# Patient Record
Sex: MALE | Race: WHITE | NOT HISPANIC OR LATINO | Employment: FULL TIME | URBAN - METROPOLITAN AREA
[De-identification: names, ages, dates, MRNs, and addresses within clinical notes are randomized per-mention and may not be internally consistent; named-entity substitution may affect disease eponyms.]

---

## 2017-03-10 ENCOUNTER — PATIENT MESSAGE (OUTPATIENT)
Dept: PSYCHIATRY | Facility: CLINIC | Age: 42
End: 2017-03-10

## 2017-03-13 ENCOUNTER — OFFICE VISIT (OUTPATIENT)
Dept: PSYCHIATRY | Facility: CLINIC | Age: 42
End: 2017-03-13
Payer: COMMERCIAL

## 2017-03-13 VITALS — DIASTOLIC BLOOD PRESSURE: 103 MMHG | HEART RATE: 73 BPM | SYSTOLIC BLOOD PRESSURE: 171 MMHG | HEIGHT: 69 IN

## 2017-03-13 DIAGNOSIS — F41.1 GAD (GENERALIZED ANXIETY DISORDER): ICD-10-CM

## 2017-03-13 DIAGNOSIS — F33.1 MODERATE EPISODE OF RECURRENT MAJOR DEPRESSIVE DISORDER: ICD-10-CM

## 2017-03-13 PROBLEM — F32.A DEPRESSION: Status: ACTIVE | Noted: 2017-03-13

## 2017-03-13 PROCEDURE — 90792 PSYCH DIAG EVAL W/MED SRVCS: CPT | Mod: S$GLB,,, | Performed by: PSYCHIATRY & NEUROLOGY

## 2017-03-13 PROCEDURE — 99999 PR PBB SHADOW E&M-EST. PATIENT-LVL II: CPT | Mod: PBBFAC,,, | Performed by: PSYCHIATRY & NEUROLOGY

## 2017-03-13 RX ORDER — DULOXETIN HYDROCHLORIDE 60 MG/1
60 CAPSULE, DELAYED RELEASE ORAL 2 TIMES DAILY
Qty: 60 CAPSULE | Refills: 5 | Status: SHIPPED | OUTPATIENT
Start: 2017-03-13 | End: 2017-07-04 | Stop reason: SDUPTHER
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
## 2017-03-24 ENCOUNTER — TELEPHONE (OUTPATIENT)
Dept: INTERNAL MEDICINE | Facility: CLINIC | Age: 42
End: 2017-03-24

## 2017-03-24 DIAGNOSIS — Z20.828 EXPOSURE TO INFLUENZA: Primary | ICD-10-CM

## 2017-03-24 RX ORDER — OSELTAMIVIR PHOSPHATE 75 MG/1
75 CAPSULE ORAL DAILY
Qty: 7 CAPSULE | Refills: 0 | Status: SHIPPED | OUTPATIENT
Start: 2017-03-24 | End: 2017-03-31

## 2017-03-24 NOTE — TELEPHONE ENCOUNTER
----- Message from Ava Leyva sent at 3/24/2017 12:12 PM CDT -----  Contact: [phone]/ self   Patient;s daughter has flu and patient wouild like prescription for tamiflu to be called in. Please advise.

## 2017-04-26 ENCOUNTER — OFFICE VISIT (OUTPATIENT)
Dept: PSYCHIATRY | Facility: CLINIC | Age: 42
End: 2017-04-26
Payer: COMMERCIAL

## 2017-04-26 VITALS
HEART RATE: 102 BPM | SYSTOLIC BLOOD PRESSURE: 155 MMHG | WEIGHT: 225.81 LBS | BODY MASS INDEX: 33.45 KG/M2 | DIASTOLIC BLOOD PRESSURE: 89 MMHG | HEIGHT: 69 IN

## 2017-04-26 DIAGNOSIS — F41.1 GAD (GENERALIZED ANXIETY DISORDER): Primary | ICD-10-CM

## 2017-04-26 DIAGNOSIS — F32.89 DEPRESSIVE DISORDER, NOT ELSEWHERE CLASSIFIED: ICD-10-CM

## 2017-04-26 PROCEDURE — 90836 PSYTX W PT W E/M 45 MIN: CPT | Mod: S$GLB,,, | Performed by: PSYCHIATRY & NEUROLOGY

## 2017-04-26 PROCEDURE — 99214 OFFICE O/P EST MOD 30 MIN: CPT | Mod: S$GLB,,, | Performed by: PSYCHIATRY & NEUROLOGY

## 2017-04-26 PROCEDURE — 99999 PR PBB SHADOW E&M-EST. PATIENT-LVL II: CPT | Mod: PBBFAC,,, | Performed by: PSYCHIATRY & NEUROLOGY

## 2017-04-26 PROCEDURE — 1160F RVW MEDS BY RX/DR IN RCRD: CPT | Mod: S$GLB,,, | Performed by: PSYCHIATRY & NEUROLOGY

## 2017-04-26 RX ORDER — LAMOTRIGINE 25 MG/1
TABLET ORAL
Qty: 30 TABLET | Refills: 2 | Status: SHIPPED | OUTPATIENT
Start: 2017-04-26 | End: 2017-06-15
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
## 2017-05-12 ENCOUNTER — PATIENT MESSAGE (OUTPATIENT)
Dept: PSYCHIATRY | Facility: CLINIC | Age: 42
End: 2017-05-12

## 2017-05-17 ENCOUNTER — PATIENT MESSAGE (OUTPATIENT)
Dept: PSYCHIATRY | Facility: CLINIC | Age: 42
End: 2017-05-17

## 2017-06-07 ENCOUNTER — OFFICE VISIT (OUTPATIENT)
Dept: INTERNAL MEDICINE | Facility: CLINIC | Age: 42
End: 2017-06-07
Payer: COMMERCIAL

## 2017-06-07 ENCOUNTER — PATIENT MESSAGE (OUTPATIENT)
Dept: INTERNAL MEDICINE | Facility: CLINIC | Age: 42
End: 2017-06-07

## 2017-06-07 ENCOUNTER — HOSPITAL ENCOUNTER (OUTPATIENT)
Dept: RADIOLOGY | Facility: HOSPITAL | Age: 42
Discharge: HOME | End: 2017-06-07
Attending: INTERNAL MEDICINE
Payer: COMMERCIAL

## 2017-06-07 VITALS
TEMPERATURE: 98 F | HEART RATE: 88 BPM | DIASTOLIC BLOOD PRESSURE: 99 MMHG | SYSTOLIC BLOOD PRESSURE: 136 MMHG | HEIGHT: 69 IN | RESPIRATION RATE: 16 BRPM | BODY MASS INDEX: 33.86 KG/M2 | WEIGHT: 228.63 LBS

## 2017-06-07 DIAGNOSIS — R10.11 RIGHT UPPER QUADRANT PAIN: Primary | ICD-10-CM

## 2017-06-07 DIAGNOSIS — Z00.00 ROUTINE GENERAL MEDICAL EXAMINATION AT A HEALTH CARE FACILITY: Primary | ICD-10-CM

## 2017-06-07 DIAGNOSIS — R10.11 RIGHT UPPER QUADRANT PAIN: ICD-10-CM

## 2017-06-07 PROCEDURE — 71110 X-RAY EXAM RIBS BIL 3 VIEWS: CPT | Mod: TC,PO

## 2017-06-07 PROCEDURE — 99214 OFFICE O/P EST MOD 30 MIN: CPT | Mod: S$GLB,,, | Performed by: INTERNAL MEDICINE

## 2017-06-07 PROCEDURE — 99999 PR PBB SHADOW E&M-EST. PATIENT-LVL III: CPT | Mod: PBBFAC,,, | Performed by: INTERNAL MEDICINE

## 2017-06-07 PROCEDURE — 71110 X-RAY EXAM RIBS BIL 3 VIEWS: CPT | Mod: 26,,, | Performed by: RADIOLOGY

## 2017-06-07 RX ORDER — TIZANIDINE 2 MG/1
2 TABLET ORAL NIGHTLY PRN
Qty: 14 TABLET | Refills: 0 | Status: SHIPPED | OUTPATIENT
Start: 2017-06-07 | End: 2017-06-17

## 2017-06-07 RX ORDER — METHYLPREDNISOLONE 4 MG/1
TABLET ORAL
Qty: 1 PACKAGE | Refills: 0 | Status: SHIPPED | OUTPATIENT
Start: 2017-06-07 | End: 2017-06-28

## 2017-06-07 NOTE — PROGRESS NOTES
"Subjective:       Patient ID: Jesse Hernández is a 42 y.o. male.    Chief Complaint: Flank Pain (right)    HPI     Patient is a 42 year old male here today with a 3 day history of right sided upper quadrant/rib cage associated pain. He says he had an "attack of his allergies" the other morning causing him to sneeze and cough quite a bit. He wasn't sure if he pulled a muscle or hurt his ribcage in doing so by sneezing so much but since then it is sore. Causing him sharp pain with movement. No change with eating meals. No fever/chills. No nausea.vomiting. No constipation or diarrhea. No diaphoresis, no chest heaviness, no palpitations, no cough, no sob or wheezing. No hemoptysis.    Review of Systems   Constitutional: Negative for chills, fatigue and fever.   HENT: Negative for congestion, ear pain, postnasal drip, rhinorrhea, sinus pressure and sore throat.    Eyes: Negative for itching and visual disturbance.   Respiratory: Negative for cough, shortness of breath and wheezing.    Cardiovascular: Negative for chest pain, palpitations and leg swelling.   Gastrointestinal: Positive for abdominal pain. Negative for nausea.   Genitourinary: Negative for dysuria.   Musculoskeletal: Negative for arthralgias and myalgias.   Skin: Negative for rash.   Neurological: Negative for weakness, light-headedness and headaches.       Objective:      Physical Exam   Constitutional: He is oriented to person, place, and time. He appears well-developed and well-nourished. No distress.   HENT:   Head: Normocephalic and atraumatic.   Mouth/Throat: Oropharynx is clear and moist. No oropharyngeal exudate.   Eyes: Conjunctivae and EOM are normal. Pupils are equal, round, and reactive to light.   Neck: Normal range of motion. Neck supple.   Cardiovascular: Normal rate, regular rhythm and normal heart sounds.    No murmur heard.  No rib cage tenderness to palpation   But pain was elicited with having him sit straight up and move his torso   "   Pulmonary/Chest: Effort normal and breath sounds normal. No respiratory distress. He has no wheezes.   Neurological: He is alert and oriented to person, place, and time.   Skin: Skin is warm. He is not diaphoretic.   Nursing note and vitals reviewed.      Assessment:       1. Right upper quadrant pain        Plan:       Patient describes rib cage pain worse with position, rotating torso in bed, straightening is spine. No trauma described. Sounds more like costochondritis. He has taken ibuprofen without much relief. Try of Medrol Dose Pack. Rib Xray today. Call me by Friday if sx have not slightly improved. Rx for Zanaflex 2 mg nightly prn muscle spasm.

## 2017-06-08 ENCOUNTER — TELEPHONE (OUTPATIENT)
Dept: INTERNAL MEDICINE | Facility: CLINIC | Age: 42
End: 2017-06-08

## 2017-06-08 NOTE — TELEPHONE ENCOUNTER
----- Message from Jose Luis Sharpe MD sent at 6/7/2017  3:50 PM CDT -----  Please let patient know that his rib xray was normal and lungs appeared normal as well. Thanks

## 2017-06-12 ENCOUNTER — PATIENT MESSAGE (OUTPATIENT)
Dept: INTERNAL MEDICINE | Facility: CLINIC | Age: 42
End: 2017-06-12

## 2017-06-12 DIAGNOSIS — R07.89 CHEST PAIN, ATYPICAL: Primary | ICD-10-CM

## 2017-06-14 NOTE — TELEPHONE ENCOUNTER
Spoke to patient and informed him that Dr. Sharpe suggest a CT of the chest.  Patient will wait a day or two to see if he will agree to have CT done.

## 2017-06-14 NOTE — TELEPHONE ENCOUNTER
Please let patient know that since he is still having pain, I would recommend a CT chest to be checked. Thank you.

## 2017-06-15 ENCOUNTER — OFFICE VISIT (OUTPATIENT)
Dept: PSYCHIATRY | Facility: CLINIC | Age: 42
End: 2017-06-15
Payer: COMMERCIAL

## 2017-06-15 VITALS
HEART RATE: 76 BPM | DIASTOLIC BLOOD PRESSURE: 108 MMHG | SYSTOLIC BLOOD PRESSURE: 152 MMHG | WEIGHT: 228 LBS | HEIGHT: 69 IN | BODY MASS INDEX: 33.77 KG/M2

## 2017-06-15 DIAGNOSIS — F41.1 GAD (GENERALIZED ANXIETY DISORDER): ICD-10-CM

## 2017-06-15 DIAGNOSIS — F33.40 MAJOR DEPRESSIVE DISORDER, RECURRENT, IN REMISSION: Primary | ICD-10-CM

## 2017-06-15 PROCEDURE — 99999 PR PBB SHADOW E&M-EST. PATIENT-LVL III: CPT | Mod: PBBFAC,,, | Performed by: PSYCHIATRY & NEUROLOGY

## 2017-06-15 PROCEDURE — 99214 OFFICE O/P EST MOD 30 MIN: CPT | Mod: S$GLB,,, | Performed by: PSYCHIATRY & NEUROLOGY

## 2017-06-15 RX ORDER — LAMOTRIGINE 100 MG/1
100 TABLET ORAL DAILY
Qty: 30 TABLET | Refills: 2 | Status: SHIPPED | OUTPATIENT
Start: 2017-06-15 | End: 2017-06-29 | Stop reason: SDUPTHER

## 2017-06-15 RX ORDER — QUETIAPINE FUMARATE 25 MG/1
25 TABLET, FILM COATED ORAL 2 TIMES DAILY PRN
Qty: 60 TABLET | Refills: 2 | Status: SHIPPED | OUTPATIENT
Start: 2017-06-15 | End: 2018-02-12

## 2017-06-15 NOTE — PATIENT INSTRUCTIONS
1. Continue cymbalta 60 mg daily.  2. Increase lamictal to 100 mg daily, sent a prescription for 100 mg tablet to your pharmacy.  3. Start seroquel 12.5 - 25 mg up to twice a day as needed for anxiety, irritability. (1/2 to 1 tablet)  4. Recommend: Gennaro Aguila, Nhi Zuniga, John Barone and Triny Alarcon for therapy.  5. Return on 6/29 at 8am.

## 2017-06-16 NOTE — TELEPHONE ENCOUNTER
----- Message from Vika Hyde sent at 6/16/2017 10:56 AM CDT -----  Contact: pt [phone]  Patient is returning a phone call.  Who left a message for the patient: nurse  Does patient know what this is regarding:  Ct scan  Comments:

## 2017-06-29 ENCOUNTER — OFFICE VISIT (OUTPATIENT)
Dept: PSYCHIATRY | Facility: CLINIC | Age: 42
End: 2017-06-29
Payer: COMMERCIAL

## 2017-06-29 VITALS
DIASTOLIC BLOOD PRESSURE: 102 MMHG | BODY MASS INDEX: 33.77 KG/M2 | SYSTOLIC BLOOD PRESSURE: 142 MMHG | HEIGHT: 69 IN | HEART RATE: 71 BPM | WEIGHT: 228 LBS

## 2017-06-29 DIAGNOSIS — F41.1 GAD (GENERALIZED ANXIETY DISORDER): ICD-10-CM

## 2017-06-29 DIAGNOSIS — F33.40 MAJOR DEPRESSIVE DISORDER, RECURRENT, IN REMISSION: ICD-10-CM

## 2017-06-29 PROCEDURE — 90833 PSYTX W PT W E/M 30 MIN: CPT | Mod: S$GLB,,, | Performed by: PSYCHIATRY & NEUROLOGY

## 2017-06-29 PROCEDURE — 99214 OFFICE O/P EST MOD 30 MIN: CPT | Mod: S$GLB,,, | Performed by: PSYCHIATRY & NEUROLOGY

## 2017-06-29 PROCEDURE — 99999 PR PBB SHADOW E&M-EST. PATIENT-LVL II: CPT | Mod: PBBFAC,,, | Performed by: PSYCHIATRY & NEUROLOGY

## 2017-06-29 RX ORDER — LAMOTRIGINE 200 MG/1
200 TABLET ORAL DAILY
Qty: 30 TABLET | Refills: 2 | Status: SHIPPED | OUTPATIENT
Start: 2017-06-29 | End: 2017-10-08 | Stop reason: SDUPTHER
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
## 2017-07-04 RX ORDER — DULOXETIN HYDROCHLORIDE 60 MG/1
60 CAPSULE, DELAYED RELEASE ORAL DAILY
Qty: 60 CAPSULE | Refills: 2 | Status: SHIPPED | OUTPATIENT
Start: 2017-07-04 | End: 2018-02-12 | Stop reason: SDUPTHER

## 2017-08-09 ENCOUNTER — OFFICE VISIT (OUTPATIENT)
Dept: INTERNAL MEDICINE | Facility: CLINIC | Age: 42
End: 2017-08-09
Payer: COMMERCIAL

## 2017-08-09 VITALS
HEIGHT: 70 IN | OXYGEN SATURATION: 97 % | DIASTOLIC BLOOD PRESSURE: 88 MMHG | HEART RATE: 98 BPM | SYSTOLIC BLOOD PRESSURE: 132 MMHG | BODY MASS INDEX: 32.82 KG/M2 | WEIGHT: 229.25 LBS

## 2017-08-09 DIAGNOSIS — R06.83 SNORING: ICD-10-CM

## 2017-08-09 DIAGNOSIS — R03.0 ELEVATED BLOOD PRESSURE READING IN OFFICE WITHOUT DIAGNOSIS OF HYPERTENSION: ICD-10-CM

## 2017-08-09 DIAGNOSIS — Z00.00 PREVENTATIVE HEALTH CARE: Primary | ICD-10-CM

## 2017-08-09 DIAGNOSIS — E66.9 OBESITY (BMI 30.0-34.9): ICD-10-CM

## 2017-08-09 DIAGNOSIS — Z23 NEED FOR TDAP VACCINATION: ICD-10-CM

## 2017-08-09 DIAGNOSIS — G47.10 DAYTIME HYPERSOMNIA: ICD-10-CM

## 2017-08-09 DIAGNOSIS — R91.1 PULMONARY NODULE: ICD-10-CM

## 2017-08-09 DIAGNOSIS — M79.676 PAIN OF TOE, UNSPECIFIED LATERALITY: ICD-10-CM

## 2017-08-09 PROBLEM — E66.811 OBESITY (BMI 30.0-34.9): Status: ACTIVE | Noted: 2017-08-09

## 2017-08-09 PROCEDURE — 99396 PREV VISIT EST AGE 40-64: CPT | Mod: S$GLB,,, | Performed by: INTERNAL MEDICINE

## 2017-08-09 PROCEDURE — 99999 PR PBB SHADOW E&M-EST. PATIENT-LVL IV: CPT | Mod: PBBFAC,,, | Performed by: INTERNAL MEDICINE

## 2017-08-09 NOTE — PROGRESS NOTES
Subjective:       Patient ID: Jesse Hernández is a 42 y.o. male.    Chief Complaint: Annual Exam    HPI 42-year-old male presents to clinic today for annual physical exam.  He reports isolated episode where his toe great toe hurt for around a week with some swelling pain and sensitivity no redness.  He increase his water and cherry intake.  Seems to have resolved.  He is followed by psychiatry for mood disorder.  He has been on medications since March of last year.  His blood pressure is been running elevated periodically over this time.  He denies any symptoms.  He is recent started exercising and eating better.  We discussed treatment options and he like to hold off on starting vacations for his blood pressure while he works on this.  He feels he can control it.  His medications for mood seemed to be working very well. Review of Systems  Otherwise negative   Objective:      Physical Exam  General: Well-appearing, well-nourished.  No distress  HEENT: conjunctivae are normal.  Pupils are equal and reative to light.  TM's are clear and intact bilaterally.  Hearing is grossly normal.  Nasopharynx is clear.  Oropharynx is clear.  Neck: Supple.  No thyroid megaly.  No bruits.  Lymph: No cervical or supraclavicular adenopathy.  Heart: Regular rate and rhythm, without murmur, rub or gallop.  Lungs: Clear to auscultation; respiratory effort normal.  Abdomen: Soft, nontender, nondistended.  Normoactive bowel sounds.  No hepatomegaly.  No masses.  Extremities: Good distal pulses.  No edema.  Psych: Oriented to time person place.  Judgment and insight seem unimpaired.  Mood and affect are appropriate.  Assessment:       1. Preventative health care    2. Need for Tdap vaccination    3. Pulmonary nodule    4. Pain of toe, unspecified laterality    5. Daytime hypersomnia    6. Snoring    7. Obesity (BMI 30.0-34.9)    8. Elevated blood pressure reading in office without diagnosis of hypertension        Plan:       Jesse was seen  today for annual exam.    Diagnoses and all orders for this visit:    Preventative health care  -     CBC auto differential; Standing  -     Comprehensive metabolic panel; Standing  -     Lipid panel; Standing  -     TSH; Standing  -     PSA, Screening; Standing    Need for Tdap vaccination  -   -     (In Office Administered) Tdap Vaccine    Pulmonary nodule  -     CT Chest Without Contrast; Future  Former smoker  Pain of toe, unspecified laterality  -     Uric acid; Future    Daytime hypersomnia  -     Ambulatory consult for Sleep Study    Snoring  -     Ambulatory consult for Sleep Study    Obesity (BMI 30.0-34.9)  Recommended weight loss counseling provided.  Elevated blood pressure reading in office without diagnosis of hypertension  Monitor blood pressure mandatory setting discussed goal blood pressure less than 140/90.  If blood pressure running greater than this and he is not able to get it under better control with healthy lifestyle, then he will need medication management.  Discussed low-sodium diet.

## 2017-08-10 ENCOUNTER — LAB VISIT (OUTPATIENT)
Dept: LAB | Facility: HOSPITAL | Age: 42
End: 2017-08-10
Attending: INTERNAL MEDICINE
Payer: COMMERCIAL

## 2017-08-10 DIAGNOSIS — M79.676 PAIN OF TOE, UNSPECIFIED LATERALITY: ICD-10-CM

## 2017-08-10 DIAGNOSIS — Z00.00 PREVENTATIVE HEALTH CARE: ICD-10-CM

## 2017-08-10 LAB
ALBUMIN SERPL BCP-MCNC: 3.7 G/DL
ALP SERPL-CCNC: 59 U/L
ALT SERPL W/O P-5'-P-CCNC: 50 U/L
ANION GAP SERPL CALC-SCNC: 10 MMOL/L
AST SERPL-CCNC: 29 U/L
BASOPHILS # BLD AUTO: 0.03 K/UL
BASOPHILS NFR BLD: 0.4 %
BILIRUB SERPL-MCNC: 0.6 MG/DL
BUN SERPL-MCNC: 15 MG/DL
CALCIUM SERPL-MCNC: 9.3 MG/DL
CHLORIDE SERPL-SCNC: 106 MMOL/L
CHOLEST/HDLC SERPL: 7.3 {RATIO}
CO2 SERPL-SCNC: 23 MMOL/L
COMPLEXED PSA SERPL-MCNC: 0.48 NG/ML
CREAT SERPL-MCNC: 1.1 MG/DL
DIFFERENTIAL METHOD: NORMAL
EOSINOPHIL # BLD AUTO: 0.1 K/UL
EOSINOPHIL NFR BLD: 1.3 %
ERYTHROCYTE [DISTWIDTH] IN BLOOD BY AUTOMATED COUNT: 13.9 %
EST. GFR  (AFRICAN AMERICAN): >60 ML/MIN/1.73 M^2
EST. GFR  (NON AFRICAN AMERICAN): >60 ML/MIN/1.73 M^2
GLUCOSE SERPL-MCNC: 103 MG/DL
HCT VFR BLD AUTO: 48.1 %
HDL/CHOLESTEROL RATIO: 13.8 %
HDLC SERPL-MCNC: 290 MG/DL
HDLC SERPL-MCNC: 40 MG/DL
HGB BLD-MCNC: 16.6 G/DL
LDLC SERPL CALC-MCNC: 184.6 MG/DL
LYMPHOCYTES # BLD AUTO: 1.6 K/UL
LYMPHOCYTES NFR BLD: 22.8 %
MCH RBC QN AUTO: 29.6 PG
MCHC RBC AUTO-ENTMCNC: 34.5 G/DL
MCV RBC AUTO: 86 FL
MONOCYTES # BLD AUTO: 0.6 K/UL
MONOCYTES NFR BLD: 7.8 %
NEUTROPHILS # BLD AUTO: 4.7 K/UL
NEUTROPHILS NFR BLD: 67.3 %
NONHDLC SERPL-MCNC: 250 MG/DL
PLATELET # BLD AUTO: 251 K/UL
PMV BLD AUTO: 10.6 FL
POTASSIUM SERPL-SCNC: 4.3 MMOL/L
PROT SERPL-MCNC: 6.6 G/DL
RBC # BLD AUTO: 5.6 M/UL
SODIUM SERPL-SCNC: 139 MMOL/L
TRIGL SERPL-MCNC: 327 MG/DL
TSH SERPL DL<=0.005 MIU/L-ACNC: 3.17 UIU/ML
URATE SERPL-MCNC: 7.3 MG/DL
WBC # BLD AUTO: 7.02 K/UL

## 2017-08-10 PROCEDURE — 80061 LIPID PANEL: CPT

## 2017-08-10 PROCEDURE — 84443 ASSAY THYROID STIM HORMONE: CPT

## 2017-08-10 PROCEDURE — 36415 COLL VENOUS BLD VENIPUNCTURE: CPT | Mod: PO

## 2017-08-10 PROCEDURE — 84550 ASSAY OF BLOOD/URIC ACID: CPT

## 2017-08-10 PROCEDURE — 84153 ASSAY OF PSA TOTAL: CPT

## 2017-08-10 PROCEDURE — 85025 COMPLETE CBC W/AUTO DIFF WBC: CPT

## 2017-08-10 PROCEDURE — 80053 COMPREHEN METABOLIC PANEL: CPT

## 2017-08-14 ENCOUNTER — OFFICE VISIT (OUTPATIENT)
Dept: FAMILY MEDICINE | Facility: CLINIC | Age: 42
End: 2017-08-14
Payer: COMMERCIAL

## 2017-08-14 VITALS
WEIGHT: 223.31 LBS | HEIGHT: 70 IN | OXYGEN SATURATION: 97 % | DIASTOLIC BLOOD PRESSURE: 80 MMHG | SYSTOLIC BLOOD PRESSURE: 122 MMHG | BODY MASS INDEX: 31.97 KG/M2 | HEART RATE: 82 BPM | TEMPERATURE: 98 F

## 2017-08-14 DIAGNOSIS — J20.9 ACUTE BRONCHITIS, UNSPECIFIED ORGANISM: Primary | ICD-10-CM

## 2017-08-14 PROCEDURE — 99999 PR PBB SHADOW E&M-EST. PATIENT-LVL III: CPT | Mod: PBBFAC,,, | Performed by: FAMILY MEDICINE

## 2017-08-14 PROCEDURE — 3008F BODY MASS INDEX DOCD: CPT | Mod: S$GLB,,, | Performed by: FAMILY MEDICINE

## 2017-08-14 PROCEDURE — 99214 OFFICE O/P EST MOD 30 MIN: CPT | Mod: S$GLB,,, | Performed by: FAMILY MEDICINE

## 2017-08-14 RX ORDER — AZITHROMYCIN 250 MG/1
TABLET, FILM COATED ORAL
Qty: 6 TABLET | Refills: 0 | Status: SHIPPED | OUTPATIENT
Start: 2017-08-14 | End: 2017-08-19

## 2017-08-14 RX ORDER — ALBUTEROL SULFATE 90 UG/1
2 AEROSOL, METERED RESPIRATORY (INHALATION) EVERY 6 HOURS PRN
Qty: 18 G | Refills: 0 | Status: SHIPPED | OUTPATIENT
Start: 2017-08-14 | End: 2018-02-15

## 2017-08-14 NOTE — PROGRESS NOTES
Subjective:       Patient ID: Jesse Hernández is a 42 y.o. male.    Chief Complaint: Sinusitis (x 1 week); Cough; and Fatigue    HPI 42 year old male here for one week history of sinus pressure, post nasal drip, and a dry cough. Patient states at night he can hear wheezing. He states symptoms are worsening and he had to stay home from work today.  He denies chest tightness. He has tried robitussin, sudafed, and benadryl without relief. Patient has tried zyrtec and allegra. He states allegra has helped the most in the past.  Review of Systems   Constitutional: Positive for fatigue. Negative for chills and fever.   HENT: Positive for postnasal drip. Negative for sinus pressure.    Respiratory: Positive for cough and wheezing.    Cardiovascular: Negative for chest pain and leg swelling.   Gastrointestinal: Negative for abdominal pain, diarrhea, nausea and vomiting.       Objective:      Vitals:    08/14/17 1744   BP: 122/80   Pulse: 82   Temp: 98.4 °F (36.9 °C)     Physical Exam   Constitutional: He is oriented to person, place, and time. He appears well-developed and well-nourished.   HENT:   Mouth/Throat: Oropharynx is clear and moist. No oropharyngeal exudate.   Eyes: EOM are normal. Right eye exhibits no discharge. Left eye exhibits no discharge.   Neck: Normal range of motion.   Cardiovascular: Normal rate and regular rhythm.    Pulmonary/Chest: Effort normal. He has wheezes.   Mild expiratory wheezing bilaterally   Abdominal: Soft. There is no tenderness. There is no guarding.   Lymphadenopathy:     He has no cervical adenopathy.   Neurological: He is alert and oriented to person, place, and time.   Psychiatric: He has a normal mood and affect. His behavior is normal. Judgment and thought content normal.   Vitals reviewed.      Assessment:       1. Acute bronchitis, unspecified organism        Plan:         1. Acute bronchitis: with patient's history of tobacco use and worsening symptoms, will start zpak and  albuterol prn. Continue water intake. RTC if symptoms worsen.   Acute bronchitis, unspecified organism    Other orders  -     albuterol 90 mcg/actuation inhaler; Inhale 2 puffs into the lungs every 6 (six) hours as needed for Wheezing. Rescue  Dispense: 18 g; Refill: 0  -     azithromycin (Z-ANGEL) 250 MG tablet; Take 2 tablets by mouth on day 1; Take 1 tablet by mouth on days 2-5  Dispense: 6 tablet; Refill: 0      Return if symptoms worsen or fail to improve.

## 2017-08-14 NOTE — PATIENT INSTRUCTIONS
Viral Upper Respiratory Illness with Wheezing (Adult)  You have a viral upper respiratory illness (URI), which is another term for the common cold. When the infection causes a lot of irritation, the air passages can go into spasm. This causes wheezing and shortness of breath.    This illness is contagious during the first few days. It is spread through the air by coughing and sneezing. It may also be spread by direct contact (touching the sick person and then touching your own eyes, nose, or mouth). Frequent handwashing will decrease the risk.  Most viral illnesses go away within 7 to 10 days with rest and simple home remedies. Sometimes the illness may last for several weeks. Antibiotics will not kill a virus, and they are generally not prescribed for this condition.  Home care  · If symptoms are severe, rest at home for the first 2 to 3 days. When you resume activity, don't let yourself get too tired.  · Avoid being exposed to cigarette smoke (yours or others).  · You may use acetaminophen or ibuprofen to control pain and fever, unless another medicine was prescribed. (Note: If you have chronic liver or kidney disease, have ever had a stomach ulcer or gastrointestinal bleeding, or are taking blood-thinning medicines, talk with your healthcare provider before using these medicines.) Aspirin should never be given to anyone under 18 years of age who is ill with a viral infection or fever. It may cause severe liver or brain damage.  · Your appetite may be poor, so a light diet is fine. Avoid dehydration by drinking 6 to 8 glasses of fluids per day (water, soft drinks, juices, tea, or soup). Extra fluids will help loosen secretions in the nose and lungs.  · Over-the-counter cold medicines will not shorten the length of time youre sick, but they may be helpful for the following symptoms: cough, sore throat, and nasal and sinus congestion. (Note: Do not use decongestants if you have high blood pressure.)  Follow-up  care  Follow up with your healthcare provider, or as advised.  When to seek medical advice  Call your healthcare provider right away if any of these occur:  · Cough with lots of colored sputum (mucus)  · Severe headache; face, neck, or ear pain  · Difficulty swallowing due to throat pain  · Fever of 100.4ºF (38ºC) or higher, or as directed by your healthcare provider  Call 911, or get immediate medical care  Call emergency services right away if any of these occur:  · Chest pain, shortness of breath, worsening wheezing, or difficulty breathing  · Coughing up blood  · Inability to swallow due to throat pain  Date Last Reviewed: 9/13/2015  © 6345-9639 [employer]. [address] [place] [address], [place] [address]. All rights reserved. This information is not intended as a substitute for professional medical care. Always follow your healthcare professional's instructions.

## 2017-08-15 ENCOUNTER — HOSPITAL ENCOUNTER (OUTPATIENT)
Dept: RADIOLOGY | Facility: HOSPITAL | Age: 42
Discharge: HOME | End: 2017-08-15
Attending: INTERNAL MEDICINE
Payer: COMMERCIAL

## 2017-08-15 DIAGNOSIS — R91.1 PULMONARY NODULE: ICD-10-CM

## 2017-08-15 PROCEDURE — 71250 CT THORAX DX C-: CPT | Mod: 26,,, | Performed by: RADIOLOGY

## 2017-08-15 PROCEDURE — 71250 CT THORAX DX C-: CPT | Mod: TC

## 2017-08-17 ENCOUNTER — PATIENT MESSAGE (OUTPATIENT)
Dept: INTERNAL MEDICINE | Facility: CLINIC | Age: 42
End: 2017-08-17

## 2017-08-17 DIAGNOSIS — J40 BRONCHITIS: Primary | ICD-10-CM

## 2017-08-18 RX ORDER — METHYLPREDNISOLONE 4 MG/1
TABLET ORAL
Qty: 1 PACKAGE | Refills: 0 | Status: SHIPPED | OUTPATIENT
Start: 2017-08-18 | End: 2018-02-12

## 2017-08-21 ENCOUNTER — PATIENT MESSAGE (OUTPATIENT)
Dept: INTERNAL MEDICINE | Facility: CLINIC | Age: 42
End: 2017-08-21

## 2017-08-23 ENCOUNTER — TELEPHONE (OUTPATIENT)
Dept: INTERNAL MEDICINE | Facility: CLINIC | Age: 42
End: 2017-08-23

## 2017-08-23 NOTE — TELEPHONE ENCOUNTER
----- Message from Baljit Coburn sent at 8/23/2017 12:37 PM CDT -----  Contact: self/[phone]  Patient is calling to get results from recent test.  Please call and advise.

## 2017-08-23 NOTE — TELEPHONE ENCOUNTER
Patient was requested to come in for an office visit due to multiple abnormalities needed to be addressed and to discuss treatment plan.  Patient no showed his appointment today and when contacted stated he did not want to pay co-pay.  Will send communication regarding all patient's test results including labs and CT of chest.  See letter section or patient email of chart.  Additionally he was asked to come back for follow-up blood pressure in the office.  Nurse spoke to patient and he has not willing to come in to the office.

## 2017-08-24 ENCOUNTER — PATIENT MESSAGE (OUTPATIENT)
Dept: INTERNAL MEDICINE | Facility: CLINIC | Age: 42
End: 2017-08-24

## 2017-08-28 ENCOUNTER — PATIENT MESSAGE (OUTPATIENT)
Dept: PSYCHIATRY | Facility: CLINIC | Age: 42
End: 2017-08-28

## 2017-08-30 DIAGNOSIS — M10.9 GOUT, UNSPECIFIED CAUSE, UNSPECIFIED CHRONICITY, UNSPECIFIED SITE: Primary | ICD-10-CM

## 2017-09-06 ENCOUNTER — CLINICAL SUPPORT (OUTPATIENT)
Dept: FAMILY MEDICINE | Facility: CLINIC | Age: 42
End: 2017-09-06
Payer: COMMERCIAL

## 2017-09-06 DIAGNOSIS — Z23 NEED FOR TDAP VACCINATION: Primary | ICD-10-CM

## 2017-09-06 PROCEDURE — 90715 TDAP VACCINE 7 YRS/> IM: CPT | Mod: S$GLB,,, | Performed by: INTERNAL MEDICINE

## 2017-09-06 PROCEDURE — 90471 IMMUNIZATION ADMIN: CPT | Mod: S$GLB,,, | Performed by: INTERNAL MEDICINE

## 2017-10-09 RX ORDER — LAMOTRIGINE 200 MG/1
TABLET ORAL
Qty: 30 TABLET | Refills: 0 | Status: SHIPPED | OUTPATIENT
Start: 2017-10-09 | End: 2017-11-06 | Stop reason: SDUPTHER

## 2017-10-18 ENCOUNTER — OFFICE VISIT (OUTPATIENT)
Dept: SLEEP MEDICINE | Facility: CLINIC | Age: 42
End: 2017-10-18
Payer: COMMERCIAL

## 2017-10-18 VITALS
HEIGHT: 70 IN | BODY MASS INDEX: 32.67 KG/M2 | SYSTOLIC BLOOD PRESSURE: 135 MMHG | HEART RATE: 66 BPM | DIASTOLIC BLOOD PRESSURE: 83 MMHG | WEIGHT: 228.19 LBS

## 2017-10-18 DIAGNOSIS — G47.30 SLEEP APNEA, UNSPECIFIED TYPE: Primary | ICD-10-CM

## 2017-10-18 PROCEDURE — 99999 PR PBB SHADOW E&M-EST. PATIENT-LVL III: CPT | Mod: PBBFAC,,, | Performed by: PSYCHIATRY & NEUROLOGY

## 2017-10-18 PROCEDURE — 99204 OFFICE O/P NEW MOD 45 MIN: CPT | Mod: S$GLB,,, | Performed by: PSYCHIATRY & NEUROLOGY

## 2017-10-18 NOTE — PATIENT INSTRUCTIONS
SLEEP LAB ([facility] or [facility]) will contact you to schedulethe sleep study. Their number is [phone] (ext 2). Please call them if you do not hear from them in 10 business days from now.  The [facility] is located on 7th floor of the [facility]; [place] lab is located in [facility].    SLEEP CLINIC (my assistant) will call you when the sleep study results are ready - if you have not heard from us by 2 weeks from the date of the study, please call [phone] (ext 1) or you can [facility] to contact me.    You are advised to abstain from driving should you feel sleepy or drowsy.

## 2017-10-18 NOTE — PROGRESS NOTES
Jesse Hernández  was seen at the request of  Roxanne Wolfe MD for sleep evaluation.    10/18/2017 INITIAL HISTORY OF PRESENT ILLNESS:  Jesse Hernández is a 42 y.o. male is here to be evaluated for a sleep disorder.       CHIEF COMPLAINT:      The patient's complaints include excessive daytime sleepiness, excessive daytime fatigue, snoring and interrupted sleep since  Many years ago.    Reports occasional  dry mouth and sore throat  Reports occasional nasal congestion   Reports  morning headaches  Reports occasional  interrupted sleep  Reports occasional frequent leg movements  Denies symptoms concerning for parasomnia    The ESS ([place] Sleepiness Score) taken on initial visit is 12 /24      SLEEP ROUTINE AND LIFESTYLE 10/18/2017 :    Occupation: +    Bed partner: his wife     Time to bed - wake up time on a workday : 9-10 pm to 6 Am  Time to bed - wake up time on a day off: till 6-8  Sleep onset latency: minutes  Disruptions or awakenings: 3-5  Time to fall back into sleep: fast   Perceived sleep quality: 3/5  Perceived total sleep time:  8  hours.  Daytime naps: yes    Exercise routine: yes  Caffeine:  no     PREVIOUS SLEEP STUDIES:     None      DME:       PAST MEDICAL HISTORY:    Active Ambulatory Problems     Diagnosis Date Noted    Fatty liver 01/07/2015    Pulmonary nodule 01/07/2015    Adrenal nodule 03/18/2015    Depressive disorder, not elsewhere classified 04/20/2015    Depression 03/13/2017    ALISON (generalized anxiety disorder) 03/13/2017    Obesity (BMI 30.0-34.9) 08/09/2017    Acute bronchitis 08/14/2017     Resolved Ambulatory Problems     Diagnosis Date Noted    Flu-like symptoms 12/14/2014    Cough 12/14/2014    Fever 12/14/2014    Sore throat 12/14/2014    Bronchitis with influenza 12/18/2014    Arthralgia 01/07/2015    Rib pain 01/07/2015    Obesity 01/07/2015     No Additional Past Medical History                PAST SURGICAL HISTORY:    Past Surgical History:   Procedure  "Laterality Date    BACK SURGERY      SPINE SURGERY           FAMILY HISTORY:                Family History   Problem Relation Age of Onset    Cancer Mother     Dementia Mother     Diabetes Paternal Grandmother        SOCIAL HISTORY:          Tobacco:   History   Smoking Status    Former Smoker    Quit date: 12/1/2008   Smokeless Tobacco    Former User       alcohol use:    History   Alcohol Use    Yes                   ALLERGIES:  Review of patient's allergies indicates:  No Known Allergies    CURRENT MEDICATIONS:    Current Outpatient Prescriptions   Medication Sig Dispense Refill    duloxetine (CYMBALTA) 60 MG capsule Take 1 capsule (60 mg total) by mouth once daily. 60 capsule 2    lamotrigine (LAMICTAL) 200 MG tablet TAKE 1 TABLET(200 MG) BY MOUTH EVERY DAY 30 tablet 0    methylPREDNISolone (MEDROL DOSEPACK) 4 mg tablet Take as directed 1 Package 0    quetiapine (SEROQUEL) 25 MG Tab Take 1 tablet (25 mg total) by mouth 2 (two) times daily as needed. 60 tablet 2    albuterol 90 mcg/actuation inhaler Inhale 2 puffs into the lungs every 6 (six) hours as needed for Wheezing. Rescue 18 g 0     No current facility-administered medications for this visit.                       REVIEW OF SYSTEMS:   Sleep related symptoms as per HPI    reports weight gain 20 lbs  Reports occasional dyspnea  Denies palpitations  Reports acid reflux   Reports polyuria 3  Reports  mood diturbance  Denies  anemia  Reports  muscle pain  Denies  Gait imbalance    Otherwise, a balance of 10 systems reviewed is negative.    PHYSICAL EXAM:  /83 (BP Location: Right arm, Patient Position: Sitting, BP Method: Large (Automatic))   Pulse 66   Ht 5' 10" (1.778 m)   Wt 103.5 kg (228 lb 2.8 oz)   BMI 32.74 kg/m²   GENERAL: Overweight body habitus, well groomed.  HEENT:   HEENT:  Conjunctivae are non-erythematous; Pupils equal, round, and reactive to light; Nose is symmetrical; Nasal mucosa is pink and moist; Septum is midline; " "Inferior turbinates are normal; Nasal airflow is normal; Posterior pharynx is pink; Modified Mallampati:IV; Posterior palate is low; Tonsils not visualized; Uvula is elongated;Tongue is enlarged; Dentition is fair; No TMJ tenderness; Jaw opening and protrusion without click and without discomfort.  NECK: Supple. Neck circumference is 17.5 inches. No thyromegaly. No palpable nodes.     SKIN: On face and neck: No abrasions, no rashes, no lesions.  No subcutaneous nodules are palpable.  RESPIRATORY: Chest is clear to auscultation.  Normal chest expansion and non-labored breathing at rest.  CARDIOVASCULAR: Normal S1, S2.  No murmurs, gallops or rubs. No carotid bruits bilaterally.  No edema. No clubbing. No cyanosis.    NEURO: Oriented to time, place and person. Normal attention span and concentration. Gait normal.    PSYCH: Affect is full. Mood is normal  MUSCULOSKELETAL: Moves 4 extremities. Gait normal.         Using [facility]: yes      ASSESSMENT:    1. Sleep Apnea NEC. The patient symptomatically has  excessive daytime sleepiness, snoring, excessive daytime fatigue and interrupted sleep  with exam findings of "a crowded oral airway and elevated body mass index. The patient has medical co-morbidities of depression,  which can be worsened by KAILEE. This warrants further investigation for possible obstructive sleep apnea.          PLAN:    Diagnostic: Polysomnogram HOME (BCBS). The nature of this procedure and its indication was discussed with the patient. he would  like to come discuss PSG results.    Weight loss strategies were discussed in detail           More than 25 minutes of this 45 minutes visit was spent in counseling: during our discussion today, we talked about the etiology of  KAILEE as well as the potential ramifications of untreated sleep apnea, which could include daytime sleepiness, hypertension, heart disease and/or stroke.  We discussed potential treatment options, which could include weight loss, body " positioning, continuous positive airway pressure (CPAP), or referral for surgical consideration. Meanwhile, he  is urged to avoid supine sleep, weight gain and alcoholic beverages since all of these can worsen KAILEE.     Precautions: The patient was advised to abstain from driving should he feel sleepy or drowsy.    Follow up: MD/NP  after the sleep study has been completed.     Thank you for allowing me the opportunity to participate in the care of your patient.    This visit summary will be sent to referring provider via inbasket

## 2017-10-18 NOTE — LETTER
October 18, 2017      Roxanne Wolfe MD  [address]  [place] [address]           [place]  [address] [address]  Phone: [phone]  Fax: [phone]          Patient: Jesse Hernández   MR Number: [id number]   YOB: 1975   Date of Visit: 10/18/2017       Dear Dr. Roxanne Wolfe:    Thank you for referring Jesse Hernández to me for evaluation. Attached you will find relevant portions of my assessment and plan of care.    If you have questions, please do not hesitate to call me. I look forward to following Jesse Hernández along with you.    Sincerely,    Tiffanie Martin MD    Enclosure  CC:  No Recipients    If you would like to receive this communication electronically, please contact [email or web address] or [phone] to request more information on "[employer] Link access.    For providers and/or their staff who would like to refer a patient to [facility], please contact us through our one-stop-shop provider referral line, [facility], at [phone].    If you feel you have received this communication in error or would no longer like to receive these types of communications, please e-mail [email or web address]

## 2017-11-07 RX ORDER — LAMOTRIGINE 200 MG/1
TABLET ORAL
Qty: 30 TABLET | Refills: 1 | Status: SHIPPED | OUTPATIENT
Start: 2017-11-07 | End: 2018-02-12 | Stop reason: SDUPTHER

## 2017-11-07 RX ORDER — LAMOTRIGINE 200 MG/1
TABLET ORAL
Qty: 30 TABLET | Refills: 0 | Status: SHIPPED | OUTPATIENT
Start: 2017-11-07 | End: 2017-11-07 | Stop reason: SDUPTHER

## 2017-11-08 ENCOUNTER — TELEPHONE (OUTPATIENT)
Dept: SLEEP MEDICINE | Facility: OTHER | Age: 42
End: 2017-11-08

## 2017-12-12 ENCOUNTER — HOSPITAL ENCOUNTER (OUTPATIENT)
Dept: SLEEP MEDICINE | Facility: OTHER | Age: 42
Discharge: HOME | End: 2017-12-12
Attending: PSYCHIATRY & NEUROLOGY
Payer: COMMERCIAL

## 2017-12-12 DIAGNOSIS — G47.33 OSA (OBSTRUCTIVE SLEEP APNEA): ICD-10-CM

## 2017-12-12 DIAGNOSIS — G47.30 SLEEP APNEA, UNSPECIFIED TYPE: ICD-10-CM

## 2017-12-12 PROCEDURE — 95806 SLEEP STUDY UNATT&RESP EFFT: CPT | Mod: 26,,, | Performed by: PSYCHIATRY & NEUROLOGY

## 2017-12-12 PROCEDURE — 95800 SLP STDY UNATTENDED: CPT

## 2017-12-12 NOTE — PROGRESS NOTES
Per physician orders, patient was given home sleep testing device and instructed on how to apply the device before going to bed tonight.  I sized the device and showed the patient using a mirror how the device fits and what it should look like so they can use a mirror when putting it on themselves at home.  We reviewed the instruction booklet and the number to call if they have any questions at night.  Patient understood and was instructed to return the device the next back to the sleep lab.

## 2017-12-27 ENCOUNTER — PATIENT MESSAGE (OUTPATIENT)
Dept: PSYCHIATRY | Facility: CLINIC | Age: 42
End: 2017-12-27

## 2017-12-28 ENCOUNTER — TELEPHONE (OUTPATIENT)
Dept: PSYCHIATRY | Facility: CLINIC | Age: 42
End: 2017-12-28

## 2017-12-29 ENCOUNTER — TELEPHONE (OUTPATIENT)
Dept: PSYCHIATRY | Facility: CLINIC | Age: 42
End: 2017-12-29

## 2017-12-29 NOTE — TELEPHONE ENCOUNTER
Called patient again. Answered but unable to talk because she was shopping. States she will call later and ask for me.

## 2018-01-05 ENCOUNTER — TELEPHONE (OUTPATIENT)
Dept: SLEEP MEDICINE | Facility: CLINIC | Age: 43
End: 2018-01-05

## 2018-01-05 NOTE — TELEPHONE ENCOUNTER
Left vmail asking for call back to schedule appointment with Dr. Martin or one of our nurse practitioners.

## 2018-01-09 ENCOUNTER — PATIENT MESSAGE (OUTPATIENT)
Dept: PSYCHIATRY | Facility: CLINIC | Age: 43
End: 2018-01-09

## 2018-01-18 ENCOUNTER — PATIENT MESSAGE (OUTPATIENT)
Dept: SLEEP MEDICINE | Facility: CLINIC | Age: 43
End: 2018-01-18

## 2018-01-18 ENCOUNTER — PATIENT MESSAGE (OUTPATIENT)
Dept: PSYCHIATRY | Facility: CLINIC | Age: 43
End: 2018-01-18

## 2018-01-21 ENCOUNTER — PATIENT MESSAGE (OUTPATIENT)
Dept: SLEEP MEDICINE | Facility: CLINIC | Age: 43
End: 2018-01-21

## 2018-02-11 ENCOUNTER — PATIENT MESSAGE (OUTPATIENT)
Dept: PSYCHIATRY | Facility: CLINIC | Age: 43
End: 2018-02-11

## 2018-02-12 ENCOUNTER — OFFICE VISIT (OUTPATIENT)
Dept: PSYCHIATRY | Facility: CLINIC | Age: 43
End: 2018-02-12
Payer: COMMERCIAL

## 2018-02-12 VITALS
SYSTOLIC BLOOD PRESSURE: 142 MMHG | DIASTOLIC BLOOD PRESSURE: 93 MMHG | WEIGHT: 226.44 LBS | BODY MASS INDEX: 32.49 KG/M2 | HEART RATE: 83 BPM

## 2018-02-12 DIAGNOSIS — F41.1 GAD (GENERALIZED ANXIETY DISORDER): ICD-10-CM

## 2018-02-12 DIAGNOSIS — F33.40 RECURRENT MAJOR DEPRESSIVE DISORDER, IN REMISSION: Primary | ICD-10-CM

## 2018-02-12 PROCEDURE — 99999 PR PBB SHADOW E&M-EST. PATIENT-LVL III: CPT | Mod: PBBFAC,,, | Performed by: PSYCHIATRY & NEUROLOGY

## 2018-02-12 PROCEDURE — 3008F BODY MASS INDEX DOCD: CPT | Mod: S$GLB,,, | Performed by: PSYCHIATRY & NEUROLOGY

## 2018-02-12 PROCEDURE — 99215 OFFICE O/P EST HI 40 MIN: CPT | Mod: S$GLB,,, | Performed by: PSYCHIATRY & NEUROLOGY

## 2018-02-12 RX ORDER — RISPERIDONE 0.25 MG/1
.25-.5 TABLET ORAL DAILY
Qty: 60 TABLET | Refills: 2 | Status: SHIPPED | OUTPATIENT
Start: 2018-02-12 | End: 2018-03-31

## 2018-02-12 RX ORDER — LAMOTRIGINE 200 MG/1
TABLET ORAL
Qty: 90 TABLET | Refills: 1 | Status: SHIPPED | OUTPATIENT
Start: 2018-02-12 | End: 2018-08-13 | Stop reason: SDUPTHER

## 2018-02-12 RX ORDER — DULOXETIN HYDROCHLORIDE 60 MG/1
60 CAPSULE, DELAYED RELEASE ORAL DAILY
Qty: 180 CAPSULE | Refills: 1 | Status: SHIPPED | OUTPATIENT
Start: 2018-02-12 | End: 2018-09-26 | Stop reason: SDUPTHER

## 2018-02-12 NOTE — PATIENT INSTRUCTIONS
1. Continue cymbalta 60 mg daily and lamictal 200 mg daily.  2. Start risperdal 0.25-0.5 mg as needed daily for anxiety or irritability.  3. Recommend getting CPAP.  4. Try to limit drinks to 3 drinks or less.  5. Recommend therapy, I will contact you about getting therapy appointment.  6. [facility].  7. Return at march 26th at 4pm.

## 2018-02-15 ENCOUNTER — OFFICE VISIT (OUTPATIENT)
Dept: SLEEP MEDICINE | Facility: CLINIC | Age: 43
End: 2018-02-15
Payer: COMMERCIAL

## 2018-02-15 VITALS
DIASTOLIC BLOOD PRESSURE: 86 MMHG | HEART RATE: 88 BPM | SYSTOLIC BLOOD PRESSURE: 132 MMHG | HEIGHT: 70 IN | WEIGHT: 231.31 LBS | BODY MASS INDEX: 33.11 KG/M2

## 2018-02-15 DIAGNOSIS — G47.33 OBSTRUCTIVE SLEEP APNEA: Primary | ICD-10-CM

## 2018-02-15 PROCEDURE — 3008F BODY MASS INDEX DOCD: CPT | Mod: S$GLB,,, | Performed by: NURSE PRACTITIONER

## 2018-02-15 PROCEDURE — 99213 OFFICE O/P EST LOW 20 MIN: CPT | Mod: S$GLB,,, | Performed by: NURSE PRACTITIONER

## 2018-02-15 PROCEDURE — 99999 PR PBB SHADOW E&M-EST. PATIENT-LVL III: CPT | Mod: PBBFAC,,, | Performed by: NURSE PRACTITIONER

## 2018-02-15 NOTE — PROGRESS NOTES
"   Jesse Hernández returns today for mgt of KAILEE.  Last seen by MD, this is my initial visit with him    He has since undergone a home sleep study which we reviewed together today in detail. Results were mailed to him in Dec '17. Continued light sleep.He is always tired!. Labs cbc and tsh normal 8/17. 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 Strongest desire to nap ~ 2p. Occasional am headaches. Occasional sinus congestion. Trying to lose wgt, even when weighed 190's he was still tired.       HISTORY  10/18/17  CHIEF COMPLAINT:      The patient's complaints include excessive daytime sleepiness, excessive daytime fatigue, snoring and interrupted sleep since  Many years ago.    Reports occasional  dry mouth and sore throat  Reports occasional nasal congestion   Reports  morning headaches  Reports occasional  interrupted sleep  Reports occasional frequent leg movements  Denies symptoms concerning for parasomnia    The ESS ([place] Sleepiness Score) taken on initial visit is 12 /24      SLEEP ROUTINE AND LIFESTYLE 02/15/2018 :    Occupation: +    Bed partner: his wife     Time to bed - wake up time on a workday : 9-10 pm to 6 Am  Time to bed - wake up time on a day off: till 6-8  Sleep onset latency: minutes  Disruptions or awakenings: 3-5  Time to fall back into sleep: fast   Perceived sleep quality: 3/5  Perceived total sleep time:  8  hours.  Daytime naps: yes    Exercise routine: yes  Caffeine:  no      REVIEW OF SYSTEMS:   Sleep related symptoms as per HPI, 3# gain. Mood stable for the most part. Otherwise a balance review of 10-systems is negative.         PHYSICAL EXAM:  /86   Pulse 88   Ht 5' 10" (1.778 m)   Wt 104.9 kg (231 lb 4.8 oz)   BMI 33.19 kg/m²   GENERAL: Overweight body habitus, well groomed.      HST AHI 5(RDI 21)/low sat 87.6%  Using [facility]: yes      ASSESSMENT:    1. KAILEE. Ready to begin PAP  He has medical co-morbidities of depression,  which can be worsened by KAILEE.       PLAN:  We " discussed potential treatment options, which could include weight loss (10-15%), body positioning, continuous positive airway pressure (CPAP-defintive), mandibular advancement splint by dentist, or referral for surgical consideration. Discussed etiology of KAILEE and potential ramifications of untreated KAILEE, including heart disease, hypertension, cognitive difficulties, stroke, and diabetes.        CPAP 6-16cm setup today, [facility] D[facility] pending authorization.   Do not drive sleepy  Encouraged continued weight loss efforts for potential improvement of KAILEE and overall health benefits      RTC 4 -5 wks after setup adherence

## 2018-02-15 NOTE — PATIENT INSTRUCTIONS
Obstructive Sleep Apnea  Obstructive sleep apnea is a condition that causes your air passages to become narrowed or blocked during sleep. As a result, breathing stops for short periods. Your body wakes up enough for breathing to begin again, though you don't remember it. The cycle of stopped breathing and brief awakenings can repeat dozens of times a night. This prevents the body from getting to the deeper stages of sleep that are needed for good rest and may cause your body's oxygen level to fall.  Signs of sleep apnea include loud snoring, noisy breathing, and gasping sounds during sleep. Daytime symptoms include waking up tired after a full night's sleep, waking up with headaches, feeling very sleepy or falling asleep during the day, and having problems with memory or concentration.  Risk factors for sleep apnea include:  · Being overweight  · Being a man, or a woman in menopause  · Smoking  · Using alcohol or sedating medicines  · Having enlarged structures in the nose or throat  Home care  Lifestyle changes that can help treat snoring and sleep apnea include the following:  · If you are overweight, lose weight. Talk to your healthcare provider about a weight-loss plan for you.  · Avoid alcohol for 3 to 4 hours before bedtime. Avoid sedating medications. Ask your healthcare provider about the medicines you take.  · If you smoke, talk to your healthcare provider about ways to quit.  · Sleep on your side. This can help prevent gravity from pulling relaxed throat tissues into your breathing passages.  · If you have allergies or sinus problems that block your nose, ask your healthcare provider for help.  Follow-up care  Follow up with your healthcare provider, or as advised. A diagnosis of sleep apnea is made with a sleep study. Your healthcare provider can tell you more about this test.  When to seek medical advice  Sleep apnea can make you more likely to have certain health problems. These include high blood  pressure, heart attack, stroke, and sexual dysfunction. If you have sleep apnea, talk to your healthcare provider about the best treatments for you.  Date Last Reviewed: 4/1/2017  © 5306-7367 [employer]. [address] [place] [address], [place] [address]. All rights reserved. This information is not intended as a substitute for professional medical care. Always follow your healthcare professional's instructions.

## 2018-03-16 ENCOUNTER — OFFICE VISIT (OUTPATIENT)
Dept: SLEEP MEDICINE | Facility: CLINIC | Age: 43
End: 2018-03-16
Payer: COMMERCIAL

## 2018-03-16 VITALS
HEIGHT: 70 IN | BODY MASS INDEX: 33.07 KG/M2 | WEIGHT: 231 LBS | DIASTOLIC BLOOD PRESSURE: 97 MMHG | HEART RATE: 88 BPM | SYSTOLIC BLOOD PRESSURE: 152 MMHG

## 2018-03-16 DIAGNOSIS — G47.33 OBSTRUCTIVE SLEEP APNEA: Primary | ICD-10-CM

## 2018-03-16 PROCEDURE — 99214 OFFICE O/P EST MOD 30 MIN: CPT | Mod: S$GLB,,, | Performed by: NURSE PRACTITIONER

## 2018-03-16 PROCEDURE — 99999 PR PBB SHADOW E&M-EST. PATIENT-LVL III: CPT | Mod: PBBFAC,,, | Performed by: NURSE PRACTITIONER

## 2018-03-16 NOTE — PROGRESS NOTES
Jesse Hernández returns today for mgt of KAILEE.  Last seen 2/15/18  Since setup he has been using his mask nightly, except when nose soreness from mask too bothersome. Wants to switch mask. It pushes up on nasal cartlidge. ESS=11. He feels better since using therapy. Also helps open his sinus'. No chin strap. Denies oral drying. Denies nasal drying. Denies pressure intolerance. BP up today from arguing/wife booster stuff.     Date Range: 2/14/2018 - 3/15/2018     Hide     Compliance Summary  Apnea Indices    Days with Device Usage:  25 days  Average AHI:  1.6    Percentage of Days >=4 Hours:  73.3%     Average Usage (Days Used):  6 hrs. 36 mins. 19 secs.     Average Usage (All Days):  5 hrs. 30 mins. 16 secs.     90% tile 8.5-9.1cm  0% leak      HISTORY  10/18/17  CHIEF COMPLAINT:      The patient's complaints include excessive daytime sleepiness, excessive daytime fatigue, snoring and interrupted sleep since  Many years ago.    Reports occasional  dry mouth and sore throat  Reports occasional nasal congestion   Reports  morning headaches  Reports occasional  interrupted sleep  Reports occasional frequent leg movements  Denies symptoms concerning for parasomnia    The ESS ([place] Sleepiness Score) taken on initial visit is 12 /24      SLEEP ROUTINE AND LIFESTYLE 03/16/2018 :    Occupation: +    Bed partner: his wife     Time to bed - wake up time on a workday : 9-10 pm to 6 Am  Time to bed - wake up time on a day off: till 6-8  Sleep onset latency: minutes  Disruptions or awakenings: 3-5  Time to fall back into sleep: fast   Perceived sleep quality: 3/5  Perceived total sleep time:  8  hours.  Daytime naps: yes    Exercise routine: yes  Caffeine:  no    2/15/18:   He has since undergone a home sleep study which we reviewed together today in detail. Results were mailed to him in Dec '17. Continued light sleep.He is always tired!. Labs cbc and tsh normal 8/17. 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 Occasional am headaches. Occasional sinus congestion. Trying to lose wgt, even when weighed 190's he was still tired.       REVIEW OF SYSTEMS:   Sleep related symptoms as per HPI, stable wgt. Mood stable. Otherwise a balance review of 10-systems is negative.         PHYSICAL EXAM:  BP (!) 152/97   Pulse 88   Ht 5' 10" (1.778 m)   Wt 104.8 kg (231 lb)   BMI 33.15 kg/m²   GENERAL: Overweight body habitus, well groomed.      HST AHI 5(RDI 21)/low sat 87.6%  Using [facility]: yes      ASSESSMENT:    1. KAILEE. Adherent with autoPAP, AHI<6, doing well, symptoms improved. Nose irritation from mask  He has medical co-morbidities of depression,  which can be worsened by KAILEE.       PLAN:  Discussed effectiveness of therapy of KAILEE and potential ramifications of untreated KAILEE, including heart disease, hypertension, cognitive difficulties, stroke, and diabetes.        APAP continue but adjust pressure 6-11cm.  [facility] DME prn supplies  Do not drive sleepy  Encouraged continued weight loss efforts for potential improvement of KAILEE and overall health benefits      RTC 12-mos adherence, sooner if needed  "

## 2018-03-26 ENCOUNTER — OFFICE VISIT (OUTPATIENT)
Dept: PSYCHIATRY | Facility: CLINIC | Age: 43
End: 2018-03-26
Payer: COMMERCIAL

## 2018-03-26 DIAGNOSIS — F41.1 GAD (GENERALIZED ANXIETY DISORDER): Primary | ICD-10-CM

## 2018-03-26 DIAGNOSIS — F33.40 RECURRENT MAJOR DEPRESSIVE DISORDER, IN REMISSION: ICD-10-CM

## 2018-03-26 PROCEDURE — 99215 OFFICE O/P EST HI 40 MIN: CPT | Mod: S$GLB,,, | Performed by: PSYCHIATRY & NEUROLOGY
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
## 2018-06-29 ENCOUNTER — PATIENT MESSAGE (OUTPATIENT)
Dept: PSYCHIATRY | Facility: CLINIC | Age: 43
End: 2018-06-29

## 2018-08-13 ENCOUNTER — OFFICE VISIT (OUTPATIENT)
Dept: INTERNAL MEDICINE | Facility: CLINIC | Age: 43
End: 2018-08-13
Payer: COMMERCIAL

## 2018-08-13 ENCOUNTER — HOSPITAL ENCOUNTER (OUTPATIENT)
Dept: RADIOLOGY | Facility: HOSPITAL | Age: 43
Discharge: HOME | End: 2018-08-13
Attending: INTERNAL MEDICINE
Payer: COMMERCIAL

## 2018-08-13 VITALS
HEART RATE: 88 BPM | SYSTOLIC BLOOD PRESSURE: 150 MMHG | WEIGHT: 234.38 LBS | DIASTOLIC BLOOD PRESSURE: 98 MMHG | HEIGHT: 70 IN | BODY MASS INDEX: 33.55 KG/M2

## 2018-08-13 DIAGNOSIS — G89.29 CHRONIC RIGHT SHOULDER PAIN: ICD-10-CM

## 2018-08-13 DIAGNOSIS — M25.511 CHRONIC RIGHT SHOULDER PAIN: ICD-10-CM

## 2018-08-13 DIAGNOSIS — F41.1 GAD (GENERALIZED ANXIETY DISORDER): ICD-10-CM

## 2018-08-13 DIAGNOSIS — I10 HYPERTENSION, UNSPECIFIED TYPE: Primary | ICD-10-CM

## 2018-08-13 DIAGNOSIS — Z00.00 PREVENTATIVE HEALTH CARE: ICD-10-CM

## 2018-08-13 DIAGNOSIS — M75.81 ROTATOR CUFF TENDONITIS, RIGHT: ICD-10-CM

## 2018-08-13 DIAGNOSIS — F33.40 RECURRENT MAJOR DEPRESSIVE DISORDER, IN REMISSION: ICD-10-CM

## 2018-08-13 PROBLEM — J20.9 ACUTE BRONCHITIS: Status: RESOLVED | Noted: 2017-08-14 | Resolved: 2018-08-13

## 2018-08-13 PROCEDURE — 73030 X-RAY EXAM OF SHOULDER: CPT | Mod: TC,FY,PO,RT

## 2018-08-13 PROCEDURE — 99999 PR PBB SHADOW E&M-EST. PATIENT-LVL IV: CPT | Mod: PBBFAC,,, | Performed by: INTERNAL MEDICINE

## 2018-08-13 PROCEDURE — 73030 X-RAY EXAM OF SHOULDER: CPT | Mod: 26,RT,, | Performed by: RADIOLOGY

## 2018-08-13 PROCEDURE — 3008F BODY MASS INDEX DOCD: CPT | Mod: CPTII,S$GLB,, | Performed by: INTERNAL MEDICINE

## 2018-08-13 PROCEDURE — 99214 OFFICE O/P EST MOD 30 MIN: CPT | Mod: S$GLB,,, | Performed by: INTERNAL MEDICINE

## 2018-08-13 RX ORDER — METOPROLOL SUCCINATE 50 MG/1
50 TABLET, EXTENDED RELEASE ORAL DAILY
Qty: 30 TABLET | Refills: 5 | Status: SHIPPED | OUTPATIENT
Start: 2018-08-13 | End: 2018-10-01

## 2018-08-13 NOTE — PROGRESS NOTES
Subjective:       Patient ID: Jesse Hernández is a 43 y.o. male.    Chief Complaint: Shoulder Pain (right)    HPI this 43-year-old male presents to clinic today complaining of right shoulder pain for the last 2 years he states it has been getting worse over the last year and around 1 month ago became he states real bad.  As he went to an after hours clinic at that time he states that they gave him some steroids and an injection.  He did really get any relief he has failed NSAIDs and steroids and Biofreeze.  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  Pain initially presented in his anterior portion of the shoulder now is become more involved the entire shoulder denies any significant neck pain. Does feel like the pain can come down into the arm.  Noted that patient's blood pressure is elevated and has been more often elevated the last couple years than normal.  Review of Systems  otherwise negative  Objective:      Physical Exam  General: Well-appearing, well-nourished.  No distress  HEENT: conjunctivae are normal.  Pupils are equal and reative to light.  TM's are clear and intact bilaterally.  Hearing is grossly normal.  Nasopharynx is clear.  Oropharynx is clear.  Neck: Supple.  No thyroid megaly.  No bruits.  Lymph: No cervical or supraclavicular adenopathy.  Heart: Regular rate and rhythm, without murmur, rub or gallop.  Lungs: Clear to auscultation; respiratory effort normal.  Abdomen: Soft, nontender, nondistended.  Normoactive bowel sounds.  No hepatomegaly.  No masses.  Extremities: Good distal pulses.  No edema.  Psych: Oriented to time person place.  Judgment and insight seem unimpaired.  Mood and affect are appropriate.  Muscle skeletal pain in his right anterior shoulder he has decreased range of motion with abduction and external rotation behind the back.  Assessment:       1.  Hypertension, unspecified type    2. Chronic right shoulder pain    3. Preventative health care    4. Rotator cuff tendonitis, right        Plan:       Jesse was seen today for shoulder pain.    Diagnoses and all orders for this visit:    Hypertension, unspecified type  -     metoprolol succinate (TOPROL-XL) 50 MG 24 hr tablet; Take 1 tablet (50 mg total) by mouth once daily.  .  Discuss use benefit as well as potential adverse side effects recommend follow-up in 4-6 weeks.  Chronic right shoulder pain  -     X-ray Shoulder 2 or More Views Right; Future  -     Ambulatory consult to Orthopedics    Preventative health care  -     CBC auto differential; Standing  -     Comprehensive metabolic panel; Standing  -     Lipid panel; Standing  -     PSA, Screening; Standing  -     TSH; Standing    Rotator cuff tendonitis, right  Right shoulder pain chronic over the last 2 years with increasing discomfort over the last month.  Clinical exam is most consistent with a rotator cuff tendinitis or tendinopathy cannot rule out tear.  Will refer to Orthopedics for further evaluation and treatment

## 2018-08-14 ENCOUNTER — TELEPHONE (OUTPATIENT)
Dept: FAMILY MEDICINE | Facility: CLINIC | Age: 43
End: 2018-08-14

## 2018-08-14 DIAGNOSIS — M25.519 SHOULDER PAIN, UNSPECIFIED CHRONICITY, UNSPECIFIED LATERALITY: Primary | ICD-10-CM

## 2018-08-14 RX ORDER — LAMOTRIGINE 200 MG/1
TABLET ORAL
Qty: 90 TABLET | Refills: 0 | Status: SHIPPED | OUTPATIENT
Start: 2018-08-14 | End: 2018-09-26 | Stop reason: SDUPTHER

## 2018-08-14 NOTE — TELEPHONE ENCOUNTER
Spoke with patient and informed of xray results and informed to keep f/u appt with orthopedics. Patient reports he is taking ibuprofen OTC and it is not working. Trying compresses hot and cold with no relief. Had to leave work today due to the pain. Patient wants to know if there is anything else he can do.

## 2018-08-14 NOTE — TELEPHONE ENCOUNTER
----- Message from Sandy George sent at 8/14/2018 11:50 AM CDT -----  Contact: [phone]/ self   Pt its requesting xray test results done yesterday. Pt also wants to know what to do about the pain .Please advise

## 2018-08-15 ENCOUNTER — PATIENT MESSAGE (OUTPATIENT)
Dept: INTERNAL MEDICINE | Facility: CLINIC | Age: 43
End: 2018-08-15

## 2018-08-15 RX ORDER — KETOROLAC TROMETHAMINE 10 MG/1
10 TABLET, FILM COATED ORAL EVERY 6 HOURS
Qty: 16 TABLET | Refills: 0 | Status: SHIPPED | OUTPATIENT
Start: 2018-08-15 | End: 2018-08-16

## 2018-08-15 NOTE — TELEPHONE ENCOUNTER
Please inform patient that I sent in a prescription for Toradol that he can take every 6 hr until he has not per 2 need to get in to see Orthopedics.  This medication can only be used short period of time as prolonged use is not good for the kidneys.  He must stop all over-the-counter anti-inflammatories such as ibuprofen Motrin and Aleve while using this.

## 2018-08-16 ENCOUNTER — OFFICE VISIT (OUTPATIENT)
Dept: ORTHOPEDICS | Facility: CLINIC | Age: 43
End: 2018-08-16
Payer: COMMERCIAL

## 2018-08-16 VITALS — HEIGHT: 70 IN | WEIGHT: 223.5 LBS | BODY MASS INDEX: 32 KG/M2

## 2018-08-16 DIAGNOSIS — M75.81 TENDINITIS OF RIGHT ROTATOR CUFF: Primary | ICD-10-CM

## 2018-08-16 PROCEDURE — 20610 DRAIN/INJ JOINT/BURSA W/O US: CPT | Mod: RT,S$GLB,, | Performed by: PHYSICIAN ASSISTANT

## 2018-08-16 PROCEDURE — 3008F BODY MASS INDEX DOCD: CPT | Mod: CPTII,S$GLB,, | Performed by: PHYSICIAN ASSISTANT

## 2018-08-16 PROCEDURE — 99999 PR PBB SHADOW E&M-EST. PATIENT-LVL III: CPT | Mod: PBBFAC,,, | Performed by: PHYSICIAN ASSISTANT

## 2018-08-16 PROCEDURE — 99203 OFFICE O/P NEW LOW 30 MIN: CPT | Mod: 25,S$GLB,, | Performed by: PHYSICIAN ASSISTANT

## 2018-08-16 RX ORDER — MELOXICAM 15 MG/1
15 TABLET ORAL DAILY
Qty: 30 TABLET | Refills: 0 | Status: SHIPPED | OUTPATIENT
Start: 2018-08-16 | End: 2018-09-15

## 2018-08-16 RX ORDER — METHOCARBAMOL 750 MG/1
750 TABLET, FILM COATED ORAL 4 TIMES DAILY
Qty: 10 TABLET | Refills: 0 | Status: SHIPPED | OUTPATIENT
Start: 2018-08-16 | End: 2018-08-26

## 2018-08-16 RX ORDER — METHYLPREDNISOLONE ACETATE 80 MG/ML
80 INJECTION, SUSPENSION INTRA-ARTICULAR; INTRALESIONAL; INTRAMUSCULAR; SOFT TISSUE
Status: COMPLETED | OUTPATIENT
Start: 2018-08-16 | End: 2018-08-16

## 2018-08-16 RX ADMIN — METHYLPREDNISOLONE ACETATE 80 MG: 80 INJECTION, SUSPENSION INTRA-ARTICULAR; INTRALESIONAL; INTRAMUSCULAR; SOFT TISSUE at 07:08

## 2018-08-16 NOTE — LETTER
August 16, 2018      Roxanne Wolfe MD  [address] [facility] [address]           [facility]  [address] [address]  Phone: [phone]  Fax: [phone]          Patient: eJsse Hernández   MR Number: [id number]   YOB: 1975   Date of Visit: 8/16/2018       Dear Dr. Roxanne Wolfe:    Thank you for referring Jesse Hernández to me for evaluation. Attached you will find relevant portions of my assessment and plan of care.    If you have questions, please do not hesitate to call me. I look forward to following Jesse Hernández along with you.    Sincerely,    Sujata Schultz PA-C    Enclosure  CC:  No Recipients    If you would like to receive this communication electronically, please contact [email or web address] or [phone] to request more information on "[employer] Link access.    For providers and/or their staff who would like to refer a patient to [facility], please contact us through our one-stop-shop provider referral line, [facility] , at [phone].    If you feel you have received this communication in error or would no longer like to receive these types of communications, please e-mail [email or web address]

## 2018-08-17 NOTE — PROGRESS NOTES
Subjective:      Patient ID: Jesse Hernández is a 43 y.o. male.    Chief Complaint: Pain of the Right Shoulder    HPI  43 year old male presents with chief complaint of intermittent right shoulder pain x 2 years. He is RHD. Pain began after he threw a baseball. It increased about 6 months ago, no trauma. Pain is worse with moving it and pulling a door open. He took naproxen and ibuprofen with no relief.   Review of Systems   Constitution: Negative for chills, fever and night sweats.   Cardiovascular: Negative for chest pain.   Respiratory: Negative for cough and shortness of breath.    Hematologic/Lymphatic: Does not bruise/bleed easily.   Skin: Negative for color change.   Gastrointestinal: Negative for heartburn.   Genitourinary: Negative for dysuria.   Neurological: Negative for numbness and paresthesias.   Psychiatric/Behavioral: Negative for altered mental status.   Allergic/Immunologic: Negative for persistent infections.         Objective:            General    Vitals reviewed.  Constitutional: He is oriented to person, place, and time. He appears well-developed and well-nourished.   Cardiovascular: Normal rate.    Neurological: He is alert and oriented to person, place, and time.         Right Shoulder Exam     Range of Motion   Active abduction: 150   Forward Flexion: 160   External Rotation 0 degrees: abnormal   Internal rotation 0 degrees: abnormal     Tests & Signs   Drop arm: negative  Rabago test: negative  Impingement: negative  Rotator Cuff Painful Arc/Range: mild  Speed's Test: negative    Other   Sensation: normal    Comments:  Positive empty can test.     Muscle Strength   Right Upper Extremity   Shoulder Abduction: 5/5   Supraspinatus: 5/5/5   Biceps: 5/5/5     Vascular Exam     Right Pulses      Radial:                    2+          X-ray: reviewed by myself. No fx or dislocation.         Assessment:       Encounter Diagnosis   Name Primary?    Tendinitis of right rotator cuff Yes           Plan:       Discussed treatment options with patient. He would like an injection. He will take mobic x 2 weeks. He will take robaxin prn. RTC if symptoms worsen or do not improve.     PROCEDURE:  I have explained the risks, benefits, and alternatives of the procedure in detail.  The patient voices understanding and all questions have been answered.  The patient agrees to proceed as planned. So after I performed a sterile prep of the skin in the normal fashion the right shoulder is injected from the anterior approach using a 22 gauge needle with a combination of 4cc 1% plain lidocaine and 80 mg of depo medrol. The patient is cautioned and immediate relief of pain is secondary to the local anesthetic and will be temporary.  After the anesthetic wears off there may be a increase in pain that may last for a few hours or a few days and they should use ice to help alleviate this flair up of pain.

## 2018-09-23 ENCOUNTER — OFFICE VISIT (OUTPATIENT)
Dept: URGENT CARE | Facility: CLINIC | Age: 43
End: 2018-09-23
Payer: COMMERCIAL

## 2018-09-23 VITALS
HEART RATE: 95 BPM | TEMPERATURE: 99 F | SYSTOLIC BLOOD PRESSURE: 145 MMHG | DIASTOLIC BLOOD PRESSURE: 97 MMHG | OXYGEN SATURATION: 99 % | RESPIRATION RATE: 19 BRPM
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
## 2018-09-23 DIAGNOSIS — K13.0 LIP PAIN: ICD-10-CM

## 2018-09-23 PROCEDURE — 96372 THER/PROPH/DIAG INJ SC/IM: CPT | Mod: S$GLB,,, | Performed by: FAMILY MEDICINE

## 2018-09-23 PROCEDURE — 99214 OFFICE O/P EST MOD 30 MIN: CPT | Mod: 25,S$GLB,, | Performed by: FAMILY MEDICINE

## 2018-09-23 RX ORDER — DIPHENHYDRAMINE HYDROCHLORIDE 50 MG/ML
50 INJECTION INTRAMUSCULAR; INTRAVENOUS
Status: COMPLETED | OUTPATIENT
Start: 2018-09-23 | End: 2018-09-23

## 2018-09-23 RX ORDER — BETAMETHASONE SODIUM PHOSPHATE AND BETAMETHASONE ACETATE 3; 3 MG/ML; MG/ML
6 INJECTION, SUSPENSION INTRA-ARTICULAR; INTRALESIONAL; INTRAMUSCULAR; SOFT TISSUE
Status: COMPLETED | OUTPATIENT
Start: 2018-09-23 | End: 2018-09-23

## 2018-09-23 RX ORDER — KETOROLAC TROMETHAMINE 30 MG/ML
60 INJECTION, SOLUTION INTRAMUSCULAR; INTRAVENOUS
Status: COMPLETED | OUTPATIENT
Start: 2018-09-23 | End: 2018-09-23

## 2018-09-23 RX ORDER — PREDNISONE 10 MG/1
TABLET ORAL
Qty: 30 TABLET | Refills: 0 | Status: SHIPPED | OUTPATIENT
Start: 2018-09-23 | End: 2018-10-01

## 2018-09-23 RX ADMIN — BETAMETHASONE SODIUM PHOSPHATE AND BETAMETHASONE ACETATE 6 MG: 3; 3 INJECTION, SUSPENSION INTRA-ARTICULAR; INTRALESIONAL; INTRAMUSCULAR; SOFT TISSUE at 04:09

## 2018-09-23 RX ADMIN — DIPHENHYDRAMINE HYDROCHLORIDE 50 MG: 50 INJECTION INTRAMUSCULAR; INTRAVENOUS at 04:09

## 2018-09-23 RX ADMIN — KETOROLAC TROMETHAMINE 60 MG: 30 INJECTION, SOLUTION INTRAMUSCULAR; INTRAVENOUS at 04:09

## 2018-09-23 NOTE — PATIENT INSTRUCTIONS
Local Allergic Reaction, Other  You are having an allergic reaction. Almost anything can cause one. Different people are allergic to different things. It is usually something that you ate or swallowed, came into contact with by getting or putting it on your skin or clothes, or something you breathed in the air. This can be very annoying and sometimes scary.  Symptoms of an allergic reaction can include:  · Rash, hives, redness, welts, blisters  · Itching, burning, stinging, pain  · Dry, flaky, cracking, scaly skin  · Swelling of the face, lips or other parts of the body  Sometimes the cause of an allergic reaction may be obvious. To help identify your allergen, remember:  · When it started  · What you were doing at the time or just before that  · Any activities you were involved in  · Any new products or contacts  Here are some common causes, but remember almost anything can cause a reaction, and you may not even be aware that you came into contact with one of these things.  · Dust, mold, pollen  · Plants such as poison ivy and poison oak are common ones, but there are many others  · Animals  · Foods such as shrimp, shellfish, peanuts, milk products, gluten, eggs; also colorings, flavorings, additives  · Insect bites or stings such as bees, mosquitos, flees, ticks  · Medicines such as penicillin, sulfa drugs, amoxicillin, aspirin, ibuprofen; any medicine can cause a reaction  · Jewelry such as nickel, gold  (new, or something youve worn for a while including zippers, and  buttons)  · Latex such as in gloves, clothes, toys, balloons, or some tapes (some people allergic to latex may also have problems with foods like bananas, avocados, kiwi, papaya, or chestnuts)  · Lotions, perfumes, cosmetics, soaps, shampoos, skincare products, nail products  · Chemicals or dyes in clothing, linen, , hair dyes, soaps, iodine  Home care    The goal of our treatment is to help relieve the symptoms, and get you feeling  better. The rash will usually fade over several days, but can sometimes last a couple of weeks. Over the next couple of days, there may be times when it is gets a little worse, and then better again. Here are some things to do:  · If you know what you are allergic to, avoid it because future reactions could be worse than this one.  · Avoid tight clothing and anything that heats up your skin (hot showers/baths, direct sunlight) since heat will make itching worse.  · An ice pack will relieve local areas of intense itching and redness. Dont put the ice directly on the skin, because it can damage the skin. You can also ice put it in a plastic bag. Wrap it in something like a towel, oskar shirt, or cloth.  · Oral Benadryl (diphenhydramine) is an antihistamine available at drug and grocery stores. Unless a prescription antihistamine was given, Benadryl may be used to reduce itching if large areas of the skin are involved. It may make you sleepy, so be careful using it in the daytime or when going to school, working, or driving. [NOTE: Do not use Benadryl if you have glaucoma or if you are a man with trouble urinating due to an enlarged prostate.] There are antihistamines that causes less drowsiness and is a good alternatives for daytime use. Ask your pharmacist for suggestions.  · Do not use Benadryl cream on your skin, because in some people it can cause a further reaction, and make you allergic to Benadryl.  · Try not to scratch. This can tear the skin and cause an infection.  · Using heat-steam to clean your home, using high-efficiency particulate (HEPA) vacuums and filters, avoiding food and pet triggers, exterminating cockroaches, and frequent house cleaning are a few of the strategies used to decrease allergic reactions.  Follow-up care  Follow up with your healthcare provider, or as advised if your symptoms do not continue to improve or get worse.  Call 911  Call 911 if any of these occur:  · Trouble breathing or  swallowing, wheezing  · New or worsening swelling in the mouth, throat, or tongue  · Hoarse voice or trouble speaking  · Confused   · Very drowsy or trouble awakening  · Fainting or loss of consciousness  · Rapid heart rate  · Low blood pressure  · Feeling of doom  · Nausea, vomiting, abdominal pain, diarrhea  · Vomiting blood, or large amounts of blood in stool  · Seizure  When to seek medical advice  Call your healthcare provider right away if any of the following occur:  · Spreading areas of itching, redness or swelling  · New or worse swelling in the face, eyelids, or  lips  · Dizziness, weakness  · Signs of infection:  ¨ Spreading redness  ¨ Increased pain or swelling  ¨ Fever of 100.4ºF (38ºC) or higher, or as directed by your healthcare provider  ¨ Colored fluid draining from the inflamed areas  Date Last Reviewed: 7/30/2015  © 4017-7375 [employer] [address] [place] [address]. All rights reserved. This information is not intended as a substitute for professional medical care. Always follow your healthcare professional's instructions.

## 2018-09-23 NOTE — PROGRESS NOTES
Subjective:       Patient ID: Jesse Hernández is a 43 y.o. male.    Vitals:  tympanic temperature is 99.1 °F (37.3 °C). His blood pressure is 207/145 (abnormal) and his pulse is 95. His respiration is 19 and oxygen saturation is 99%.     Chief Complaint: Insect Bite    44 y/o male with c/o upper lip pain and burning sensation, states cutting grass and got bit by something 10 minutes PTA, very painful and burning feeling no SOB, feels something in lips.denies SOB  Has hx  Of HTN, on bp medication and BP this am at home  Was 120/80 chad      Insect Bite   This is a new problem. The current episode started today. The problem occurs constantly. The problem has been gradually worsening. Pertinent negatives include no abdominal pain, anorexia, arthralgias, change in bowel habit, chest pain, chills, congestion, coughing, diaphoresis, fatigue, fever, headaches, joint swelling, myalgias, nausea, neck pain, numbness, rash, sore throat, swollen glands, urinary symptoms, vertigo, visual change, vomiting or weakness. Associated symptoms comments: Lip swelling, painful redness around nose and lips. He has tried nothing for the symptoms. The treatment provided no relief.     Review of Systems   Constitution: Negative for chills, diaphoresis, fatigue, fever and weakness.   HENT: Negative for congestion and sore throat.    Eyes: Negative for blurred vision.   Cardiovascular: Negative for chest pain.   Respiratory: Negative for cough and shortness of breath.    Skin: Negative for rash.   Musculoskeletal: Negative for arthralgias, back pain, joint pain, joint swelling, myalgias and neck pain.   Gastrointestinal: Negative for abdominal pain, anorexia, change in bowel habit, diarrhea, nausea and vomiting.   Neurological: Negative for headaches, numbness and vertigo.   Psychiatric/Behavioral: The patient is not nervous/anxious.        Objective:      Physical Exam   Constitutional: He is oriented to person, place, and time. He appears  well-developed and well-nourished. He is cooperative.  Non-toxic appearance. He does not appear ill. No distress.   Young healthy heavy built male in mild distress secondary to pain     HENT:   Head: Normocephalic and atraumatic.   Right Ear: Hearing, tympanic membrane, external ear and ear canal normal.   Left Ear: Hearing, tympanic membrane, external ear and ear canal normal.   Nose: Nose normal. No mucosal edema, rhinorrhea or nasal deformity. No epistaxis. Right sinus exhibits no maxillary sinus tenderness and no frontal sinus tenderness. Left sinus exhibits no maxillary sinus tenderness and no frontal sinus tenderness.   Mouth/Throat: Uvula is midline, oropharynx is clear and moist and mucous membranes are normal. No trismus in the jaw. Normal dentition. No uvula swelling. No posterior oropharyngeal erythema.   Left side upper lip with small area of tenderness and swelling, no FB or object or sting seen, small blister like appearance, and some swelling noted. Teeth intact     Eyes: Conjunctivae and lids are normal. Right eye exhibits no discharge. Left eye exhibits no discharge. No scleral icterus.   Sclera clear bilat   Neck: Trachea normal, normal range of motion, full passive range of motion without pain and phonation normal. Neck supple.   Cardiovascular: Normal rate, regular rhythm, normal heart sounds, intact distal pulses and normal pulses.   Pulmonary/Chest: Effort normal and breath sounds normal. No respiratory distress.   Abdominal: Soft. Normal appearance and bowel sounds are normal. He exhibits no distension, no pulsatile midline mass and no mass. There is no tenderness.   Musculoskeletal: Normal range of motion. He exhibits no edema or deformity.   Neurological: He is alert and oriented to person, place, and time. He exhibits normal muscle tone. Coordination normal.   Skin: Skin is warm, dry and intact. He is not diaphoretic. No pallor.   Psychiatric: He has a normal mood and affect. His speech is  normal and behavior is normal. Judgment and thought content normal. Cognition and memory are normal.   Nursing note and vitals reviewed.      Assessment:       1. Insect bite, initial encounter    2. Lip pain        Plan:         Insect bite, initial encounter    Lip pain    Other orders  -     betamethasone acetate-betamethasone sodium phosphate injection 6 mg; Inject 1 mL (6 mg total) into the muscle one time.  -     diphenhydrAMINE injection 50 mg; Inject 1 mL (50 mg total) into the muscle one time.  -     ketorolac injection 60 mg; Inject 2 mLs (60 mg total) into the muscle one time.  -     predniSONE (DELTASONE) 10 MG tablet; Take 2 po bid x 3 days, one bid x 3 days, then one daily till finish  Dispense: 30 tablet; Refill: 0        Apply ice  If increase SOB go to ER  FU with PCP  Prednisone to hold

## 2018-09-26 ENCOUNTER — OFFICE VISIT (OUTPATIENT)
Dept: PSYCHIATRY | Facility: CLINIC | Age: 43
End: 2018-09-26
Payer: COMMERCIAL

## 2018-09-26 VITALS
BODY MASS INDEX: 33.99 KG/M2 | HEART RATE: 77 BPM | DIASTOLIC BLOOD PRESSURE: 96 MMHG | SYSTOLIC BLOOD PRESSURE: 155 MMHG | WEIGHT: 237.44 LBS | HEIGHT: 70 IN

## 2018-09-26 DIAGNOSIS — G47.33 OSA (OBSTRUCTIVE SLEEP APNEA): ICD-10-CM

## 2018-09-26 DIAGNOSIS — F41.1 GAD (GENERALIZED ANXIETY DISORDER): ICD-10-CM

## 2018-09-26 DIAGNOSIS — F33.0 MILD EPISODE OF RECURRENT MAJOR DEPRESSIVE DISORDER: Primary | ICD-10-CM

## 2018-09-26 PROCEDURE — 99214 OFFICE O/P EST MOD 30 MIN: CPT | Mod: S$GLB,,, | Performed by: PSYCHIATRY & NEUROLOGY

## 2018-09-26 PROCEDURE — 3008F BODY MASS INDEX DOCD: CPT | Mod: CPTII,S$GLB,, | Performed by: PSYCHIATRY & NEUROLOGY

## 2018-09-26 PROCEDURE — 99999 PR PBB SHADOW E&M-EST. PATIENT-LVL III: CPT | Mod: PBBFAC,,, | Performed by: PSYCHIATRY & NEUROLOGY

## 2018-09-26 RX ORDER — LAMOTRIGINE 150 MG/1
300 TABLET ORAL DAILY
Qty: 180 TABLET | Refills: 1 | Status: SHIPPED | OUTPATIENT
Start: 2018-09-26 | End: 2018-12-19 | Stop reason: SDUPTHER

## 2018-09-26 RX ORDER — DULOXETIN HYDROCHLORIDE 60 MG/1
60 CAPSULE, DELAYED RELEASE ORAL DAILY
Qty: 180 CAPSULE | Refills: 1 | Status: SHIPPED | OUTPATIENT
Start: 2018-09-26 | End: 2018-12-19 | Stop reason: SDUPTHER

## 2018-09-26 NOTE — PATIENT INSTRUCTIONS
1. Follow up with sleep medicine to see if cpap and mask need to be adjusted.  2. Continue cymbalta 60 mg daily.  3. Increase lamictal to 300 mg daily (sent prescription for 150 mg tablets to your pharmacy, take 2).  4. Follow up in 3 months, notify me via portal or phone if things are getting worse.
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
## 2018-09-27 ENCOUNTER — LAB VISIT (OUTPATIENT)
Dept: LAB | Facility: HOSPITAL | Age: 43
End: 2018-09-27
Attending: INTERNAL MEDICINE
Payer: COMMERCIAL

## 2018-09-27 DIAGNOSIS — Z00.00 PREVENTATIVE HEALTH CARE: ICD-10-CM

## 2018-09-27 LAB
ALBUMIN SERPL BCP-MCNC: 3.7 G/DL
ALP SERPL-CCNC: 54 U/L
ALT SERPL W/O P-5'-P-CCNC: 29 U/L
ANION GAP SERPL CALC-SCNC: 9 MMOL/L
AST SERPL-CCNC: 19 U/L
BASOPHILS # BLD AUTO: 0.1 K/UL
BASOPHILS NFR BLD: 0.9 %
BILIRUB SERPL-MCNC: 0.5 MG/DL
BUN SERPL-MCNC: 17 MG/DL
CALCIUM SERPL-MCNC: 9.6 MG/DL
CHLORIDE SERPL-SCNC: 105 MMOL/L
CHOLEST SERPL-MCNC: 275 MG/DL
CHOLEST/HDLC SERPL: 6.3 {RATIO}
CO2 SERPL-SCNC: 25 MMOL/L
COMPLEXED PSA SERPL-MCNC: 0.36 NG/ML
CREAT SERPL-MCNC: 1 MG/DL
DIFFERENTIAL METHOD: ABNORMAL
EOSINOPHIL # BLD AUTO: 0.3 K/UL
EOSINOPHIL NFR BLD: 2.4 %
ERYTHROCYTE [DISTWIDTH] IN BLOOD BY AUTOMATED COUNT: 14.1 %
EST. GFR  (AFRICAN AMERICAN): >60 ML/MIN/1.73 M^2
EST. GFR  (NON AFRICAN AMERICAN): >60 ML/MIN/1.73 M^2
GLUCOSE SERPL-MCNC: 96 MG/DL
HCT VFR BLD AUTO: 47.9 %
HDLC SERPL-MCNC: 44 MG/DL
HDLC SERPL: 16 %
HGB BLD-MCNC: 15.4 G/DL
IMM GRANULOCYTES # BLD AUTO: 0.18 K/UL
IMM GRANULOCYTES NFR BLD AUTO: 1.7 %
LDLC SERPL CALC-MCNC: 153.6 MG/DL
LYMPHOCYTES # BLD AUTO: 3.1 K/UL
LYMPHOCYTES NFR BLD: 28.9 %
MCH RBC QN AUTO: 28.8 PG
MCHC RBC AUTO-ENTMCNC: 32.2 G/DL
MCV RBC AUTO: 90 FL
MONOCYTES # BLD AUTO: 0.7 K/UL
MONOCYTES NFR BLD: 6.4 %
NEUTROPHILS # BLD AUTO: 6.3 K/UL
NEUTROPHILS NFR BLD: 59.7 %
NONHDLC SERPL-MCNC: 231 MG/DL
NRBC BLD-RTO: 0 /100 WBC
PLATELET # BLD AUTO: 273 K/UL
PMV BLD AUTO: 10.5 FL
POTASSIUM SERPL-SCNC: 4.2 MMOL/L
PROT SERPL-MCNC: 6.6 G/DL
RBC # BLD AUTO: 5.34 M/UL
SODIUM SERPL-SCNC: 139 MMOL/L
T4 FREE SERPL-MCNC: 0.96 NG/DL
TRIGL SERPL-MCNC: 387 MG/DL
TSH SERPL DL<=0.005 MIU/L-ACNC: 4.64 UIU/ML
WBC # BLD AUTO: 10.59 K/UL

## 2018-09-27 PROCEDURE — 36415 COLL VENOUS BLD VENIPUNCTURE: CPT | Mod: PO

## 2018-09-27 PROCEDURE — 84443 ASSAY THYROID STIM HORMONE: CPT

## 2018-09-27 PROCEDURE — 84439 ASSAY OF FREE THYROXINE: CPT

## 2018-09-27 PROCEDURE — 80053 COMPREHEN METABOLIC PANEL: CPT

## 2018-09-27 PROCEDURE — 84153 ASSAY OF PSA TOTAL: CPT

## 2018-09-27 PROCEDURE — 85025 COMPLETE CBC W/AUTO DIFF WBC: CPT

## 2018-09-27 PROCEDURE — 80061 LIPID PANEL: CPT

## 2018-10-01 ENCOUNTER — OFFICE VISIT (OUTPATIENT)
Dept: INTERNAL MEDICINE | Facility: CLINIC | Age: 43
End: 2018-10-01
Payer: COMMERCIAL

## 2018-10-01 VITALS
SYSTOLIC BLOOD PRESSURE: 130 MMHG | WEIGHT: 237.19 LBS | BODY MASS INDEX: 33.96 KG/M2 | OXYGEN SATURATION: 97 % | TEMPERATURE: 99 F | HEIGHT: 70 IN | DIASTOLIC BLOOD PRESSURE: 82 MMHG | HEART RATE: 83 BPM

## 2018-10-01 DIAGNOSIS — E78.1 HYPERTRIGLYCERIDEMIA: ICD-10-CM

## 2018-10-01 DIAGNOSIS — M25.511 RIGHT SHOULDER PAIN, UNSPECIFIED CHRONICITY: ICD-10-CM

## 2018-10-01 DIAGNOSIS — R79.89 TSH ELEVATION: ICD-10-CM

## 2018-10-01 DIAGNOSIS — I10 HYPERTENSION, UNSPECIFIED TYPE: ICD-10-CM

## 2018-10-01 DIAGNOSIS — E66.9 OBESITY (BMI 30.0-34.9): ICD-10-CM

## 2018-10-01 DIAGNOSIS — Z00.00 PREVENTATIVE HEALTH CARE: Primary | ICD-10-CM

## 2018-10-01 PROCEDURE — 90471 IMMUNIZATION ADMIN: CPT | Mod: S$GLB,,, | Performed by: INTERNAL MEDICINE

## 2018-10-01 PROCEDURE — 99396 PREV VISIT EST AGE 40-64: CPT | Mod: 25,S$GLB,, | Performed by: INTERNAL MEDICINE

## 2018-10-01 PROCEDURE — 90686 IIV4 VACC NO PRSV 0.5 ML IM: CPT | Mod: S$GLB,,, | Performed by: INTERNAL MEDICINE

## 2018-10-01 PROCEDURE — 99999 PR PBB SHADOW E&M-EST. PATIENT-LVL IV: CPT | Mod: PBBFAC,,, | Performed by: INTERNAL MEDICINE

## 2018-10-01 RX ORDER — METOPROLOL SUCCINATE 100 MG/1
100 TABLET, EXTENDED RELEASE ORAL DAILY
Qty: 90 TABLET | Refills: 1 | Status: SHIPPED | OUTPATIENT
Start: 2018-10-01 | End: 2019-04-17 | Stop reason: SDUPTHER

## 2018-10-01 NOTE — PROGRESS NOTES
Subjective:       Patient ID: Jesse Hernández is a 43 y.o. male.    Chief Complaint: Annual Exam    HPI 43-year-old male presents to clinic today for annual physical exam and follow-up of hypertension patient on his blood work as a slight elevation of his TSH with normal free T4 he has an elevation triglycerides this does correlate with the addition initiation of Lamictal a couple years ago.  He is comfortable with trying over-the-counter fish oil his mental health is doing fairly well with this medication and the risk benefit and benefit his determined to be greater than the risk.  Patient is experiencing problems with persistent right shoulder pain would like to follow up back with Orthopedics.  Received an injection recently with partial benefit.  He is requesting the staff physician.  He does report generalized fatigue decreased motivation and drive.  Review of Systems  otherwise negative  Objective:      Physical Exam  General: Well-appearing, well-nourished.  No distress  HEENT: conjunctivae are normal.  Pupils are equal and reative to light.  TM's are clear and intact bilaterally.  Hearing is grossly normal.  Nasopharynx is clear.  Oropharynx is clear.  Neck: Supple.  No thyroid megaly.  No bruits.  Lymph: No cervical or supraclavicular adenopathy.  Heart: Regular rate and rhythm, without murmur, rub or gallop.  Lungs: Clear to auscultation; respiratory effort normal.  Abdomen: Soft, nontender, nondistended.  Normoactive bowel sounds.  No hepatomegaly.  No masses.  Extremities: Good distal pulses.  No edema.  Psych: Oriented to time person place.  Judgment and insight seem unimpaired.  Mood and affect are appropriate.  Assessment:       1. Preventative health care    2. Hypertension, unspecified type    3. Obesity (BMI 30.0-34.9)    4. Right shoulder pain, unspecified chronicity    5. Hypertriglyceridemia    6. TSH elevation        Plan:       Jesse was seen today for annual exam.    Diagnoses and all orders  for this visit:    Preventative health care  Performed reviewed and updated today  Hypertension, unspecified type  -     metoprolol succinate (TOPROL-XL) 100 MG 24 hr tablet; Take 1 tablet (100 mg total) by mouth once daily.  -     Comprehensive metabolic panel; Standing  -     Lipid panel; Standing  Blood pressure suboptimal control in the outpatient setting will go ahead and increase Toprol from 50 mg to 100 mg.  He is advised to report back to me regarding his ambulatory blood pressures which she takes on a regular basis.  Obesity (BMI 30.0-34.9)  Stressed and recommended weight loss he has gained approximately 40 lb over the last couple years.  Recommend setting weight loss targets and discussed exercise and low starch and sugar diet.  Right shoulder pain, unspecified chronicity  -     Ambulatory consult to Orthopedics    Hypertriglyceridemia  Low starch and sugar diet and recommended a trial of fish oil.  This is secondary to medication for mental health.  TSH elevation  -     TSH; Standing  Repeat levels in 2-3 months.  Other orders  -     Influenza - Quadrivalent (3 years & older) (PF)

## 2018-12-12 ENCOUNTER — PATIENT MESSAGE (OUTPATIENT)
Dept: PSYCHIATRY | Facility: CLINIC | Age: 43
End: 2018-12-12

## 2018-12-19 ENCOUNTER — OFFICE VISIT (OUTPATIENT)
Dept: PSYCHIATRY | Facility: CLINIC | Age: 43
End: 2018-12-19
Payer: COMMERCIAL

## 2018-12-19 VITALS
DIASTOLIC BLOOD PRESSURE: 91 MMHG | HEIGHT: 70 IN | BODY MASS INDEX: 33.99 KG/M2 | SYSTOLIC BLOOD PRESSURE: 153 MMHG | WEIGHT: 237.44 LBS | HEART RATE: 76 BPM

## 2018-12-19 DIAGNOSIS — F33.0 MILD EPISODE OF RECURRENT MAJOR DEPRESSIVE DISORDER: ICD-10-CM

## 2018-12-19 DIAGNOSIS — F41.1 GAD (GENERALIZED ANXIETY DISORDER): ICD-10-CM

## 2018-12-19 PROCEDURE — 3008F BODY MASS INDEX DOCD: CPT | Mod: CPTII,S$GLB,, | Performed by: NURSE PRACTITIONER

## 2018-12-19 PROCEDURE — 99213 OFFICE O/P EST LOW 20 MIN: CPT | Mod: S$GLB,,, | Performed by: NURSE PRACTITIONER

## 2018-12-19 PROCEDURE — 99999 PR PBB SHADOW E&M-EST. PATIENT-LVL II: CPT | Mod: PBBFAC,,, | Performed by: NURSE PRACTITIONER

## 2018-12-19 RX ORDER — LAMOTRIGINE 150 MG/1
300 TABLET ORAL DAILY
Qty: 180 TABLET | Refills: 1 | Status: SHIPPED | OUTPATIENT
Start: 2018-12-19 | End: 2019-03-13 | Stop reason: SDUPTHER

## 2018-12-19 RX ORDER — DULOXETIN HYDROCHLORIDE 60 MG/1
60 CAPSULE, DELAYED RELEASE ORAL DAILY
Qty: 180 CAPSULE | Refills: 1 | Status: SHIPPED | OUTPATIENT
Start: 2018-12-19 | End: 2019-03-13 | Stop reason: SDUPTHER

## 2018-12-19 NOTE — PATIENT INSTRUCTIONS
Duloxetine delayed-release capsules  What is this medicine?  DULOXETINE (doo LOX e teen) is used to treat depression, anxiety, and different types of chronic pain.  How should I use this medicine?  Take this medicine by mouth with a glass of water. Follow the directions on the prescription label. Do not cut, crush or chew this medicine. You can take this medicine with or without food. Take your medicine at regular intervals. Do not take your medicine more often than directed. Do not stop taking this medicine suddenly except upon the advice of your doctor. Stopping this medicine too quickly may cause serious side effects or your condition may worsen.  A special MedGuide will be given to you by the pharmacist with each prescription and refill. Be sure to read this information carefully each time.  Talk to your pediatrician regarding the use of this medicine in children. While this drug may be prescribed for children as young as 7 years of age for selected conditions, precautions do apply.  What side effects may I notice from receiving this medicine?  Side effects that you should report to your doctor or health care professional as soon as possible:  · allergic reactions like skin rash, itching or hives, swelling of the face, lips, or tongue  · changes in blood pressure  · confusion  · dark urine  · dizziness  · fast talking and excited feelings or actions that are out of control  · fast, irregular heartbeat  · fever  · general ill feeling or flu-like symptoms  · hallucination, loss of contact with reality  · light-colored stools  · loss of balance or coordination  · redness, blistering, peeling or loosening of the skin, including inside the mouth  · right upper belly pain  · seizures  · suicidal thoughts or other mood changes  · trouble concentrating  · trouble passing urine or change in the amount of urine  · unusual bleeding or bruising  · unusually weak or tired  · yellowing of the eyes or skin  Side effects that  usually do not require medical attention (report to your doctor or health care professional if they continue or are bothersome):  · blurred vision  · change in appetite  · change in sex drive or performance  · headache  · increased sweating  · nausea  What may interact with this medicine?  Do not take this medicine with any of the following medications:  · certain diet drugs like dexfenfluramine, fenfluramine  · desvenlafaxine  · linezolid  · MAOIs like Azilect, Carbex, Eldepryl, Marplan, Nardil, and Parnate  · methylene blue (intravenous)  · milnacipran  · thioridazine  · venlafaxine  This medicine may also interact with the following medications:  · alcohol  · aspirin and aspirin-like medicines  · certain antibiotics like ciprofloxacin and enoxacin  · certain medicines for blood pressure, heart disease, irregular heart beat  · certain medicines for depression, anxiety, or psychotic disturbances  · certain medicines for migraine headache like almotriptan, eletriptan, frovatriptan, naratriptan, rizatriptan, sumatriptan, zolmitriptan  · certain medicines that treat or prevent blood clots like warfarin, enoxaparin, and dalteparin  · cimetidine  · fentanyl  · lithium  · NSAIDS, medicines for pain and inflammation, like ibuprofen or naproxen  · phentermine  · procarbazine  · sibutramine  · Errol's wort  · theophylline  · tramadol  · tryptophan  What if I miss a dose?  If you miss a dose, take it as soon as you can. If it is almost time for your next dose, take only that dose. Do not take double or extra doses.  Where should I keep my medicine?  Keep out of the reach of children.  Store at room temperature between 20 and 25 degrees C (68 to 77 degrees F). Throw away any unused medicine after the expiration date.  What should I tell my health care provider before I take this medicine?  They need to know if you have any of these conditions:  · bipolar disorder or a family history of bipolar  disorder  · glaucoma  · kidney disease  · liver disease  · suicidal thoughts or a previous suicide attempt  · taken medicines called MAOIs like Carbex, Eldepryl, Marplan, Nardil, and Parnate within 14 days  · an unusual reaction to duloxetine, other medicines, foods, dyes, or preservatives  · pregnant or trying to get pregnant  · breast-feeding  What should I watch for while using this medicine?  Tell your doctor if your symptoms do not get better or if they get worse. Visit your doctor or health care professional for regular checks on your progress. Because it may take several weeks to see the full effects of this medicine, it is important to continue your treatment as prescribed by your doctor.  Patients and their families should watch out for new or worsening thoughts of suicide or depression. Also watch out for sudden changes in feelings such as feeling anxious, agitated, panicky, irritable, hostile, aggressive, impulsive, severely restless, overly excited and hyperactive, or not being able to sleep. If this happens, especially at the beginning of treatment or after a change in dose, call your health care professional.  You may get drowsy or dizzy. Do not drive, use machinery, or do anything that needs mental alertness until you know how this medicine affects you. Do not stand or sit up quickly, especially if you are an older patient. This reduces the risk of dizzy or fainting spells. Alcohol may interfere with the effect of this medicine. Avoid alcoholic drinks.  This medicine can cause an increase in blood pressure. This medicine can also cause a sudden drop in your blood pressure, which may make you feel faint and increase the chance of a fall. These effects are most common when you first start the medicine or when the dose is increased, or during use of other medicines that can cause a sudden drop in blood pressure. Check with your doctor for instructions on monitoring your blood pressure while taking this  medicine.  Your mouth may get dry. Chewing sugarless gum or sucking hard candy, and drinking plenty of water may help. Contact your doctor if the problem does not go away or is severe.  NOTE:This sheet is a summary. It may not cover all possible information. If you have questions about this medicine, talk to your doctor, pharmacist, or health care provider. Copyright© 2017 [employer]        Lamotrigine tablets  What is this medicine?  LAMOTRIGINE (la KATT tri jeen) is used to control seizures in adults and children with epilepsy and Lennox-Gastaut syndrome. It is also used in adults to treat bipolar disorder.  How should I use this medicine?  Take this medicine by mouth with a glass of water. Follow the directions on the prescription label. Do not chew these tablets. If this medicine upsets your stomach, take it with food or milk. Take your doses at regular intervals. Do not take your medicine more often than directed.  A special MedGuide will be given to you by the pharmacist with each new prescription and refill. Be sure to read this information carefully each time.  Talk to your pediatrician regarding the use of this medicine in children. While this drug may be prescribed for children as young as 2 years for selected conditions, precautions do apply.  What side effects may I notice from receiving this medicine?  Side effects you should report to your doctor or health care professional as soon as possible:  · allergic reactions like skin rash, itching or hives, swelling of the face, lips, or tongue  · blurred or double vision  · difficulty walking or controlling muscle movements  · fever  · headache, stiff neck, and sensitivity to light  · painful sores in the mouth, eyes, or nose  · redness, blistering, peeling or loosening of the skin, including inside the mouth  · severe muscle pain  · swollen lymph glands  · uncontrollable eye movements  · unusual bruising or bleeding  · unusually weak or  tired  · vomiting  · worsening of mood, thoughts or actions of suicide or dying  · yellowing of the eyes or skin  Side effects that usually do not require medical attention (report to your doctor or health care professional if they continue or are bothersome):  · diarrhea or constipation  · difficulty sleeping  · nausea  · tremors  What may interact with this medicine?  · carbamazepine  · female hormones, including contraceptive or birth control pills  · methotrexate  · phenobarbital  · phenytoin  · primidone  · pyrimethamine  · rifampin  · trimethoprim  · valproic acid  What if I miss a dose?  If you miss a dose, take it as soon as you can. If it is almost time for your next dose, take only that dose. Do not take double or extra doses.  Where should I keep my medicine?  Keep out of reach of children.  Store at room temperature between 15 and 30 degrees C (59 and 86 degrees F). Throw away any unused medicine after the expiration date.  What should I tell my health care provider before I take this medicine?  They need to know if you have any of these conditions:  · a history of depression or bipolar disorder  · aseptic meningitis during prior use of lamotrigine  · folate deficiency  · kidney disease  · liver disease  · suicidal thoughts, plans, or attempt; a previous suicide attempt by you or a family member  · an unusual or allergic reaction to lamotrigine or other seizure medications, other medicines, foods, dyes, or preservatives  · pregnant or trying to get pregnant  · breast-feeding  What should I watch for while using this medicine?  Visit your doctor or health care professional for regular checks on your progress. If you take this medicine for seizures, wear a Medic Alert bracelet or necklace. Carry an identification card with information about your condition, medicines, and doctor or health care professional.  It is important to take this medicine exactly as directed. When first starting treatment, your dose  will need to be adjusted slowly. It may take weeks or months before your dose is stable. You should contact your doctor or health care professional if your seizures get worse or if you have any new types of seizures. Do not stop taking this medicine unless instructed by your doctor or health care professional. Stopping your medicine suddenly can increase your seizures or their severity.  Contact your doctor or health care professional right away if you develop a rash while taking this medicine. Rashes may be very severe and sometimes require treatment in the hospital. Deaths from rashes have occurred. Serious rashes occur more often in children than adults taking this medicine. It is more common for these serious rashes to occur during the first 2 months of treatment, but a rash can occur at any time.  You may get drowsy, dizzy, or have blurred vision. Do not drive, use machinery, or do anything that needs mental alertness until you know how this medicine affects you. To reduce dizzy or fainting spells, do not sit or stand up quickly, especially if you are an older patient. Alcohol can increase drowsiness and dizziness. Avoid alcoholic drinks.  If you are taking this medicine for bipolar disorder, it is important to report any changes in your mood to your doctor or health care professional. If your condition gets worse, you get mentally depressed, feel very hyperactive or manic, have difficulty sleeping, or have thoughts of hurting yourself or committing suicide, you need to get help from your health care professional right away. If you are a caregiver for someone taking this medicine for bipolar disorder, you should also report these behavioral changes right away. The use of this medicine may increase the chance of suicidal thoughts or actions. Pay special attention to how you are responding while on this medicine.  Your mouth may get dry. Chewing sugarless gum or sucking hard candy, and drinking plenty of water  may help. Contact your doctor if the problem does not go away or is severe.  Women who become pregnant while using this medicine may enroll in the [place] Antiepileptic Drug Pregnancy Registry by calling 1-172.106.1162. This registry collects information about the safety of antiepileptic drug use during pregnancy.  NOTE:This sheet is a summary. It may not cover all possible information. If you have questions about this medicine, talk to your doctor, pharmacist, or health care provider. Copyright© 2017 [employer]

## 2018-12-19 NOTE — PROGRESS NOTES
"Outpatient Psychiatry Follow-Up Visit (MD/NP)    12/19/2018    Clinical Status of Patient:  Outpatient (Ambulatory)    Chief Complaint:  Jesse Hernández is a 43 y.o. male who presents today for follow-up of depression and anxiety.  Met with patient.      Interval History and Content of Current Session:  Interim Events/Subjective Report/Content of Current Session:   She described mood as "really good but a little anxious at times" and affect was mood congruent and bright. She reported medication compliance and reported increased yawning as a possible side effect to his medications. Patient requested to keep the same dosage at this time. He denied feeling depressed.  He denied feelings of guilt, helplessness, hopelessness, anhedonia, amotivation, or fatigue. He reported improved anxiety symptoms with his current medications regimen and decreased irritability. He stated, "I don't feel depressed but just a little anxious. I always want to be a little early on my projects. I get disappointment with the way people drive on the road but I am learning to stay positive". The patient denied SI/HI/AH/VH and no delusion noted or reported. Patient denied symptoms of debby and denied alcohol abuse or any type of illicit drug use. Reported adequate sleep and appetite. Discussed positive self-talk, meditation and gratitude list to maintain a positive attitude.       Psychotherapy:  · Target symptoms: anxiety   · Why chosen therapy is appropriate versus another modality: relevant to diagnosis, patient responds to this modality, evidence based practice  · Outcome monitoring methods: self-report, observation  · Therapeutic intervention type: supportive psychotherapy  · Topics discussed/themes: building skills sets for symptom management, symptom recognition  · The patient's response to the intervention is motivated. The patient's progress toward treatment goals is fair.   · Duration of intervention: 17 minutes.    Review of Systems " "  · PSYCHIATRIC: Pertinant items are noted in the narrative.  · CONSTITUTIONAL: No weight gain or loss.   · MUSCULOSKELETAL: No pain or stiffness of the joints.  · NEUROLOGIC: No weakness, sensory changes, seizures, confusion, memory loss, tremor or other abnormal movements.  · ENDOCRINE: No polydipsia or polyuria.  · INTEGUMENTARY: No rashes or lacerations.  · EYES: No exophthalmos, jaundice or blindness.  · ENT: No dizziness, tinnitus or hearing loss.  · RESPIRATORY: No shortness of breath.  · CARDIOVASCULAR: No tachycardia or chest pain.  · GASTROINTESTINAL: No nausea, vomiting, pain, constipation or diarrhea.  · GENITOURINARY: No frequency, dysuria or sexual dysfunction.  · HEMATOLOGIC/LYMPHATIC: No excessive bleeding, prolonged or excessive bleeding after dental extraction/injury.  · ALLERGIC/IMMUNOLOGIC: No allergic response to materials, foods or animals at this time.    Past Medical, Family and Social History: The patient's past medical, family and social history have been reviewed and updated as appropriate within the electronic medical record - see encounter notes.    Compliance: yes    Side effects: None    Risk Parameters:  Patient reports no suicidal ideation  Patient reports no homicidal ideation  Patient reports no self-injurious behavior  Patient reports no violent behavior    Exam (detailed: at least 9 elements; comprehensive: all 15 elements)   Constitutional  Vitals:  Most recent vital signs, dated greater than 90 days prior to this appointment, were reviewed.   Vitals:    12/19/18 0758   BP: (!) 153/91   Pulse: 76   Weight: 107.7 kg (237 lb 7 oz)   Height: 5' 10" (1.778 m)        General:  unremarkable, age appropriate     Musculoskeletal  Muscle Strength/Tone:  no dystonia, no tremor, no tic   Gait & Station:  non-ataxic     Psychiatric  Speech:  no latency; no press   Mood & Affect:  "really good but a little anxious at times"   congruent and appropriate   Thought Process:  normal and logical "   Associations:  intact   Thought Content:  normal, no suicidality, no homicidality, delusions, or paranoia   Insight:  intact   Judgement: behavior is adequate to circumstances   Orientation:  grossly intact   Memory: intact for content of interview   Language: grossly intact   Attention Span & Concentration:  able to focus   Fund of Knowledge:  intact and appropriate to age and level of education     Assessment and Diagnosis   Status/Progress: Based on the examination today, the patient's problem(s) is/are improved.  New problems have not been presented today.   Co-morbidities, Diagnostic uncertainty and Lack of compliance are not complicating management of the primary condition.  There are no active rule-out diagnoses for this patient at this time.     General Impression: Patient's depressive symptoms are well controlled with his medication regimen and his anxiety symptoms are improving. Patient is medication compliant and will continue the same medications regimen.       ICD-10-CM ICD-9-CM   1. Mild episode of recurrent major depressive disorder F33.0 296.31   2. ALISON (generalized anxiety disorder) F41.1 300.02       Intervention/Counseling/Treatment Plan   · Medication Management: Continue current medications. The risks and benefits of medication were discussed with the patient.  · Labs, Diagnostic Studies: reviewed all recent labs are reviewed   · Continue duloxetine 60mg po daily  · Continue Lamictal 300mg po daily (take 2 tabs of 150 mg in the morning)  -Discussed informed consent, diagnosis, risks and benefits of proposed treatment above vs alternative treatments vs no treatment, and potential side effects of these treatments. The patient expresses understanding of the above and displays the capacity to agree with this treatment given said understanding. Patient also agrees that, currently, the benefits outweigh the risks and would like to pursue treatment at this time. Answered all questions and discussed  follow up. Encouraged patient to contact us with any questions or concerns.   -Encouraged Patient to keep future appointments.  -Take medications as prescribed and abstain from substance abuse.  -Pt to present to ED for thoughts to harm herself or others          Return to Clinic: 3 months

## 2019-01-08 ENCOUNTER — LAB VISIT (OUTPATIENT)
Dept: LAB | Facility: HOSPITAL | Age: 44
End: 2019-01-08
Attending: INTERNAL MEDICINE
Payer: COMMERCIAL

## 2019-01-08 DIAGNOSIS — R79.89 TSH ELEVATION: ICD-10-CM

## 2019-01-08 LAB — TSH SERPL DL<=0.005 MIU/L-ACNC: 1.81 UIU/ML

## 2019-01-08 PROCEDURE — 84443 ASSAY THYROID STIM HORMONE: CPT

## 2019-01-08 PROCEDURE — 36415 COLL VENOUS BLD VENIPUNCTURE: CPT | Mod: PO

## 2019-03-13 ENCOUNTER — OFFICE VISIT (OUTPATIENT)
Dept: PSYCHIATRY | Facility: CLINIC | Age: 44
End: 2019-03-13
Payer: COMMERCIAL

## 2019-03-13 VITALS
SYSTOLIC BLOOD PRESSURE: 149 MMHG | HEART RATE: 86 BPM | BODY MASS INDEX: 34.49 KG/M2 | WEIGHT: 240.94 LBS | DIASTOLIC BLOOD PRESSURE: 75 MMHG | HEIGHT: 70 IN

## 2019-03-13 DIAGNOSIS — F33.40 RECURRENT MAJOR DEPRESSIVE DISORDER, IN REMISSION: ICD-10-CM

## 2019-03-13 DIAGNOSIS — F41.1 GAD (GENERALIZED ANXIETY DISORDER): Primary | ICD-10-CM

## 2019-03-13 DIAGNOSIS — F33.0 MILD EPISODE OF RECURRENT MAJOR DEPRESSIVE DISORDER: ICD-10-CM

## 2019-03-13 PROCEDURE — 99213 PR OFFICE/OUTPT VISIT, EST, LEVL III, 20-29 MIN: ICD-10-PCS | Mod: S$GLB,,, | Performed by: NURSE PRACTITIONER

## 2019-03-13 PROCEDURE — 3077F SYST BP >= 140 MM HG: CPT | Mod: CPTII,S$GLB,, | Performed by: NURSE PRACTITIONER

## 2019-03-13 PROCEDURE — 3078F PR MOST RECENT DIASTOLIC BLOOD PRESSURE < 80 MM HG: ICD-10-PCS | Mod: CPTII,S$GLB,, | Performed by: NURSE PRACTITIONER

## 2019-03-13 PROCEDURE — 99999 PR PBB SHADOW E&M-EST. PATIENT-LVL II: ICD-10-PCS | Mod: PBBFAC,,, | Performed by: NURSE PRACTITIONER

## 2019-03-13 PROCEDURE — 99999 PR PBB SHADOW E&M-EST. PATIENT-LVL II: CPT | Mod: PBBFAC,,, | Performed by: NURSE PRACTITIONER

## 2019-03-13 PROCEDURE — 3008F PR BODY MASS INDEX (BMI) DOCUMENTED: ICD-10-PCS | Mod: CPTII,S$GLB,, | Performed by: NURSE PRACTITIONER

## 2019-03-13 PROCEDURE — 3008F BODY MASS INDEX DOCD: CPT | Mod: CPTII,S$GLB,, | Performed by: NURSE PRACTITIONER

## 2019-03-13 PROCEDURE — 3078F DIAST BP <80 MM HG: CPT | Mod: CPTII,S$GLB,, | Performed by: NURSE PRACTITIONER

## 2019-03-13 PROCEDURE — 3077F PR MOST RECENT SYSTOLIC BLOOD PRESSURE >= 140 MM HG: ICD-10-PCS | Mod: CPTII,S$GLB,, | Performed by: NURSE PRACTITIONER

## 2019-03-13 PROCEDURE — 99213 OFFICE O/P EST LOW 20 MIN: CPT | Mod: S$GLB,,, | Performed by: NURSE PRACTITIONER

## 2019-03-13 RX ORDER — DULOXETIN HYDROCHLORIDE 60 MG/1
60 CAPSULE, DELAYED RELEASE ORAL DAILY
Qty: 180 CAPSULE | Refills: 1 | Status: SHIPPED | OUTPATIENT
Start: 2019-03-13 | End: 2019-08-06 | Stop reason: SDUPTHER

## 2019-03-13 RX ORDER — LAMOTRIGINE 150 MG/1
300 TABLET ORAL DAILY
Qty: 180 TABLET | Refills: 1 | Status: SHIPPED | OUTPATIENT
Start: 2019-03-13 | End: 2019-08-06 | Stop reason: SDUPTHER

## 2019-03-13 NOTE — PROGRESS NOTES
"Outpatient Psychiatry Follow-Up Visit (MD/NP)    3/13/2019    Clinical Status of Patient:  Outpatient (Ambulatory)    Chief Complaint:  Jesse Hernández is a 44 y.o. male who presents today for follow-up of depression and anxiety.  Met with patient.      Interval History and Content of Current Session:  Interim Events/Subjective Report/Content of Current Session:   She described mood as "well" and affect was mood congruent and bright. He reported improved depression and anxiety.  He denied feelings of guilt, helplessness, hopelessness, anhedonia, amotivation, or fatigue. The patient denied SI/HI/AH/VH and no delusion noted or reported. Patient denied symptoms of debby or hypomania. He reported occasional alcohol use and denied any type of illicit drug use. Reported adequate sleep and appetite. He reported medication compliance but continues to report yawning as a side effect of his medications. However, he reported decreased yawing frequency since his last visit. Patient requested to keep the same dosage at this time. He stated that he is a little stressed out at his job. He also stated, "my wife is another stressor".  Discussed positive self-talk, meditation and gratitude list to maintain a positive attitude.      Psychotherapy:  · Target symptoms: anxiety   · Why chosen therapy is appropriate versus another modality: relevant to diagnosis, patient responds to this modality, evidence based practice  · Outcome monitoring methods: self-report, observation  · Therapeutic intervention type: supportive psychotherapy  · Topics discussed/themes: building skills sets for symptom management, symptom recognition  · The patient's response to the intervention is motivated. The patient's progress toward treatment goals is fair.   · Duration of intervention: 12 minutes.    Review of Systems   · PSYCHIATRIC: Pertinant items are noted in the narrative.  · CONSTITUTIONAL: No weight gain or loss.   · MUSCULOSKELETAL: No pain or " "stiffness of the joints.  · NEUROLOGIC: No weakness, sensory changes, seizures, confusion, memory loss, tremor or other abnormal movements.  · ENDOCRINE: No polydipsia or polyuria.  · INTEGUMENTARY: No rashes or lacerations.  · EYES: No exophthalmos, jaundice or blindness.  · ENT: No dizziness, tinnitus or hearing loss.  · RESPIRATORY: No shortness of breath.  · CARDIOVASCULAR: No tachycardia or chest pain.  · GASTROINTESTINAL: No nausea, vomiting, pain, constipation or diarrhea.  · GENITOURINARY: No frequency, dysuria or sexual dysfunction.  · HEMATOLOGIC/LYMPHATIC: No excessive bleeding, prolonged or excessive bleeding after dental extraction/injury.  · ALLERGIC/IMMUNOLOGIC: No allergic response to materials, foods or animals at this time.    Past Medical, Family and Social History: The patient's past medical, family and social history have been reviewed and updated as appropriate within the electronic medical record - see encounter notes.    Compliance: yes    Side effects: None    Risk Parameters:  Patient reports no suicidal ideation  Patient reports no homicidal ideation  Patient reports no self-injurious behavior  Patient reports no violent behavior    Exam (detailed: at least 9 elements; comprehensive: all 15 elements)   Constitutional  Vitals:  Most recent vital signs, dated greater than 90 days prior to this appointment, were reviewed.   Vitals:    03/13/19 0837   BP: (!) 149/75   Pulse: 86   Weight: 109.3 kg (240 lb 15.4 oz)   Height: 5' 10" (1.778 m)        General:  unremarkable, age appropriate     Musculoskeletal  Muscle Strength/Tone:  no dystonia, no tremor, no tic   Gait & Station:  non-ataxic     Psychiatric  Speech:  no latency; no press   Mood & Affect:  "well"  euthymic   Thought Process:  normal and logical   Associations:  intact   Thought Content:  normal, no suicidality, no homicidality, delusions, or paranoia   Insight:  intact   Judgement: behavior is adequate to circumstances "   Orientation:  grossly intact   Memory: intact for content of interview   Language: grossly intact   Attention Span & Concentration:  able to focus   Fund of Knowledge:  intact and appropriate to age and level of education     Assessment and Diagnosis   Status/Progress: Based on the examination today, the patient's problem(s) is/are improved.  New problems have not been presented today.   Co-morbidities, Diagnostic uncertainty and Lack of compliance are not complicating management of the primary condition.  There are no active rule-out diagnoses for this patient at this time.     General Impression: Patient's depressive symptoms are well controlled with his medication regimen and his anxiety symptoms are improving. Patient is medication compliant and will continue the same medications regimen.       ICD-10-CM ICD-9-CM   1. ALISON (generalized anxiety disorder) F41.1 300.02   2. Recurrent major depressive disorder, in remission F33.40 296.35   3. Mild episode of recurrent major depressive disorder F33.0 296.31       Intervention/Counseling/Treatment Plan   · Medication Management: Continue current medications. The risks and benefits of medication were discussed with the patient.  · Labs, Diagnostic Studies: reviewed all recent labs are reviewed   · Continue duloxetine 60mg po daily  · Continue Lamictal 300mg po daily (take 2 tabs of 150 mg in the morning)  -Discussed informed consent, diagnosis, risks and benefits of proposed treatment above vs alternative treatments vs no treatment, and potential side effects of these treatments. The patient expresses understanding of the above and displays the capacity to agree with this treatment given said understanding. Patient also agrees that, currently, the benefits outweigh the risks and would like to pursue treatment at this time. Answered all questions and discussed follow up. Encouraged patient to contact us with any questions or concerns.   -Encouraged Patient to keep  future appointments.  -Take medications as prescribed and abstain from substance abuse.  -Pt to present to ED for thoughts to harm herself or others          Return to Clinic: 3 months

## 2019-03-14 NOTE — PATIENT INSTRUCTIONS
Duloxetine delayed-release capsules  What is this medicine?  DULOXETINE (doo LOX e teen) is used to treat depression, anxiety, and different types of chronic pain.  How should I use this medicine?  Take this medicine by mouth with a glass of water. Follow the directions on the prescription label. Do not cut, crush or chew this medicine. You can take this medicine with or without food. Take your medicine at regular intervals. Do not take your medicine more often than directed. Do not stop taking this medicine suddenly except upon the advice of your doctor. Stopping this medicine too quickly may cause serious side effects or your condition may worsen.  A special MedGuide will be given to you by the pharmacist with each prescription and refill. Be sure to read this information carefully each time.  Talk to your pediatrician regarding the use of this medicine in children. While this drug may be prescribed for children as young as 7 years of age for selected conditions, precautions do apply.  What side effects may I notice from receiving this medicine?  Side effects that you should report to your doctor or health care professional as soon as possible:  · allergic reactions like skin rash, itching or hives, swelling of the face, lips, or tongue  · changes in blood pressure  · confusion  · dark urine  · dizziness  · fast talking and excited feelings or actions that are out of control  · fast, irregular heartbeat  · fever  · general ill feeling or flu-like symptoms  · hallucination, loss of contact with reality  · light-colored stools  · loss of balance or coordination  · redness, blistering, peeling or loosening of the skin, including inside the mouth  · right upper belly pain  · seizures  · suicidal thoughts or other mood changes  · trouble concentrating  · trouble passing urine or change in the amount of urine  · unusual bleeding or bruising  · unusually weak or tired  · yellowing of the eyes or skin  Side effects that  usually do not require medical attention (report to your doctor or health care professional if they continue or are bothersome):  · blurred vision  · change in appetite  · change in sex drive or performance  · headache  · increased sweating  · nausea  What may interact with this medicine?  Do not take this medicine with any of the following medications:  · certain diet drugs like dexfenfluramine, fenfluramine  · desvenlafaxine  · linezolid  · MAOIs like Azilect, Carbex, Eldepryl, Marplan, Nardil, and Parnate  · methylene blue (intravenous)  · milnacipran  · thioridazine  · venlafaxine  This medicine may also interact with the following medications:  · alcohol  · aspirin and aspirin-like medicines  · certain antibiotics like ciprofloxacin and enoxacin  · certain medicines for blood pressure, heart disease, irregular heart beat  · certain medicines for depression, anxiety, or psychotic disturbances  · certain medicines for migraine headache like almotriptan, eletriptan, frovatriptan, naratriptan, rizatriptan, sumatriptan, zolmitriptan  · certain medicines that treat or prevent blood clots like warfarin, enoxaparin, and dalteparin  · cimetidine  · fentanyl  · lithium  · NSAIDS, medicines for pain and inflammation, like ibuprofen or naproxen  · phentermine  · procarbazine  · sibutramine  · Errol's wort  · theophylline  · tramadol  · tryptophan  What if I miss a dose?  If you miss a dose, take it as soon as you can. If it is almost time for your next dose, take only that dose. Do not take double or extra doses.  Where should I keep my medicine?  Keep out of the reach of children.  Store at room temperature between 20 and 25 degrees C (68 to 77 degrees F). Throw away any unused medicine after the expiration date.  What should I tell my health care provider before I take this medicine?  They need to know if you have any of these conditions:  · bipolar disorder or a family history of bipolar  disorder  · glaucoma  · kidney disease  · liver disease  · suicidal thoughts or a previous suicide attempt  · taken medicines called MAOIs like Carbex, Eldepryl, Marplan, Nardil, and Parnate within 14 days  · an unusual reaction to duloxetine, other medicines, foods, dyes, or preservatives  · pregnant or trying to get pregnant  · breast-feeding  What should I watch for while using this medicine?  Tell your doctor if your symptoms do not get better or if they get worse. Visit your doctor or health care professional for regular checks on your progress. Because it may take several weeks to see the full effects of this medicine, it is important to continue your treatment as prescribed by your doctor.  Patients and their families should watch out for new or worsening thoughts of suicide or depression. Also watch out for sudden changes in feelings such as feeling anxious, agitated, panicky, irritable, hostile, aggressive, impulsive, severely restless, overly excited and hyperactive, or not being able to sleep. If this happens, especially at the beginning of treatment or after a change in dose, call your health care professional.  You may get drowsy or dizzy. Do not drive, use machinery, or do anything that needs mental alertness until you know how this medicine affects you. Do not stand or sit up quickly, especially if you are an older patient. This reduces the risk of dizzy or fainting spells. Alcohol may interfere with the effect of this medicine. Avoid alcoholic drinks.  This medicine can cause an increase in blood pressure. This medicine can also cause a sudden drop in your blood pressure, which may make you feel faint and increase the chance of a fall. These effects are most common when you first start the medicine or when the dose is increased, or during use of other medicines that can cause a sudden drop in blood pressure. Check with your doctor for instructions on monitoring your blood pressure while taking this  medicine.  Your mouth may get dry. Chewing sugarless gum or sucking hard candy, and drinking plenty of water may help. Contact your doctor if the problem does not go away or is severe.  NOTE:This sheet is a summary. It may not cover all possible information. If you have questions about this medicine, talk to your doctor, pharmacist, or health care provider. Copyright© 2017 [employer]        Lamotrigine tablets  What is this medicine?  LAMOTRIGINE (la KATT tri jeen) is used to control seizures in adults and children with epilepsy and Lennox-Gastaut syndrome. It is also used in adults to treat bipolar disorder.  How should I use this medicine?  Take this medicine by mouth with a glass of water. Follow the directions on the prescription label. Do not chew these tablets. If this medicine upsets your stomach, take it with food or milk. Take your doses at regular intervals. Do not take your medicine more often than directed.  A special MedGuide will be given to you by the pharmacist with each new prescription and refill. Be sure to read this information carefully each time.  Talk to your pediatrician regarding the use of this medicine in children. While this drug may be prescribed for children as young as 2 years for selected conditions, precautions do apply.  What side effects may I notice from receiving this medicine?  Side effects you should report to your doctor or health care professional as soon as possible:  · allergic reactions like skin rash, itching or hives, swelling of the face, lips, or tongue  · blurred or double vision  · difficulty walking or controlling muscle movements  · fever  · headache, stiff neck, and sensitivity to light  · painful sores in the mouth, eyes, or nose  · redness, blistering, peeling or loosening of the skin, including inside the mouth  · severe muscle pain  · swollen lymph glands  · uncontrollable eye movements  · unusual bruising or bleeding  · unusually weak or  tired  · vomiting  · worsening of mood, thoughts or actions of suicide or dying  · yellowing of the eyes or skin  Side effects that usually do not require medical attention (report to your doctor or health care professional if they continue or are bothersome):  · diarrhea or constipation  · difficulty sleeping  · nausea  · tremors  What may interact with this medicine?  · carbamazepine  · female hormones, including contraceptive or birth control pills  · methotrexate  · phenobarbital  · phenytoin  · primidone  · pyrimethamine  · rifampin  · trimethoprim  · valproic acid  What if I miss a dose?  If you miss a dose, take it as soon as you can. If it is almost time for your next dose, take only that dose. Do not take double or extra doses.  Where should I keep my medicine?  Keep out of reach of children.  Store at room temperature between 15 and 30 degrees C (59 and 86 degrees F). Throw away any unused medicine after the expiration date.  What should I tell my health care provider before I take this medicine?  They need to know if you have any of these conditions:  · a history of depression or bipolar disorder  · aseptic meningitis during prior use of lamotrigine  · folate deficiency  · kidney disease  · liver disease  · suicidal thoughts, plans, or attempt; a previous suicide attempt by you or a family member  · an unusual or allergic reaction to lamotrigine or other seizure medications, other medicines, foods, dyes, or preservatives  · pregnant or trying to get pregnant  · breast-feeding  What should I watch for while using this medicine?  Visit your doctor or health care professional for regular checks on your progress. If you take this medicine for seizures, wear a Medic Alert bracelet or necklace. Carry an identification card with information about your condition, medicines, and doctor or health care professional.  It is important to take this medicine exactly as directed. When first starting treatment, your dose  will need to be adjusted slowly. It may take weeks or months before your dose is stable. You should contact your doctor or health care professional if your seizures get worse or if you have any new types of seizures. Do not stop taking this medicine unless instructed by your doctor or health care professional. Stopping your medicine suddenly can increase your seizures or their severity.  Contact your doctor or health care professional right away if you develop a rash while taking this medicine. Rashes may be very severe and sometimes require treatment in the hospital. Deaths from rashes have occurred. Serious rashes occur more often in children than adults taking this medicine. It is more common for these serious rashes to occur during the first 2 months of treatment, but a rash can occur at any time.  You may get drowsy, dizzy, or have blurred vision. Do not drive, use machinery, or do anything that needs mental alertness until you know how this medicine affects you. To reduce dizzy or fainting spells, do not sit or stand up quickly, especially if you are an older patient. Alcohol can increase drowsiness and dizziness. Avoid alcoholic drinks.  If you are taking this medicine for bipolar disorder, it is important to report any changes in your mood to your doctor or health care professional. If your condition gets worse, you get mentally depressed, feel very hyperactive or manic, have difficulty sleeping, or have thoughts of hurting yourself or committing suicide, you need to get help from your health care professional right away. If you are a caregiver for someone taking this medicine for bipolar disorder, you should also report these behavioral changes right away. The use of this medicine may increase the chance of suicidal thoughts or actions. Pay special attention to how you are responding while on this medicine.  Your mouth may get dry. Chewing sugarless gum or sucking hard candy, and drinking plenty of water  may help. Contact your doctor if the problem does not go away or is severe.  Women who become pregnant while using this medicine may enroll in the [place] Antiepileptic Drug Pregnancy Registry by calling 1-189.601.1993. This registry collects information about the safety of antiepileptic drug use during pregnancy.  NOTE:This sheet is a summary. It may not cover all possible information. If you have questions about this medicine, talk to your doctor, pharmacist, or health care provider. Copyright© 2017 [employer]

## 2019-03-19 ENCOUNTER — PATIENT MESSAGE (OUTPATIENT)
Dept: INTERNAL MEDICINE | Facility: CLINIC | Age: 44
End: 2019-03-19

## 2019-04-01 ENCOUNTER — OFFICE VISIT (OUTPATIENT)
Dept: INTERNAL MEDICINE | Facility: CLINIC | Age: 44
End: 2019-04-01
Payer: COMMERCIAL

## 2019-04-01 VITALS
BODY MASS INDEX: 34.09 KG/M2 | DIASTOLIC BLOOD PRESSURE: 70 MMHG | HEIGHT: 70 IN | HEART RATE: 64 BPM | WEIGHT: 238.13 LBS | OXYGEN SATURATION: 97 % | SYSTOLIC BLOOD PRESSURE: 132 MMHG

## 2019-04-01 DIAGNOSIS — Z00.00 PREVENTATIVE HEALTH CARE: Primary | ICD-10-CM

## 2019-04-01 DIAGNOSIS — E66.9 OBESITY (BMI 30.0-34.9): ICD-10-CM

## 2019-04-01 DIAGNOSIS — I10 HYPERTENSION, UNSPECIFIED TYPE: ICD-10-CM

## 2019-04-01 DIAGNOSIS — E78.1 HYPERTRIGLYCERIDEMIA: ICD-10-CM

## 2019-04-01 DIAGNOSIS — Z12.5 SCREENING FOR PROSTATE CANCER: ICD-10-CM

## 2019-04-01 PROCEDURE — 3008F PR BODY MASS INDEX (BMI) DOCUMENTED: ICD-10-PCS | Mod: CPTII,S$GLB,, | Performed by: INTERNAL MEDICINE

## 2019-04-01 PROCEDURE — 3075F SYST BP GE 130 - 139MM HG: CPT | Mod: CPTII,S$GLB,, | Performed by: INTERNAL MEDICINE

## 2019-04-01 PROCEDURE — 99214 PR OFFICE/OUTPT VISIT, EST, LEVL IV, 30-39 MIN: ICD-10-PCS | Mod: S$GLB,,, | Performed by: INTERNAL MEDICINE

## 2019-04-01 PROCEDURE — 99214 OFFICE O/P EST MOD 30 MIN: CPT | Mod: S$GLB,,, | Performed by: INTERNAL MEDICINE

## 2019-04-01 PROCEDURE — 3008F BODY MASS INDEX DOCD: CPT | Mod: CPTII,S$GLB,, | Performed by: INTERNAL MEDICINE

## 2019-04-01 PROCEDURE — 99999 PR PBB SHADOW E&M-EST. PATIENT-LVL III: ICD-10-PCS | Mod: PBBFAC,,, | Performed by: INTERNAL MEDICINE

## 2019-04-01 PROCEDURE — 3078F PR MOST RECENT DIASTOLIC BLOOD PRESSURE < 80 MM HG: ICD-10-PCS | Mod: CPTII,S$GLB,, | Performed by: INTERNAL MEDICINE

## 2019-04-01 PROCEDURE — 3078F DIAST BP <80 MM HG: CPT | Mod: CPTII,S$GLB,, | Performed by: INTERNAL MEDICINE

## 2019-04-01 PROCEDURE — 3075F PR MOST RECENT SYSTOLIC BLOOD PRESS GE 130-139MM HG: ICD-10-PCS | Mod: CPTII,S$GLB,, | Performed by: INTERNAL MEDICINE

## 2019-04-01 PROCEDURE — 99999 PR PBB SHADOW E&M-EST. PATIENT-LVL III: CPT | Mod: PBBFAC,,, | Performed by: INTERNAL MEDICINE

## 2019-04-03 NOTE — PROGRESS NOTES
Subjective:       Patient ID: Jesse Hernández is a 44 y.o. male.    Chief Complaint: No chief complaint on file.    HPI 44-year-old male presents to clinic today follow-up of hypertension dyslipidemia hypertriglyceridemia.  Patient did not have fasting blood work prior prior to her visit.  Blood pressures been well controlled.  Review of Systems  otherwise negative  Objective:      Physical Exam  General: Well-appearing, well-nourished.  No distress  HEENT: conjunctivae are normal.  Pupils are equal and reative to light.  TM's are clear and intact bilaterally.  Hearing is grossly normal.  Nasopharynx is clear.  Oropharynx is clear.  Neck: Supple.  No thyroid megaly.  No bruits.  Lymph: No cervical or supraclavicular adenopathy.  Heart: Regular rate and rhythm, without murmur, rub or gallop.  Lungs: Clear to auscultation; respiratory effort normal.  Abdomen: Soft, nontender, nondistended.  Normoactive bowel sounds.  No hepatomegaly.  No masses.  Extremities: Good distal pulses.  No edema.  Psych: Oriented to time person place.  Judgment and insight seem unimpaired.  Mood and affect are appropriate.  Assessment:       1. Preventative health care    2. Obesity (BMI 30.0-34.9)    3. Hypertension, unspecified type    4. Hypertriglyceridemia    5. Screening for prostate cancer        Plan:       Diagnoses and all orders for this visit:    Preventative health care  -     CBC auto differential; Standing  -     TSH; Standing  Scheduled in 6 months  Obesity (BMI 30.0-34.9)  -     TSH; Standing    Hypertension, unspecified type  -     TSH; Standing  Controlled.  Continue current medical regimen.  Prescription refills addressed.  Followup advised. See after visit summary.  Hypertriglyceridemia  -     TSH; Standing  Recommended low-fat diet weight loss counseling provided.  Screening for prostate cancer  -     PSA, Screening; Standing

## 2019-04-04 ENCOUNTER — LAB VISIT (OUTPATIENT)
Dept: LAB | Facility: HOSPITAL | Age: 44
End: 2019-04-04
Attending: INTERNAL MEDICINE
Payer: COMMERCIAL

## 2019-04-04 DIAGNOSIS — R79.89 TSH ELEVATION: ICD-10-CM

## 2019-04-04 DIAGNOSIS — I10 HYPERTENSION, UNSPECIFIED TYPE: ICD-10-CM

## 2019-04-04 LAB
ALBUMIN SERPL BCP-MCNC: 3.8 G/DL (ref 3.5–5.2)
ALP SERPL-CCNC: 60 U/L (ref 55–135)
ALT SERPL W/O P-5'-P-CCNC: 38 U/L (ref 10–44)
ANION GAP SERPL CALC-SCNC: 7 MMOL/L (ref 8–16)
AST SERPL-CCNC: 30 U/L (ref 10–40)
BILIRUB SERPL-MCNC: 0.5 MG/DL (ref 0.1–1)
BUN SERPL-MCNC: 14 MG/DL (ref 6–20)
CALCIUM SERPL-MCNC: 9.4 MG/DL (ref 8.7–10.5)
CHLORIDE SERPL-SCNC: 105 MMOL/L (ref 95–110)
CHOLEST SERPL-MCNC: 266 MG/DL (ref 120–199)
CHOLEST/HDLC SERPL: 7.2 {RATIO} (ref 2–5)
CO2 SERPL-SCNC: 26 MMOL/L (ref 23–29)
CREAT SERPL-MCNC: 1.1 MG/DL (ref 0.5–1.4)
EST. GFR  (AFRICAN AMERICAN): >60 ML/MIN/1.73 M^2
EST. GFR  (NON AFRICAN AMERICAN): >60 ML/MIN/1.73 M^2
GLUCOSE SERPL-MCNC: 101 MG/DL (ref 70–110)
HDLC SERPL-MCNC: 37 MG/DL (ref 40–75)
HDLC SERPL: 13.9 % (ref 20–50)
LDLC SERPL CALC-MCNC: ABNORMAL MG/DL (ref 63–159)
NONHDLC SERPL-MCNC: 229 MG/DL
POTASSIUM SERPL-SCNC: 4.2 MMOL/L (ref 3.5–5.1)
PROT SERPL-MCNC: 6.7 G/DL (ref 6–8.4)
SODIUM SERPL-SCNC: 138 MMOL/L (ref 136–145)
TRIGL SERPL-MCNC: 433 MG/DL (ref 30–150)
TSH SERPL DL<=0.005 MIU/L-ACNC: 3.45 UIU/ML (ref 0.4–4)

## 2019-04-04 PROCEDURE — 36415 COLL VENOUS BLD VENIPUNCTURE: CPT | Mod: PO

## 2019-04-04 PROCEDURE — 80061 LIPID PANEL: CPT

## 2019-04-04 PROCEDURE — 80053 COMPREHEN METABOLIC PANEL: CPT

## 2019-04-04 PROCEDURE — 84443 ASSAY THYROID STIM HORMONE: CPT

## 2019-04-17 DIAGNOSIS — I10 HYPERTENSION, UNSPECIFIED TYPE: ICD-10-CM

## 2019-04-18 RX ORDER — METOPROLOL SUCCINATE 100 MG/1
TABLET, EXTENDED RELEASE ORAL
Qty: 90 TABLET | Refills: 1 | Status: SHIPPED | OUTPATIENT
Start: 2019-04-18 | End: 2019-10-13 | Stop reason: SDUPTHER

## 2019-08-05 ENCOUNTER — PATIENT MESSAGE (OUTPATIENT)
Dept: INTERNAL MEDICINE | Facility: CLINIC | Age: 44
End: 2019-08-05

## 2019-08-05 ENCOUNTER — PATIENT MESSAGE (OUTPATIENT)
Dept: PSYCHIATRY | Facility: CLINIC | Age: 44
End: 2019-08-05

## 2019-08-06 ENCOUNTER — OFFICE VISIT (OUTPATIENT)
Dept: PSYCHIATRY | Facility: CLINIC | Age: 44
End: 2019-08-06
Payer: COMMERCIAL

## 2019-08-06 VITALS
HEART RATE: 58 BPM | BODY MASS INDEX: 34.89 KG/M2 | SYSTOLIC BLOOD PRESSURE: 140 MMHG | WEIGHT: 243.19 LBS | DIASTOLIC BLOOD PRESSURE: 88 MMHG

## 2019-08-06 DIAGNOSIS — F33.0 MILD EPISODE OF RECURRENT MAJOR DEPRESSIVE DISORDER: ICD-10-CM

## 2019-08-06 DIAGNOSIS — F33.40 RECURRENT MAJOR DEPRESSIVE DISORDER, IN REMISSION: ICD-10-CM

## 2019-08-06 DIAGNOSIS — F41.1 GAD (GENERALIZED ANXIETY DISORDER): Primary | ICD-10-CM

## 2019-08-06 PROCEDURE — 99213 OFFICE O/P EST LOW 20 MIN: CPT | Mod: S$GLB,,, | Performed by: NURSE PRACTITIONER

## 2019-08-06 PROCEDURE — 99999 PR PBB SHADOW E&M-EST. PATIENT-LVL II: CPT | Mod: PBBFAC,,, | Performed by: NURSE PRACTITIONER

## 2019-08-06 PROCEDURE — 3008F BODY MASS INDEX DOCD: CPT | Mod: CPTII,S$GLB,, | Performed by: NURSE PRACTITIONER

## 2019-08-06 PROCEDURE — 3008F PR BODY MASS INDEX (BMI) DOCUMENTED: ICD-10-PCS | Mod: CPTII,S$GLB,, | Performed by: NURSE PRACTITIONER

## 2019-08-06 PROCEDURE — 99999 PR PBB SHADOW E&M-EST. PATIENT-LVL II: ICD-10-PCS | Mod: PBBFAC,,, | Performed by: NURSE PRACTITIONER

## 2019-08-06 PROCEDURE — 3079F PR MOST RECENT DIASTOLIC BLOOD PRESSURE 80-89 MM HG: ICD-10-PCS | Mod: CPTII,S$GLB,, | Performed by: NURSE PRACTITIONER

## 2019-08-06 PROCEDURE — 3077F PR MOST RECENT SYSTOLIC BLOOD PRESSURE >= 140 MM HG: ICD-10-PCS | Mod: CPTII,S$GLB,, | Performed by: NURSE PRACTITIONER

## 2019-08-06 PROCEDURE — 99213 PR OFFICE/OUTPT VISIT, EST, LEVL III, 20-29 MIN: ICD-10-PCS | Mod: S$GLB,,, | Performed by: NURSE PRACTITIONER

## 2019-08-06 PROCEDURE — 3077F SYST BP >= 140 MM HG: CPT | Mod: CPTII,S$GLB,, | Performed by: NURSE PRACTITIONER

## 2019-08-06 PROCEDURE — 3079F DIAST BP 80-89 MM HG: CPT | Mod: CPTII,S$GLB,, | Performed by: NURSE PRACTITIONER

## 2019-08-06 RX ORDER — LAMOTRIGINE 150 MG/1
300 TABLET ORAL DAILY
Qty: 180 TABLET | Refills: 1 | Status: SHIPPED | OUTPATIENT
Start: 2019-08-06 | End: 2020-01-14 | Stop reason: SDUPTHER

## 2019-08-06 RX ORDER — DULOXETIN HYDROCHLORIDE 60 MG/1
60 CAPSULE, DELAYED RELEASE ORAL DAILY
Qty: 90 CAPSULE | Refills: 1 | Status: SHIPPED | OUTPATIENT
Start: 2019-08-06 | End: 2020-01-14 | Stop reason: SDUPTHER

## 2019-08-06 NOTE — PROGRESS NOTES
"Outpatient Psychiatry Follow-Up Visit (MD/NP)    8/6/2019    Clinical Status of Patient:  Outpatient (Ambulatory)    Chief Complaint:  Jesse Hernández is a 44 y.o. male who presents today for follow-up of depression and anxiety.  Met with patient.      Interval History and Content of Current Session:  Interim Events/Subjective Report/Content of Current Session:   She described mood as "fine" and affect was bright. He stated that he feels a little anxious about the current situation at his job and complained of making less money. He reported improved depression and anxiety with his current medication regimen. He denied SI/HI/AH/VH and no delusion noted or reported. Patient denied symptoms of debby or hypomania. He reported occasional alcohol use and denied any type of illicit drug use. Reported adequate sleep but increased appetite. He stated he is trying to practice "intermittent fasting for 7 hours" to control his eating and lose weight. He reported medication compliance and denied any side effects to his current medication of lamictal and duloxetine.     Psychotherapy:  · Target symptoms: anxiety   · Why chosen therapy is appropriate versus another modality: relevant to diagnosis, patient responds to this modality, evidence based practice  · Outcome monitoring methods: self-report, observation  · Therapeutic intervention type: supportive psychotherapy  · Topics discussed/themes: building skills sets for symptom management, symptom recognition  · The patient's response to the intervention is motivated. The patient's progress toward treatment goals is fair.   · Duration of intervention: 10 minutes.    Review of Systems   · PSYCHIATRIC: Pertinant items are noted in the narrative.  · CONSTITUTIONAL: No weight gain or loss.   · MUSCULOSKELETAL: No pain or stiffness of the joints.  · NEUROLOGIC: No weakness, sensory changes, seizures, confusion, memory loss, tremor or other abnormal movements.  · ENDOCRINE: No " "polydipsia or polyuria.  · INTEGUMENTARY: No rashes or lacerations.  · EYES: No exophthalmos, jaundice or blindness.  · ENT: No dizziness, tinnitus or hearing loss.  · RESPIRATORY: No shortness of breath.  · CARDIOVASCULAR: No tachycardia or chest pain.  · GASTROINTESTINAL: No nausea, vomiting, pain, constipation or diarrhea.  · GENITOURINARY: No frequency, dysuria or sexual dysfunction.  · HEMATOLOGIC/LYMPHATIC: No excessive bleeding, prolonged or excessive bleeding after dental extraction/injury.  · ALLERGIC/IMMUNOLOGIC: No allergic response to materials, foods or animals at this time.    Past Medical, Family and Social History: The patient's past medical, family and social history have been reviewed and updated as appropriate within the electronic medical record - see encounter notes.    Compliance: yes    Side effects: None    Risk Parameters:  Patient reports no suicidal ideation  Patient reports no homicidal ideation  Patient reports no self-injurious behavior  Patient reports no violent behavior    Exam (detailed: at least 9 elements; comprehensive: all 15 elements)   Constitutional  Vitals:  Most recent vital signs, dated greater than 90 days prior to this appointment, were reviewed.   Vitals:    08/06/19 1034   BP: (!) 140/88   Pulse: (!) 58   Weight: 110.3 kg (243 lb 2.7 oz)        General:  unremarkable, age appropriate     Musculoskeletal  Muscle Strength/Tone:  no dystonia, no tremor, no tic   Gait & Station:  non-ataxic     Psychiatric  Speech:  no latency; no press   Mood & Affect:  "fine"  euthymic   Thought Process:  normal and logical   Associations:  intact   Thought Content:  normal, no suicidality, no homicidality, delusions, or paranoia   Insight:  intact   Judgement: behavior is adequate to circumstances   Orientation:  grossly intact   Memory: intact for content of interview   Language: grossly intact   Attention Span & Concentration:  able to focus   Fund of Knowledge:  intact and " appropriate to age and level of education     Assessment and Diagnosis   Status/Progress: Based on the examination today, the patient's problem(s) is/are improved.  New problems have not been presented today.   Co-morbidities, Diagnostic uncertainty and Lack of compliance are not complicating management of the primary condition.  There are no active rule-out diagnoses for this patient at this time.     General Impression: Patient's depressive symptoms are well controlled with his medication regimen and his anxiety symptoms are improving. Patient is medication compliant and will continue the same medications regimen.       ICD-10-CM ICD-9-CM   1. ALISON (generalized anxiety disorder) F41.1 300.02   2. Recurrent major depressive disorder, in remission F33.40 296.35   3. Mild episode of recurrent major depressive disorder F33.0 296.31       Intervention/Counseling/Treatment Plan   · Medication Management: Continue current medications. The risks and benefits of medication were discussed with the patient.  · Labs, Diagnostic Studies: reviewed all recent labs are reviewed   · Continue duloxetine 60mg po daily  · Continue Lamictal 300mg po daily (take 2 tabs of 150 mg in the morning)  -Discussed informed consent, diagnosis, risks and benefits of proposed treatment above vs alternative treatments vs no treatment, and potential side effects of these treatments. The patient expresses understanding of the above and displays the capacity to agree with this treatment given said understanding. Patient also agrees that, currently, the benefits outweigh the risks and would like to pursue treatment at this time. Answered all questions and discussed follow up. Encouraged patient to contact us with any questions or concerns.   -Encouraged Patient to keep future appointments.  -Take medications as prescribed and abstain from substance abuse.  -Pt to present to ED for thoughts to harm herself or others          Return to Clinic: 3 months

## 2019-08-06 NOTE — PATIENT INSTRUCTIONS
Duloxetine delayed-release capsules  What is this medicine?  DULOXETINE (doo LOX e teen) is used to treat depression, anxiety, and different types of chronic pain.  How should I use this medicine?  Take this medicine by mouth with a glass of water. Follow the directions on the prescription label. Do not cut, crush or chew this medicine. You can take this medicine with or without food. Take your medicine at regular intervals. Do not take your medicine more often than directed. Do not stop taking this medicine suddenly except upon the advice of your doctor. Stopping this medicine too quickly may cause serious side effects or your condition may worsen.  A special MedGuide will be given to you by the pharmacist with each prescription and refill. Be sure to read this information carefully each time.  Talk to your pediatrician regarding the use of this medicine in children. While this drug may be prescribed for children as young as 7 years of age for selected conditions, precautions do apply.  What side effects may I notice from receiving this medicine?  Side effects that you should report to your doctor or health care professional as soon as possible:  · allergic reactions like skin rash, itching or hives, swelling of the face, lips, or tongue  · changes in blood pressure  · confusion  · dark urine  · dizziness  · fast talking and excited feelings or actions that are out of control  · fast, irregular heartbeat  · fever  · general ill feeling or flu-like symptoms  · hallucination, loss of contact with reality  · light-colored stools  · loss of balance or coordination  · redness, blistering, peeling or loosening of the skin, including inside the mouth  · right upper belly pain  · seizures  · suicidal thoughts or other mood changes  · trouble concentrating  · trouble passing urine or change in the amount of urine  · unusual bleeding or bruising  · unusually weak or tired  · yellowing of the eyes or skin  Side effects that  usually do not require medical attention (report to your doctor or health care professional if they continue or are bothersome):  · blurred vision  · change in appetite  · change in sex drive or performance  · headache  · increased sweating  · nausea  What may interact with this medicine?  Do not take this medicine with any of the following medications:  · certain diet drugs like dexfenfluramine, fenfluramine  · desvenlafaxine  · linezolid  · MAOIs like Azilect, Carbex, Eldepryl, Marplan, Nardil, and Parnate  · methylene blue (intravenous)  · milnacipran  · thioridazine  · venlafaxine  This medicine may also interact with the following medications:  · alcohol  · aspirin and aspirin-like medicines  · certain antibiotics like ciprofloxacin and enoxacin  · certain medicines for blood pressure, heart disease, irregular heart beat  · certain medicines for depression, anxiety, or psychotic disturbances  · certain medicines for migraine headache like almotriptan, eletriptan, frovatriptan, naratriptan, rizatriptan, sumatriptan, zolmitriptan  · certain medicines that treat or prevent blood clots like warfarin, enoxaparin, and dalteparin  · cimetidine  · fentanyl  · lithium  · NSAIDS, medicines for pain and inflammation, like ibuprofen or naproxen  · phentermine  · procarbazine  · sibutramine  · Errol's wort  · theophylline  · tramadol  · tryptophan  What if I miss a dose?  If you miss a dose, take it as soon as you can. If it is almost time for your next dose, take only that dose. Do not take double or extra doses.  Where should I keep my medicine?  Keep out of the reach of children.  Store at room temperature between 20 and 25 degrees C (68 to 77 degrees F). Throw away any unused medicine after the expiration date.  What should I tell my health care provider before I take this medicine?  They need to know if you have any of these conditions:  · bipolar disorder or a family history of bipolar  disorder  · glaucoma  · kidney disease  · liver disease  · suicidal thoughts or a previous suicide attempt  · taken medicines called MAOIs like Carbex, Eldepryl, Marplan, Nardil, and Parnate within 14 days  · an unusual reaction to duloxetine, other medicines, foods, dyes, or preservatives  · pregnant or trying to get pregnant  · breast-feeding  What should I watch for while using this medicine?  Tell your doctor if your symptoms do not get better or if they get worse. Visit your doctor or health care professional for regular checks on your progress. Because it may take several weeks to see the full effects of this medicine, it is important to continue your treatment as prescribed by your doctor.  Patients and their families should watch out for new or worsening thoughts of suicide or depression. Also watch out for sudden changes in feelings such as feeling anxious, agitated, panicky, irritable, hostile, aggressive, impulsive, severely restless, overly excited and hyperactive, or not being able to sleep. If this happens, especially at the beginning of treatment or after a change in dose, call your health care professional.  You may get drowsy or dizzy. Do not drive, use machinery, or do anything that needs mental alertness until you know how this medicine affects you. Do not stand or sit up quickly, especially if you are an older patient. This reduces the risk of dizzy or fainting spells. Alcohol may interfere with the effect of this medicine. Avoid alcoholic drinks.  This medicine can cause an increase in blood pressure. This medicine can also cause a sudden drop in your blood pressure, which may make you feel faint and increase the chance of a fall. These effects are most common when you first start the medicine or when the dose is increased, or during use of other medicines that can cause a sudden drop in blood pressure. Check with your doctor for instructions on monitoring your blood pressure while taking this  medicine.  Your mouth may get dry. Chewing sugarless gum or sucking hard candy, and drinking plenty of water may help. Contact your doctor if the problem does not go away or is severe.  NOTE:This sheet is a summary. It may not cover all possible information. If you have questions about this medicine, talk to your doctor, pharmacist, or health care provider. Copyright© 2017 [employer]        Lamotrigine tablets  What is this medicine?  LAMOTRIGINE (la KATT tri jeen) is used to control seizures in adults and children with epilepsy and Lennox-Gastaut syndrome. It is also used in adults to treat bipolar disorder.  How should I use this medicine?  Take this medicine by mouth with a glass of water. Follow the directions on the prescription label. Do not chew these tablets. If this medicine upsets your stomach, take it with food or milk. Take your doses at regular intervals. Do not take your medicine more often than directed.  A special MedGuide will be given to you by the pharmacist with each new prescription and refill. Be sure to read this information carefully each time.  Talk to your pediatrician regarding the use of this medicine in children. While this drug may be prescribed for children as young as 2 years for selected conditions, precautions do apply.  What side effects may I notice from receiving this medicine?  Side effects you should report to your doctor or health care professional as soon as possible:  · allergic reactions like skin rash, itching or hives, swelling of the face, lips, or tongue  · blurred or double vision  · difficulty walking or controlling muscle movements  · fever  · headache, stiff neck, and sensitivity to light  · painful sores in the mouth, eyes, or nose  · redness, blistering, peeling or loosening of the skin, including inside the mouth  · severe muscle pain  · swollen lymph glands  · uncontrollable eye movements  · unusual bruising or bleeding  · unusually weak or  tired  · vomiting  · worsening of mood, thoughts or actions of suicide or dying  · yellowing of the eyes or skin  Side effects that usually do not require medical attention (report to your doctor or health care professional if they continue or are bothersome):  · diarrhea or constipation  · difficulty sleeping  · nausea  · tremors  What may interact with this medicine?  · carbamazepine  · female hormones, including contraceptive or birth control pills  · methotrexate  · phenobarbital  · phenytoin  · primidone  · pyrimethamine  · rifampin  · trimethoprim  · valproic acid  What if I miss a dose?  If you miss a dose, take it as soon as you can. If it is almost time for your next dose, take only that dose. Do not take double or extra doses.  Where should I keep my medicine?  Keep out of reach of children.  Store at room temperature between 15 and 30 degrees C (59 and 86 degrees F). Throw away any unused medicine after the expiration date.  What should I tell my health care provider before I take this medicine?  They need to know if you have any of these conditions:  · a history of depression or bipolar disorder  · aseptic meningitis during prior use of lamotrigine  · folate deficiency  · kidney disease  · liver disease  · suicidal thoughts, plans, or attempt; a previous suicide attempt by you or a family member  · an unusual or allergic reaction to lamotrigine or other seizure medications, other medicines, foods, dyes, or preservatives  · pregnant or trying to get pregnant  · breast-feeding  What should I watch for while using this medicine?  Visit your doctor or health care professional for regular checks on your progress. If you take this medicine for seizures, wear a Medic Alert bracelet or necklace. Carry an identification card with information about your condition, medicines, and doctor or health care professional.  It is important to take this medicine exactly as directed. When first starting treatment, your dose  will need to be adjusted slowly. It may take weeks or months before your dose is stable. You should contact your doctor or health care professional if your seizures get worse or if you have any new types of seizures. Do not stop taking this medicine unless instructed by your doctor or health care professional. Stopping your medicine suddenly can increase your seizures or their severity.  Contact your doctor or health care professional right away if you develop a rash while taking this medicine. Rashes may be very severe and sometimes require treatment in the hospital. Deaths from rashes have occurred. Serious rashes occur more often in children than adults taking this medicine. It is more common for these serious rashes to occur during the first 2 months of treatment, but a rash can occur at any time.  You may get drowsy, dizzy, or have blurred vision. Do not drive, use machinery, or do anything that needs mental alertness until you know how this medicine affects you. To reduce dizzy or fainting spells, do not sit or stand up quickly, especially if you are an older patient. Alcohol can increase drowsiness and dizziness. Avoid alcoholic drinks.  If you are taking this medicine for bipolar disorder, it is important to report any changes in your mood to your doctor or health care professional. If your condition gets worse, you get mentally depressed, feel very hyperactive or manic, have difficulty sleeping, or have thoughts of hurting yourself or committing suicide, you need to get help from your health care professional right away. If you are a caregiver for someone taking this medicine for bipolar disorder, you should also report these behavioral changes right away. The use of this medicine may increase the chance of suicidal thoughts or actions. Pay special attention to how you are responding while on this medicine.  Your mouth may get dry. Chewing sugarless gum or sucking hard candy, and drinking plenty of water  may help. Contact your doctor if the problem does not go away or is severe.  Women who become pregnant while using this medicine may enroll in the [place] Antiepileptic Drug Pregnancy Registry by calling 1-945.849.5217. This registry collects information about the safety of antiepileptic drug use during pregnancy.  NOTE:This sheet is a summary. It may not cover all possible information. If you have questions about this medicine, talk to your doctor, pharmacist, or health care provider. Copyright© 2017 [employer]

## 2019-08-17 ENCOUNTER — TELEPHONE (OUTPATIENT)
Dept: FAMILY MEDICINE | Facility: CLINIC | Age: 44
End: 2019-08-17

## 2019-08-17 ENCOUNTER — PATIENT MESSAGE (OUTPATIENT)
Dept: INTERNAL MEDICINE | Facility: CLINIC | Age: 44
End: 2019-08-17

## 2019-08-17 ENCOUNTER — OFFICE VISIT (OUTPATIENT)
Dept: URGENT CARE | Facility: CLINIC | Age: 44
End: 2019-08-17
Payer: COMMERCIAL

## 2019-08-17 VITALS
HEART RATE: 67 BPM | RESPIRATION RATE: 20 BRPM | DIASTOLIC BLOOD PRESSURE: 82 MMHG | OXYGEN SATURATION: 96 % | BODY MASS INDEX: 34.36 KG/M2 | TEMPERATURE: 98 F | WEIGHT: 240 LBS | HEIGHT: 70 IN | SYSTOLIC BLOOD PRESSURE: 136 MMHG

## 2019-08-17 DIAGNOSIS — S39.012A STRAIN OF LUMBAR REGION, INITIAL ENCOUNTER: ICD-10-CM

## 2019-08-17 DIAGNOSIS — R10.9 ABDOMINAL PAIN IN MALE: Primary | ICD-10-CM

## 2019-08-17 DIAGNOSIS — M54.50 ACUTE LEFT-SIDED LOW BACK PAIN WITHOUT SCIATICA: ICD-10-CM

## 2019-08-17 LAB
BILIRUB UR QL STRIP: NEGATIVE
GLUCOSE UR QL STRIP: NEGATIVE
KETONES UR QL STRIP: NEGATIVE
LEUKOCYTE ESTERASE UR QL STRIP: NEGATIVE
PH, POC UA: 5.5 (ref 5–8)
POC BLOOD, URINE: NEGATIVE
POC NITRATES, URINE: NEGATIVE
PROT UR QL STRIP: NEGATIVE
SP GR UR STRIP: 1.02 (ref 1–1.03)
UROBILINOGEN UR STRIP-ACNC: NORMAL (ref 0.3–2.2)

## 2019-08-17 PROCEDURE — 3075F PR MOST RECENT SYSTOLIC BLOOD PRESS GE 130-139MM HG: ICD-10-PCS | Mod: CPTII,S$GLB,, | Performed by: FAMILY MEDICINE

## 2019-08-17 PROCEDURE — 81003 POCT URINALYSIS, DIPSTICK, AUTOMATED, W/O SCOPE: ICD-10-PCS | Mod: QW,S$GLB,, | Performed by: FAMILY MEDICINE

## 2019-08-17 PROCEDURE — 99214 OFFICE O/P EST MOD 30 MIN: CPT | Mod: 25,S$GLB,, | Performed by: FAMILY MEDICINE

## 2019-08-17 PROCEDURE — 96372 THER/PROPH/DIAG INJ SC/IM: CPT | Mod: S$GLB,,, | Performed by: FAMILY MEDICINE

## 2019-08-17 PROCEDURE — 3079F DIAST BP 80-89 MM HG: CPT | Mod: CPTII,S$GLB,, | Performed by: FAMILY MEDICINE

## 2019-08-17 PROCEDURE — 3008F BODY MASS INDEX DOCD: CPT | Mod: CPTII,S$GLB,, | Performed by: FAMILY MEDICINE

## 2019-08-17 PROCEDURE — 99214 PR OFFICE/OUTPT VISIT, EST, LEVL IV, 30-39 MIN: ICD-10-PCS | Mod: 25,S$GLB,, | Performed by: FAMILY MEDICINE

## 2019-08-17 PROCEDURE — 3075F SYST BP GE 130 - 139MM HG: CPT | Mod: CPTII,S$GLB,, | Performed by: FAMILY MEDICINE

## 2019-08-17 PROCEDURE — 3079F PR MOST RECENT DIASTOLIC BLOOD PRESSURE 80-89 MM HG: ICD-10-PCS | Mod: CPTII,S$GLB,, | Performed by: FAMILY MEDICINE

## 2019-08-17 PROCEDURE — 81003 URINALYSIS AUTO W/O SCOPE: CPT | Mod: QW,S$GLB,, | Performed by: FAMILY MEDICINE

## 2019-08-17 PROCEDURE — 3008F PR BODY MASS INDEX (BMI) DOCUMENTED: ICD-10-PCS | Mod: CPTII,S$GLB,, | Performed by: FAMILY MEDICINE

## 2019-08-17 PROCEDURE — 96372 PR INJECTION,THERAP/PROPH/DIAG2ST, IM OR SUBCUT: ICD-10-PCS | Mod: S$GLB,,, | Performed by: FAMILY MEDICINE

## 2019-08-17 RX ORDER — KETOROLAC TROMETHAMINE 10 MG/1
TABLET, FILM COATED ORAL
Qty: 20 TABLET | Refills: 0 | Status: ON HOLD | OUTPATIENT
Start: 2019-08-17 | End: 2019-11-25 | Stop reason: HOSPADM

## 2019-08-17 RX ORDER — KETOROLAC TROMETHAMINE 30 MG/ML
60 INJECTION, SOLUTION INTRAMUSCULAR; INTRAVENOUS
Status: COMPLETED | OUTPATIENT
Start: 2019-08-17 | End: 2019-08-17

## 2019-08-17 RX ORDER — CYCLOBENZAPRINE HCL 10 MG
10 TABLET ORAL 3 TIMES DAILY PRN
Qty: 30 TABLET | Refills: 0 | Status: SHIPPED | OUTPATIENT
Start: 2019-08-17 | End: 2019-08-27

## 2019-08-17 RX ADMIN — KETOROLAC TROMETHAMINE 60 MG: 30 INJECTION, SOLUTION INTRAMUSCULAR; INTRAVENOUS at 11:08

## 2019-08-17 NOTE — PATIENT INSTRUCTIONS
Back Pain (Acute or Chronic)    Back pain is one of the most common problems. The good news is that most people feel better in 1 to 2 weeks, and most of the rest in 1 to 2 months. Most people can remain active.  People experience and describe pain differently; not everyone is the same.  · The pain can be sharp, stabbing, shooting, aching, cramping or burning.  · Movement, standing, bending, lifting, sitting, or walking may worsen pain.  · It can be localized to one spot or area, or it can be more generalized.  · It can spread or radiate upwards, to the front, or go down your arms or legs (sciatica).  · It can cause muscle spasm.  Most of the time, mechanical problems with the muscles or spine cause the pain. Mechanical problems are usually caused by an injury to the muscles or ligaments. While illness can cause back pain, it is usually not caused by a serious illness. Mechanical problems include:   · Physical activity such as sports, exercise, work, or normal activity  · Overexertion, lifting, pushing, pulling incorrectly or too aggressively  · Sudden twisting, bending, or stretching from an accident, or accidental movement  · Poor posture  · Stretching or moving wrong, without noticing pain at the time  · Poor coordination, lack of regular exercise (check with your doctor about this)  · Spinal disc disease or arthritis  · Stress  Pain can also be related to pregnancy, or illness like appendicitis, bladder or kidney infections, pelvic infections, and many other things.  Acute back pain usually gets better in 1 to 2 weeks. Back pain related to disk disease, arthritis in the spinal joints or spinal stenosis (narrowing of the spinal canal) can become chronic and last for months or years.  Unless you had a physical injury (for example, a car accident or fall) X-rays are usually not needed for the initial evaluation of back pain. If pain continues and does not respond to medical treatment, X-rays and other tests may be  needed.  Home care  Try these home care recommendations:  · When in bed, try to find a position of comfort. A firm mattress is best. Try lying flat on your back with pillows under your knees. You can also try lying on your side with your knees bent up towards your chest and a pillow between your knees.  · At first, do not try to stretch out the sore spots. If there is a strain, it is not like the good soreness you get after exercising without an injury. In this case, stretching may make it worse.  · Avoid prolong sitting, long car rides, or travel. This puts more stress on the lower back than standing or walking.  · During the first 24 to 72 hours after an acute injury or flare up of chronic back pain, apply an ice pack to the painful area for 20 minutes and then remove it for 20 minutes. Do this over a period of 60 to 90 minutes or several times a day. This will reduce swelling and pain. Wrap the ice pack in a thin towel or plastic to protect your skin.  · You can start with ice, then switch to heat. Heat (hot shower, hot bath, or heating pad) reduces pain and works well for muscle spasms. Heat can be applied to the painful area for 20 minutes then remove it for 20 minutes. Do this over a period of 60 to 90 minutes or several times a day. Do not sleep on a heating pad. It can lead to skin burns or tissue damage.  · You can alternate ice and heat therapy. Talk with your doctor about the best treatment for your back pain.  · Therapeutic massage can help relax the back muscles without stretching them.  · Be aware of safe lifting methods and do not lift anything without stretching first.  Medicines  Talk to your doctor before using medicine, especially if you have other medical problems or are taking other medicines.  · You may use over-the-counter medicine as directed on the bottle to control pain, unless another pain medicine was prescribed. If you have chronic conditions like diabetes, liver or kidney disease,  stomach ulcers, or gastrointestinal bleeding, or are taking blood thinners, talk to your doctor before taking any medicine.  · Be careful if you are given a prescription medicines, narcotics, or medicine for muscle spasms. They can cause drowsiness, affect your coordination, reflexes, and judgement. Do not drive or operate heavy machinery.  Follow-up care  Follow up with your healthcare provider, or as advised.   A radiologist will review any X-rays that were taken. Your provide will notify you of any new findings that may affect your care.  Call 911  Call emergency services if any of the following occur:  · Trouble breathing  · Confusion  · Very drowsy or trouble awakening  · Fainting or loss of consciousness  · Rapid or very slow heart rate  · Loss of bowel or bladder control  When to seek medical advice  Call your healthcare provider right away if any of these occur:   · Pain becomes worse or spreads to your legs  · Weakness or numbness in one or both legs  · Numbness in the groin or genital area  Date Last Reviewed: 7/1/2016  © 7849-7677 [employer]. [address] [place] [address], [place] [address]. All rights reserved. This information is not intended as a substitute for professional medical care. Always follow your healthcare professional's instructions.

## 2019-08-17 NOTE — PROGRESS NOTES
"Subjective:       Patient ID: Jesse Hernández is a 44 y.o. male.    Vitals:  height is 5' 10" (1.778 m) and weight is 108.9 kg (240 lb). His oral temperature is 97.8 °F (36.6 °C). His blood pressure is 136/82 and his pulse is 67. His respiration is 20 and oxygen saturation is 96%.     Chief Complaint: Abdominal Pain (lower left side)    Patient presents with left lower quadrant pain for approx 3 days.  Patient complains of left-sided low back pain for last 3 days no injury or trauma pain radiates slightly to the left lower leg no numbness or tingling patient does have a history of previous back surgery but has not been done in the unusual strenuous activity denies any history of kidney stone denies any radiation of pain to the groin    Patient denies any abdominal pain patient does not have any rash on her back or on the left side    Abdominal Pain   This is a new problem. The current episode started in the past 7 days. The problem occurs intermittently. The problem has been gradually worsening. The pain is located in the LLQ. The pain is at a severity of 4/10. The pain is mild. The quality of the pain is aching and sharp. The abdominal pain does not radiate. Pertinent negatives include no arthralgias, constipation, diarrhea, dysuria, fever, frequency, headaches, myalgias, nausea or vomiting. The pain is aggravated by movement. The pain is relieved by nothing. He has tried nothing for the symptoms. There is no history of abdominal surgery.       Constitution: Positive for activity change and generalized weakness. Negative for appetite change, chills, sweating, fatigue and fever.   HENT: Negative for congestion and sore throat.    Neck: Negative for painful lymph nodes.   Cardiovascular: Negative for chest pain and leg swelling.   Eyes: Negative for double vision and blurred vision.   Respiratory: Negative for cough and shortness of breath.    Gastrointestinal: Negative for abdominal trauma, abdominal bloating, " history of abdominal surgery, nausea, vomiting, constipation, diarrhea, dark colored stools and heartburn.   Genitourinary: Negative for dysuria, frequency, urgency and pelvic pain.   Musculoskeletal: Negative for joint pain, joint swelling, back pain, muscle cramps and muscle ache.   Skin: Negative for color change, pale and rash.   Allergic/Immunologic: Negative for seasonal allergies.   Neurological: Negative for dizziness, history of vertigo, light-headedness, passing out and headaches.   Hematologic/Lymphatic: Negative for swollen lymph nodes, easy bruising/bleeding and history of blood clots. Does not bruise/bleed easily.   Psychiatric/Behavioral: Negative for nervous/anxious, sleep disturbance and depression. The patient is not nervous/anxious.        Objective:      Physical Exam   Constitutional: He is oriented to person, place, and time. He appears well-developed and well-nourished. He is cooperative.  Non-toxic appearance. He does not appear ill. No distress.   HENT:   Head: Normocephalic and atraumatic.   Right Ear: Hearing, tympanic membrane, external ear and ear canal normal.   Left Ear: Hearing, tympanic membrane, external ear and ear canal normal.   Nose: Nose normal. No mucosal edema, rhinorrhea or nasal deformity. No epistaxis. Right sinus exhibits no maxillary sinus tenderness and no frontal sinus tenderness. Left sinus exhibits no maxillary sinus tenderness and no frontal sinus tenderness.   Mouth/Throat: Uvula is midline, oropharynx is clear and moist and mucous membranes are normal. No trismus in the jaw. Normal dentition. No uvula swelling. No posterior oropharyngeal erythema.   Eyes: Conjunctivae and lids are normal. Right eye exhibits no discharge. Left eye exhibits no discharge. No scleral icterus.   Sclera clear bilat   Neck: Trachea normal, normal range of motion, full passive range of motion without pain and phonation normal. Neck supple.   Cardiovascular: Normal rate, regular rhythm,  intact distal pulses and normal pulses. Exam reveals no gallop and no friction rub.   Murmur heard.  Pulmonary/Chest: Effort normal and breath sounds normal. No respiratory distress.   Abdominal: Soft. Normal appearance and bowel sounds are normal. He exhibits no distension, no pulsatile midline mass and no mass. There is no tenderness.   Musculoskeletal: Normal range of motion. He exhibits no edema or deformity.   Back flexion extension normal with a slightly increased tenderness on lateral lumbar movement straight leg exam is negative   Neurological: He is alert and oriented to person, place, and time. He exhibits normal muscle tone. Coordination normal.   Skin: Skin is warm, dry and intact. He is not diaphoretic. No pallor.   Psychiatric: He has a normal mood and affect. His speech is normal and behavior is normal. Judgment and thought content normal. Cognition and memory are normal.   Nursing note and vitals reviewed.      Assessment:       1. Abdominal pain in male    2. Acute left-sided low back pain without sciatica    3. Strain of lumbar region, initial encounter        Plan:         Abdominal pain in male  -     POCT Urinalysis, Dipstick, Automated, W/O Scope    Acute left-sided low back pain without sciatica    Strain of lumbar region, initial encounter    Other orders  -     ketorolac (TORADOL) 10 mg tablet; Take one po q 8 hrs prn pain  Dispense: 20 tablet; Refill: 0  -     ketorolac injection 60 mg  -     cyclobenzaprine (FLEXERIL) 10 MG tablet; Take 1 tablet (10 mg total) by mouth 3 (three) times daily as needed for Muscle spasms.  Dispense: 30 tablet; Refill: 0          Results for orders placed or performed in visit on 08/17/19   POCT Urinalysis, Dipstick, Automated, W/O Scope   Result Value Ref Range    POC Blood, Urine Negative Negative    POC Bilirubin, Urine Negative Negative    POC Urobilinogen, Urine norm 0.3 - 2.2    POC Ketones, Urine Negative Negative    POC Protein, Urine Negative Negative     POC Nitrates, Urine Negative Negative    POC Glucose, Urine Negative Negative    pH, UA 5.5 5 - 8    POC Specific Gravity, Urine 1.020 1.003 - 1.029    POC Leukocytes, Urine Negative Negative       Patient advised to follow up with the PCP if pain persists or return to clinic thank you   1-2 drinks

## 2019-08-18 ENCOUNTER — TELEPHONE (OUTPATIENT)
Dept: URGENT CARE | Facility: CLINIC | Age: 44
End: 2019-08-18

## 2019-10-13 DIAGNOSIS — I10 HYPERTENSION, UNSPECIFIED TYPE: ICD-10-CM

## 2019-10-14 RX ORDER — METOPROLOL SUCCINATE 100 MG/1
TABLET, EXTENDED RELEASE ORAL
Qty: 90 TABLET | Refills: 0 | Status: SHIPPED | OUTPATIENT
Start: 2019-10-14 | End: 2020-01-08

## 2019-11-04 ENCOUNTER — PATIENT MESSAGE (OUTPATIENT)
Dept: INTERNAL MEDICINE | Facility: CLINIC | Age: 44
End: 2019-11-04

## 2019-11-06 ENCOUNTER — LAB VISIT (OUTPATIENT)
Dept: LAB | Facility: HOSPITAL | Age: 44
End: 2019-11-06
Attending: INTERNAL MEDICINE
Payer: COMMERCIAL

## 2019-11-06 DIAGNOSIS — E78.1 HYPERTRIGLYCERIDEMIA: ICD-10-CM

## 2019-11-06 DIAGNOSIS — E66.9 OBESITY (BMI 30.0-34.9): ICD-10-CM

## 2019-11-06 DIAGNOSIS — Z12.5 SCREENING FOR PROSTATE CANCER: ICD-10-CM

## 2019-11-06 DIAGNOSIS — I10 HYPERTENSION, UNSPECIFIED TYPE: ICD-10-CM

## 2019-11-06 DIAGNOSIS — Z00.00 PREVENTATIVE HEALTH CARE: ICD-10-CM

## 2019-11-06 LAB
ALBUMIN SERPL BCP-MCNC: 4.1 G/DL (ref 3.5–5.2)
ALP SERPL-CCNC: 56 U/L (ref 55–135)
ALT SERPL W/O P-5'-P-CCNC: 43 U/L (ref 10–44)
ANION GAP SERPL CALC-SCNC: 11 MMOL/L (ref 8–16)
AST SERPL-CCNC: 30 U/L (ref 10–40)
BASOPHILS # BLD AUTO: 0.08 K/UL (ref 0–0.2)
BASOPHILS NFR BLD: 1 % (ref 0–1.9)
BILIRUB SERPL-MCNC: 0.5 MG/DL (ref 0.1–1)
BUN SERPL-MCNC: 18 MG/DL (ref 6–20)
CALCIUM SERPL-MCNC: 9.5 MG/DL (ref 8.7–10.5)
CHLORIDE SERPL-SCNC: 105 MMOL/L (ref 95–110)
CHOLEST SERPL-MCNC: 317 MG/DL (ref 120–199)
CHOLEST/HDLC SERPL: 7.4 {RATIO} (ref 2–5)
CO2 SERPL-SCNC: 22 MMOL/L (ref 23–29)
COMPLEXED PSA SERPL-MCNC: 0.46 NG/ML (ref 0–4)
CREAT SERPL-MCNC: 1.2 MG/DL (ref 0.5–1.4)
DIFFERENTIAL METHOD: ABNORMAL
EOSINOPHIL # BLD AUTO: 0.1 K/UL (ref 0–0.5)
EOSINOPHIL NFR BLD: 1.8 % (ref 0–8)
ERYTHROCYTE [DISTWIDTH] IN BLOOD BY AUTOMATED COUNT: 13.6 % (ref 11.5–14.5)
EST. GFR  (AFRICAN AMERICAN): >60 ML/MIN/1.73 M^2
EST. GFR  (NON AFRICAN AMERICAN): >60 ML/MIN/1.73 M^2
GLUCOSE SERPL-MCNC: 103 MG/DL (ref 70–110)
HCT VFR BLD AUTO: 50.6 % (ref 40–54)
HDLC SERPL-MCNC: 43 MG/DL (ref 40–75)
HDLC SERPL: 13.6 % (ref 20–50)
HGB BLD-MCNC: 16.2 G/DL (ref 14–18)
IMM GRANULOCYTES # BLD AUTO: 0.07 K/UL (ref 0–0.04)
IMM GRANULOCYTES NFR BLD AUTO: 0.9 % (ref 0–0.5)
LDLC SERPL CALC-MCNC: ABNORMAL MG/DL (ref 63–159)
LYMPHOCYTES # BLD AUTO: 2.2 K/UL (ref 1–4.8)
LYMPHOCYTES NFR BLD: 28.5 % (ref 18–48)
MCH RBC QN AUTO: 29.3 PG (ref 27–31)
MCHC RBC AUTO-ENTMCNC: 32 G/DL (ref 32–36)
MCV RBC AUTO: 92 FL (ref 82–98)
MONOCYTES # BLD AUTO: 0.5 K/UL (ref 0.3–1)
MONOCYTES NFR BLD: 6.6 % (ref 4–15)
NEUTROPHILS # BLD AUTO: 4.7 K/UL (ref 1.8–7.7)
NEUTROPHILS NFR BLD: 61.2 % (ref 38–73)
NONHDLC SERPL-MCNC: 274 MG/DL
NRBC BLD-RTO: 0 /100 WBC
PLATELET # BLD AUTO: 260 K/UL (ref 150–350)
PMV BLD AUTO: 10.7 FL (ref 9.2–12.9)
POTASSIUM SERPL-SCNC: 4.2 MMOL/L (ref 3.5–5.1)
PROT SERPL-MCNC: 7.2 G/DL (ref 6–8.4)
RBC # BLD AUTO: 5.53 M/UL (ref 4.6–6.2)
SODIUM SERPL-SCNC: 138 MMOL/L (ref 136–145)
T4 FREE SERPL-MCNC: 0.9 NG/DL (ref 0.71–1.51)
TRIGL SERPL-MCNC: 499 MG/DL (ref 30–150)
TSH SERPL DL<=0.005 MIU/L-ACNC: 4.23 UIU/ML (ref 0.4–4)
WBC # BLD AUTO: 7.69 K/UL (ref 3.9–12.7)

## 2019-11-06 PROCEDURE — 80061 LIPID PANEL: CPT

## 2019-11-06 PROCEDURE — 84439 ASSAY OF FREE THYROXINE: CPT

## 2019-11-06 PROCEDURE — 80053 COMPREHEN METABOLIC PANEL: CPT

## 2019-11-06 PROCEDURE — 84153 ASSAY OF PSA TOTAL: CPT

## 2019-11-06 PROCEDURE — 36415 COLL VENOUS BLD VENIPUNCTURE: CPT | Mod: PO

## 2019-11-06 PROCEDURE — 84443 ASSAY THYROID STIM HORMONE: CPT

## 2019-11-06 PROCEDURE — 85025 COMPLETE CBC W/AUTO DIFF WBC: CPT

## 2019-11-18 ENCOUNTER — PATIENT MESSAGE (OUTPATIENT)
Dept: INTERNAL MEDICINE | Facility: CLINIC | Age: 44
End: 2019-11-18

## 2019-11-20 ENCOUNTER — PATIENT OUTREACH (OUTPATIENT)
Dept: ADMINISTRATIVE | Facility: OTHER | Age: 44
End: 2019-11-20

## 2019-11-20 ENCOUNTER — OFFICE VISIT (OUTPATIENT)
Dept: INTERNAL MEDICINE | Facility: CLINIC | Age: 44
End: 2019-11-20
Payer: COMMERCIAL

## 2019-11-20 VITALS
BODY MASS INDEX: 35.09 KG/M2 | WEIGHT: 245.13 LBS | HEIGHT: 70 IN | OXYGEN SATURATION: 97 % | DIASTOLIC BLOOD PRESSURE: 80 MMHG | SYSTOLIC BLOOD PRESSURE: 130 MMHG | HEART RATE: 75 BPM

## 2019-11-20 DIAGNOSIS — E78.1 HYPERTRIGLYCERIDEMIA: Primary | ICD-10-CM

## 2019-11-20 DIAGNOSIS — K42.9 UMBILICAL HERNIA WITHOUT OBSTRUCTION AND WITHOUT GANGRENE: ICD-10-CM

## 2019-11-20 PROCEDURE — 99214 OFFICE O/P EST MOD 30 MIN: CPT | Mod: 25,S$GLB,, | Performed by: INTERNAL MEDICINE

## 2019-11-20 PROCEDURE — 99999 PR PBB SHADOW E&M-EST. PATIENT-LVL IV: CPT | Mod: PBBFAC,,, | Performed by: INTERNAL MEDICINE

## 2019-11-20 PROCEDURE — 3079F DIAST BP 80-89 MM HG: CPT | Mod: CPTII,S$GLB,, | Performed by: INTERNAL MEDICINE

## 2019-11-20 PROCEDURE — 90471 FLU VACCINE (QUAD) GREATER THAN OR EQUAL TO 3YO PRESERVATIVE FREE IM: ICD-10-PCS | Mod: S$GLB,,, | Performed by: INTERNAL MEDICINE

## 2019-11-20 PROCEDURE — 90686 IIV4 VACC NO PRSV 0.5 ML IM: CPT | Mod: S$GLB,,, | Performed by: INTERNAL MEDICINE

## 2019-11-20 PROCEDURE — 99999 PR PBB SHADOW E&M-EST. PATIENT-LVL IV: ICD-10-PCS | Mod: PBBFAC,,, | Performed by: INTERNAL MEDICINE

## 2019-11-20 PROCEDURE — 3008F BODY MASS INDEX DOCD: CPT | Mod: CPTII,S$GLB,, | Performed by: INTERNAL MEDICINE

## 2019-11-20 PROCEDURE — 90686 FLU VACCINE (QUAD) GREATER THAN OR EQUAL TO 3YO PRESERVATIVE FREE IM: ICD-10-PCS | Mod: S$GLB,,, | Performed by: INTERNAL MEDICINE

## 2019-11-20 PROCEDURE — 3075F SYST BP GE 130 - 139MM HG: CPT | Mod: CPTII,S$GLB,, | Performed by: INTERNAL MEDICINE

## 2019-11-20 PROCEDURE — 99214 PR OFFICE/OUTPT VISIT, EST, LEVL IV, 30-39 MIN: ICD-10-PCS | Mod: 25,S$GLB,, | Performed by: INTERNAL MEDICINE

## 2019-11-20 PROCEDURE — 3075F PR MOST RECENT SYSTOLIC BLOOD PRESS GE 130-139MM HG: ICD-10-PCS | Mod: CPTII,S$GLB,, | Performed by: INTERNAL MEDICINE

## 2019-11-20 PROCEDURE — 3008F PR BODY MASS INDEX (BMI) DOCUMENTED: ICD-10-PCS | Mod: CPTII,S$GLB,, | Performed by: INTERNAL MEDICINE

## 2019-11-20 PROCEDURE — 90471 IMMUNIZATION ADMIN: CPT | Mod: S$GLB,,, | Performed by: INTERNAL MEDICINE

## 2019-11-20 PROCEDURE — 3079F PR MOST RECENT DIASTOLIC BLOOD PRESSURE 80-89 MM HG: ICD-10-PCS | Mod: CPTII,S$GLB,, | Performed by: INTERNAL MEDICINE

## 2019-11-20 RX ORDER — FENOFIBRATE 160 MG/1
160 TABLET ORAL DAILY
Qty: 90 TABLET | Refills: 1 | Status: SHIPPED | OUTPATIENT
Start: 2019-11-20 | End: 2019-12-02

## 2019-11-20 NOTE — PROGRESS NOTES
Subjective:       Patient ID: Jesse Hernández is a 44 y.o. male.    Chief Complaint: Hernia    HPI 44-year-old male presents to clinic today patient is experiencing umbilical umbilical pain and protrusion he is concerned about possible hernia.  Symptoms have progressed over the last month or 2.  Additionally his labs demonstrate significant hypertriglyceridemia at this is been present over the last 2 visits more pronounced and progressive.  We discussed treatment in the past.  Otherwise negative  Review of Systems  otherwise negative  Objective:      Physical Exam  General: Well-appearing, well-nourished.  No distress  HEENT: conjunctivae are normal.  Pupils are equal and reative to light.  TM's are clear and intact bilaterally.  Hearing is grossly normal.  Nasopharynx is clear.  Oropharynx is clear.  Neck: Supple.  No thyroid megaly.  No bruits.  Lymph: No cervical or supraclavicular adenopathy.  Heart: Regular rate and rhythm, without murmur, rub or gallop.  Lungs: Clear to auscultation; respiratory effort normal.  Abdomen: Soft, obese positive umbilical hernia reducible some tenderness to palpation., nondistended.  Normoactive bowel sounds.  No hepatomegaly.  No masses.  Extremities: Good distal pulses.  No edema.  Psych: Oriented to time person place.  Judgment and insight seem unimpaired.  Mood and affect are appropriate.    Assessment:       1. Hypertriglyceridemia    2. Umbilical hernia without obstruction and without gangrene        Plan:       Jesse was seen today for hernia.    Diagnoses and all orders for this visit:    Hypertriglyceridemia  -     fenofibrate 160 MG Tab; Take 1 tablet (160 mg total) by mouth once daily.  Discuss use benefit as well as potential adverse side effects follow-up scheduled in 3 months  Umbilical hernia without obstruction and without gangrene  -     Ambulatory referral to General Surgery    Other orders  -     Influenza - Quadrivalent (6 months+) (PF)

## 2019-11-21 ENCOUNTER — OFFICE VISIT (OUTPATIENT)
Dept: SURGERY | Facility: CLINIC | Age: 44
End: 2019-11-21
Payer: COMMERCIAL

## 2019-11-21 VITALS
DIASTOLIC BLOOD PRESSURE: 90 MMHG | SYSTOLIC BLOOD PRESSURE: 133 MMHG | TEMPERATURE: 98 F | HEART RATE: 61 BPM | WEIGHT: 244.19 LBS | BODY MASS INDEX: 34.96 KG/M2 | HEIGHT: 70 IN

## 2019-11-21 DIAGNOSIS — K42.9 REDUCIBLE UMBILICAL HERNIA: Primary | ICD-10-CM

## 2019-11-21 PROCEDURE — 99999 PR PBB SHADOW E&M-EST. PATIENT-LVL IV: CPT | Mod: PBBFAC,,, | Performed by: SURGERY

## 2019-11-21 PROCEDURE — 3080F DIAST BP >= 90 MM HG: CPT | Mod: CPTII,S$GLB,, | Performed by: SURGERY

## 2019-11-21 PROCEDURE — 3075F SYST BP GE 130 - 139MM HG: CPT | Mod: CPTII,S$GLB,, | Performed by: SURGERY

## 2019-11-21 PROCEDURE — 99203 PR OFFICE/OUTPT VISIT, NEW, LEVL III, 30-44 MIN: ICD-10-PCS | Mod: S$GLB,,, | Performed by: SURGERY

## 2019-11-21 PROCEDURE — 99999 PR PBB SHADOW E&M-EST. PATIENT-LVL IV: ICD-10-PCS | Mod: PBBFAC,,, | Performed by: SURGERY

## 2019-11-21 PROCEDURE — 3008F BODY MASS INDEX DOCD: CPT | Mod: CPTII,S$GLB,, | Performed by: SURGERY

## 2019-11-21 PROCEDURE — 99203 OFFICE O/P NEW LOW 30 MIN: CPT | Mod: S$GLB,,, | Performed by: SURGERY

## 2019-11-21 PROCEDURE — 3080F PR MOST RECENT DIASTOLIC BLOOD PRESSURE >= 90 MM HG: ICD-10-PCS | Mod: CPTII,S$GLB,, | Performed by: SURGERY

## 2019-11-21 PROCEDURE — 3075F PR MOST RECENT SYSTOLIC BLOOD PRESS GE 130-139MM HG: ICD-10-PCS | Mod: CPTII,S$GLB,, | Performed by: SURGERY

## 2019-11-21 PROCEDURE — 3008F PR BODY MASS INDEX (BMI) DOCUMENTED: ICD-10-PCS | Mod: CPTII,S$GLB,, | Performed by: SURGERY

## 2019-11-21 RX ORDER — SODIUM CHLORIDE 9 MG/ML
INJECTION, SOLUTION INTRAVENOUS CONTINUOUS
Status: CANCELLED | OUTPATIENT
Start: 2019-11-21

## 2019-11-21 NOTE — H&P
[facility]  OUTPATIENT H&P    REASON FOR VISIT/CC:  Umbilical hernia    HPI: Jesse Hernández is a 44 y.o. male referred for evaluation an umbilical bulge with associated pain.  Patient reports the bulge has been present for over a year.  Recently he has developed frequent pain across the mid abdomen localized over the umbilicus.  Pain is described as sharp and occasionally severe.  Typically resolves with time.  Pain seems to be worsened by certain movements and heavy lifting.  He denies fevers or chills, nausea vomiting.  No obstructive symptoms.  Never had abdominal surgery before.    I have reviewed the patient's chart including prior progress notes, procedures and testing. The patient has hypertension as well as hypertriglyceridemia and was recently started on fenofibrate.    ROS:   Review of Systems   Constitutional: Positive for activity change. Negative for chills and fever.   HENT: Negative for congestion, nosebleeds and trouble swallowing.    Eyes: Negative for photophobia, discharge and visual disturbance.   Respiratory: Negative for apnea, chest tightness and shortness of breath.    Cardiovascular: Negative for chest pain, palpitations and leg swelling.   Gastrointestinal: Positive for abdominal pain. Negative for abdominal distention, diarrhea, nausea and vomiting.   Genitourinary: Negative for difficulty urinating, dysuria and hematuria.   Musculoskeletal: Negative for arthralgias, gait problem, neck pain and neck stiffness.   Skin: Negative for color change, pallor, rash and wound.   Neurological: Negative for seizures, syncope and light-headedness.   Psychiatric/Behavioral: Negative for agitation, behavioral problems and confusion.       PROBLEM LIST:  Patient Active Problem List   Diagnosis    Fatty liver    Pulmonary nodule    Adrenal nodule    Recurrent major depressive disorder, in remission    Depression    ALISON (generalized anxiety disorder)    Obesity (BMI 30.0-34.9)    KAILEE  (obstructive sleep apnea)    Hypertriglyceridemia    Right shoulder pain    Hypertension         HISTORY  Past Medical History:   Diagnosis Date    Hypertension        Past Surgical History:   Procedure Laterality Date    BACK SURGERY      SPINE SURGERY         Social History     Tobacco Use    Smoking status: Former Smoker     Last attempt to quit: 12/1/2008     Years since quitting: 10.9    Smokeless tobacco: Former User   Substance Use Topics    Alcohol use: Yes    Drug use: No       Family History   Problem Relation Age of Onset    Cancer Mother     Dementia Mother     Diabetes Paternal Grandmother          MEDS:  Current Outpatient Medications on File Prior to Visit   Medication Sig Dispense Refill    DULoxetine (CYMBALTA) 60 MG capsule Take 1 capsule (60 mg total) by mouth once daily. 90 capsule 1    fenofibrate 160 MG Tab Take 1 tablet (160 mg total) by mouth once daily. 90 tablet 1    ketorolac (TORADOL) 10 mg tablet Take one po q 8 hrs prn pain (Patient not taking: Reported on 11/20/2019) 20 tablet 0    lamoTRIgine (LAMICTAL) 150 MG Tab Take 2 tablets (300 mg total) by mouth once daily. 180 tablet 1    metoprolol succinate (TOPROL-XL) 100 MG 24 hr tablet TAKE 1 TABLET(100 MG) BY MOUTH EVERY DAY 90 tablet 0     No current facility-administered medications on file prior to visit.        ALLERGIES:  Review of patient's allergies indicates:  No Known Allergies      VITALS:  There were no vitals filed for this visit.      PHYSICAL EXAM:  Physical Exam   Constitutional: He is oriented to person, place, and time. He appears well-developed and well-nourished. No distress.   HENT:   Head: Normocephalic and atraumatic.   Nose: Nose normal.   Eyes: Conjunctivae and EOM are normal. No scleral icterus.   Neck: Normal range of motion. Neck supple. No tracheal deviation present.   Cardiovascular: Normal rate, regular rhythm and intact distal pulses.   Pulmonary/Chest: Effort normal and breath sounds  normal. No stridor. No respiratory distress.   Abdominal: Soft. He exhibits no distension and no ascites.       Musculoskeletal: Normal range of motion. He exhibits no edema or deformity.   Neurological: He is alert and oriented to person, place, and time. He is not disoriented. He exhibits normal muscle tone.   Skin: Skin is warm and dry. He is not diaphoretic. No erythema.   Psychiatric: He has a normal mood and affect. His behavior is normal. Judgment and thought content normal.   Vitals reviewed.        LABS:  Lab Results   Component Value Date    WBC 7.69 11/06/2019    RBC 5.53 11/06/2019    HGB 16.2 11/06/2019    HCT 50.6 11/06/2019     11/06/2019     Lab Results   Component Value Date     11/06/2019     11/06/2019    K 4.2 11/06/2019     11/06/2019    CO2 22 (L) 11/06/2019    BUN 18 11/06/2019    CREATININE 1.2 11/06/2019    CALCIUM 9.5 11/06/2019     Lab Results   Component Value Date    ALT 43 11/06/2019    AST 30 11/06/2019    ALKPHOS 56 11/06/2019    BILITOT 0.5 11/06/2019     No results found for: MG, PHOS        ASSESSMENT & PLAN:  44 y.o. male with reducible umbilical/supraumbilical hernia  - discussed the diagnosis with the patient and associated treatment options including observation versus surgical intervention  - do the symptomatic nature of the hernia patient is seeking operative fix  - discussed minimally invasive versus open hernia repair, given the small size of the defects in open repair with mesh is reasonable  - after discussing the risks and benefits, and allowing time for questions, consent was obtained the case was posted  - labs are up-to-date  - will plan for open umbilical hernia repair with mesh on 11/25/2019

## 2019-11-21 NOTE — LETTER
November 21, 2019      Roxanne Wolfe MD  [address] [facility]  [place] [address]           [facility]  [address], [address]  [facility] [address]  Phone: [phone]          Patient: Jesse Hernández   MR Number: [id number]   YOB: 1975   Date of Visit: 11/21/2019       Dear Dr. Roxanne oWlfe:    Thank you for referring Jesse Hernández to me for evaluation. Attached you will find relevant portions of my assessment and plan of care.    If you have questions, please do not hesitate to call me. I look forward to following Jesse Hernández along with you.    Sincerely,    Patrick Rehman Jr., MD    Enclosure  CC:  No Recipients    If you would like to receive this communication electronically, please contact [email or web address] or [phone] to request more information on [facility] access.    For providers and/or their staff who would like to refer a patient to [facility], please contact us through our one-stop-shop provider referral line, [facility] , at [phone].    If you feel you have received this communication in error or would no longer like to receive these types of communications, please e-mail [email or web address]

## 2019-11-22 ENCOUNTER — TELEPHONE (OUTPATIENT)
Dept: SURGERY | Facility: CLINIC | Age: 44
End: 2019-11-22

## 2019-11-22 NOTE — TELEPHONE ENCOUNTER
----- Message from Barbra Odell sent at 11/22/2019  1:45 PM CST -----  Contact: Self [phone]  Patient is calling to talk to nurse in regards to his arrival time for his procedure.           11/22/2019      1608  Contacted patient regarding the above message. Patient stated that he received a phone call and text instructing them to return Jennifer's call. Informed him that there is no Jennifer that works in Dr. Rehman's office and I would have no clue as to why he would be getting this information. Patient let me know he did get his information regarding his surgery on Monday, and verbalized understanding.

## 2019-11-25 ENCOUNTER — ANESTHESIA (OUTPATIENT)
Dept: SURGERY | Facility: HOSPITAL | Age: 44
End: 2019-11-25
Payer: COMMERCIAL

## 2019-11-25 ENCOUNTER — PATIENT MESSAGE (OUTPATIENT)
Dept: SURGERY | Facility: HOSPITAL | Age: 44
End: 2019-11-25

## 2019-11-25 ENCOUNTER — ANESTHESIA EVENT (OUTPATIENT)
Dept: SURGERY | Facility: HOSPITAL | Age: 44
End: 2019-11-25
Payer: COMMERCIAL

## 2019-11-25 ENCOUNTER — HOSPITAL ENCOUNTER (OUTPATIENT)
Facility: HOSPITAL | Age: 44
Discharge: HOME | End: 2019-11-25
Attending: SURGERY | Admitting: SURGERY
Payer: COMMERCIAL

## 2019-11-25 VITALS
HEIGHT: 70 IN | SYSTOLIC BLOOD PRESSURE: 110 MMHG | RESPIRATION RATE: 18 BRPM | BODY MASS INDEX: 35.07 KG/M2 | HEART RATE: 69 BPM | WEIGHT: 245 LBS | TEMPERATURE: 98 F | DIASTOLIC BLOOD PRESSURE: 73 MMHG | OXYGEN SATURATION: 96 %

## 2019-11-25 DIAGNOSIS — K42.9 REDUCIBLE UMBILICAL HERNIA: Primary | ICD-10-CM

## 2019-11-25 DIAGNOSIS — I10 HYPERTENSION: ICD-10-CM

## 2019-11-25 PROCEDURE — 63600175 PHARM REV CODE 636 W HCPCS: Performed by: NURSE ANESTHETIST, CERTIFIED REGISTERED

## 2019-11-25 PROCEDURE — C1781 MESH (IMPLANTABLE): HCPCS | Performed by: SURGERY

## 2019-11-25 PROCEDURE — 25000003 PHARM REV CODE 250: Performed by: SURGERY

## 2019-11-25 PROCEDURE — 63600175 PHARM REV CODE 636 W HCPCS: Performed by: SURGERY

## 2019-11-25 PROCEDURE — 25000003 PHARM REV CODE 250: Performed by: NURSE ANESTHETIST, CERTIFIED REGISTERED

## 2019-11-25 PROCEDURE — 36000706: Performed by: SURGERY

## 2019-11-25 PROCEDURE — 36000707: Performed by: SURGERY

## 2019-11-25 PROCEDURE — 71000016 HC POSTOP RECOV ADDL HR: Performed by: SURGERY

## 2019-11-25 PROCEDURE — 93010 EKG 12-LEAD: ICD-10-PCS | Mod: ,,, | Performed by: STUDENT IN AN ORGANIZED HEALTH CARE EDUCATION/TRAINING PROGRAM

## 2019-11-25 PROCEDURE — 37000008 HC ANESTHESIA 1ST 15 MINUTES: Performed by: SURGERY

## 2019-11-25 PROCEDURE — 93010 ELECTROCARDIOGRAM REPORT: CPT | Mod: ,,, | Performed by: STUDENT IN AN ORGANIZED HEALTH CARE EDUCATION/TRAINING PROGRAM

## 2019-11-25 PROCEDURE — 37000009 HC ANESTHESIA EA ADD 15 MINS: Performed by: SURGERY

## 2019-11-25 PROCEDURE — S0020 INJECTION, BUPIVICAINE HYDRO: HCPCS | Performed by: SURGERY

## 2019-11-25 PROCEDURE — 71000015 HC POSTOP RECOV 1ST HR: Performed by: SURGERY

## 2019-11-25 PROCEDURE — 93005 ELECTROCARDIOGRAM TRACING: CPT

## 2019-11-25 PROCEDURE — 25000003 PHARM REV CODE 250: Performed by: ANESTHESIOLOGY

## 2019-11-25 PROCEDURE — 49585 PR REPAIR UMBILICAL HERN,5+Y/O,REDUC: CPT | Mod: ,,, | Performed by: SURGERY

## 2019-11-25 PROCEDURE — 71000033 HC RECOVERY, INTIAL HOUR: Performed by: SURGERY

## 2019-11-25 PROCEDURE — 49585 PR REPAIR UMBILICAL HERN,5+Y/O,REDUC: ICD-10-PCS | Mod: ,,, | Performed by: SURGERY

## 2019-11-25 RX ORDER — MIDAZOLAM HYDROCHLORIDE 1 MG/ML
INJECTION, SOLUTION INTRAMUSCULAR; INTRAVENOUS
Status: DISCONTINUED | OUTPATIENT
Start: 2019-11-25 | End: 2019-11-25

## 2019-11-25 RX ORDER — ACETAMINOPHEN 500 MG
1000 TABLET ORAL EVERY 8 HOURS
Refills: 0 | COMMUNITY
Start: 2019-11-25 | End: 2019-11-28

## 2019-11-25 RX ORDER — SUCCINYLCHOLINE CHLORIDE 20 MG/ML
INJECTION INTRAMUSCULAR; INTRAVENOUS
Status: DISCONTINUED | OUTPATIENT
Start: 2019-11-25 | End: 2019-11-25

## 2019-11-25 RX ORDER — ONDANSETRON 2 MG/ML
INJECTION INTRAMUSCULAR; INTRAVENOUS
Status: DISCONTINUED | OUTPATIENT
Start: 2019-11-25 | End: 2019-11-25

## 2019-11-25 RX ORDER — SODIUM CHLORIDE, SODIUM LACTATE, POTASSIUM CHLORIDE, CALCIUM CHLORIDE 600; 310; 30; 20 MG/100ML; MG/100ML; MG/100ML; MG/100ML
INJECTION, SOLUTION INTRAVENOUS CONTINUOUS PRN
Status: DISCONTINUED | OUTPATIENT
Start: 2019-11-25 | End: 2019-11-25

## 2019-11-25 RX ORDER — CEFAZOLIN SODIUM 2 G/50ML
2 SOLUTION INTRAVENOUS
Status: COMPLETED | OUTPATIENT
Start: 2019-11-25 | End: 2019-11-25

## 2019-11-25 RX ORDER — SODIUM CHLORIDE 9 MG/ML
INJECTION, SOLUTION INTRAVENOUS CONTINUOUS
Status: DISCONTINUED | OUTPATIENT
Start: 2019-11-25 | End: 2019-11-25 | Stop reason: HOSPADM

## 2019-11-25 RX ORDER — HYDROMORPHONE HYDROCHLORIDE 2 MG/ML
0.5 INJECTION, SOLUTION INTRAMUSCULAR; INTRAVENOUS; SUBCUTANEOUS EVERY 5 MIN PRN
Status: DISCONTINUED | OUTPATIENT
Start: 2019-11-25 | End: 2019-11-25 | Stop reason: HOSPADM

## 2019-11-25 RX ORDER — GLYCOPYRROLATE 0.2 MG/ML
INJECTION INTRAMUSCULAR; INTRAVENOUS
Status: DISCONTINUED | OUTPATIENT
Start: 2019-11-25 | End: 2019-11-25

## 2019-11-25 RX ORDER — ONDANSETRON 2 MG/ML
4 INJECTION INTRAMUSCULAR; INTRAVENOUS DAILY PRN
Status: DISCONTINUED | OUTPATIENT
Start: 2019-11-25 | End: 2019-11-25 | Stop reason: HOSPADM

## 2019-11-25 RX ORDER — PROPOFOL 10 MG/ML
VIAL (ML) INTRAVENOUS
Status: DISCONTINUED | OUTPATIENT
Start: 2019-11-25 | End: 2019-11-25

## 2019-11-25 RX ORDER — NEOSTIGMINE METHYLSULFATE 1 MG/ML
INJECTION, SOLUTION INTRAVENOUS
Status: DISCONTINUED | OUTPATIENT
Start: 2019-11-25 | End: 2019-11-25

## 2019-11-25 RX ORDER — OXYCODONE HYDROCHLORIDE 5 MG/1
5 TABLET ORAL
Status: DISCONTINUED | OUTPATIENT
Start: 2019-11-25 | End: 2019-11-25 | Stop reason: HOSPADM

## 2019-11-25 RX ORDER — LIDOCAINE HCL/PF 100 MG/5ML
SYRINGE (ML) INTRAVENOUS
Status: DISCONTINUED | OUTPATIENT
Start: 2019-11-25 | End: 2019-11-25

## 2019-11-25 RX ORDER — ACETAMINOPHEN 10 MG/ML
INJECTION, SOLUTION INTRAVENOUS
Status: DISCONTINUED | OUTPATIENT
Start: 2019-11-25 | End: 2019-11-25

## 2019-11-25 RX ORDER — ROCURONIUM BROMIDE 10 MG/ML
INJECTION, SOLUTION INTRAVENOUS
Status: DISCONTINUED | OUTPATIENT
Start: 2019-11-25 | End: 2019-11-25

## 2019-11-25 RX ORDER — DEXAMETHASONE SODIUM PHOSPHATE 4 MG/ML
INJECTION, SOLUTION INTRA-ARTICULAR; INTRALESIONAL; INTRAMUSCULAR; INTRAVENOUS; SOFT TISSUE
Status: DISCONTINUED | OUTPATIENT
Start: 2019-11-25 | End: 2019-11-25

## 2019-11-25 RX ORDER — FENTANYL CITRATE 50 UG/ML
INJECTION, SOLUTION INTRAMUSCULAR; INTRAVENOUS
Status: DISCONTINUED | OUTPATIENT
Start: 2019-11-25 | End: 2019-11-25

## 2019-11-25 RX ORDER — BUPIVACAINE HYDROCHLORIDE 5 MG/ML
INJECTION, SOLUTION EPIDURAL; INTRACAUDAL
Status: DISCONTINUED | OUTPATIENT
Start: 2019-11-25 | End: 2019-11-25 | Stop reason: HOSPADM

## 2019-11-25 RX ORDER — IBUPROFEN 200 MG
600 TABLET ORAL EVERY 8 HOURS
Refills: 0 | COMMUNITY
Start: 2019-11-25 | End: 2019-11-28

## 2019-11-25 RX ORDER — OXYCODONE HYDROCHLORIDE 5 MG/1
5 TABLET ORAL EVERY 6 HOURS PRN
Qty: 20 TABLET | Refills: 0 | Status: SHIPPED | OUTPATIENT
Start: 2019-11-25 | End: 2019-12-02

## 2019-11-25 RX ADMIN — ONDANSETRON 8 MG: 2 INJECTION, SOLUTION INTRAMUSCULAR; INTRAVENOUS at 09:11

## 2019-11-25 RX ADMIN — FENTANYL CITRATE 50 MCG: 50 INJECTION, SOLUTION INTRAMUSCULAR; INTRAVENOUS at 10:11

## 2019-11-25 RX ADMIN — DEXAMETHASONE SODIUM PHOSPHATE 4 MG: 4 INJECTION, SOLUTION INTRAMUSCULAR; INTRAVENOUS at 09:11

## 2019-11-25 RX ADMIN — CEFAZOLIN SODIUM 2 G: 2 SOLUTION INTRAVENOUS at 09:11

## 2019-11-25 RX ADMIN — GLYCOPYRROLATE 0.6 MG: 0.2 INJECTION, SOLUTION INTRAMUSCULAR; INTRAVENOUS at 10:11

## 2019-11-25 RX ADMIN — PROPOFOL 200 MG: 10 INJECTION, EMULSION INTRAVENOUS at 09:11

## 2019-11-25 RX ADMIN — SODIUM CHLORIDE, SODIUM LACTATE, POTASSIUM CHLORIDE, AND CALCIUM CHLORIDE: .6; .31; .03; .02 INJECTION, SOLUTION INTRAVENOUS at 09:11

## 2019-11-25 RX ADMIN — NEOSTIGMINE METHYLSULFATE 5 MG: 1 INJECTION INTRAVENOUS at 10:11

## 2019-11-25 RX ADMIN — SUCCINYLCHOLINE CHLORIDE 120 MG: 20 INJECTION, SOLUTION INTRAMUSCULAR; INTRAVENOUS at 09:11

## 2019-11-25 RX ADMIN — ROCURONIUM BROMIDE 5 MG: 10 INJECTION, SOLUTION INTRAVENOUS at 09:11

## 2019-11-25 RX ADMIN — FENTANYL CITRATE 150 MCG: 50 INJECTION, SOLUTION INTRAMUSCULAR; INTRAVENOUS at 09:11

## 2019-11-25 RX ADMIN — MIDAZOLAM 2 MG: 1 INJECTION INTRAMUSCULAR; INTRAVENOUS at 09:11

## 2019-11-25 RX ADMIN — ACETAMINOPHEN 1000 MG: 10 INJECTION, SOLUTION INTRAVENOUS at 09:11

## 2019-11-25 RX ADMIN — ROCURONIUM BROMIDE 35 MG: 10 INJECTION, SOLUTION INTRAVENOUS at 09:11

## 2019-11-25 RX ADMIN — OXYCODONE HYDROCHLORIDE 5 MG: 5 TABLET ORAL at 11:11

## 2019-11-25 RX ADMIN — LIDOCAINE HYDROCHLORIDE 100 MG: 20 INJECTION, SOLUTION INTRAVENOUS at 09:11

## 2019-11-25 NOTE — ANESTHESIA POSTPROCEDURE EVALUATION
Anesthesia Post Evaluation    Patient: Jesse Hernández    Procedure(s) Performed: Procedure(s) (LRB):  REPAIR, HERNIA, UMBILICAL, AGE 5 YEARS OR OLDER - open with mesh (N/A)    Final Anesthesia Type: general    Patient location during evaluation: PACU  Patient participation: Yes- Able to Participate  Level of consciousness: awake and alert  Post-procedure vital signs: reviewed and stable  Pain management: adequate  Airway patency: patent    PONV status at discharge: No PONV  Anesthetic complications: no      Cardiovascular status: blood pressure returned to baseline  Respiratory status: unassisted  Hydration status: euvolemic            Vitals Value Taken Time   /99 11/25/2019 11:28 AM   Temp 36.6 °C (97.9 °F) 11/25/2019 10:39 AM   Pulse 58 11/25/2019 11:33 AM   Resp 19 11/25/2019 11:33 AM   SpO2 94 % 11/25/2019 11:33 AM   Vitals shown include unvalidated device data.      No case tracking events are documented in the log.      Pain/Alex Score: Pain Rating Prior to Med Admin: 4 (11/25/2019 11:25 AM)  Alex Score: 9 (11/25/2019 11:16 AM)

## 2019-11-25 NOTE — ANESTHESIA PREPROCEDURE EVALUATION
11/25/2019  Jesse Hernández is a 44 y.o., male presents for umbilical hernia under GA.    Past Medical History:   Diagnosis Date    Hypertension     KAILEE (obstructive sleep apnea)      Past Surgical History:   Procedure Laterality Date    BACK SURGERY      SPINE SURGERY           Anesthesia Evaluation    I have reviewed the Patient Summary Reports.    I have reviewed the Nursing Notes.   I have reviewed the Medications.     Review of Systems  Anesthesia Hx:  No problems with previous Anesthesia    Cardiovascular:   Hypertension    Pulmonary:   Sleep Apnea    Renal/:  Renal/ Normal     Hepatic/GI:   Liver Disease,    Neurological:  Neurology Normal        Physical Exam  General:  Well nourished    Airway/Jaw/Neck:  Airway Findings: Tongue: Normal General Airway Assessment: Adult  Mallampati: II  TM Distance: Normal, at least 6 cm       Chest/Lungs:  Chest/Lungs Findings: Clear to auscultation, Normal Respiratory Rate     Heart/Vascular:  Heart Findings: Rate: Normal  Rhythm: Regular Rhythm  Sounds: Normal        Mental Status:  Mental Status Findings:  Cooperative, Alert and Oriented         Anesthesia Plan  Type of Anesthesia, risks & benefits discussed:  Anesthesia Type:  general  Patient's Preference:   Intra-op Monitoring Plan: standard ASA monitors  Intra-op Monitoring Plan Comments:   Post Op Pain Control Plan: per primary service following discharge from PACU  Post Op Pain Control Plan Comments:   Induction:   IV  Beta Blocker:         Informed Consent: Patient understands risks and agrees with Anesthesia plan.  Questions answered. Anesthesia consent signed with patient.  ASA Score: 2     Day of Surgery Review of History & Physical:  There are no significant changes.          Ready For Surgery From Anesthesia Perspective.

## 2019-11-25 NOTE — DISCHARGE SUMMARY
[facility]  Discharge Note  Short Stay    Admit Date: 11/25/2019    Discharge Date and Time: No discharge date for patient encounter.     Attending Physician: Patrick Rehman Jr.*     Discharge Provider: Patrick Rehman Jr    Diagnoses:  Active Hospital Problems    Diagnosis  POA    *Reducible umbilical hernia [K42.9]  Yes      Resolved Hospital Problems   No resolved problems to display.       Discharged Condition: good    Hospital Course: Patient was admitted for an outpatient procedure and tolerated the procedure well with no complications.    Final Diagnoses: Same as principal problem.    Disposition: Home or Self Care    Follow up/Patient Instructions:    Medications:  Reconciled Home Medications:      Medication List      START taking these medications    acetaminophen 500 MG tablet  Commonly known as:  TYLENOL  Take 2 tablets (1,000 mg total) by mouth every 8 (eight) hours. for 3 days     ibuprofen 200 MG tablet  Commonly known as:  ADVIL,MOTRIN  Take 3 tablets (600 mg total) by mouth every 8 (eight) hours. for 3 days     oxyCODONE 5 MG immediate release tablet  Commonly known as:  ROXICODONE  Take 1 tablet (5 mg total) by mouth every 6 (six) hours as needed for Pain.        CONTINUE taking these medications    DULoxetine 60 MG capsule  Commonly known as:  CYMBALTA  Take 1 capsule (60 mg total) by mouth once daily.     fenofibrate 160 MG Tab  Take 1 tablet (160 mg total) by mouth once daily.     lamoTRIgine 150 MG Tab  Commonly known as:  LAMICTAL  Take 2 tablets (300 mg total) by mouth once daily.     metoprolol succinate 100 MG 24 hr tablet  Commonly known as:  TOPROL-XL  TAKE 1 TABLET(100 MG) BY MOUTH EVERY DAY        STOP taking these medications    ketorolac 10 mg tablet  Commonly known as:  TORADOL          Discharge Procedure Orders   Diet Adult Regular     Ice to affected area     Lifting restrictions   Order Comments: Avoid straining, strenuous activity and lifting greater than  20 lb for 4 weeks     Notify your health care provider if you experience any of the following:  temperature >100.4     Notify your health care provider if you experience any of the following:  persistent nausea and vomiting or diarrhea     Notify your health care provider if you experience any of the following:  severe uncontrolled pain     Notify your health care provider if you experience any of the following:  redness, tenderness, or signs of infection (pain, swelling, redness, odor or green/yellow discharge around incision site)     Notify your health care provider if you experience any of the following:  worsening rash     Remove dressing in 48 hours   Order Comments: - remove outer gauze bandage after 2 days  - A surgical glue has been placed over your incisions. Please leave the glue in place and do not attempt to remove it.   - It is ok to shower using mild soap and water over the incisions the day after your procedure. Pat dry your incisions. Do not soak in a bath tub or other body of water for 2 weeks or until cleared by your surgeon.   - If you noticed redness, swelling, fever, increasing pain or significant drainage from your wound please call the office or the hospital  after hours.     Shower on day dressing removed (No bath)         Discharge Procedure Orders (must include Diet, Follow-up, Activity):   Discharge Procedure Orders (must include Diet, Follow-up, Activity)   Diet Adult Regular     Ice to affected area     Lifting restrictions   Order Comments: Avoid straining, strenuous activity and lifting greater than 20 lb for 4 weeks     Notify your health care provider if you experience any of the following:  temperature >100.4     Notify your health care provider if you experience any of the following:  persistent nausea and vomiting or diarrhea     Notify your health care provider if you experience any of the following:  severe uncontrolled pain     Notify your health care provider if you  experience any of the following:  redness, tenderness, or signs of infection (pain, swelling, redness, odor or green/yellow discharge around incision site)     Notify your health care provider if you experience any of the following:  worsening rash     Remove dressing in 48 hours   Order Comments: - remove outer gauze bandage after 2 days  - A surgical glue has been placed over your incisions. Please leave the glue in place and do not attempt to remove it.   - It is ok to shower using mild soap and water over the incisions the day after your procedure. Pat dry your incisions. Do not soak in a bath tub or other body of water for 2 weeks or until cleared by your surgeon.   - If you noticed redness, swelling, fever, increasing pain or significant drainage from your wound please call the office or the hospital  after hours.     Shower on day dressing removed (No bath)

## 2019-11-25 NOTE — PLAN OF CARE
Patient arrived to pacu connected to cm x 4 vss nad noted. Oral Airway noted. Will continue to monitor.

## 2019-11-25 NOTE — PLAN OF CARE
VSS, abd remains soft and nondistended.  Dressing reamins c/d/i.  Criteria met to be discharged from PACU.  Dr. Castillo notified and ok'd to release from PACU

## 2019-11-25 NOTE — OP NOTE
DATE OF PROCEDURE: 11/25/2019    PREOPERATIVE DIAGNOSIS:  Reducible umbilical hernia    POSTOPERATIVE DIAGNOSIS:  Same    PROCEDURE:  Open umbilical hernia repair with mesh    SURGEON: Patrick Rehman M.D    ASSISTANT: None    ANESTHESIA:  General    ESTIMATED BLOOD LOSS:  Minimal    SPECIMEN:  None    CONDITION:  Stable    COMPLICATIONS: None    FINDINGS:   1.  Fat containing umbilical hernia with a 1 cm fascial defect  2.  Repair performed with a 6.6 cm coated Parietex composite ventral hernia patch    INDICATIONS: The patient is a 44-year-old male with a painful umbilical bulge.  Patient was diagnosed with a reducible umbilical hernia. Treatment options including observation for surgical intervention were discussed.  Patient agreed proceed with open umbilical hernia repair with or without mesh.    PROCEDURE IN DETAIL:  Informed consent was obtained.  Patient taken the operating room placed in supine position where general endotracheal intubation was achieved. His abdomen was prepped and draped typical sterile fashion.  He received 2 g of Ancef.  Time-out performed by all members of the operative team.  We began by injecting local anesthetic around the umbilicus. We then made a curvilinear incision around the umbilicus sharply.  Bovie was used to dissect down through the dermis and subcutaneous tissue. This was taken down the level of the fascia. The umbilicus was circumferentially dissected.  The hernia sac was then incised which was found to contain just fatty tissue. This fatty tissue was  from the umbilical stalk and then reduced into the abdominal cavity.  The remainder of the umbilical stalk and hernia sac were transected.  We cleared away the anterior fascia for a few cm on the anterior surface. The defect measured approximately 1 cm in size.  We were able to palpate the supraumbilical region and did not note any supraumbilical hernia. We selected a 6.6 cm parietex composite ventral hernia  patch.  This was inserted after clearing away the posterior aspect of fascia to assure the mesh could lay flat against the abdominal wall.  Once inserted and deployed reassured laid flat.  The tabs of the mesh were then sutured to the edges of the fascia using 0 Ethibond sutures in a U-stitch fashion. The fascia was then closed primarily over the mesh with interrupted 0 Ethibond sutures x3.  Hemostasis was assured.  Additional local anesthetic was injected at the fascial level.  The umbilical stalk was tacked back down to the fascia with a 3 0 Vicryl suture.  The dermis reapproximated with 3 0 Vicryl sutures. The skin was then closed with a running 4 0 Monocryl subcuticular stitch and glue.  Additional local anesthetic was injected at the skin level. A pressure dressing was then applied.  The patient was aroused from sedation, extubated and taken the recovery room in stable condition having suffered no complications.  All counts correct x2 the case. I was present scrubbed throughout the case.    DISPO:  To PACU then home

## 2019-11-25 NOTE — TRANSFER OF CARE
"Anesthesia Transfer of Care Note    Patient: Jesse Hernández    Procedure(s) Performed: Procedure(s) (LRB):  REPAIR, HERNIA, UMBILICAL, AGE 5 YEARS OR OLDER - open with mesh (N/A)    Patient location: PACU    Anesthesia Type: general    Transport from OR: Transported from OR on 6-10 L/min O2 by face mask with adequate spontaneous ventilation    Post pain: adequate analgesia    Post assessment: no apparent anesthetic complications and tolerated procedure well    Post vital signs: stable    Level of consciousness: awake and alert    Nausea/Vomiting: no nausea/vomiting    Complications: none    Transfer of care protocol was followed      Last vitals:   Visit Vitals  /76   Pulse 68   Temp 36.5 °C (97.7 °F) (Temporal)   Resp 16   Ht 5' 10" (1.778 m)   Wt 111.1 kg (245 lb)   SpO2 99%   BMI 35.15 kg/m²     "

## 2019-11-25 NOTE — DISCHARGE INSTRUCTIONS
Discharge Instructions for Open Hernia Repair  You had a procedure called open hernia repair. Also called a rupture, a hernia is a tear or weakness in the wall of the belly. This weakness may be present at birth. Or it can be caused by the wear and tear of daily living. Hernias may get worse with time or with physical stress. But surgery can help repair the weakness and eliminate symptoms.  Activity after surgery  Recommendations include the following:  · After surgery, take it easy for the rest of the day. If you had general anesthesia, dont use machinery or power tools, drink alcohol, or make any major decisions for at least the first 24 hours.  · Dont drive while you are still taking opioid pain medicine, and dont drive until you are able to step firmly on the brake pedal without hesitation.  · Ask others to help with chores and errands while you recover.  · Dont lift anything heavier than 10 pounds until your healthcare provider says its OK.  · Dont mow the lawn, use a vacuum , or do other strenuous activities until your healthcare provider says its OK.  · Walk as often as you feel able.  · Continue the coughing and deep breathing exercises that you learned in the hospital.  · Ask your healthcare provider when you can expect to return to work.  · Avoid constipation:  ¨ Eat fruits, vegetables, and whole grains.  ¨ Drink 6 to 8 glasses of water a day, unless otherwise instructed.  ¨ Use a laxative or a mild stool softener as instructed by your healthcare provider.  Bandage and incision care  Tips include the following:  · Do not get the bandage or wound wet for 48 hours.  · If strips of tape were used to close your incision, dont pull them off. Let them fall off on their own.  · Remove any gauze bandage in 48 hours.  · Wash your incision with mild soap and water. Pat it dry. Dont use oils, powders, or lotions on your incision. Do not soak your incision or take tub baths until cleared by your  healthcare provider.  Follow-up care  Keep follow-up appointments during your recovery. These allow your healthcare provider to check your progress and make sure youre healing well. You may also need to have your stitches, staples, or bandage removed. During office visits, tell your healthcare provider if you have any new symptoms. And be sure to ask any questions you have.     When to call your healthcare provider  Call your healthcare provider immediately if you have any of the following:  · A large amount of swelling or bruising (some testicular swelling and bruising is common)  · Bleeding  · Increasing pain  · Increased redness or drainage of the incision  · Fever of 100.4°F (38.0°C) or higher, or as directed by your healthcare provider  · Trouble urinating  · Nausea or vomiting   Date Last Reviewed: 7/1/2016  © 9717-8791 [employer]. [address] [place] [id number]. All rights reserved. This information is not intended as a substitute for professional medical care. Always follow your healthcare professional's instructions.    ANESTHESIA  -For the first 24 hours after surgery:  Do not drive, use heavy equipment, make important decisions, or drink alcohol  -It is normal to feel sleepy for several hours.  Rest until you are more awake.  -Have someone stay with you, if needed.  They can watch for problems and help keep you safe.  -Some possible post anesthesia side effects include: nausea and vomiting, sore throat and hoarseness, sleepiness, and dizziness.    PAIN  -If you have pain after surgery, pain medicine will help you feel better.  Take it as directed, before pain becomes severe.  Most pain relievers taken by mouth need at least 20-30 minutes to start working.  -Do not drive or drink alcohol while taking pain medicine.  -Pain medication can upset your stomach.  Taking them with a little food may help.  -Other ways to help control pain: elevation, ice, and relaxation  -Call your  surgeon if still having unmanageable pain an hour after taking pain medicine.  -Pain medicine can cause constipation.  Taking an over-the counter stool softener while on prescription pain medicine and drinking plenty of fluids can prevent this side effect.  -Call your surgeon if you have severe side effects like: breathing problems, trouble waking up, dizziness, confusion, or severe constipation.    NAUSEA  -Some people have nausea after surgery.  This is often because of anesthesia, pain, pain medicine, or the stress of surgery.  -Do not push yourself to eat.  Start off with clear liquids and soup.  Slowly move to solid foods.  Don't eat fatty, rich, spicy foods at first.  Eat smaller amounts.  -If you develop persistent nausea and vomiting please notify your surgeon immediately.    BLEEDING  -Different types of surgery require different types of care and dressing changes.  It is important to follow all instructions and advice from your surgeon.  Change dressing as directed.  Call your surgeon for any concerns regarding postop bleeding.    SIGNS OF INFECTION  -Signs of infection include: fever, swelling, drainage, and redness  -Notify your surgeon if you have a fever of 100.4 F (38.0 C) or higher.  -Notify your surgeon if you notice redness, swelling, increased pain, pus, or a foul smell at the incision site.

## 2019-11-25 NOTE — PLAN OF CARE
Patient discharge criteria met. IV removed per order with catheter intact.  Patient voided per protocol.  Pain well controlled, VSS.  Patient given discharge instructions, verbalized understanding and able to teach back.  No complications noted.

## 2019-11-25 NOTE — INTERVAL H&P NOTE
The patient has been examined and the H&P has been reviewed:    I concur with the findings and no changes have occurred since H&P was written.    Anesthesia/Surgery risks, benefits and alternative options discussed and understood by patient/family.          Active Hospital Problems    Diagnosis  POA    Reducible umbilical hernia [K42.9]  Yes      Resolved Hospital Problems   No resolved problems to display.

## 2019-11-27 ENCOUNTER — NURSE TRIAGE (OUTPATIENT)
Dept: ADMINISTRATIVE | Facility: CLINIC | Age: 44
End: 2019-11-27

## 2019-11-27 NOTE — TELEPHONE ENCOUNTER
Pt states he had surgery Monday and still has not had a bowel movement, denies stomach swelling or any pain besides from the actual surgical site, does not feel the need to have a bowel movement yet, he is worried about straining, advised him to keep taking stool softener, increase fluid and fiber intake, advised him to call back if he does not have a bowel movement in 7 days or for any worsening symptoms or concerns, caller agreed    Reason for Disposition   Mild constipation    Additional Information   Negative: [1] Abdomen pain is main symptom AND [2] adult male   Negative: [1] Abdomen pain is main symptom AND [2] adult female   Negative: Rectal bleeding or blood in stool is main symptom   Negative: Rectal pain or itching is main symptom   Negative: Constipation in a cancer patient who is currently (or recently) receiving chemotherapy or radiation therapy, or cancer patient who has metastatic or end-stage cancer and is receiving palliative care   Negative: Patient sounds very sick or weak to the triager   Negative: [1] Vomiting AND [2] abdomen looks much more swollen than usual   Negative: [1] Vomiting AND [2] contains bile (green color)   Negative: [1] Constant abdominal pain AND [2] present > 2 hours   Negative: [1] Rectal pain or fullness from fecal impaction (rectum full of stool) AND [2] NOT better after SITZ bath, suppository or enema   Negative: [1] Intermittent mild abdominal pain AND [2] fever   Negative: Abdomen is more swollen than usual   Negative: Last bowel movement (BM) > 4 days ago   Negative: Leaking stool   Negative: Unable to have a bowel movement (BM) without manually removing stool (using finger to pull out stool or perform disimpaction)   Negative: Unable to have a bowel movement (BM) without laxative or enema   Negative: [1] Constipation persists > 1 week AND [2] no improvement after using CARE ADVICE   Negative: [1] Weight loss > 10 pounds (5 kg) AND [2] not dieting    Negative: Pencil-like, narrow stools   Negative: Taking new prescription medication   Negative: [1] Minor bleeding from rectum (e.g., blood just on toilet paper, few drops, streaks on surface of normal formed BM) AND [2] 3 or more times   Negative: [1] Uses laxative (e.g., PEG / Miralax. Milk of magnesia) or enema AND [2] > once a month   Negative: Constipation is a chronic symptom (recurrent or ongoing AND present > 4 weeks)    Protocols used: CONSTIPATION-A-AH

## 2019-12-02 ENCOUNTER — TELEPHONE (OUTPATIENT)
Dept: INTERNAL MEDICINE | Facility: CLINIC | Age: 44
End: 2019-12-02

## 2019-12-02 DIAGNOSIS — E78.1 HYPERTRIGLYCERIDEMIA: Primary | ICD-10-CM

## 2019-12-02 RX ORDER — OMEGA-3-ACID ETHYL ESTERS 1 G/1
2 CAPSULE, LIQUID FILLED ORAL 2 TIMES DAILY
Qty: 360 CAPSULE | Refills: 1 | Status: SHIPPED | OUTPATIENT
Start: 2019-12-02 | End: 2020-01-02

## 2019-12-02 NOTE — TELEPHONE ENCOUNTER
Please inform patient that since his insurance does not have fenofibrate on his formulary at this time were going to try to get prescription strength fish oil which is also indicated given his triglycerides are greater than 400.  If we cannot get this medication covered on his insurance, additional steps will need to be taken.  He will need repeat cholesterol in 2 to 3 months after starting this medication.

## 2019-12-02 NOTE — TELEPHONE ENCOUNTER
States needs prior auth for fenofibrate and does not want rx sent to [facility] for [facility], Is this the only med that he can take for hypertriglyceridemia?

## 2019-12-02 NOTE — TELEPHONE ENCOUNTER
----- Message from Tim Londono sent at 12/2/2019  4:03 PM CST -----  Contact: Patient's wife  Patient's wife called in for pre-authorization for prescription, and would like a call from the office. Kesha Hernández can be reached at     [phone]

## 2019-12-16 ENCOUNTER — TELEPHONE (OUTPATIENT)
Dept: SURGERY | Facility: CLINIC | Age: 44
End: 2019-12-16

## 2019-12-16 NOTE — TELEPHONE ENCOUNTER
12/19/2019          1033  Attempted to contact patient regarding rescheduling his appointment on 12/26/2019. No answer. Left voicemail.

## 2019-12-17 ENCOUNTER — PATIENT MESSAGE (OUTPATIENT)
Dept: SURGERY | Facility: CLINIC | Age: 44
End: 2019-12-17

## 2019-12-23 ENCOUNTER — PATIENT MESSAGE (OUTPATIENT)
Dept: INTERNAL MEDICINE | Facility: CLINIC | Age: 44
End: 2019-12-23

## 2019-12-24 ENCOUNTER — OFFICE VISIT (OUTPATIENT)
Dept: FAMILY MEDICINE | Facility: CLINIC | Age: 44
End: 2019-12-24
Payer: COMMERCIAL

## 2019-12-24 VITALS
OXYGEN SATURATION: 97 % | HEIGHT: 70 IN | DIASTOLIC BLOOD PRESSURE: 80 MMHG | SYSTOLIC BLOOD PRESSURE: 122 MMHG | WEIGHT: 234.56 LBS | HEART RATE: 75 BPM | BODY MASS INDEX: 33.58 KG/M2

## 2019-12-24 DIAGNOSIS — M54.31 BILATERAL SCIATICA: Primary | ICD-10-CM

## 2019-12-24 DIAGNOSIS — M54.32 BILATERAL SCIATICA: Primary | ICD-10-CM

## 2019-12-24 DIAGNOSIS — I10 ESSENTIAL HYPERTENSION: ICD-10-CM

## 2019-12-24 PROCEDURE — 3008F BODY MASS INDEX DOCD: CPT | Mod: CPTII,S$GLB,, | Performed by: FAMILY MEDICINE

## 2019-12-24 PROCEDURE — 3074F SYST BP LT 130 MM HG: CPT | Mod: CPTII,S$GLB,, | Performed by: FAMILY MEDICINE

## 2019-12-24 PROCEDURE — 99214 PR OFFICE/OUTPT VISIT, EST, LEVL IV, 30-39 MIN: ICD-10-PCS | Mod: S$GLB,,, | Performed by: FAMILY MEDICINE

## 2019-12-24 PROCEDURE — 3079F DIAST BP 80-89 MM HG: CPT | Mod: CPTII,S$GLB,, | Performed by: FAMILY MEDICINE

## 2019-12-24 PROCEDURE — 3074F PR MOST RECENT SYSTOLIC BLOOD PRESSURE < 130 MM HG: ICD-10-PCS | Mod: CPTII,S$GLB,, | Performed by: FAMILY MEDICINE

## 2019-12-24 PROCEDURE — 3008F PR BODY MASS INDEX (BMI) DOCUMENTED: ICD-10-PCS | Mod: CPTII,S$GLB,, | Performed by: FAMILY MEDICINE

## 2019-12-24 PROCEDURE — 99999 PR PBB SHADOW E&M-EST. PATIENT-LVL IV: ICD-10-PCS | Mod: PBBFAC,,, | Performed by: FAMILY MEDICINE

## 2019-12-24 PROCEDURE — 99214 OFFICE O/P EST MOD 30 MIN: CPT | Mod: S$GLB,,, | Performed by: FAMILY MEDICINE

## 2019-12-24 PROCEDURE — 99999 PR PBB SHADOW E&M-EST. PATIENT-LVL IV: CPT | Mod: PBBFAC,,, | Performed by: FAMILY MEDICINE

## 2019-12-24 PROCEDURE — 3079F PR MOST RECENT DIASTOLIC BLOOD PRESSURE 80-89 MM HG: ICD-10-PCS | Mod: CPTII,S$GLB,, | Performed by: FAMILY MEDICINE

## 2019-12-24 RX ORDER — FENOFIBRATE 160 MG/1
TABLET ORAL
Refills: 1 | COMMUNITY
Start: 2019-12-02 | End: 2021-01-22

## 2019-12-24 RX ORDER — GABAPENTIN 300 MG/1
300 CAPSULE ORAL NIGHTLY
Qty: 30 CAPSULE | Refills: 1 | Status: SHIPPED | OUTPATIENT
Start: 2019-12-24 | End: 2020-11-13

## 2019-12-24 NOTE — PROGRESS NOTES
Subjective:       Patient ID: Jesse Hernández is a 44 y.o. male.    Chief Complaint: Umbilical Hernia    HPI   45 yo male patient of Dr. Moncada s/p umbilical hernia repair by Dr. Rehman on 11/25/19 presents c/o recurrent low back pain which radiates to the abdomen. No radiation to the legs or arms. Describes pain as sharp and stabbing. Pain can be shocking in nature. Pain is constant but worsens with certain movements. Symptoms for the past 3 months. Pt denies dysuria or hematuria. No bowel or bladder incontinence. Pt has taken Ibuprofen and Tylenol for the pain without improvement in symptoms.  Review of Systems   Constitutional: Negative for chills and fever.   Gastrointestinal: Negative for anal bleeding and blood in stool.        See HPI   Genitourinary: Negative for dysuria and hematuria.   Musculoskeletal: Positive for back pain.       Objective:      Physical Exam   Constitutional: He appears well-developed and well-nourished. No distress.   HENT:   Head: Normocephalic and atraumatic.   Cardiovascular: Normal rate, regular rhythm and normal heart sounds. Exam reveals no gallop and no friction rub.   No murmur heard.  Pulmonary/Chest: Effort normal and breath sounds normal. He has no wheezes. He has no rales.   Abdominal: Soft. Bowel sounds are normal. He exhibits no mass. There is no tenderness.   Musculoskeletal:        Cervical back: He exhibits normal range of motion, no tenderness and no bony tenderness.        Thoracic back: He exhibits normal range of motion, no tenderness and no bony tenderness.        Lumbar back: He exhibits normal range of motion, no tenderness and no bony tenderness.   Neurological: He displays normal reflexes. No sensory deficit. He exhibits normal muscle tone.   Straight leg positive bilaterally (L>R)   Skin: He is not diaphoretic.   Vitals reviewed.      Assessment:       See plan  Plan:       Bilateral sciatica  -     MRI Lumbar Spine Without Contrast; Future; Expected  date: 12/24/2019  -     Ambulatory referral to Pain Clinic  -     gabapentin (NEURONTIN) 300 MG capsule; Take 1 capsule (300 mg total) by mouth every evening.  Dispense: 30 capsule; Refill: 1    Essential hypertension: Stable    F/U with Dr. Rehman on 12/26 for umbilical hernia repair follow-up.

## 2019-12-25 ENCOUNTER — HOSPITAL ENCOUNTER (EMERGENCY)
Facility: HOSPITAL | Age: 44
Discharge: HOME | End: 2019-12-25
Attending: EMERGENCY MEDICINE
Payer: COMMERCIAL

## 2019-12-25 VITALS
RESPIRATION RATE: 21 BRPM | BODY MASS INDEX: 33.29 KG/M2 | HEART RATE: 61 BPM | DIASTOLIC BLOOD PRESSURE: 85 MMHG | TEMPERATURE: 98 F | OXYGEN SATURATION: 97 % | SYSTOLIC BLOOD PRESSURE: 143 MMHG | WEIGHT: 232 LBS

## 2019-12-25 DIAGNOSIS — R10.33 PERIUMBILICAL ABDOMINAL PAIN: Primary | ICD-10-CM

## 2019-12-25 DIAGNOSIS — G89.18 POST-OP PAIN: ICD-10-CM

## 2019-12-25 DIAGNOSIS — R10.13 EPIGASTRIC ABDOMINAL PAIN: ICD-10-CM

## 2019-12-25 LAB
ALBUMIN SERPL BCP-MCNC: 4.2 G/DL (ref 3.5–5.2)
ALP SERPL-CCNC: 50 U/L (ref 55–135)
ALT SERPL W/O P-5'-P-CCNC: 30 U/L (ref 10–44)
ANION GAP SERPL CALC-SCNC: 13 MMOL/L (ref 8–16)
AST SERPL-CCNC: 29 U/L (ref 10–40)
BILIRUB SERPL-MCNC: 0.5 MG/DL (ref 0.1–1)
BILIRUB UR QL STRIP: NEGATIVE
BUN SERPL-MCNC: 12 MG/DL (ref 6–20)
CALCIUM SERPL-MCNC: 9.4 MG/DL (ref 8.7–10.5)
CHLORIDE SERPL-SCNC: 105 MMOL/L (ref 95–110)
CLARITY UR: CLEAR
CO2 SERPL-SCNC: 20 MMOL/L (ref 23–29)
COLOR UR: YELLOW
CREAT SERPL-MCNC: 1.1 MG/DL (ref 0.5–1.4)
ERYTHROCYTE [DISTWIDTH] IN BLOOD BY AUTOMATED COUNT: 13.5 % (ref 11.5–14.5)
EST. GFR  (AFRICAN AMERICAN): >60 ML/MIN/1.73 M^2
EST. GFR  (NON AFRICAN AMERICAN): >60 ML/MIN/1.73 M^2
GLUCOSE SERPL-MCNC: 100 MG/DL (ref 70–110)
GLUCOSE UR QL STRIP: NEGATIVE
HCT VFR BLD AUTO: 46.5 % (ref 40–54)
HGB BLD-MCNC: 15.3 G/DL (ref 14–18)
HGB UR QL STRIP: NEGATIVE
INR PPP: 1 (ref 0.8–1.2)
KETONES UR QL STRIP: NEGATIVE
LACTATE SERPL-SCNC: 2.1 MMOL/L (ref 0.5–2.2)
LEUKOCYTE ESTERASE UR QL STRIP: NEGATIVE
LIPASE SERPL-CCNC: 15 U/L (ref 4–60)
MCH RBC QN AUTO: 29.3 PG (ref 27–31)
MCHC RBC AUTO-ENTMCNC: 32.9 G/DL (ref 32–36)
MCV RBC AUTO: 89 FL (ref 82–98)
NITRITE UR QL STRIP: NEGATIVE
PH UR STRIP: 6 [PH] (ref 5–8)
PLATELET # BLD AUTO: 261 K/UL (ref 150–350)
PMV BLD AUTO: 10.9 FL (ref 9.2–12.9)
POTASSIUM SERPL-SCNC: 4.6 MMOL/L (ref 3.5–5.1)
PROT SERPL-MCNC: 7.1 G/DL (ref 6–8.4)
PROT UR QL STRIP: NEGATIVE
PROTHROMBIN TIME: 10.3 SEC (ref 9–12.5)
RBC # BLD AUTO: 5.23 M/UL (ref 4.6–6.2)
SODIUM SERPL-SCNC: 138 MMOL/L (ref 136–145)
SP GR UR STRIP: 1.02 (ref 1–1.03)
URN SPEC COLLECT METH UR: NORMAL
UROBILINOGEN UR STRIP-ACNC: NEGATIVE EU/DL
WBC # BLD AUTO: 5.82 K/UL (ref 3.9–12.7)

## 2019-12-25 PROCEDURE — 85027 COMPLETE CBC AUTOMATED: CPT

## 2019-12-25 PROCEDURE — 83690 ASSAY OF LIPASE: CPT

## 2019-12-25 PROCEDURE — 63600175 PHARM REV CODE 636 W HCPCS: Performed by: EMERGENCY MEDICINE

## 2019-12-25 PROCEDURE — 99285 EMERGENCY DEPT VISIT HI MDM: CPT | Mod: 25

## 2019-12-25 PROCEDURE — 96375 TX/PRO/DX INJ NEW DRUG ADDON: CPT

## 2019-12-25 PROCEDURE — 25500020 PHARM REV CODE 255: Performed by: EMERGENCY MEDICINE

## 2019-12-25 PROCEDURE — 80053 COMPREHEN METABOLIC PANEL: CPT

## 2019-12-25 PROCEDURE — 96361 HYDRATE IV INFUSION ADD-ON: CPT

## 2019-12-25 PROCEDURE — 96374 THER/PROPH/DIAG INJ IV PUSH: CPT

## 2019-12-25 PROCEDURE — 85610 PROTHROMBIN TIME: CPT

## 2019-12-25 PROCEDURE — 83605 ASSAY OF LACTIC ACID: CPT

## 2019-12-25 PROCEDURE — 81003 URINALYSIS AUTO W/O SCOPE: CPT

## 2019-12-25 RX ORDER — MORPHINE SULFATE 4 MG/ML
4 INJECTION, SOLUTION INTRAMUSCULAR; INTRAVENOUS
Status: COMPLETED | OUTPATIENT
Start: 2019-12-25 | End: 2019-12-25

## 2019-12-25 RX ORDER — DICYCLOMINE HYDROCHLORIDE 20 MG/1
20 TABLET ORAL 2 TIMES DAILY
Qty: 15 TABLET | Refills: 0 | Status: SHIPPED | OUTPATIENT
Start: 2019-12-25 | End: 2020-01-01

## 2019-12-25 RX ORDER — OMEPRAZOLE 20 MG/1
20 CAPSULE, DELAYED RELEASE ORAL DAILY
Qty: 14 CAPSULE | Refills: 0 | Status: SHIPPED | OUTPATIENT
Start: 2019-12-25 | End: 2020-11-13

## 2019-12-25 RX ORDER — SODIUM CHLORIDE 9 MG/ML
500 INJECTION, SOLUTION INTRAVENOUS
Status: COMPLETED | OUTPATIENT
Start: 2019-12-25 | End: 2019-12-25

## 2019-12-25 RX ORDER — ONDANSETRON 2 MG/ML
4 INJECTION INTRAMUSCULAR; INTRAVENOUS
Status: COMPLETED | OUTPATIENT
Start: 2019-12-25 | End: 2019-12-25

## 2019-12-25 RX ORDER — OXYCODONE AND ACETAMINOPHEN 5; 325 MG/1; MG/1
1 TABLET ORAL EVERY 6 HOURS PRN
Qty: 12 TABLET | Refills: 0 | Status: SHIPPED | OUTPATIENT
Start: 2019-12-25 | End: 2019-12-29

## 2019-12-25 RX ADMIN — ONDANSETRON 4 MG: 2 INJECTION INTRAMUSCULAR; INTRAVENOUS at 12:12

## 2019-12-25 RX ADMIN — SODIUM CHLORIDE 500 ML: 0.9 INJECTION, SOLUTION INTRAVENOUS at 12:12

## 2019-12-25 RX ADMIN — MORPHINE SULFATE 4 MG: 4 INJECTION, SOLUTION INTRAMUSCULAR; INTRAVENOUS at 12:12

## 2019-12-25 RX ADMIN — IOHEXOL 100 ML: 350 INJECTION, SOLUTION INTRAVENOUS at 01:12

## 2019-12-25 NOTE — ED NOTES
Physician at bedside for assessment. Pt reports that he's been having increasing abd pain the past few days. Pt is s/p abd hernia surgery x 4 weeks ago. Pt reports diffuse tenderness to abd and increased pain with any kind of movement or muscle use. Pt has a reddened rash to abd as well that pt states is not his normal. Pt placed on cardiac monitor, continuous pulse oximeter, and NIBP cuff. Pt's wife at bedside and is stable, without acute distress at this time.

## 2019-12-25 NOTE — ED PROVIDER NOTES
Encounter Date: 12/25/2019    SCRIBE #1 NOTE: I, Gabi Charles, am scribing for, and in the presence of,  Dr. Tavera. I have scribed the entire note.       History     Chief Complaint   Patient presents with    Abdominal Pain     reports middle abd pain for the past 3-4 months that he reports the pain is worsening. reports hx of hernia repair 3-4weeks ago. denies n/v/d.      Jesse Hernández is a 44 y.o. male who  has a past medical history of Hypertension and KAILEE (obstructive sleep apnea).    The patient presents to the ED due to abdominal pain.   Patient reports symptoms started 3-4 months ago, located to across his mid-abdomen, radiating to his back. He was evaluated by his PCP, and was found to have an umbilical hernia. He was evaluated by Dr. Rehman, General Surgery, and underwent hernia repair a few weeks ago. He reports the pain has been constant and denies any improvement after the surgery. He describes the pain as sharp and stabbing, and waxes/wanes in intensity. It appears to be worse with position changes and sitting/standing. Earlier today, he had a severe pain that caused him to kneel on the floor in pain. He reports regular bowel movements, denies N/V, fever, relation to PO intake, diarrhea, CP/SOB, urinary symptoms, extremity tingling, numbness, weakness, or any other symptoms at this time. Patient has no other complaints.    The history is provided by the patient.     Review of patient's allergies indicates:  No Known Allergies  Past Medical History:   Diagnosis Date    Hypertension     KAILEE (obstructive sleep apnea)      Past Surgical History:   Procedure Laterality Date    BACK SURGERY      SPINE SURGERY      UMBILICAL HERNIA REPAIR N/A 11/25/2019    Procedure: REPAIR, HERNIA, UMBILICAL, AGE 5 YEARS OR OLDER - open with mesh;  Surgeon: Patrick Rehman Jr., MD;  Location: [facility] OR;  Service: General;  Laterality: N/A;     Family History   Problem Relation Age of Onset    Cancer Mother      Dementia Mother     Diabetes Paternal Grandmother      Social History     Tobacco Use    Smoking status: Former Smoker     Last attempt to quit: 2008     Years since quittin.0    Smokeless tobacco: Former User   Substance Use Topics    Alcohol use: Yes    Drug use: No     Review of Systems   Constitutional: Negative for appetite change, chills and fever.   HENT: Negative for sore throat.    Respiratory: Negative for shortness of breath.    Cardiovascular: Negative for chest pain and leg swelling.   Gastrointestinal: Positive for abdominal pain. Negative for constipation, diarrhea, nausea and vomiting.   Genitourinary: Negative for difficulty urinating, dysuria, frequency and urgency.   Musculoskeletal: Positive for back pain. Negative for arthralgias, myalgias, neck pain and neck stiffness.   Skin: Negative for rash and wound.   Neurological: Negative for dizziness, weakness and numbness.   Hematological: Does not bruise/bleed easily.   Psychiatric/Behavioral: Negative for agitation, behavioral problems and confusion.       Physical Exam     Initial Vitals [19 1156]   BP Pulse Resp Temp SpO2   (!) 145/89 65 20 97.8 °F (36.6 °C) 97 %      MAP       --         Physical Exam    Nursing note and vitals reviewed.  Constitutional: He appears well-developed and well-nourished. He is not diaphoretic. No distress.   HENT:   Head: Normocephalic and atraumatic.   Mouth/Throat: Oropharynx is clear and moist.   Eyes: EOM are normal. Pupils are equal, round, and reactive to light.   Neck: No tracheal deviation present.   Cardiovascular: Normal rate, regular rhythm, normal heart sounds, intact distal pulses and normal pulses.   Distal pulses are intact.   Pulmonary/Chest: Breath sounds normal. No stridor. No respiratory distress.   Abdominal: Soft. Normal appearance and bowel sounds are normal. He exhibits no distension and no mass. There is tenderness in the epigastric area and periumbilical area. There  is no rigidity, no rebound, no guarding, no CVA tenderness, no tenderness at McBurney's point and negative Taveras's sign.   Post op incision to umbilicus, with overlying scab, healing well.   Generalized abdominal tenderness, mostly to epigastrium and delon-umbilical region. No rebound or guarding.    Musculoskeletal: Normal range of motion. He exhibits no edema.   Neurological: He is alert and oriented to person, place, and time. No cranial nerve deficit or sensory deficit.   Skin: Skin is warm and dry. Capillary refill takes less than 2 seconds. No rash noted.   Psychiatric: He has a normal mood and affect. His behavior is normal. Thought content normal.         ED Course   Procedures  Labs Reviewed   COMPREHENSIVE METABOLIC PANEL - Abnormal; Notable for the following components:       Result Value    CO2 20 (*)     Alkaline Phosphatase 50 (*)     All other components within normal limits   CBC WITHOUT DIFFERENTIAL   LIPASE   LACTIC ACID, PLASMA   PROTIME-INR   URINALYSIS, REFLEX TO URINE CULTURE    Narrative:     Preferred Collection Type->Urine, Clean Catch          Imaging Results          CT Abdomen Pelvis With Contrast (Final result)  Result time 12/25/19 13:53:53    Final result by Richie Cope MD (12/25/19 13:53:53)                 Impression:      No CT evidence to explain the patient's symptomatology.  No source of infection is seen.  Incidental findings as above      Electronically signed by: Richie Cope MD  Date:    12/25/2019  Time:    13:53             Narrative:    EXAMINATION:  CT ABDOMEN PELVIS WITH CONTRAST    CLINICAL HISTORY:  Infection, abdomen-pelvis;    TECHNIQUE:  Low dose axial images, sagittal and coronal reformations were obtained from the lung bases to the pubic symphysis following the IV administration of 100 mL of Omnipaque 350 and the oral administration of 30 mL of Omnipaque 350.    COMPARISON:  Selected images from the chest CT which was performed in August  2017.    FINDINGS:  Scarring or atelectasis at the left lung base.  Review of bone windows demonstrate degenerative changes of the spine.  Bones remain intact.    Hepatic steatosis.  No cholelithiasis.  Spleen is within normal limits.  Pancreas is unremarkable.  No biliary or pancreatic ductal dilation.  Stable 2.8 cm fat containing right adrenal gland complex myelolipoma.  Left adrenal gland is normal.  Kidneys enhance symmetrically.  There is no evidence of hydronephrosis.    Aorta and IVC are within normal limits.  No bulky adenopathy.  No ascites.    Stomach is within normal limits.  Minimally prominent loops of proximal small bowel which is nonspecific.  Loops of bowel more distal to this region appear normal.  No definite evidence of obstruction.  Normal appendix is visualized.  A few scattered diverticula.  No evidence of colonic diverticulitis.    Urinary bladder is within normal limits.  Heterogeneous calcifications in the prostate gland are noted.  Small fat containing bilateral inguinal hernias.  Small amount of fluid deep to the umbilicus is seen..                                 Medical Decision Making:   History:   Old Medical Records: I decided to obtain old medical records.  Old Records Summarized: other records.  Differential Diagnosis:   Differential Diagnosis includes, but is not limited to:  AAA, aortic dissection, mesenteric ischemia, perforated viscous, MI/ACS, SBO/volvulus, incarcerated/strangulated hernia, intussusception, ileus, appendicitis, cholecystitis, cholangitis, diverticulitis, esophagitis, hepatitis, nephrolithiasis, pancreatitis, gastroenteritis, colitis, IBD/IBS, biliary colic, GERD, PUD, constipation, UTI/pyelonephritis,  disorder.    Clinical Tests:   Lab Tests: Ordered and Reviewed  Radiological Study: Ordered and Reviewed  ED Management:  Will D/C with symptomatic treatment, Bentyl, omeprazole, pain medication. Patient to f/u with PCP, Surgery, and GI if symptoms do not  improve.    On re-evaluation, the patient's status has improved.  After complete ED evaluation, clinical impression is most consistent with chronic abdominal pain.  PCP/Surgery follow-up within 2-3 days was recommended.    After taking into careful account the patient's history, physical exam findings, as well as empirical and objective data obtained throughout ED workup, I feel no emergent medical condition has been identified. No further evaluation or admission was felt to be required, and the patient is stable for discharge from the ED. The patient and any additional family present were updated with test results, overall clinical impression, and recommended further plan of care, including discharge instructions as provided and outpatient follow-up for continued evaluation and management as needed. All questions were answered. The patient expressed understanding and agreed with current plan for discharge and follow-up plan of care. Strict ED return precautions were provided, including return/worsening of current symptoms, new symptoms, or any other concerns.                     ED Course as of Dec 27 1358   Wed Dec 25, 2019   1359 44-year-old male presents to ED due to persistent, intermittent abdominal pain ongoing for the last several months.  He underwent umbilical hernia repair with Dr. Rehman a few weeks ago, but denies any change or improvement in symptoms.  Vitals unremarkable, exam with healing postop incision the to umbilicus, mild abdominal tenderness without rebound or guarding.  Labs unremarkable, UA without infection.  CT A/P revealed no acute process, stable incidental findings.  Discussed patient with Dr. Rehman, who recommends routine outpatient follow-up as scheduled.    [SS]      ED Course User Index  [SS] Ga Tavera MD            Discharge Medication List as of 12/25/2019  2:22 PM      START taking these medications    Details   dicyclomine (BENTYL) 20 mg tablet Take 1 tablet (20  mg total) by mouth 2 (two) times daily. for 7 days, Starting Wed 12/25/2019, Until Wed 1/1/2020, Print      omeprazole (PRILOSEC) 20 MG capsule Take 1 capsule (20 mg total) by mouth once daily. for 14 days, Starting Wed 12/25/2019, Until Wed 1/8/2020, Print      oxyCODONE-acetaminophen (PERCOCET) 5-325 mg per tablet Take 1 tablet by mouth every 6 (six) hours as needed for Pain (severe pain)., Starting Wed 12/25/2019, Until Sat 12/28/2019, Print               Clinical Impression:       ICD-10-CM ICD-9-CM   1. Periumbilical abdominal pain R10.33 789.05   2. Epigastric abdominal pain R10.13 789.06   3. Post-op pain G89.18 338.18         Disposition:   Disposition: Discharged  Condition: Stable        I, Dr. Ga Tavera, personally performed the services described in this documentation. All medical record entries made by the scribe were at my direction and in my presence.  I have reviewed the chart and agree that the record reflects my personal performance and is accurate and complete.     Ga Tavera MD.             Ga Tavera MD  12/27/19 [id number]

## 2019-12-26 ENCOUNTER — TELEPHONE (OUTPATIENT)
Dept: FAMILY MEDICINE | Facility: CLINIC | Age: 44
End: 2019-12-26

## 2019-12-26 ENCOUNTER — HOSPITAL ENCOUNTER (OUTPATIENT)
Dept: RADIOLOGY | Facility: HOSPITAL | Age: 44
Discharge: HOME | End: 2019-12-26
Attending: FAMILY MEDICINE
Payer: COMMERCIAL

## 2019-12-26 DIAGNOSIS — M54.32 BILATERAL SCIATICA: ICD-10-CM

## 2019-12-26 DIAGNOSIS — M54.31 BILATERAL SCIATICA: ICD-10-CM

## 2019-12-26 PROCEDURE — 72148 MRI LUMBAR SPINE WITHOUT CONTRAST: ICD-10-PCS | Mod: 26,,, | Performed by: RADIOLOGY

## 2019-12-26 PROCEDURE — 72148 MRI LUMBAR SPINE W/O DYE: CPT | Mod: 26,,, | Performed by: RADIOLOGY

## 2019-12-26 PROCEDURE — 72148 MRI LUMBAR SPINE W/O DYE: CPT | Mod: TC

## 2019-12-26 NOTE — TELEPHONE ENCOUNTER
Spoke with pt regarding lumbar spine MRI done today. Pt has a diffuse disc bulge at L4-L5. Pt notes that he was seen in the ED yesterday due to abdominal pain. CT of abdomen was normal.   Pt to see Dr. Traylor on 1/2/20. Pt states that he has not yet started Gabapentin. May take Gabapentin 300 mg up to tid.

## 2019-12-27 ENCOUNTER — TELEPHONE (OUTPATIENT)
Dept: FAMILY MEDICINE | Facility: CLINIC | Age: 44
End: 2019-12-27

## 2019-12-27 ENCOUNTER — PATIENT MESSAGE (OUTPATIENT)
Dept: FAMILY MEDICINE | Facility: CLINIC | Age: 44
End: 2019-12-27

## 2019-12-27 NOTE — TELEPHONE ENCOUNTER
----- Message from Ava Silva sent at 12/27/2019  1:48 PM CST -----  Contact: [phone]/self  Patient requesting to speak with you regarding his MRI results. He was in too much pain too understand what Dr. Thacker told him yesterday. Please call and advise.

## 2019-12-27 NOTE — TELEPHONE ENCOUNTER
Spoke with patient about Abdomen CT and  L-Spine MRI result.  Patient verbalized understanding on instruction given.

## 2019-12-30 ENCOUNTER — PATIENT OUTREACH (OUTPATIENT)
Dept: ADMINISTRATIVE | Facility: OTHER | Age: 44
End: 2019-12-30

## 2020-01-02 ENCOUNTER — HOSPITAL ENCOUNTER (OUTPATIENT)
Dept: RADIOLOGY | Facility: HOSPITAL | Age: 45
Discharge: HOME | End: 2020-01-02
Attending: PHYSICAL MEDICINE & REHABILITATION
Payer: COMMERCIAL

## 2020-01-02 ENCOUNTER — OFFICE VISIT (OUTPATIENT)
Dept: PAIN MEDICINE | Facility: CLINIC | Age: 45
End: 2020-01-02
Payer: COMMERCIAL

## 2020-01-02 ENCOUNTER — PATIENT MESSAGE (OUTPATIENT)
Dept: PAIN MEDICINE | Facility: CLINIC | Age: 45
End: 2020-01-02

## 2020-01-02 VITALS
BODY MASS INDEX: 33.83 KG/M2 | WEIGHT: 235.81 LBS | HEART RATE: 71 BPM | SYSTOLIC BLOOD PRESSURE: 132 MMHG | DIASTOLIC BLOOD PRESSURE: 78 MMHG

## 2020-01-02 DIAGNOSIS — R10.33 PERIUMBILICAL ABDOMINAL PAIN: ICD-10-CM

## 2020-01-02 DIAGNOSIS — M54.50 THORACOLUMBAR BACK PAIN: ICD-10-CM

## 2020-01-02 DIAGNOSIS — M54.6 THORACOLUMBAR BACK PAIN: ICD-10-CM

## 2020-01-02 DIAGNOSIS — G89.29 CHRONIC BILATERAL THORACIC BACK PAIN: ICD-10-CM

## 2020-01-02 DIAGNOSIS — M54.14 THORACIC RADICULOPATHY: Primary | ICD-10-CM

## 2020-01-02 DIAGNOSIS — M54.6 CHRONIC BILATERAL THORACIC BACK PAIN: ICD-10-CM

## 2020-01-02 DIAGNOSIS — F41.1 GAD (GENERALIZED ANXIETY DISORDER): ICD-10-CM

## 2020-01-02 DIAGNOSIS — R29.898 MUSCULAR DECONDITIONING: ICD-10-CM

## 2020-01-02 DIAGNOSIS — M54.14 THORACIC RADICULOPATHY: ICD-10-CM

## 2020-01-02 DIAGNOSIS — F33.40 RECURRENT MAJOR DEPRESSIVE DISORDER, IN REMISSION: ICD-10-CM

## 2020-01-02 PROCEDURE — 72080 XR THORACOLUMBAR SPINE AP LATERAL: ICD-10-PCS | Mod: 26,,, | Performed by: RADIOLOGY

## 2020-01-02 PROCEDURE — 99244 PR OFFICE CONSULTATION,LEVEL IV: ICD-10-PCS | Mod: S$GLB,,, | Performed by: PHYSICAL MEDICINE & REHABILITATION

## 2020-01-02 PROCEDURE — 99999 PR PBB SHADOW E&M-EST. PATIENT-LVL III: CPT | Mod: PBBFAC,,, | Performed by: PHYSICAL MEDICINE & REHABILITATION

## 2020-01-02 PROCEDURE — 99244 OFF/OP CNSLTJ NEW/EST MOD 40: CPT | Mod: S$GLB,,, | Performed by: PHYSICAL MEDICINE & REHABILITATION

## 2020-01-02 PROCEDURE — 99999 PR PBB SHADOW E&M-EST. PATIENT-LVL III: ICD-10-PCS | Mod: PBBFAC,,, | Performed by: PHYSICAL MEDICINE & REHABILITATION

## 2020-01-02 PROCEDURE — 72080 X-RAY EXAM THORACOLMB 2/> VW: CPT | Mod: TC

## 2020-01-02 PROCEDURE — 72080 X-RAY EXAM THORACOLMB 2/> VW: CPT | Mod: 26,,, | Performed by: RADIOLOGY

## 2020-01-02 NOTE — LETTER
January 2, 2020      Pratima Thacker MD  [facility] [address]           [facility]  [address] [place] [address] [address] [place] [id number]  Phone: [phone]          Patient: Jesse Hernández   MR Number: [id number]   YOB: 1975   Date of Visit: 1/2/2020       Dear Dr. Pratima Thacker:    Thank you for referring Jesse Hernández to me for evaluation. Attached you will find relevant portions of my assessment and plan of care.    If you have questions, please do not hesitate to call me. I look forward to following Jesse Hernández along with you.    Sincerely,    Felecia Traylor MD    Enclosure  CC:  No Recipients    If you would like to receive this communication electronically, please contact [email or web address] or [phone] to request more information on [facility] access.    For providers and/or their staff who would like to refer a patient to [facility], please contact us through our one-stop-shop provider referral line, [facility], at [phone].    If you feel you have received this communication in error or would no longer like to receive these types of communications, please e-mail [email or web address]

## 2020-01-02 NOTE — PROGRESS NOTES
[facility]  New Patient H&P    Referring Provider: Pratima Thacker Md  [address] [facility]  [place] [address]    Chief Complaint:   Chief Complaint   Patient presents with    Mid-back Pain       History of Present Illness: Jesse Hernández is a 44 y.o. male referred by Dr. Pratima Thacker for Bilateral sciatica. Patient states that it is midline back pain that radiates through his stomach.      Pain has been present for about 4 months. Pain is localized to the thoracolumbar area with pain/radiation around the abdomen in a T9-T10 distribution. Pain is described as electric, sharp, like a sword cutting him in half. The pain is aggravated by general daily activity, but nothing specific. The pain is alleviated by nothing. He denies weakness or numbness in his legs. Denies changes in bowel or bladder function. Denies saddle anesthesia. Denies recent fevers or infections. Denies significant weight loss.    He underwent umbilical hernia repair as they initially thought this pain could be from that.     Onset: 4 months ago  Location: Low back   Radiation: denies  Timing: constant  Quality: Tight, Deep, Sharp and Electric  Exacerbating Factors: nothing in particular, exercise, lifting, standinf for more than 5 minutes, walking for more than 2 minutes, sitting, lying down, extension, flexion, daily activity  Alleviating Factors: nothing and medications  Associated Symptoms: denies     Severity: Currently: 5/10   Typical Range: 5-10/10     Exacerbation: 10/10   Pain Disability Index  Family/Home Responsibilities:: 5  Recreation:: 5  Social Activity:: 5  Occupation:: 5  Sexual Behavior:: 5  Self Care:: 5  Life-Support Activities:: 5  Pain Disability Index (PDI): 35    Previous Interventions:  - None    Previous Therapies:  PT/OT: no   Surgery: yes, 2010 lumbar surgery with Dr. Marysol Root at [facility], not sure what level, but imaging reveals prior right-sided laminectomies at the L4 and L5  levels  Previous Medications:   - NSAIDS: Tylenol doesn't help. Ibuprofen doesn't help.   - Muscle Relaxants: Flexeril   - TCAs:   - SNRIs: cymbalta   - Topicals:   - Anticonvulsants: gabapentin  - Opioids: Percocet    Current Pain Medications:  1. Cymbalta 60 mg  2. Gabapentin 300 mg at night  3. Lamotrigine 300 mg daily    Blood Thinners: None    Full Medication List:    Current Outpatient Medications:     DULoxetine (CYMBALTA) 60 MG capsule, Take 1 capsule (60 mg total) by mouth once daily., Disp: 90 capsule, Rfl: 1    fenofibrate 160 MG Tab, , Disp: , Rfl: 1    gabapentin (NEURONTIN) 300 MG capsule, Take 1 capsule (300 mg total) by mouth every evening., Disp: 30 capsule, Rfl: 1    lamoTRIgine (LAMICTAL) 150 MG Tab, Take 2 tablets (300 mg total) by mouth once daily., Disp: 180 tablet, Rfl: 1    metoprolol succinate (TOPROL-XL) 100 MG 24 hr tablet, TAKE 1 TABLET(100 MG) BY MOUTH EVERY DAY, Disp: 90 tablet, Rfl: 0    omeprazole (PRILOSEC) 20 MG capsule, Take 1 capsule (20 mg total) by mouth once daily. for 14 days, Disp: 14 capsule, Rfl: 0     Review of Systems:  Review of Systems   Constitutional: Negative for fever and weight loss.   HENT: Negative for ear pain and tinnitus.    Eyes: Negative for pain and redness.   Respiratory: Negative for cough and shortness of breath.    Cardiovascular: Positive for palpitations. Negative for chest pain.   Gastrointestinal: Positive for abdominal pain. Negative for constipation and heartburn.   Genitourinary: Positive for frequency and urgency.        Denies urinary incontinence. Denies urine retention.    Musculoskeletal: Positive for back pain. Negative for neck pain.   Skin: Negative for itching and rash.   Neurological: Negative for tingling, focal weakness and seizures.   Endo/Heme/Allergies: Negative for environmental allergies. Does not bruise/bleed easily.   Psychiatric/Behavioral: Negative for depression. The patient has insomnia. The patient is not  nervous/anxious.        Allergies:  Patient has no known allergies.     Medical History:   has a past medical history of Hypertension and KAILEE (obstructive sleep apnea).    Surgical History:   has a past surgical history that includes Back surgery; Spine surgery; and Umbilical hernia repair (N/A, 11/25/2019).    Family History:  family history includes Cancer in his mother; Dementia in his mother; Diabetes in his paternal grandmother.    Social History:   reports that he quit smoking about 11 years ago. He has quit using smokeless tobacco. He reports that he drinks alcohol. He reports that he does not use drugs.    Physical Exam:  /78   Pulse 71   Wt 106.9 kg (235 lb 12.5 oz)   BMI 33.83 kg/m²   GEN: No acute distress. Calm, comfortable  HENT: Normocephalic, atraumatic, moist mucous membranes  EYE: Anicteric sclera, non-injected.   CV: Non-diaphoretic. Regular Rate. Radial Pulses 2+.  RESP: Breathing comfortably. Chest expansion symmetric.  ABD: Protuberant abdomen. Soft, NT. (-) Carnett's sign.  EXT: No clubbing, cyanosis.   SKIN: Warm, & dry to palpation. No visible rashes or lesions of exposed skin.   PSYCH: Pleasant mood and appropriate affect. Recent and remote memory intact.   GAIT: Independent, normal ambulation  ThoracoLumbar Spine Exam:       Inspection: No erythema, bruising. No surgical incisions      Palpation: No TTP of lumbar or thoracic paraspinals       ROM:  Limited in flexion, extension, lateral bending. Pain worse with coming up from a flexed position      (+) Facet loading bilaterally      (-) Straight Leg Raise bilaterally      (-) YELITZA bilaterally      (+) Back pain reproduced with bilateral 6'' leg lifts  Neurologic Exam:     Alert. Speech is fluent and appropriate.     Strength:  5/5 throughout bilateral lower extremities     Sensation:  Grossly intact to light touch in bilateral lower extremities     Reflexes: 2+ in b/l patella     Tone: No abnormality appreciated in bilateral lower  extremities     No Clonus       Imaging:  - MRI LUMBAR SPINE WITHOUT CONTRAST  The lumbar alignment is within normal limits.  There are scattered Schmorl's nodes throughout the lower thoracic and upper lumbar spine.  The vertebral body heights are maintained.  The bone marrow signal is within normal limits.  The conus terminates at the level of L1.  The cauda equina nerve roots are within normal limits.  There are no intraspinal collections.  There are postoperative changes of prior right-sided laminectomies at the L4 and L5 levels.  The overall postoperative changes are unremarkable.  There is multilevel disc desiccation.  Evaluation of the individual disc levels reveals the following:  L1-L2, the disc is within normal limits.  There is minimal facet hypertrophy and ligamentum flavum hypertrophy.  The spinal canal and neural foramina are within normal limits.  L2-L3, the disc is within normal limits.  There is facet hypertrophy and ligamentum flavum hypertrophy.  The spinal canal and neural foramina are unremarkable.  L3-L4, there is diffuse disc bulge along with facet hypertrophy and ligamentum flavum hypertrophy.  The spinal canal is and neural foramina are unremarkable.  L4-5, there are right-sided laminectomy changes at this level.  There is diffuse disc bulge.  There is superimposed right foraminal disc protrusion.  There is moderate right-sided neural foraminal narrowing.  The spinal canal and left neural foramina are within normal limits.  L5-S1, there are right-sided laminectomy changes at this level.  The spinal canal and neural foramina are within normal limits.  There are postop changes in the right paraspinous region.  The remainder of the paraspinal soft tissues are within normal limits.       Labs:  BMP  Lab Results   Component Value Date     12/25/2019    K 4.6 12/25/2019     12/25/2019    CO2 20 (L) 12/25/2019    BUN 12 12/25/2019    CREATININE 1.1 12/25/2019    CALCIUM 9.4 12/25/2019     ANIONGAP 13 12/25/2019    ESTGFRAFRICA >60 12/25/2019    EGFRNONAA >60 12/25/2019     Lab Results   Component Value Date    ALT 30 12/25/2019    AST 29 12/25/2019    ALKPHOS 50 (L) 12/25/2019    BILITOT 0.5 12/25/2019     Lab Results   Component Value Date     12/25/2019       Assessment:  Jesse Hernández is a 44 y.o. male with the following diagnoses based on history, exam, and imaging:    Problem List Items Addressed This Visit        Psychiatric    Depression    ALISON (generalized anxiety disorder)      Other Visit Diagnoses     Thoracic radiculopathy    -  Primary    Relevant Orders    X-Ray Thoracolumbar Spine AP Lateral (Completed)    Ambulatory consult to Physical Therapy    Chronic bilateral thoracic back pain        Relevant Orders    X-Ray Thoracolumbar Spine AP Lateral (Completed)    Ambulatory consult to Physical Therapy    Muscular deconditioning        Relevant Orders    X-Ray Thoracolumbar Spine AP Lateral (Completed)    Ambulatory consult to Physical Therapy    Periumbilical abdominal pain        Relevant Orders    X-Ray Thoracolumbar Spine AP Lateral (Completed)    Thoracolumbar back pain        Relevant Orders    X-Ray Thoracolumbar Spine AP Lateral (Completed)    Ambulatory consult to Physical Therapy          This is a pleasant 44 y.o. gentleman with chronic thoracolumbar back pain with radiation around the abdomen bilaterally.  I am not certain what is the cause of this pain. He does have poor core strength, but he has negative carnett's sign so I do not think the pain source is coming from the abdominal wall itself. This may be coming from a thoracic radiculopathy.  Abdominal pain radiation is in the distribution of T8 or T9 which was not visualized on lumbar MRI, and he does describe the pain with neuropathic features such as electrical. His pain is further complicated by his depression and anxiety.     Treatment Plan: I discussed with the patient the following assessment and  recommendations. The following is the plan the patient agreed upon:  - PT/OT/HEP: Referral to PT, internal, for thoracolumbar back pain and core strengthening  - Procedures: Consider T8-9 or T9-10 IL XIMENA vs b/l TF XIMENA in future, after thoracic MRI if necessary.  - Medications: Prescription for compound topical medication with diclofenac 2.5%, gabapentin 2.5%, lidocaine 2.25%, prilocaine 2.25%.  Apply 3.2 (2 pumps) to painful area up to 5 times daily  - Imaging: Reviewed. Ordered thoracolumbar x-ray. Will plan for thoracic MRI if no relief with conservative treatment (PT, topical cream, NSAIDs)  - Labs: Reviewed.  Medications are appropriately dosed for current hepatorenal function.    Follow Up: RTC in 2-3 months.     Felecia Traylor M.D.  Interventional Pain Medicine / Physical Medicine & Rehabilitation    Disclaimer: This note was partly generated using dictation software which may occasionally result in transcription errors.

## 2020-01-03 ENCOUNTER — TELEPHONE (OUTPATIENT)
Dept: PAIN MEDICINE | Facility: CLINIC | Age: 45
End: 2020-01-03

## 2020-01-03 ENCOUNTER — PATIENT MESSAGE (OUTPATIENT)
Dept: INTERNAL MEDICINE | Facility: CLINIC | Age: 45
End: 2020-01-03

## 2020-01-03 RX ORDER — BACLOFEN 10 MG/1
10 TABLET ORAL 3 TIMES DAILY
Qty: 90 TABLET | Refills: 1 | Status: SHIPPED | OUTPATIENT
Start: 2020-01-03 | End: 2020-11-13

## 2020-01-03 NOTE — TELEPHONE ENCOUNTER
Returned call to patient.      ----- Message from Aretha Mata LPN sent at 1/3/2020 11:36 AM CST -----  Contact: [phone]/self       ----- Message -----  From: Marcella Rivera  Sent: 1/3/2020  10:05 AM CST  To: Kymberly Izquierdo Staff Pain Mgmt    Jesse Chaisson calling to speak with Dr Traylor nurse about back pain  Please advise

## 2020-01-03 NOTE — TELEPHONE ENCOUNTER
Will call in baclofen 10 mg TID. He stated he understood. I also advised him to not stop suddenly once he reaches a steady dose. I advised him to reach back out to us if he is still having issues and we can order thoracic MRI.

## 2020-01-03 NOTE — TELEPHONE ENCOUNTER
I called and discussed his pain with the patient. He states he has not had any changes to his pain since I saw him yesterday, but that he is very frustrated with the entire ordeal. I told him that I understand his frustration, but that I think we should start with the initial plan that was offered and we can work from there. He stated understanding.     ----- Message from Sejal Andrade MA sent at 1/3/2020 11:45 AM CST -----  Patient called and stated that he is not getting in to the PT until 1/15/20. I informed him that the cream you sent in should be within the week . He stated that he is having horrible pain and he is getting angry because no one can diagnose him correctly. I informed him that if he is having acute pain that he should go to the ED. Patient stated that it would be a waste of money. I informed him that I was going to message you and see what you would recommend. Please advise.       Thanks,  Cat

## 2020-01-08 DIAGNOSIS — I10 HYPERTENSION, UNSPECIFIED TYPE: ICD-10-CM

## 2020-01-08 RX ORDER — METOPROLOL SUCCINATE 100 MG/1
TABLET, EXTENDED RELEASE ORAL
Qty: 90 TABLET | Refills: 0 | Status: SHIPPED | OUTPATIENT
Start: 2020-01-08 | End: 2020-02-17

## 2020-01-14 ENCOUNTER — OFFICE VISIT (OUTPATIENT)
Dept: PSYCHIATRY | Facility: CLINIC | Age: 45
End: 2020-01-14
Payer: COMMERCIAL

## 2020-01-14 VITALS
WEIGHT: 228.19 LBS | HEART RATE: 63 BPM | SYSTOLIC BLOOD PRESSURE: 147 MMHG | BODY MASS INDEX: 32.74 KG/M2 | DIASTOLIC BLOOD PRESSURE: 97 MMHG

## 2020-01-14 DIAGNOSIS — F33.40 RECURRENT MAJOR DEPRESSIVE DISORDER, IN REMISSION: ICD-10-CM

## 2020-01-14 DIAGNOSIS — F33.0 MILD EPISODE OF RECURRENT MAJOR DEPRESSIVE DISORDER: ICD-10-CM

## 2020-01-14 DIAGNOSIS — F41.1 GAD (GENERALIZED ANXIETY DISORDER): Primary | ICD-10-CM

## 2020-01-14 PROCEDURE — 3077F SYST BP >= 140 MM HG: CPT | Mod: CPTII,S$GLB,, | Performed by: NURSE PRACTITIONER

## 2020-01-14 PROCEDURE — 99213 OFFICE O/P EST LOW 20 MIN: CPT | Mod: S$GLB,,, | Performed by: NURSE PRACTITIONER

## 2020-01-14 PROCEDURE — 3008F PR BODY MASS INDEX (BMI) DOCUMENTED: ICD-10-PCS | Mod: CPTII,S$GLB,, | Performed by: NURSE PRACTITIONER

## 2020-01-14 PROCEDURE — 3077F PR MOST RECENT SYSTOLIC BLOOD PRESSURE >= 140 MM HG: ICD-10-PCS | Mod: CPTII,S$GLB,, | Performed by: NURSE PRACTITIONER

## 2020-01-14 PROCEDURE — 99999 PR PBB SHADOW E&M-EST. PATIENT-LVL II: CPT | Mod: PBBFAC,,, | Performed by: NURSE PRACTITIONER

## 2020-01-14 PROCEDURE — 3008F BODY MASS INDEX DOCD: CPT | Mod: CPTII,S$GLB,, | Performed by: NURSE PRACTITIONER

## 2020-01-14 PROCEDURE — 99213 PR OFFICE/OUTPT VISIT, EST, LEVL III, 20-29 MIN: ICD-10-PCS | Mod: S$GLB,,, | Performed by: NURSE PRACTITIONER

## 2020-01-14 PROCEDURE — 3080F DIAST BP >= 90 MM HG: CPT | Mod: CPTII,S$GLB,, | Performed by: NURSE PRACTITIONER

## 2020-01-14 PROCEDURE — 3080F PR MOST RECENT DIASTOLIC BLOOD PRESSURE >= 90 MM HG: ICD-10-PCS | Mod: CPTII,S$GLB,, | Performed by: NURSE PRACTITIONER

## 2020-01-14 PROCEDURE — 99999 PR PBB SHADOW E&M-EST. PATIENT-LVL II: ICD-10-PCS | Mod: PBBFAC,,, | Performed by: NURSE PRACTITIONER

## 2020-01-14 RX ORDER — LAMOTRIGINE 150 MG/1
300 TABLET ORAL DAILY
Qty: 180 TABLET | Refills: 1 | Status: SHIPPED | OUTPATIENT
Start: 2020-01-14 | End: 2020-09-17 | Stop reason: SDUPTHER

## 2020-01-14 RX ORDER — DULOXETIN HYDROCHLORIDE 60 MG/1
60 CAPSULE, DELAYED RELEASE ORAL DAILY
Qty: 90 CAPSULE | Refills: 1 | Status: SHIPPED | OUTPATIENT
Start: 2020-01-14 | End: 2020-08-11

## 2020-01-14 NOTE — PROGRESS NOTES
"Outpatient Psychiatry Follow-Up Visit (MD/NP)    1/14/2020    Clinical Status of Patient:  Outpatient (Ambulatory)    Chief Complaint:  Jesse Hernández is a 44 y.o. male who presents today for follow-up of depression and anxiety.  Met with patient.      Interval History and Content of Current Session:  Interim Events/Subjective Report/Content of Current Session:   She described mood as "a little aggravated. I have been having a lot of pain in my back and I got steroid injections, have been prescribed tramadol, hydrocodone and prednisone. I find these meds somewhat helpful but they did not take all the pain away. I had a hernia surgery. I have been on a diet trying to lose weight in hope to ease my back pain." He stated that he still feels nervous at times about the job and finances. He reported improved depression and anxiety with his current medication regimen of duloxetine and lamical. He denied SI/HI/AH/VH and no delusion noted or reported. Patient denied symptoms of debby or hypomania. He reported occasional alcohol use and denied any type of illicit drug use. He reported adequate sleep and appetite. He reported medication compliance and denied any side effects to his current medication of lamictal and duloxetine.     Psychotherapy:  · Target symptoms: anxiety   · Why chosen therapy is appropriate versus another modality: relevant to diagnosis, patient responds to this modality, evidence based practice  · Outcome monitoring methods: self-report, observation  · Therapeutic intervention type: supportive psychotherapy  · Topics discussed/themes: building skills sets for symptom management, symptom recognition  · The patient's response to the intervention is motivated. The patient's progress toward treatment goals is fair.   · Duration of intervention: 14 minutes.    Review of Systems   · PSYCHIATRIC: Pertinant items are noted in the narrative.  · CONSTITUTIONAL: No weight gain or loss.   · MUSCULOSKELETAL: No pain " "or stiffness of the joints.  · NEUROLOGIC: No weakness, sensory changes, seizures, confusion, memory loss, tremor or other abnormal movements.  · ENDOCRINE: No polydipsia or polyuria.  · INTEGUMENTARY: No rashes or lacerations.  · EYES: No exophthalmos, jaundice or blindness.  · ENT: No dizziness, tinnitus or hearing loss.  · RESPIRATORY: No shortness of breath.  · CARDIOVASCULAR: No tachycardia or chest pain.  · GASTROINTESTINAL: No nausea, vomiting, pain, constipation or diarrhea.  · GENITOURINARY: No frequency, dysuria or sexual dysfunction.  · HEMATOLOGIC/LYMPHATIC: No excessive bleeding, prolonged or excessive bleeding after dental extraction/injury.  · ALLERGIC/IMMUNOLOGIC: No allergic response to materials, foods or animals at this time.    Past Medical, Family and Social History: The patient's past medical, family and social history have been reviewed and updated as appropriate within the electronic medical record - see encounter notes.    Compliance: yes    Side effects: None    Risk Parameters:  Patient reports no suicidal ideation  Patient reports no homicidal ideation  Patient reports no self-injurious behavior  Patient reports no violent behavior    Exam (detailed: at least 9 elements; comprehensive: all 15 elements)   Constitutional  Vitals:  Most recent vital signs, dated greater than 90 days prior to this appointment, were reviewed.   Vitals:    01/14/20 0853   BP: (!) 147/97   Pulse: 63   Weight: 103.5 kg (228 lb 2.8 oz)        General:  unremarkable, age appropriate     Musculoskeletal  Muscle Strength/Tone:  no dystonia, no tremor, no tic   Gait & Station:  non-ataxic     Psychiatric  Speech:  no latency; no press   Mood & Affect:  "a little aggravated"  euthymic   Thought Process:  normal and logical   Associations:  intact   Thought Content:  normal, no suicidality, no homicidality, delusions, or paranoia   Insight:  intact   Judgement: behavior is adequate to circumstances   Orientation:  " grossly intact   Memory: intact for content of interview   Language: grossly intact   Attention Span & Concentration:  able to focus   Fund of Knowledge:  intact and appropriate to age and level of education     Assessment and Diagnosis   Status/Progress: Based on the examination today, the patient's problem(s) is/are improved.  New problems have not been presented today.   Co-morbidities, Diagnostic uncertainty and Lack of compliance are not complicating management of the primary condition.  There are no active rule-out diagnoses for this patient at this time.     General Impression: Patient's depressive symptoms are well controlled with his medication regimen and his anxiety symptoms are improving. Patient is medication compliant and will continue the same medications regimen.       ICD-10-CM ICD-9-CM   1. ALISON (generalized anxiety disorder) F41.1 300.02   2. Recurrent major depressive disorder, in remission F33.40 296.35       Intervention/Counseling/Treatment Plan   · Medication Management: Continue current medications. The risks and benefits of medication were discussed with the patient.  · Labs, Diagnostic Studies: reviewed all recent labs are reviewed   · Continue duloxetine 60mg po daily  · Continue Lamictal 300mg po daily (take 2 tabs of 150 mg in the morning)  -Discussed informed consent, diagnosis, risks and benefits of proposed treatment above vs alternative treatments vs no treatment, and potential side effects of these treatments. The patient expresses understanding of the above and displays the capacity to agree with this treatment given said understanding. Patient also agrees that, currently, the benefits outweigh the risks and would like to pursue treatment at this time. Answered all questions and discussed follow up. Encouraged patient to contact us with any questions or concerns.   -Encouraged Patient to keep future appointments.  -Take medications as prescribed and abstain from substance  abuse.  -Pt to present to ED for thoughts to harm herself or others          Return to Clinic: 3 months

## 2020-01-14 NOTE — PATIENT INSTRUCTIONS
Duloxetine delayed-release capsules  What is this medicine?  DULOXETINE (doo LOX e teen) is used to treat depression, anxiety, and different types of chronic pain.  How should I use this medicine?  Take this medicine by mouth with a glass of water. Follow the directions on the prescription label. Do not cut, crush or chew this medicine. You can take this medicine with or without food. Take your medicine at regular intervals. Do not take your medicine more often than directed. Do not stop taking this medicine suddenly except upon the advice of your doctor. Stopping this medicine too quickly may cause serious side effects or your condition may worsen.  A special MedGuide will be given to you by the pharmacist with each prescription and refill. Be sure to read this information carefully each time.  Talk to your pediatrician regarding the use of this medicine in children. While this drug may be prescribed for children as young as 7 years of age for selected conditions, precautions do apply.  What side effects may I notice from receiving this medicine?  Side effects that you should report to your doctor or health care professional as soon as possible:  · allergic reactions like skin rash, itching or hives, swelling of the face, lips, or tongue  · changes in blood pressure  · confusion  · dark urine  · dizziness  · fast talking and excited feelings or actions that are out of control  · fast, irregular heartbeat  · fever  · general ill feeling or flu-like symptoms  · hallucination, loss of contact with reality  · light-colored stools  · loss of balance or coordination  · redness, blistering, peeling or loosening of the skin, including inside the mouth  · right upper belly pain  · seizures  · suicidal thoughts or other mood changes  · trouble concentrating  · trouble passing urine or change in the amount of urine  · unusual bleeding or bruising  · unusually weak or tired  · yellowing of the eyes or skin  Side effects that  usually do not require medical attention (report to your doctor or health care professional if they continue or are bothersome):  · blurred vision  · change in appetite  · change in sex drive or performance  · headache  · increased sweating  · nausea  What may interact with this medicine?  Do not take this medicine with any of the following medications:  · certain diet drugs like dexfenfluramine, fenfluramine  · desvenlafaxine  · linezolid  · MAOIs like Azilect, Carbex, Eldepryl, Marplan, Nardil, and Parnate  · methylene blue (intravenous)  · milnacipran  · thioridazine  · venlafaxine  This medicine may also interact with the following medications:  · alcohol  · aspirin and aspirin-like medicines  · certain antibiotics like ciprofloxacin and enoxacin  · certain medicines for blood pressure, heart disease, irregular heart beat  · certain medicines for depression, anxiety, or psychotic disturbances  · certain medicines for migraine headache like almotriptan, eletriptan, frovatriptan, naratriptan, rizatriptan, sumatriptan, zolmitriptan  · certain medicines that treat or prevent blood clots like warfarin, enoxaparin, and dalteparin  · cimetidine  · fentanyl  · lithium  · NSAIDS, medicines for pain and inflammation, like ibuprofen or naproxen  · phentermine  · procarbazine  · sibutramine  · Errol's wort  · theophylline  · tramadol  · tryptophan  What if I miss a dose?  If you miss a dose, take it as soon as you can. If it is almost time for your next dose, take only that dose. Do not take double or extra doses.  Where should I keep my medicine?  Keep out of the reach of children.  Store at room temperature between 20 and 25 degrees C (68 to 77 degrees F). Throw away any unused medicine after the expiration date.  What should I tell my health care provider before I take this medicine?  They need to know if you have any of these conditions:  · bipolar disorder or a family history of bipolar  disorder  · glaucoma  · kidney disease  · liver disease  · suicidal thoughts or a previous suicide attempt  · taken medicines called MAOIs like Carbex, Eldepryl, Marplan, Nardil, and Parnate within 14 days  · an unusual reaction to duloxetine, other medicines, foods, dyes, or preservatives  · pregnant or trying to get pregnant  · breast-feeding  What should I watch for while using this medicine?  Tell your doctor if your symptoms do not get better or if they get worse. Visit your doctor or health care professional for regular checks on your progress. Because it may take several weeks to see the full effects of this medicine, it is important to continue your treatment as prescribed by your doctor.  Patients and their families should watch out for new or worsening thoughts of suicide or depression. Also watch out for sudden changes in feelings such as feeling anxious, agitated, panicky, irritable, hostile, aggressive, impulsive, severely restless, overly excited and hyperactive, or not being able to sleep. If this happens, especially at the beginning of treatment or after a change in dose, call your health care professional.  You may get drowsy or dizzy. Do not drive, use machinery, or do anything that needs mental alertness until you know how this medicine affects you. Do not stand or sit up quickly, especially if you are an older patient. This reduces the risk of dizzy or fainting spells. Alcohol may interfere with the effect of this medicine. Avoid alcoholic drinks.  This medicine can cause an increase in blood pressure. This medicine can also cause a sudden drop in your blood pressure, which may make you feel faint and increase the chance of a fall. These effects are most common when you first start the medicine or when the dose is increased, or during use of other medicines that can cause a sudden drop in blood pressure. Check with your doctor for instructions on monitoring your blood pressure while taking this  medicine.  Your mouth may get dry. Chewing sugarless gum or sucking hard candy, and drinking plenty of water may help. Contact your doctor if the problem does not go away or is severe.  NOTE:This sheet is a summary. It may not cover all possible information. If you have questions about this medicine, talk to your doctor, pharmacist, or health care provider. Copyright© 2017 [employer]        Lamotrigine tablets  What is this medicine?  LAMOTRIGINE (la KATT tri jeen) is used to control seizures in adults and children with epilepsy and Lennox-Gastaut syndrome. It is also used in adults to treat bipolar disorder.  How should I use this medicine?  Take this medicine by mouth with a glass of water. Follow the directions on the prescription label. Do not chew these tablets. If this medicine upsets your stomach, take it with food or milk. Take your doses at regular intervals. Do not take your medicine more often than directed.  A special MedGuide will be given to you by the pharmacist with each new prescription and refill. Be sure to read this information carefully each time.  Talk to your pediatrician regarding the use of this medicine in children. While this drug may be prescribed for children as young as 2 years for selected conditions, precautions do apply.  What side effects may I notice from receiving this medicine?  Side effects you should report to your doctor or health care professional as soon as possible:  · allergic reactions like skin rash, itching or hives, swelling of the face, lips, or tongue  · blurred or double vision  · difficulty walking or controlling muscle movements  · fever  · headache, stiff neck, and sensitivity to light  · painful sores in the mouth, eyes, or nose  · redness, blistering, peeling or loosening of the skin, including inside the mouth  · severe muscle pain  · swollen lymph glands  · uncontrollable eye movements  · unusual bruising or bleeding  · unusually weak or  tired  · vomiting  · worsening of mood, thoughts or actions of suicide or dying  · yellowing of the eyes or skin  Side effects that usually do not require medical attention (report to your doctor or health care professional if they continue or are bothersome):  · diarrhea or constipation  · difficulty sleeping  · nausea  · tremors  What may interact with this medicine?  · carbamazepine  · female hormones, including contraceptive or birth control pills  · methotrexate  · phenobarbital  · phenytoin  · primidone  · pyrimethamine  · rifampin  · trimethoprim  · valproic acid  What if I miss a dose?  If you miss a dose, take it as soon as you can. If it is almost time for your next dose, take only that dose. Do not take double or extra doses.  Where should I keep my medicine?  Keep out of reach of children.  Store at room temperature between 15 and 30 degrees C (59 and 86 degrees F). Throw away any unused medicine after the expiration date.  What should I tell my health care provider before I take this medicine?  They need to know if you have any of these conditions:  · a history of depression or bipolar disorder  · aseptic meningitis during prior use of lamotrigine  · folate deficiency  · kidney disease  · liver disease  · suicidal thoughts, plans, or attempt; a previous suicide attempt by you or a family member  · an unusual or allergic reaction to lamotrigine or other seizure medications, other medicines, foods, dyes, or preservatives  · pregnant or trying to get pregnant  · breast-feeding  What should I watch for while using this medicine?  Visit your doctor or health care professional for regular checks on your progress. If you take this medicine for seizures, wear a Medic Alert bracelet or necklace. Carry an identification card with information about your condition, medicines, and doctor or health care professional.  It is important to take this medicine exactly as directed. When first starting treatment, your dose  will need to be adjusted slowly. It may take weeks or months before your dose is stable. You should contact your doctor or health care professional if your seizures get worse or if you have any new types of seizures. Do not stop taking this medicine unless instructed by your doctor or health care professional. Stopping your medicine suddenly can increase your seizures or their severity.  Contact your doctor or health care professional right away if you develop a rash while taking this medicine. Rashes may be very severe and sometimes require treatment in the hospital. Deaths from rashes have occurred. Serious rashes occur more often in children than adults taking this medicine. It is more common for these serious rashes to occur during the first 2 months of treatment, but a rash can occur at any time.  You may get drowsy, dizzy, or have blurred vision. Do not drive, use machinery, or do anything that needs mental alertness until you know how this medicine affects you. To reduce dizzy or fainting spells, do not sit or stand up quickly, especially if you are an older patient. Alcohol can increase drowsiness and dizziness. Avoid alcoholic drinks.  If you are taking this medicine for bipolar disorder, it is important to report any changes in your mood to your doctor or health care professional. If your condition gets worse, you get mentally depressed, feel very hyperactive or manic, have difficulty sleeping, or have thoughts of hurting yourself or committing suicide, you need to get help from your health care professional right away. If you are a caregiver for someone taking this medicine for bipolar disorder, you should also report these behavioral changes right away. The use of this medicine may increase the chance of suicidal thoughts or actions. Pay special attention to how you are responding while on this medicine.  Your mouth may get dry. Chewing sugarless gum or sucking hard candy, and drinking plenty of water  may help. Contact your doctor if the problem does not go away or is severe.  Women who become pregnant while using this medicine may enroll in the [place] Antiepileptic Drug Pregnancy Registry by calling 1-269.885.4149. This registry collects information about the safety of antiepileptic drug use during pregnancy.  NOTE:This sheet is a summary. It may not cover all possible information. If you have questions about this medicine, talk to your doctor, pharmacist, or health care provider. Copyright© 2017 [employer]

## 2020-01-15 ENCOUNTER — CLINICAL SUPPORT (OUTPATIENT)
Dept: REHABILITATION | Facility: HOSPITAL | Age: 45
End: 2020-01-15
Attending: PHYSICAL MEDICINE & REHABILITATION
Payer: COMMERCIAL

## 2020-01-15 DIAGNOSIS — Z74.09 IMPAIRED MOBILITY AND ACTIVITIES OF DAILY LIVING: ICD-10-CM

## 2020-01-15 DIAGNOSIS — M54.10 RADICULAR PAIN: ICD-10-CM

## 2020-01-15 DIAGNOSIS — Z78.9 IMPAIRED MOBILITY AND ACTIVITIES OF DAILY LIVING: ICD-10-CM

## 2020-01-15 DIAGNOSIS — M54.6 CHRONIC BILATERAL THORACIC BACK PAIN: Primary | ICD-10-CM

## 2020-01-15 DIAGNOSIS — G89.29 CHRONIC BILATERAL THORACIC BACK PAIN: Primary | ICD-10-CM

## 2020-01-15 PROCEDURE — 97110 THERAPEUTIC EXERCISES: CPT | Mod: PN

## 2020-01-15 PROCEDURE — 97140 MANUAL THERAPY 1/> REGIONS: CPT | Mod: PN,59

## 2020-01-15 PROCEDURE — 97162 PT EVAL MOD COMPLEX 30 MIN: CPT | Mod: PN

## 2020-01-15 NOTE — PLAN OF CARE
[facility]  Physical Therapy Initial Evaluation    Name: Jesse Hernández  Clinic Number: [id number]    Therapy Diagnosis:   Encounter Diagnoses   Name Primary?    Chronic bilateral thoracic back pain Yes    Impaired mobility and activities of daily living     Radicular pain      Physician: Felecia Traylor MD    Physician Orders: PT Eval and Treat: Core strengthening, stretching, modalities, general conditioning, transition to HEP   Medical Diagnosis from Referral: M54.6,G89.29 (ICD-10-CM) - Chronic bilateral thoracic back pain M54.14 (ICD-10-CM) - Thoracic radiculopathy R29.898 (ICD-10-CM) - Muscular deconditioning M54.5,M54.6 (ICD-10-CM) - Thoracolumbar back pain   Evaluation Date: 1/15/2020  Authorization Period Expiration: 1/1/2021  Plan of Care Expiration: 1/15/2020 to 3/13/2020  Visit # / Visits authorized: 1/1    Time In: 0711  Time Out: 0802  Total Billable Time: 51 minutes    Precautions: Standard    Subjective     Date of onset: 6 months ago    History of current condition - Jesse reports: insidious thoracic pain beginning 6 months ago. Pain has varied since onset with frustration over lack of resolve and diagnoses for current presentation. Patient has seen 6 [facility] doctors thus far and one doctor from [facility]. Most have suggested his lumbar spine as the source; previous lumbar surgery years ago. Umbilica hernia discovered during annual exam and thought to be source of pain; surgery performed before Thanksgiving without change in pain. Patient met with the doctor from [facility] last Friday; he performed soft tissue mobilization and gave herbal injection which provided the most relief.     Medical History:   Past Medical History:   Diagnosis Date    Hypertension     KAILEE (obstructive sleep apnea)      Surgical History:   Jesse Hernández  has a past surgical history that includes Back surgery; Spine surgery; and Umbilical hernia repair (N/A,  11/25/2019).    Medications:   Jesse has a current medication list which includes the following prescription(s): baclofen, duloxetine, fenofibrate, gabapentin, lamotrigine, metoprolol succinate, and omeprazole.    Allergies:   Review of patient's allergies indicates:  No Known Allergies     Imaging: MRI lumbar spinePostoperative of right-sided laminectomies at the L4-L5 and L5-S1 levels.  No significant residual tissue at the post laminectomy site. Diffuse disc bulge at the L4-5 level with associated right foraminal disc protrusion at the L4-L5 level resulting moderate right-sided neural foraminal narrowing.  No evidence of spinal canal narrowing.   CT abdomen: No CT evidence to explain the patient's symptomatology.  No source of infection is seen.  Incidental findings as above   X-Ray thoracic spine: Mild spondylosis at the thoracolumbar junction    Prior Therapy: Not for current pain  Social History: Patient lives with wife, 17 year old son and 9 year old daughter in a single story house with one step to enter. Patient's children assist with household chores as needed; wife helps with positioning in bed for comfort. Patient enjoys playing golf but has been unable to secondary to pain; had to stop round secondary to pain.   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 On no work-related restrictions. Patient leaves next week and returns the following for work trip.   Prior Level of Function: Independent  Current Level of Function: Independent; impaired mobility and quality of life    Pain:  Current 3/10, worst 10/10, best 0/10   Location: B low back  Description: Shooting  Aggravating Factors: Supine<>sit, bed mobility (rolling), twisting motions (golf). At times random  Easing Factors: Random with full relief. Heat    Pts goals: Get relief and understand what is going on with regards to pain    Objective     WNL=within normal limits  !=pain    Posture: Standing: flattened  "lumbar sagittal curve  Palpation: Hypertrophy of thoracolumbar erector spinae    Range of Motion/Strength:     Thoracolumbar AROM Pain/Dysfunction with Movement   Flexion Minimal loss    Extension Not tested    Right side bending Moderate loss Pain and pressure to L side of thoracic spine   Left side bending Moderate loss    Right rotation Minimal loss Muscular pain on R side of thoracic spine   Left rotation Normal Joint pain in thoracic spine     Shoulder Right Left Pain/Dysfunction with Movement   AROM/PROM      flexion  WNL  WNL    extension  WNL!  WNL! Pain to B thoracic area   abduction  WNL  WNL    ER at 90° abd  WNL  WNL    IR at 90° abd  WNL  WNL      Hip Right Left Pain/Dysfunction with Movement   AROM/PROM      flexion  120/130!  120/130! Pain to B thoracic area     Joint Mobility:   - Thoracic: Hypomobile  - Lumbar: Hypomobile    Gait Analysis: no AD; initial limp without favor of LE. Stiff gait     Bed mobility: Alternate rolling: pain to B thoracic area  Supine<>sit via log roll: pain to B thoracic area     CMS Impairment/Limitation/Restriction for FOTO Thoracic Survey    Therapist reviewed FOTO scores for Jesse Hernández on 1/15/2020.   FOTO documents entered into tydy - see Media section.    Limitation Score: 63%  Category: Mobility     TREATMENT     Treatment Time In: 0735  Treatment Time Out: 0802  Total Treatment time separate from Evaluation: 37 minutes    Jesse received the following manual therapy techniques: were applied to the: thoracic spine for 12 minutes, including:  Grade I-II central posterior to anterior mobilizations to lower 1/2 of thoracic spine    Jesse received therapeutic exercises to develop strength and flexibility for 25 minutes including:  Sidelying open books: x5 B  Lower trunk rotations: 5"x10 B  Supine glute sets + transverse abdominus (TA) activation: 5"x10  TA activation: 3-5"x5 in alternate sidelying, supine, and sitting  Thoracic extension mobilizations against towel roll " "on chair back: 5"x10 middle and thoracic spine    Home Exercises and Patient Education Provided    Education provided:   - Possible mechanisms underlying pain and dysfunction  - Role of therapy/plan of care  - HEP    Written Home Exercises Provided: yes.  Exercises were reviewed and Jesse was able to demonstrate them prior to the end of the session.  Jesse demonstrated good  understanding of the education provided.     See EMR under Patient Instructions for exercises provided 1/15/2020.    Assessment     Jesse is a 44 y.o. male referred to outpatient Physical Therapy with a medical diagnosis of thoracic radiculopathy. Pt presents to initial evaluation with signs and symptoms consistent with medical diagnosis. History of lumbar laminectomies at L4-5 and L5-S1 which may affect thoracic mobility; spondylosis at lower thoracic spine and thoracolumbar junction noted in X-Ray report. Decreased pain and improved performance of bed mobility following mobilizations, TA activation, and open books. Initial irritability with thoracic mobilizations warranting grade one; however improved over time and change in segment levels. Similar initial irritability with thoracic extension mobilizations; instructions for performance similar to that which therapist applied.     Pt prognosis is Guarded.   Pt will benefit from skilled outpatient Physical Therapy to address the deficits stated above and in the chart below, provide pt/family education, and to maximize pt's level of independence.     Plan of care discussed with patient: Yes  Pt's spiritual, cultural and educational needs considered and pt agreeable to plan of care and goals as stated below:     Anticipated Barriers for therapy: Coping style; X-Ray findings; history of lumbar surgery    Medical Necessity is demonstrated by the following  History  Co-morbidities and personal factors that may impact the plan of care Co-morbidities:   Back pain  BMI >30  Generalized anxiety disorder, " depression    Personal Factors:   coping style  lifestyle-exercise seldom or never     moderate   Examination  Body Structures and Functions, activity limitations and participation restrictions that may impact the plan of care Body Regions:   back  lower extremities  upper extremities  trunk    Body Systems:    ROM  strength  gait  transfers  transitions    Participation Restrictions:   Community ambulation    Activity limitations:   Learning and applying knowledge  no deficits    General Tasks and Commands  no deficits    Communication  no deficits    Mobility  lifting and carrying objects  walking    Self care  washing oneself (bathing, drying, washing hands)  dressing    Domestic Life  shopping  cooking  doing house work (cleaning house, washing dishes, laundry)    Interactions/Relationships  family relationships    Life Areas  employment    Community and Social Life  community life  recreation and leisure         high   Clinical Presentation evolving clinical presentation with changing clinical characteristics moderate   Decision Making/ Complexity Score: moderate     Goals:  Short Term Goals (4 Weeks):  1. Pt will be compliant with HEP to supplement PT in decreasing pain with functional mobility.  2. Pt will perform pallof press + rotation with good control to demonstrate improved core strength for return to twisting motions without restriction.  3. Pt will improve lumbar AROM to </=minimal loss in all planes to improve functional mobility.  4. Pt will perform bed mobility and transfers without pain to promote QOL.   5. Pt will report centralization of pain to thoracic spine for >/=3 days to promote QOL.   Long Term Goals (8 Weeks):   1. Pt will improve FOTO score to </= 42% limited to decrease perceived limitation with maintaining/changing body position.   2. Pt will perform chops and lifts with good control to demonstrate improved core strength for return to golf.  3. Pt will play 9 holes of golf without pain  to promote recreation and leisure.   4. Pt will perform lumbar AROM without pain to improve functional mobility.    Plan     Plan of care Certification: 1/15/2020 to 3/13/2020.    Outpatient Physical Therapy 18 visits in 60 days to include the following interventions: Electrical Stimulation -, Gait Training, Manual Therapy, Moist Heat/ Ice, Neuromuscular Re-ed, Patient Education, Therapeutic Activites and Therapeutic Exercise.     Kayla Bazan, PT, DPT

## 2020-01-29 ENCOUNTER — CLINICAL SUPPORT (OUTPATIENT)
Dept: REHABILITATION | Facility: HOSPITAL | Age: 45
End: 2020-01-29
Attending: PHYSICAL MEDICINE & REHABILITATION
Payer: COMMERCIAL

## 2020-01-29 DIAGNOSIS — M54.10 RADICULAR PAIN: ICD-10-CM

## 2020-01-29 DIAGNOSIS — Z74.09 IMPAIRED MOBILITY AND ACTIVITIES OF DAILY LIVING: ICD-10-CM

## 2020-01-29 DIAGNOSIS — G89.29 CHRONIC BILATERAL THORACIC BACK PAIN: ICD-10-CM

## 2020-01-29 DIAGNOSIS — Z78.9 IMPAIRED MOBILITY AND ACTIVITIES OF DAILY LIVING: ICD-10-CM

## 2020-01-29 DIAGNOSIS — M54.6 CHRONIC BILATERAL THORACIC BACK PAIN: ICD-10-CM

## 2020-01-29 PROCEDURE — 97110 THERAPEUTIC EXERCISES: CPT | Mod: PN

## 2020-01-29 PROCEDURE — 97140 MANUAL THERAPY 1/> REGIONS: CPT | Mod: PN

## 2020-01-29 NOTE — PROGRESS NOTES
Physical Therapy Daily Treatment Note     Name: Jesse Hernández  Clinic Number: [id number]    Therapy Diagnosis:   Encounter Diagnoses   Name Primary?    Chronic bilateral thoracic back pain     Impaired mobility and activities of daily living     Radicular pain      Physician: Felecia Traylor MD    Visit Date: 1/29/2020    Physician Orders: PT Eval and Treat: Core strengthening, stretching, modalities, general conditioning, transition to HEP   Medical Diagnosis from Referral: M54.6,G89.29 (ICD-10-CM) - Chronic bilateral thoracic back pain M54.14 (ICD-10-CM) - Thoracic radiculopathy R29.898 (ICD-10-CM) - Muscular deconditioning M54.5,M54.6 (ICD-10-CM) - Thoracolumbar back pain   Evaluation Date: 1/15/2020  Authorization Period Expiration: 5/21/2020  Plan of Care Expiration: 1/15/2020 to 3/13/2020  Visit # / Visits authorized: 2/30    Time In: 0700  Time Out: 0801  Total Billable Time: 61 minutes    Precautions: Standard    Subjective     Pt reports: decreased pain since initial evaluation. Has been out of town and had lots of things going on keeping him from full HEP compliance.  He was PARTIALLY compliant with home exercise program.  Response to previous treatment: decreased pain  Functional change: partial HEP compliance    Pain: 1-2/10 upon arrival; 0/10 at end of session  Location: Thoracic area    Objective     Jesse received the following manual therapy techniques: were applied to the: thoracic spine for 10 minutes, including:  Grade II-III central posterior to anterior mobilizations to lower 1/2 of thoracic spine and L1.      Jesse received therapeutic exercises to develop strength and flexibility for 51 minutes including:    Mat:  Prone press-ups on elbows: x10  Prone press-ups on hands: x10  Prone anterior pelvic tilt for multifidi activation: 2x10  Sidelying open books: x10 B  Sidelying transverse abdominus (TA) activation: 5' total; performed on both sides. Instructed to perform  "until able to achieve 10 consecutive reps without difficulty of isolation  Lower trunk rotations: 3"x10 B  Posterior pelvic tilt via glute sets + transverse abdominus (TA) activation: 2x10  Seated TA activation: x10    Thoracic extension mobilizations against towel roll on chair back: 2x10 middle and lower thoracic spine    Standing:  Straight arm pull downs: YTB 3x10  Pallof press: YTB 2x10    Home Exercises Provided and Patient Education Provided:    Education provided:   - Continue HEP. Re-printed HEP from evaluation.  - Will see how patient does with regards to pain following    Written Home Exercises Provided: Not today.  Exercises were reviewed and Gino able to demonstrate them prior to the end of the session.  Jesse demonstrated good  understanding of the education provided.     See EMR under Patient Instructions for exercises provided 1/15/2020     Assessment     Pain localized to lower thoracic spine; improved with mobilizations. Pain with change in position on mat, especially with initial prone and hooklying positioning. No pain by session's end.    Jesse is progressing well towards his goals.   Pt prognosis is Guarded.   Pt will continue to benefit from skilled outpatient physical therapy to address the deficits listed in the problem list box on initial evaluation, provide pt/family education and to maximize pt's level of independence in the home and community environment.     Pt's spiritual, cultural and educational needs considered and pt agreeable to plan of care and goals.  Anticipated barriers to physical therapy: Coping style; X-Ray findings; history of lumbar surgery     Short Term Goals (4 Weeks):  1. Pt will be compliant with HEP to supplement PT in decreasing pain with functional mobility. PROGRESSING, NOT MET 1/29/2020   2. Pt will perform pallof press + rotation with good control to demonstrate improved core strength for return to twisting motions without restriction. PROGRESSING, NOT MET " 1/29/2020   3. Pt will improve lumbar AROM to </=minimal loss in all planes to improve functional mobility. PROGRESSING, NOT MET 1/29/2020   4. Pt will perform bed mobility and transfers without pain to promote QOL. PROGRESSING, NOT MET 1/29/2020   5. Pt will report centralization of pain to thoracic spine for >/=3 days to promote QOL. PROGRESSING, NOT MET 1/29/2020   Long Term Goals (8 Weeks):   1. Pt will improve FOTO score to </= 42% limited to decrease perceived limitation with maintaining/changing body position. PROGRESSING, NOT MET 1/29/2020   2. Pt will perform chops and lifts with good control to demonstrate improved core strength for return to golf. PROGRESSING, NOT MET 1/29/2020   3. Pt will play 9 holes of golf without pain to promote recreation and leisure. PROGRESSING, NOT MET 1/29/2020   4. Pt will perform lumbar AROM without pain to improve functional mobility. PROGRESSING, NOT MET 1/29/2020     Plan     Add prone to quadruped with TA and child's pose next.    Kayla Bazan, PT, DPT

## 2020-01-31 ENCOUNTER — CLINICAL SUPPORT (OUTPATIENT)
Dept: REHABILITATION | Facility: HOSPITAL | Age: 45
End: 2020-01-31
Attending: PHYSICAL MEDICINE & REHABILITATION
Payer: COMMERCIAL

## 2020-01-31 DIAGNOSIS — G89.29 CHRONIC BILATERAL THORACIC BACK PAIN: ICD-10-CM

## 2020-01-31 DIAGNOSIS — M54.6 CHRONIC BILATERAL THORACIC BACK PAIN: ICD-10-CM

## 2020-01-31 DIAGNOSIS — Z78.9 IMPAIRED MOBILITY AND ACTIVITIES OF DAILY LIVING: ICD-10-CM

## 2020-01-31 DIAGNOSIS — Z74.09 IMPAIRED MOBILITY AND ACTIVITIES OF DAILY LIVING: ICD-10-CM

## 2020-01-31 DIAGNOSIS — M54.10 RADICULAR PAIN: ICD-10-CM

## 2020-01-31 PROCEDURE — 97140 MANUAL THERAPY 1/> REGIONS: CPT | Mod: PN

## 2020-01-31 PROCEDURE — 97110 THERAPEUTIC EXERCISES: CPT | Mod: PN

## 2020-01-31 NOTE — PROGRESS NOTES
Physical Therapy Daily Treatment Note     Name: Jesse Hernández  Clinic Number: [id number]    Therapy Diagnosis:   Encounter Diagnoses   Name Primary?    Chronic bilateral thoracic back pain     Impaired mobility and activities of daily living     Radicular pain      Physician: Felecia Traylor MD    Visit Date: 1/31/2020    Physician Orders: PT Eval and Treat: Core strengthening, stretching, modalities, general conditioning, transition to HEP   Medical Diagnosis from Referral: M54.6,G89.29 (ICD-10-CM) - Chronic bilateral thoracic back pain M54.14 (ICD-10-CM) - Thoracic radiculopathy R29.898 (ICD-10-CM) - Muscular deconditioning M54.5,M54.6 (ICD-10-CM) - Thoracolumbar back pain   Evaluation Date: 1/15/2020  Authorization Period Expiration: 5/21/2020  Plan of Care Expiration: 1/15/2020 to 3/13/2020  Visit # / Visits authorized: 3/30    Time In: 0705  Time Out: 0800  Total Billable Time: 55 minutes (3 TE, 1 MT)    Precautions: Standard    Subjective     Pt reports: feeling a little better since last visit but still having stinging pain especially when he turns in bed  He was PARTIALLY compliant with home exercise program.  Response to previous treatment: decreased pain  Functional change: partial HEP compliance    Pain: 1-2/10 upon arrival; 0/10 at end of session  Location: Thoracic area    Objective     Jesse received the following manual therapy techniques: were applied to the: thoracic spine for 10 minutes, including: assessment  Grade II-III central posterior to anterior mobilizations to lower 1/2 of thoracic spine and L1     Jesse received therapeutic exercises to develop strength and flexibility for 45 minutes including:    Mat:  Prone press-ups on elbows: x10  Prone press-ups on hands: x10  Prone anterior pelvic tilt for multifidi activation: 2x10  Child's pose: 5x, trial  Sidelying open books: x10 B  Sidelying transverse abdominus (TA) activation: 5' total; performed on both sides.  "Instructed to perform until able to achieve 10 consecutive reps without difficulty of isolation  Lower trunk rotations: 3"x10 B  Glute sets: 2x10  Transverse abdominus (TA) activation: 2x10  Seated TA activation: 20x5''    Thoracic extension mobilizations against towel roll on chair back: 2x10 middle and lower thoracic spine - not done today    Standing:  Straight arm pull downs: YTB 3x10 - not done today  Pallof press: YTB 2x10 - not done today  Rows: 2x15 GTB    Home Exercises Provided and Patient Education Provided:  Education provided:   - Continue HEP. Re-printed HEP from evaluation.  - Will see how patient does with regards to pain following    Written Home Exercises Provided: Not today.  Exercises were reviewed and Gino able to demonstrate them prior to the end of the session.  Jesse demonstrated good  understanding of the education provided.     See EMR under Patient Instructions for exercises provided 1/15/2020     Assessment     No change in pain and doing ok. Some pain with prone anterior pelvic tilts and mild discomfort with turning on mat along with transitions.    Jesse is progressing well towards his goals.   Pt prognosis is Guarded.   Pt will continue to benefit from skilled outpatient physical therapy to address the deficits listed in the problem list box on initial evaluation, provide pt/family education and to maximize pt's level of independence in the home and community environment.     Pt's spiritual, cultural and educational needs considered and pt agreeable to plan of care and goals.  Anticipated barriers to physical therapy: Coping style; X-Ray findings; history of lumbar surgery     Short Term Goals (4 Weeks):  1. Pt will be compliant with HEP to supplement PT in decreasing pain with functional mobility. PROGRESSING, NOT MET 1/31/2020   2. Pt will perform pallof press + rotation with good control to demonstrate improved core strength for return to twisting motions without restriction. " PROGRESSING, NOT MET 1/31/2020   3. Pt will improve lumbar AROM to </=minimal loss in all planes to improve functional mobility. PROGRESSING, NOT MET 1/31/2020   4. Pt will perform bed mobility and transfers without pain to promote QOL. PROGRESSING, NOT MET 1/31/2020   5. Pt will report centralization of pain to thoracic spine for >/=3 days to promote QOL. PROGRESSING, NOT MET 1/31/2020   Long Term Goals (8 Weeks):   1. Pt will improve FOTO score to </= 42% limited to decrease perceived limitation with maintaining/changing body position. PROGRESSING, NOT MET 1/31/2020   2. Pt will perform chops and lifts with good control to demonstrate improved core strength for return to golf. PROGRESSING, NOT MET 1/31/2020   3. Pt will play 9 holes of golf without pain to promote recreation and leisure. PROGRESSING, NOT MET 1/31/2020   4. Pt will perform lumbar AROM without pain to improve functional mobility. PROGRESSING, NOT MET 1/31/2020     Plan     Add prone to quadruped with TA and child's pose next.    Deandre Mae, PT, DPT

## 2020-02-05 ENCOUNTER — DOCUMENTATION ONLY (OUTPATIENT)
Dept: REHABILITATION | Facility: HOSPITAL | Age: 45
End: 2020-02-05

## 2020-02-05 DIAGNOSIS — G89.29 CHRONIC BILATERAL THORACIC BACK PAIN: ICD-10-CM

## 2020-02-05 DIAGNOSIS — M54.6 CHRONIC BILATERAL THORACIC BACK PAIN: ICD-10-CM

## 2020-02-05 DIAGNOSIS — Z78.9 IMPAIRED MOBILITY AND ACTIVITIES OF DAILY LIVING: ICD-10-CM

## 2020-02-05 DIAGNOSIS — Z74.09 IMPAIRED MOBILITY AND ACTIVITIES OF DAILY LIVING: ICD-10-CM

## 2020-02-05 DIAGNOSIS — M54.10 RADICULAR PAIN: ICD-10-CM

## 2020-02-05 NOTE — PROGRESS NOTES
Patient's wife called 33 minutes after scheduled session time to inform he would not make it because he is still at work. Counts as no show.

## 2020-02-06 NOTE — PROGRESS NOTES
Physical Therapy Daily Treatment Note     Name: Jesse Hernández  Clinic Number: [id number]    Therapy Diagnosis:   Encounter Diagnoses   Name Primary?    Chronic bilateral thoracic back pain     Impaired mobility and activities of daily living     Radicular pain      Physician: Felecia Traylor MD    Visit Date: 2/7/2020    Physician Orders: PT Eval and Treat: Core strengthening, stretching, modalities, general conditioning, transition to HEP   Medical Diagnosis from Referral: M54.6,G89.29 (ICD-10-CM) - Chronic bilateral thoracic back pain M54.14 (ICD-10-CM) - Thoracic radiculopathy R29.898 (ICD-10-CM) - Muscular deconditioning M54.5,M54.6 (ICD-10-CM) - Thoracolumbar back pain   Evaluation Date: 1/15/2020  Authorization Period Expiration: 5/21/2020  Plan of Care Expiration: 1/15/2020 to 3/13/2020  Visit # / Visits authorized: 4/30    Time In: 0657  Time Out: 0729  Total Billable Time: 32 minutes     Precautions: Standard    Subjective     Pt reports:pain with lying prone and leaning backwards against support. Patient reports pain is much improved from initial evaluation. Session terminated early due to patient feeling nauseuous   He was compliant with home exercise program.  Response to previous treatment: decreased pain.  Functional change: played a full round of golf with soreness but not pain.    Pain: 2-3/10 upon arrival; 0/10 at end of session  Location: Thoracic area    Objective     Jesse received the following manual therapy techniques: were applied to the: thoracic spine for 9 minutes, including: assessment  Grade II-IV central posterior to anterior mobilizations to thoracic spine and L1     Jesse received therapeutic exercises to develop strength and flexibility for 21 minutes including:    Mat:  Prone press-ups on hands: 2x10  Prone anterior pelvic tilt for multifidi activation: 2x10  Prone to quadruped: x10  Child's pose: x10  Quadruped thread the needle: x10 B  Sidelying open  "books: x10 B  Supine lower trunk rotations: x10 B  Supine glute sets: 10"x10  Bridges: 3x10  Supine transverse abdominus (TA) activation: 10"x10  Seated TA activation: 10"x10    Home Exercises Provided and Patient Education Provided:    Education provided:   - Continue HEP.     Written Home Exercises Provided: Not today.  Exercises were reviewed and Gino able to demonstrate them prior to the end of the session.  Jesse demonstrated good  understanding of the education provided.     See EMR under Patient Instructions for exercises provided 1/15/2020     Assessment     Able to progress mobilizations to grade IV without pain. Less pain noted with mat mobility. No pain by end of session. Session limited by quick onset of nausea.     Jesse is progressing well towards his goals.   Pt prognosis is Guarded.   Pt will continue to benefit from skilled outpatient physical therapy to address the deficits listed in the problem list box on initial evaluation, provide pt/family education and to maximize pt's level of independence in the home and community environment.     Pt's spiritual, cultural and educational needs considered and pt agreeable to plan of care and goals.  Anticipated barriers to physical therapy: Coping style; X-Ray findings; history of lumbar surgery     Short Term Goals (4 Weeks):  1. Pt will be compliant with HEP to supplement PT in decreasing pain with functional mobility. PROGRESSING, NOT MET 2/7/2020   2. Pt will perform pallof press + rotation with good control to demonstrate improved core strength for return to twisting motions without restriction. PROGRESSING, NOT MET 2/7/2020   3. Pt will improve lumbar AROM to </=minimal loss in all planes to improve functional mobility. PROGRESSING, NOT MET 2/7/2020   4. Pt will perform bed mobility and transfers without pain to promote QOL. PROGRESSING, NOT MET 2/7/2020   5. Pt will report centralization of pain to thoracic spine for >/=3 days to promote QOL. " PROGRESSING, NOT MET 2/7/2020   Long Term Goals (8 Weeks):   1. Pt will improve FOTO score to </= 42% limited to decrease perceived limitation with maintaining/changing body position. PROGRESSING, NOT MET 2/7/2020   2. Pt will perform chops and lifts with good control to demonstrate improved core strength for return to golf. PROGRESSING, NOT MET 2/7/2020   3. Pt will play 9 holes of golf without pain to promote recreation and leisure. PROGRESSING, NOT MET 2/7/2020   4. Pt will perform lumbar AROM without pain to improve functional mobility. PROGRESSING, NOT MET 2/7/2020     Plan     Resume seated and standing exercises.     Kayla Bazan, PT, DPT

## 2020-02-07 ENCOUNTER — CLINICAL SUPPORT (OUTPATIENT)
Dept: REHABILITATION | Facility: HOSPITAL | Age: 45
End: 2020-02-07
Attending: PHYSICAL MEDICINE & REHABILITATION
Payer: COMMERCIAL

## 2020-02-07 DIAGNOSIS — Z78.9 IMPAIRED MOBILITY AND ACTIVITIES OF DAILY LIVING: ICD-10-CM

## 2020-02-07 DIAGNOSIS — G89.29 CHRONIC BILATERAL THORACIC BACK PAIN: ICD-10-CM

## 2020-02-07 DIAGNOSIS — M54.6 CHRONIC BILATERAL THORACIC BACK PAIN: ICD-10-CM

## 2020-02-07 DIAGNOSIS — Z74.09 IMPAIRED MOBILITY AND ACTIVITIES OF DAILY LIVING: ICD-10-CM

## 2020-02-07 DIAGNOSIS — M54.10 RADICULAR PAIN: ICD-10-CM

## 2020-02-07 PROCEDURE — 97140 MANUAL THERAPY 1/> REGIONS: CPT | Mod: PN

## 2020-02-07 PROCEDURE — 97110 THERAPEUTIC EXERCISES: CPT | Mod: PN

## 2020-02-14 ENCOUNTER — CLINICAL SUPPORT (OUTPATIENT)
Dept: REHABILITATION | Facility: HOSPITAL | Age: 45
End: 2020-02-14
Attending: PHYSICAL MEDICINE & REHABILITATION
Payer: COMMERCIAL

## 2020-02-14 DIAGNOSIS — M54.6 CHRONIC BILATERAL THORACIC BACK PAIN: ICD-10-CM

## 2020-02-14 DIAGNOSIS — M54.10 RADICULAR PAIN: ICD-10-CM

## 2020-02-14 DIAGNOSIS — G89.29 CHRONIC BILATERAL THORACIC BACK PAIN: ICD-10-CM

## 2020-02-14 DIAGNOSIS — Z74.09 IMPAIRED MOBILITY AND ACTIVITIES OF DAILY LIVING: ICD-10-CM

## 2020-02-14 DIAGNOSIS — Z78.9 IMPAIRED MOBILITY AND ACTIVITIES OF DAILY LIVING: ICD-10-CM

## 2020-02-14 PROCEDURE — 97110 THERAPEUTIC EXERCISES: CPT | Mod: PN

## 2020-02-14 PROCEDURE — 97140 MANUAL THERAPY 1/> REGIONS: CPT | Mod: PN

## 2020-02-14 NOTE — PROGRESS NOTES
"                            Physical Therapy Daily Treatment Note     Name: Jesse Hernández  Clinic Number: [id number]    Therapy Diagnosis:   No diagnosis found.  Physician: Felecia Traylor MD    Visit Date: 2/14/2020    Physician Orders: PT Eval and Treat: Core strengthening, stretching, modalities, general conditioning, transition to HEP   Medical Diagnosis from Referral: M54.6,G89.29 (ICD-10-CM) - Chronic bilateral thoracic back pain M54.14 (ICD-10-CM) - Thoracic radiculopathy R29.898 (ICD-10-CM) - Muscular deconditioning M54.5,M54.6 (ICD-10-CM) - Thoracolumbar back pain   Evaluation Date: 1/15/2020  Authorization Period Expiration: 5/21/2020  Plan of Care Expiration: 1/15/2020 to 3/13/2020  Visit # / Visits authorized: 5/30  FOTO: 5/10    Time In: 0703  Time Out: 0750  Total Billable Time: 47 minutes     Precautions: Standard    Subjective     Pt reports: brief discomfort in middle to lower back while sitting last night; rated as 2-3/10  He was compliant with home exercise program.  Response to previous treatment: no adverse effect  Functional change: none from last session    Pain: 3/10 upon arrival; 0/10 at end of session  Location: Midline of thoracic spine    Objective     Jesse received the following manual therapy techniques: were applied to the: thoracolumbar spine for 8 minutes, including:  Grade III-IV central posterior to anterior mobilizations to thoracic spine and L1     Jesse received therapeutic exercises to develop strength and flexibility for 39 minutes including:    Mat:  Prone press-ups on hands: 2x10. Additional 10 after manual therapy  Prone anterior pelvic tilt for multifidi activation: 3-5" 2x10  Prone to quadruped: x10  Child's pose: x10. Additional 10 after manual therapy  Quadruped thread the needle: x10 B  Sidelying open books: x10 B  Bridges: 2x10  Supine transverse abdominus (TA) activation: 3" 2x10  Seated TA activation: 3"x20    Chair:   Thoracic extension mobilizations against small " tan bolster: 2x10 middle and lower thoracic spine     Standing:  Pallof press: GTB x15 B  Chops: RTB x10 B  Lifts: RTB x10 B    Home Exercises Provided and Patient Education Provided:    Education provided:   - Continue HEP.   - Options similar to exercises performed to decrease discomfort and promote spinal mobility.    Written Home Exercises Provided: Not today.  Exercises were reviewed and Gino able to demonstrate them prior to the end of the session.  Jesse demonstrated good  understanding of the education provided.     See EMR under Patient Instructions for exercises provided 1/15/2020     Assessment     Mild pain with changes in position on mat and supine<>sit. Brief moderate pain with initial bridges. Resumed standing exercises and progressed to incorporate functional rotation.     Jesse is progressing well towards his goals.   Pt prognosis is Guarded.   Pt will continue to benefit from skilled outpatient physical therapy to address the deficits listed in the problem list box on initial evaluation, provide pt/family education and to maximize pt's level of independence in the home and community environment.     Pt's spiritual, cultural and educational needs considered and pt agreeable to plan of care and goals.  Anticipated barriers to physical therapy: Coping style; X-Ray findings; history of lumbar surgery     Short Term Goals (4 Weeks):  1. Pt will be compliant with HEP to supplement PT in decreasing pain with functional mobility. PROGRESSING, NOT MET 2/14/2020   2. Pt will perform pallof press + rotation with good control to demonstrate improved core strength for return to twisting motions without restriction. PROGRESSING, NOT MET 2/14/2020   3. Pt will improve lumbar AROM to </=minimal loss in all planes to improve functional mobility. PROGRESSING, NOT MET 2/14/2020   4. Pt will perform bed mobility and transfers without pain to promote QOL. PROGRESSING, NOT MET 2/14/2020   5. Pt will report  centralization of pain to thoracic spine for >/=3 days to promote QOL. PROGRESSING, NOT MET 2/14/2020   Long Term Goals (8 Weeks):   1. Pt will improve FOTO score to </= 42% limited to decrease perceived limitation with maintaining/changing body position. PROGRESSING, NOT MET 2/14/2020   2. Pt will perform chops and lifts with good control to demonstrate improved core strength for return to golf. PROGRESSING, NOT MET 2/14/2020   3. Pt will play 9 holes of golf without pain to promote recreation and leisure. PROGRESSING, NOT MET 2/14/2020   4. Pt will perform lumbar AROM without pain to improve functional mobility. PROGRESSING, NOT MET 2/14/2020     Plan     Monitor pain. Progress pallof press to include contralateral rotation; begin resistance exercises on cable machine.     Kayla Bazan, PT, DPT

## 2020-02-16 DIAGNOSIS — I10 HYPERTENSION, UNSPECIFIED TYPE: ICD-10-CM

## 2020-02-17 RX ORDER — METOPROLOL SUCCINATE 100 MG/1
TABLET, EXTENDED RELEASE ORAL
Qty: 90 TABLET | Refills: 0 | Status: SHIPPED | OUTPATIENT
Start: 2020-02-17 | End: 2020-05-18

## 2020-02-21 ENCOUNTER — CLINICAL SUPPORT (OUTPATIENT)
Dept: REHABILITATION | Facility: HOSPITAL | Age: 45
End: 2020-02-21
Attending: PHYSICAL MEDICINE & REHABILITATION
Payer: COMMERCIAL

## 2020-02-21 DIAGNOSIS — Z74.09 IMPAIRED MOBILITY AND ACTIVITIES OF DAILY LIVING: ICD-10-CM

## 2020-02-21 DIAGNOSIS — M54.10 RADICULAR PAIN: ICD-10-CM

## 2020-02-21 DIAGNOSIS — Z78.9 IMPAIRED MOBILITY AND ACTIVITIES OF DAILY LIVING: ICD-10-CM

## 2020-02-21 DIAGNOSIS — G89.29 CHRONIC BILATERAL THORACIC BACK PAIN: ICD-10-CM

## 2020-02-21 DIAGNOSIS — M54.6 CHRONIC BILATERAL THORACIC BACK PAIN: ICD-10-CM

## 2020-02-21 PROCEDURE — 97110 THERAPEUTIC EXERCISES: CPT | Mod: PN

## 2020-02-21 PROCEDURE — 97140 MANUAL THERAPY 1/> REGIONS: CPT | Mod: PN

## 2020-02-21 NOTE — PROGRESS NOTES
Physical Therapy Daily Treatment Note     Name: Jesse Hernández  Clinic Number: [id number]    Therapy Diagnosis:   Encounter Diagnoses   Name Primary?    Chronic bilateral thoracic back pain     Impaired mobility and activities of daily living     Radicular pain      Physician: Felecia Traylor MD    Visit Date: 2/21/2020    Physician Orders: PT Eval and Treat: Core strengthening, stretching, modalities, general conditioning, transition to HEP   Medical Diagnosis from Referral: M54.6,G89.29 (ICD-10-CM) - Chronic bilateral thoracic back pain M54.14 (ICD-10-CM) - Thoracic radiculopathy R29.898 (ICD-10-CM) - Muscular deconditioning M54.5,M54.6 (ICD-10-CM) - Thoracolumbar back pain   Evaluation Date: 1/15/2020  Authorization Period Expiration: 5/21/2020  Plan of Care Expiration: 1/15/2020 to 3/13/2020  Visit # / Visits authorized: 6/30  FOTO: 6/10    Time In: 0702  Time Out: 0758  Total Billable Time: 56 minutes     Precautions: Standard    Subjective     Pt reports: soreness upon performing open books. No issues pertaining to pain or function since last session. Patient reports the session he left early due to nausea may be related to transitioning from prone to supine. Denies any other instances of dizziness in supine or with head turns. Patient reports he takes medicine that increases blood pressure and takes blood pressure medication to address.   He was compliant with home exercise program.  Response to previous treatment: no issues that he can recall  Functional change: no issues that he can recall    Pain: 1-2/10 shortly after arrival; 0/10 at end of session  Location: Midline of thoracic spine    Objective     Jesse received the following manual therapy techniques: were applied to the: thoracolumbar spine for 12 minutes, including:  Grade III-IV central posterior to anterior mobilizations to thoracic spine and L1  Grade V central posterior to anterior thrust to middle thoracic spine    "  Jesse received therapeutic exercises to develop strength and flexibility for 44 minutes including:    Mat:  Prone press-ups on hands: 2x10  Prone to quadruped: 2x10  Child's pose: x10  Bird dogs: x10  Quadruped thread the needle: x10 B  Sidelying open books: x10 B. Verbal cues to increase range of performance.  Seated TA activation: 10"x10    Chair:   Thoracic extension mobilizations against small tan bolster: x20 middle and lower thoracic spine     Cable machine:  Pallof press: 7# 2x10 B  Chops: 7# x10 B  Lifts: 7# x10 B    Standing hip hinge: x30    Home Exercises Provided and Patient Education Provided:    Education provided:   - Continue HEP.   - Options similar to exercises performed to decrease discomfort and promote spinal mobility.    Written Home Exercises Provided: Not today.  Exercises were reviewed and Gino able to demonstrate them prior to the end of the session.  Jesse demonstrated good  understanding of the education provided.     See EMR under Patient Instructions for exercises provided 1/15/2020     Assessment     Mild pain with sidelying positioning; no pain for remainder of session or at session's end. Unable to produce cavitation with thrust. Progressed resistance exercises incorporating functional rotation.     Jesse is progressing well towards his goals.   Pt prognosis is Guarded.   Pt will continue to benefit from skilled outpatient physical therapy to address the deficits listed in the problem list box on initial evaluation, provide pt/family education and to maximize pt's level of independence in the home and community environment.     Pt's spiritual, cultural and educational needs considered and pt agreeable to plan of care and goals.  Anticipated barriers to physical therapy: Coping style; X-Ray findings; history of lumbar surgery     Short Term Goals (4 Weeks):  1. Pt will be compliant with HEP to supplement PT in decreasing pain with functional mobility. PROGRESSING, NOT MET 2/21/2020 "   2. Pt will perform pallof press + rotation with good control to demonstrate improved core strength for return to twisting motions without restriction. PROGRESSING, NOT MET 2/21/2020   3. Pt will improve lumbar AROM to </=minimal loss in all planes to improve functional mobility. PROGRESSING, NOT MET 2/21/2020   4. Pt will perform bed mobility and transfers without pain to promote QOL. PROGRESSING, NOT MET 2/21/2020   5. Pt will report centralization of pain to thoracic spine for >/=3 days to promote QOL. PROGRESSING, NOT MET 2/21/2020   Long Term Goals (8 Weeks):   1. Pt will improve FOTO score to </= 42% limited to decrease perceived limitation with maintaining/changing body position. PROGRESSING, NOT MET 2/21/2020   2. Pt will perform chops and lifts with good control to demonstrate improved core strength for return to golf. PROGRESSING, NOT MET 2/21/2020   3. Pt will play 9 holes of golf without pain to promote recreation and leisure. PROGRESSING, NOT MET 2/21/2020   4. Pt will perform lumbar AROM without pain to improve functional mobility. PROGRESSING, NOT MET 2/21/2020     Plan     Monitor pain. Add weighted squats and lifts next.     Kayla Bazan, PT, DPT

## 2020-02-26 ENCOUNTER — TELEPHONE (OUTPATIENT)
Dept: REHABILITATION | Facility: HOSPITAL | Age: 45
End: 2020-02-26

## 2020-02-26 DIAGNOSIS — G89.29 CHRONIC BILATERAL THORACIC BACK PAIN: ICD-10-CM

## 2020-02-26 DIAGNOSIS — Z78.9 IMPAIRED MOBILITY AND ACTIVITIES OF DAILY LIVING: ICD-10-CM

## 2020-02-26 DIAGNOSIS — M54.10 RADICULAR PAIN: ICD-10-CM

## 2020-02-26 DIAGNOSIS — Z74.09 IMPAIRED MOBILITY AND ACTIVITIES OF DAILY LIVING: ICD-10-CM

## 2020-02-26 DIAGNOSIS — M54.6 CHRONIC BILATERAL THORACIC BACK PAIN: ICD-10-CM

## 2020-03-12 ENCOUNTER — PATIENT MESSAGE (OUTPATIENT)
Dept: INTERNAL MEDICINE | Facility: CLINIC | Age: 45
End: 2020-03-12

## 2020-03-16 ENCOUNTER — DOCUMENTATION ONLY (OUTPATIENT)
Dept: REHABILITATION | Facility: HOSPITAL | Age: 45
End: 2020-03-16

## 2020-03-16 DIAGNOSIS — Z78.9 IMPAIRED MOBILITY AND ACTIVITIES OF DAILY LIVING: ICD-10-CM

## 2020-03-16 DIAGNOSIS — M54.10 RADICULAR PAIN: ICD-10-CM

## 2020-03-16 DIAGNOSIS — G89.29 CHRONIC BILATERAL THORACIC BACK PAIN: ICD-10-CM

## 2020-03-16 DIAGNOSIS — M54.6 CHRONIC BILATERAL THORACIC BACK PAIN: ICD-10-CM

## 2020-03-16 DIAGNOSIS — Z74.09 IMPAIRED MOBILITY AND ACTIVITIES OF DAILY LIVING: ICD-10-CM

## 2020-03-16 NOTE — PROGRESS NOTES
Pt was evaluated on 1/15/2020 and was seen 6 times for PT. Pt has not attended PT since 2020; cancelled remaining appointments with reason as 'condition improved'. Pt was given HEP at evaluation. Current status is unknown. Plan of care  3/13/2020. Pt to be d/c'd at this time.

## 2020-03-17 ENCOUNTER — PATIENT OUTREACH (OUTPATIENT)
Dept: ADMINISTRATIVE | Facility: OTHER | Age: 45
End: 2020-03-17

## 2020-03-23 ENCOUNTER — PATIENT OUTREACH (OUTPATIENT)
Dept: ADMINISTRATIVE | Facility: OTHER | Age: 45
End: 2020-03-23

## 2020-03-23 ENCOUNTER — TELEPHONE (OUTPATIENT)
Dept: PAIN MEDICINE | Facility: CLINIC | Age: 45
End: 2020-03-23

## 2020-03-23 NOTE — TELEPHONE ENCOUNTER
Called patient and left detailed message to call us back regarding his appointment tomorrow 3/24/20. I informed patient that he can call us back and we can either reschedule to a later date or we can schedule him for a virtual visit.

## 2020-04-16 ENCOUNTER — PATIENT MESSAGE (OUTPATIENT)
Dept: INTERNAL MEDICINE | Facility: CLINIC | Age: 45
End: 2020-04-16

## 2020-05-18 DIAGNOSIS — I10 HYPERTENSION, UNSPECIFIED TYPE: ICD-10-CM

## 2020-05-18 RX ORDER — METOPROLOL SUCCINATE 100 MG/1
TABLET, EXTENDED RELEASE ORAL
Qty: 90 TABLET | Refills: 0 | Status: SHIPPED | OUTPATIENT
Start: 2020-05-18 | End: 2020-10-21

## 2020-09-17 ENCOUNTER — PATIENT MESSAGE (OUTPATIENT)
Dept: PSYCHIATRY | Facility: CLINIC | Age: 45
End: 2020-09-17

## 2020-09-17 DIAGNOSIS — F33.0 MILD EPISODE OF RECURRENT MAJOR DEPRESSIVE DISORDER: ICD-10-CM

## 2020-09-17 DIAGNOSIS — F41.1 GAD (GENERALIZED ANXIETY DISORDER): ICD-10-CM

## 2020-09-17 RX ORDER — LAMOTRIGINE 150 MG/1
300 TABLET ORAL DAILY
Qty: 60 TABLET | Refills: 1 | Status: SHIPPED | OUTPATIENT
Start: 2020-09-17 | End: 2020-10-17

## 2020-09-17 RX ORDER — LAMOTRIGINE 150 MG/1
300 TABLET ORAL DAILY
Qty: 60 TABLET | Refills: 0 | Status: SHIPPED | OUTPATIENT
Start: 2020-09-17 | End: 2020-09-17 | Stop reason: SDUPTHER

## 2020-10-21 ENCOUNTER — PATIENT MESSAGE (OUTPATIENT)
Dept: INTERNAL MEDICINE | Facility: CLINIC | Age: 45
End: 2020-10-21

## 2020-10-21 ENCOUNTER — PATIENT MESSAGE (OUTPATIENT)
Dept: PSYCHIATRY | Facility: CLINIC | Age: 45
End: 2020-10-21

## 2020-10-22 ENCOUNTER — OFFICE VISIT (OUTPATIENT)
Dept: PSYCHIATRY | Facility: CLINIC | Age: 45
End: 2020-10-22
Payer: COMMERCIAL

## 2020-10-22 DIAGNOSIS — F41.1 GAD (GENERALIZED ANXIETY DISORDER): Primary | ICD-10-CM

## 2020-10-22 DIAGNOSIS — F33.40 RECURRENT MAJOR DEPRESSIVE DISORDER, IN REMISSION: ICD-10-CM

## 2020-10-22 PROCEDURE — 99213 PR OFFICE/OUTPT VISIT, EST, LEVL III, 20-29 MIN: ICD-10-PCS | Mod: 95,,, | Performed by: NURSE PRACTITIONER

## 2020-10-22 PROCEDURE — 99213 OFFICE O/P EST LOW 20 MIN: CPT | Mod: 95,,, | Performed by: NURSE PRACTITIONER

## 2020-10-22 RX ORDER — LAMOTRIGINE 150 MG/1
TABLET ORAL
Qty: 60 TABLET | Refills: 3 | Status: SHIPPED | OUTPATIENT
Start: 2020-10-22 | End: 2021-03-23 | Stop reason: SDUPTHER

## 2020-10-22 RX ORDER — DULOXETIN HYDROCHLORIDE 60 MG/1
CAPSULE, DELAYED RELEASE ORAL
Qty: 30 CAPSULE | Refills: 3 | Status: SHIPPED | OUTPATIENT
Start: 2020-10-22 | End: 2021-03-23 | Stop reason: SDUPTHER

## 2020-10-22 NOTE — PROGRESS NOTES
"Outpatient Psychiatry Follow-Up Visit (MD/NP)    10/22/2020   The patient location is: Car in [place] on business trip  The chief complaint leading to consultation is: Depression and anxiety    Visit type: audiovisual    Face to Face time with patient: 12 minutes  20 minutes of total time spent on the encounter, which includes face to face time and non-face to face time preparing to see the patient (eg, review of tests), Obtaining and/or reviewing separately obtained history, Documenting clinical information in the electronic or other health record, Independently interpreting results (not separately reported) and communicating results to the patient/family/caregiver, or Care coordination (not separately reported).         Each patient to whom he or she provides medical services by telemedicine is:  (1) informed of the relationship between the physician and patient and the respective role of any other health care provider with respect to management of the patient; and (2) notified that he or she may decline to receive medical services by telemedicine and may withdraw from such care at any time.    Notes:       Clinical Status of Patient:  Outpatient (Ambulatory)    Chief Complaint:  Jesse Hernández is a 45 y.o. male who presents today for follow-up of depression and anxiety.  Met with patient.      Interval History and Content of Current Session:  Interim Events/Subjective Report/Content of Current Session:   She described mood as "good. 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 I am work [employer] and living in [place] during the week. I come home to [place] on the weekends. My sons had covid but had mild symptoms and are okay" and his affect was euthymic. He reported improved depression and anxiety with his current medication regimen of duloxetine and lamical. He denied SI/HI/AH/VH and no delusion noted or reported. Patient denied symptoms of debby or hypomania. He reported occasional alcohol use and denied any " type of illicit drug use. He reported adequate sleep and appetite. He reported medication compliance and denied any side effects to his current medication of lamictal and duloxetine.     Psychotherapy:  · Target symptoms: depression, anxiety   · Why chosen therapy is appropriate versus another modality: relevant to diagnosis, patient responds to this modality, evidence based practice  · Outcome monitoring methods: self-report, observation  · Therapeutic intervention type: supportive psychotherapy  · Topics discussed/themes: building skills sets for symptom management, symptom recognition  · The patient's response to the intervention is motivated. The patient's progress toward treatment goals is fair.   · Duration of intervention: 5 minutes.    Review of Systems   · PSYCHIATRIC: Pertinant items are noted in the narrative.  · CONSTITUTIONAL: No weight gain or loss.   · MUSCULOSKELETAL: No pain or stiffness of the joints.  · NEUROLOGIC: No weakness, sensory changes, seizures, confusion, memory loss, tremor or other abnormal movements.  · ENDOCRINE: No polydipsia or polyuria.  · INTEGUMENTARY: No rashes or lacerations.  · EYES: No exophthalmos, jaundice or blindness.  · ENT: No dizziness, tinnitus or hearing loss.  · RESPIRATORY: No shortness of breath.  · CARDIOVASCULAR: No tachycardia or chest pain.  · GASTROINTESTINAL: No nausea, vomiting, pain, constipation or diarrhea.  · GENITOURINARY: No frequency, dysuria or sexual dysfunction.  · HEMATOLOGIC/LYMPHATIC: No excessive bleeding, prolonged or excessive bleeding after dental extraction/injury.  · ALLERGIC/IMMUNOLOGIC: No allergic response to materials, foods or animals at this time.    Past Medical, Family and Social History: The patient's past medical, family and social history have been reviewed and updated as appropriate within the electronic medical record - see encounter notes.    Compliance: yes    Side effects: None    Risk Parameters:  Patient reports no  "suicidal ideation  Patient reports no homicidal ideation  Patient reports no self-injurious behavior  Patient reports no violent behavior    Exam (detailed: at least 9 elements; comprehensive: all 15 elements)   Constitutional  Vitals:  Most recent vital signs, dated greater than 90 days prior to this appointment, were reviewed.   There were no vitals filed for this visit.     General:  unremarkable, age appropriate     Musculoskeletal  Muscle Strength/Tone:  no dystonia, no tremor, no tic   Gait & Station:  non-ataxic     Psychiatric  Speech:  no latency; no press   Mood & Affect:  "good"  euthymic   Thought Process:  normal and logical   Associations:  intact   Thought Content:  normal, no suicidality, no homicidality, delusions, or paranoia   Insight:  intact   Judgement: behavior is adequate to circumstances   Orientation:  grossly intact   Memory: intact for content of interview   Language: grossly intact   Attention Span & Concentration:  able to focus   Fund of Knowledge:  intact and appropriate to age and level of education     Assessment and Diagnosis   Status/Progress: Based on the examination today, the patient's problem(s) is/are improved.  New problems have not been presented today.   Co-morbidities, Diagnostic uncertainty and Lack of compliance are not complicating management of the primary condition.  There are no active rule-out diagnoses for this patient at this time.     General Impression: Patient's depressive symptoms are well controlled with his medication regimen and his anxiety symptoms are improving. Patient is medication compliant and will continue the same medications regimen.       ICD-10-CM ICD-9-CM   1. ALISON (generalized anxiety disorder)  F41.1 300.02   2. Recurrent major depressive disorder, in remission  F33.40 296.35       Intervention/Counseling/Treatment Plan   · Medication Management: Continue current medications. The risks and benefits of medication were discussed with the " patient.  · Labs, Diagnostic Studies: reviewed all recent labs are reviewed   · Continue duloxetine 60mg po daily for MDD and ALISON  · Continue Lamictal 300mg po daily (take 2 tabs of 150 mg in the morning) for mood stabilization  -Discussed informed consent, diagnosis, risks and benefits of proposed treatment above vs alternative treatments vs no treatment, and potential side effects of these treatments. The patient expresses understanding of the above and displays the capacity to agree with this treatment given said understanding. Patient also agrees that, currently, the benefits outweigh the risks and would like to pursue treatment at this time. Answered all questions and discussed follow up. Encouraged patient to contact us with any questions or concerns.   -Encouraged Patient to keep future appointments.  -Take medications as prescribed and abstain from substance abuse.  -Pt to present to ED for thoughts to harm herself or others          Return to Clinic: 3 months or earlier as needed      TRAY Ruelas-BC

## 2020-10-22 NOTE — PATIENT INSTRUCTIONS
Duloxetine delayed-release capsules  What is this medicine?  DULOXETINE (doo LOX e teen) is used to treat depression, anxiety, and different types of chronic pain.  How should I use this medicine?  Take this medicine by mouth with a glass of water. Follow the directions on the prescription label. Do not cut, crush or chew this medicine. You can take this medicine with or without food. Take your medicine at regular intervals. Do not take your medicine more often than directed. Do not stop taking this medicine suddenly except upon the advice of your doctor. Stopping this medicine too quickly may cause serious side effects or your condition may worsen.  A special MedGuide will be given to you by the pharmacist with each prescription and refill. Be sure to read this information carefully each time.  Talk to your pediatrician regarding the use of this medicine in children. While this drug may be prescribed for children as young as 7 years of age for selected conditions, precautions do apply.  What side effects may I notice from receiving this medicine?  Side effects that you should report to your doctor or health care professional as soon as possible:  · allergic reactions like skin rash, itching or hives, swelling of the face, lips, or tongue  · changes in blood pressure  · confusion  · dark urine  · dizziness  · fast talking and excited feelings or actions that are out of control  · fast, irregular heartbeat  · fever  · general ill feeling or flu-like symptoms  · hallucination, loss of contact with reality  · light-colored stools  · loss of balance or coordination  · redness, blistering, peeling or loosening of the skin, including inside the mouth  · right upper belly pain  · seizures  · suicidal thoughts or other mood changes  · trouble concentrating  · trouble passing urine or change in the amount of urine  · unusual bleeding or bruising  · unusually weak or tired  · yellowing of the eyes or skin  Side effects that  usually do not require medical attention (report to your doctor or health care professional if they continue or are bothersome):  · blurred vision  · change in appetite  · change in sex drive or performance  · headache  · increased sweating  · nausea  What may interact with this medicine?  Do not take this medicine with any of the following medications:  · certain diet drugs like dexfenfluramine, fenfluramine  · desvenlafaxine  · linezolid  · MAOIs like Azilect, Carbex, Eldepryl, Marplan, Nardil, and Parnate  · methylene blue (intravenous)  · milnacipran  · thioridazine  · venlafaxine  This medicine may also interact with the following medications:  · alcohol  · aspirin and aspirin-like medicines  · certain antibiotics like ciprofloxacin and enoxacin  · certain medicines for blood pressure, heart disease, irregular heart beat  · certain medicines for depression, anxiety, or psychotic disturbances  · certain medicines for migraine headache like almotriptan, eletriptan, frovatriptan, naratriptan, rizatriptan, sumatriptan, zolmitriptan  · certain medicines that treat or prevent blood clots like warfarin, enoxaparin, and dalteparin  · cimetidine  · fentanyl  · lithium  · NSAIDS, medicines for pain and inflammation, like ibuprofen or naproxen  · phentermine  · procarbazine  · sibutramine  · Errol's wort  · theophylline  · tramadol  · tryptophan  What if I miss a dose?  If you miss a dose, take it as soon as you can. If it is almost time for your next dose, take only that dose. Do not take double or extra doses.  Where should I keep my medicine?  Keep out of the reach of children.  Store at room temperature between 20 and 25 degrees C (68 to 77 degrees F). Throw away any unused medicine after the expiration date.  What should I tell my health care provider before I take this medicine?  They need to know if you have any of these conditions:  · bipolar disorder or a family history of bipolar  disorder  · glaucoma  · kidney disease  · liver disease  · suicidal thoughts or a previous suicide attempt  · taken medicines called MAOIs like Carbex, Eldepryl, Marplan, Nardil, and Parnate within 14 days  · an unusual reaction to duloxetine, other medicines, foods, dyes, or preservatives  · pregnant or trying to get pregnant  · breast-feeding  What should I watch for while using this medicine?  Tell your doctor if your symptoms do not get better or if they get worse. Visit your doctor or health care professional for regular checks on your progress. Because it may take several weeks to see the full effects of this medicine, it is important to continue your treatment as prescribed by your doctor.  Patients and their families should watch out for new or worsening thoughts of suicide or depression. Also watch out for sudden changes in feelings such as feeling anxious, agitated, panicky, irritable, hostile, aggressive, impulsive, severely restless, overly excited and hyperactive, or not being able to sleep. If this happens, especially at the beginning of treatment or after a change in dose, call your health care professional.  You may get drowsy or dizzy. Do not drive, use machinery, or do anything that needs mental alertness until you know how this medicine affects you. Do not stand or sit up quickly, especially if you are an older patient. This reduces the risk of dizzy or fainting spells. Alcohol may interfere with the effect of this medicine. Avoid alcoholic drinks.  This medicine can cause an increase in blood pressure. This medicine can also cause a sudden drop in your blood pressure, which may make you feel faint and increase the chance of a fall. These effects are most common when you first start the medicine or when the dose is increased, or during use of other medicines that can cause a sudden drop in blood pressure. Check with your doctor for instructions on monitoring your blood pressure while taking this  medicine.  Your mouth may get dry. Chewing sugarless gum or sucking hard candy, and drinking plenty of water may help. Contact your doctor if the problem does not go away or is severe.  NOTE:This sheet is a summary. It may not cover all possible information. If you have questions about this medicine, talk to your doctor, pharmacist, or health care provider. Copyright© 2017 [employer]        Lamotrigine tablets  What is this medicine?  LAMOTRIGINE (la KATT tri jeen) is used to control seizures in adults and children with epilepsy and Lennox-Gastaut syndrome. It is also used in adults to treat bipolar disorder.  How should I use this medicine?  Take this medicine by mouth with a glass of water. Follow the directions on the prescription label. Do not chew these tablets. If this medicine upsets your stomach, take it with food or milk. Take your doses at regular intervals. Do not take your medicine more often than directed.  A special MedGuide will be given to you by the pharmacist with each new prescription and refill. Be sure to read this information carefully each time.  Talk to your pediatrician regarding the use of this medicine in children. While this drug may be prescribed for children as young as 2 years for selected conditions, precautions do apply.  What side effects may I notice from receiving this medicine?  Side effects you should report to your doctor or health care professional as soon as possible:  · allergic reactions like skin rash, itching or hives, swelling of the face, lips, or tongue  · blurred or double vision  · difficulty walking or controlling muscle movements  · fever  · headache, stiff neck, and sensitivity to light  · painful sores in the mouth, eyes, or nose  · redness, blistering, peeling or loosening of the skin, including inside the mouth  · severe muscle pain  · swollen lymph glands  · uncontrollable eye movements  · unusual bruising or bleeding  · unusually weak or  tired  · vomiting  · worsening of mood, thoughts or actions of suicide or dying  · yellowing of the eyes or skin  Side effects that usually do not require medical attention (report to your doctor or health care professional if they continue or are bothersome):  · diarrhea or constipation  · difficulty sleeping  · nausea  · tremors  What may interact with this medicine?  · carbamazepine  · female hormones, including contraceptive or birth control pills  · methotrexate  · phenobarbital  · phenytoin  · primidone  · pyrimethamine  · rifampin  · trimethoprim  · valproic acid  What if I miss a dose?  If you miss a dose, take it as soon as you can. If it is almost time for your next dose, take only that dose. Do not take double or extra doses.  Where should I keep my medicine?  Keep out of reach of children.  Store at room temperature between 15 and 30 degrees C (59 and 86 degrees F). Throw away any unused medicine after the expiration date.  What should I tell my health care provider before I take this medicine?  They need to know if you have any of these conditions:  · a history of depression or bipolar disorder  · aseptic meningitis during prior use of lamotrigine  · folate deficiency  · kidney disease  · liver disease  · suicidal thoughts, plans, or attempt; a previous suicide attempt by you or a family member  · an unusual or allergic reaction to lamotrigine or other seizure medications, other medicines, foods, dyes, or preservatives  · pregnant or trying to get pregnant  · breast-feeding  What should I watch for while using this medicine?  Visit your doctor or health care professional for regular checks on your progress. If you take this medicine for seizures, wear a Medic Alert bracelet or necklace. Carry an identification card with information about your condition, medicines, and doctor or health care professional.  It is important to take this medicine exactly as directed. When first starting treatment, your dose  will need to be adjusted slowly. It may take weeks or months before your dose is stable. You should contact your doctor or health care professional if your seizures get worse or if you have any new types of seizures. Do not stop taking this medicine unless instructed by your doctor or health care professional. Stopping your medicine suddenly can increase your seizures or their severity.  Contact your doctor or health care professional right away if you develop a rash while taking this medicine. Rashes may be very severe and sometimes require treatment in the hospital. Deaths from rashes have occurred. Serious rashes occur more often in children than adults taking this medicine. It is more common for these serious rashes to occur during the first 2 months of treatment, but a rash can occur at any time.  You may get drowsy, dizzy, or have blurred vision. Do not drive, use machinery, or do anything that needs mental alertness until you know how this medicine affects you. To reduce dizzy or fainting spells, do not sit or stand up quickly, especially if you are an older patient. Alcohol can increase drowsiness and dizziness. Avoid alcoholic drinks.  If you are taking this medicine for bipolar disorder, it is important to report any changes in your mood to your doctor or health care professional. If your condition gets worse, you get mentally depressed, feel very hyperactive or manic, have difficulty sleeping, or have thoughts of hurting yourself or committing suicide, you need to get help from your health care professional right away. If you are a caregiver for someone taking this medicine for bipolar disorder, you should also report these behavioral changes right away. The use of this medicine may increase the chance of suicidal thoughts or actions. Pay special attention to how you are responding while on this medicine.  Your mouth may get dry. Chewing sugarless gum or sucking hard candy, and drinking plenty of water  may help. Contact your doctor if the problem does not go away or is severe.  Women who become pregnant while using this medicine may enroll in the [place] Antiepileptic Drug Pregnancy Registry by calling 1-737.656.9230. This registry collects information about the safety of antiepileptic drug use during pregnancy.  NOTE:This sheet is a summary. It may not cover all possible information. If you have questions about this medicine, talk to your doctor, pharmacist, or health care provider. Copyright© 2017 [employer]

## 2020-11-13 RX ORDER — MELOXICAM 15 MG/1
TABLET ORAL
COMMUNITY
Start: 2020-11-10 | End: 2021-03-24

## 2020-11-13 RX ORDER — TAMSULOSIN HYDROCHLORIDE 0.4 MG/1
CAPSULE ORAL
COMMUNITY
Start: 2020-11-10 | End: 2021-01-22

## 2020-11-13 RX ORDER — CYCLOBENZAPRINE HCL 10 MG
TABLET ORAL
COMMUNITY
Start: 2020-11-10 | End: 2021-03-24

## 2020-12-16 ENCOUNTER — PATIENT MESSAGE (OUTPATIENT)
Dept: INTERNAL MEDICINE | Facility: CLINIC | Age: 45
End: 2020-12-16

## 2020-12-18 DIAGNOSIS — Z00.00 PREVENTATIVE HEALTH CARE: Primary | ICD-10-CM

## 2020-12-29 ENCOUNTER — LAB VISIT (OUTPATIENT)
Dept: LAB | Facility: HOSPITAL | Age: 45
End: 2020-12-29
Attending: INTERNAL MEDICINE
Payer: COMMERCIAL

## 2020-12-29 DIAGNOSIS — Z00.00 PREVENTATIVE HEALTH CARE: ICD-10-CM

## 2020-12-29 LAB
ALBUMIN SERPL BCP-MCNC: 3.9 G/DL (ref 3.5–5.2)
ALP SERPL-CCNC: 68 U/L (ref 55–135)
ALT SERPL W/O P-5'-P-CCNC: 30 U/L (ref 10–44)
ANION GAP SERPL CALC-SCNC: 12 MMOL/L (ref 8–16)
AST SERPL-CCNC: 21 U/L (ref 10–40)
BASOPHILS # BLD AUTO: 0.09 K/UL (ref 0–0.2)
BASOPHILS NFR BLD: 1.1 % (ref 0–1.9)
BILIRUB SERPL-MCNC: 0.3 MG/DL (ref 0.1–1)
BUN SERPL-MCNC: 22 MG/DL (ref 6–20)
CALCIUM SERPL-MCNC: 9.4 MG/DL (ref 8.7–10.5)
CHLORIDE SERPL-SCNC: 103 MMOL/L (ref 95–110)
CHOLEST SERPL-MCNC: 368 MG/DL (ref 120–199)
CHOLEST/HDLC SERPL: 9.7 {RATIO} (ref 2–5)
CO2 SERPL-SCNC: 22 MMOL/L (ref 23–29)
COMPLEXED PSA SERPL-MCNC: 0.41 NG/ML (ref 0–4)
CREAT SERPL-MCNC: 1.1 MG/DL (ref 0.5–1.4)
DIFFERENTIAL METHOD: ABNORMAL
EOSINOPHIL # BLD AUTO: 0.1 K/UL (ref 0–0.5)
EOSINOPHIL NFR BLD: 1.6 % (ref 0–8)
ERYTHROCYTE [DISTWIDTH] IN BLOOD BY AUTOMATED COUNT: 13.8 % (ref 11.5–14.5)
EST. GFR  (AFRICAN AMERICAN): >60 ML/MIN/1.73 M^2
EST. GFR  (NON AFRICAN AMERICAN): >60 ML/MIN/1.73 M^2
GLUCOSE SERPL-MCNC: 99 MG/DL (ref 70–110)
HCT VFR BLD AUTO: 51.4 % (ref 40–54)
HDLC SERPL-MCNC: 38 MG/DL (ref 40–75)
HDLC SERPL: 10.3 % (ref 20–50)
HGB BLD-MCNC: 16.7 G/DL (ref 14–18)
IMM GRANULOCYTES # BLD AUTO: 0.07 K/UL (ref 0–0.04)
IMM GRANULOCYTES NFR BLD AUTO: 0.8 % (ref 0–0.5)
LDLC SERPL CALC-MCNC: ABNORMAL MG/DL (ref 63–159)
LYMPHOCYTES # BLD AUTO: 2.9 K/UL (ref 1–4.8)
LYMPHOCYTES NFR BLD: 34 % (ref 18–48)
MCH RBC QN AUTO: 29.6 PG (ref 27–31)
MCHC RBC AUTO-ENTMCNC: 32.5 G/DL (ref 32–36)
MCV RBC AUTO: 91 FL (ref 82–98)
MONOCYTES # BLD AUTO: 0.5 K/UL (ref 0.3–1)
MONOCYTES NFR BLD: 6.2 % (ref 4–15)
NEUTROPHILS # BLD AUTO: 4.8 K/UL (ref 1.8–7.7)
NEUTROPHILS NFR BLD: 56.3 % (ref 38–73)
NONHDLC SERPL-MCNC: 330 MG/DL
NRBC BLD-RTO: 0 /100 WBC
PLATELET # BLD AUTO: 274 K/UL (ref 150–350)
PMV BLD AUTO: 11 FL (ref 9.2–12.9)
POTASSIUM SERPL-SCNC: 3.9 MMOL/L (ref 3.5–5.1)
PROT SERPL-MCNC: 7.2 G/DL (ref 6–8.4)
RBC # BLD AUTO: 5.65 M/UL (ref 4.6–6.2)
SODIUM SERPL-SCNC: 137 MMOL/L (ref 136–145)
T4 FREE SERPL-MCNC: 0.82 NG/DL (ref 0.71–1.51)
TRIGL SERPL-MCNC: 1347 MG/DL (ref 30–150)
TSH SERPL DL<=0.005 MIU/L-ACNC: 7.52 UIU/ML (ref 0.4–4)
WBC # BLD AUTO: 8.55 K/UL (ref 3.9–12.7)

## 2020-12-29 PROCEDURE — 80061 LIPID PANEL: CPT

## 2020-12-29 PROCEDURE — 85025 COMPLETE CBC W/AUTO DIFF WBC: CPT

## 2020-12-29 PROCEDURE — 84439 ASSAY OF FREE THYROXINE: CPT

## 2020-12-29 PROCEDURE — 84153 ASSAY OF PSA TOTAL: CPT

## 2020-12-29 PROCEDURE — 84443 ASSAY THYROID STIM HORMONE: CPT

## 2020-12-29 PROCEDURE — 36415 COLL VENOUS BLD VENIPUNCTURE: CPT | Mod: PO

## 2020-12-29 PROCEDURE — 80053 COMPREHEN METABOLIC PANEL: CPT

## 2021-01-02 ENCOUNTER — HOSPITAL ENCOUNTER (EMERGENCY)
Facility: HOSPITAL | Age: 46
Discharge: HOME | End: 2021-01-02
Attending: EMERGENCY MEDICINE | Admitting: FAMILY MEDICINE
Payer: COMMERCIAL

## 2021-01-02 VITALS
SYSTOLIC BLOOD PRESSURE: 159 MMHG | BODY MASS INDEX: 32.93 KG/M2 | DIASTOLIC BLOOD PRESSURE: 89 MMHG | TEMPERATURE: 98 F | OXYGEN SATURATION: 99 % | WEIGHT: 230 LBS | RESPIRATION RATE: 16 BRPM | HEART RATE: 69 BPM | HEIGHT: 70 IN

## 2021-01-02 DIAGNOSIS — Z20.822 CLOSE EXPOSURE TO COVID-19 VIRUS: ICD-10-CM

## 2021-01-02 DIAGNOSIS — R06.02 SOB (SHORTNESS OF BREATH): ICD-10-CM

## 2021-01-02 DIAGNOSIS — U07.1 COVID-19: Primary | ICD-10-CM

## 2021-01-02 DIAGNOSIS — J02.9 VIRAL PHARYNGITIS: ICD-10-CM

## 2021-01-02 LAB
CTP QC/QA: YES
GROUP A STREP, MOLECULAR: NEGATIVE
INFLUENZA A, MOLECULAR: NEGATIVE
INFLUENZA B, MOLECULAR: NEGATIVE
SARS-COV-2 RDRP RESP QL NAA+PROBE: NEGATIVE
SPECIMEN SOURCE: NORMAL

## 2021-01-02 PROCEDURE — 87502 INFLUENZA DNA AMP PROBE: CPT

## 2021-01-02 PROCEDURE — 99282 EMERGENCY DEPT VISIT SF MDM: CPT

## 2021-01-02 PROCEDURE — U0002 COVID-19 LAB TEST NON-CDC: HCPCS | Performed by: EMERGENCY MEDICINE

## 2021-01-02 PROCEDURE — 87651 STREP A DNA AMP PROBE: CPT

## 2021-01-02 RX ORDER — GLUCAGON 1 MG
1 KIT INJECTION
Status: DISCONTINUED | OUTPATIENT
Start: 2021-01-02 | End: 2021-01-02

## 2021-01-02 RX ORDER — SODIUM CHLORIDE 0.9 % (FLUSH) 0.9 %
10 SYRINGE (ML) INJECTION
Status: DISCONTINUED | OUTPATIENT
Start: 2021-01-02 | End: 2021-01-02

## 2021-01-02 RX ORDER — IBUPROFEN 200 MG
24 TABLET ORAL
Status: DISCONTINUED | OUTPATIENT
Start: 2021-01-02 | End: 2021-01-02

## 2021-01-02 RX ORDER — IBUPROFEN 200 MG
16 TABLET ORAL
Status: DISCONTINUED | OUTPATIENT
Start: 2021-01-02 | End: 2021-01-02

## 2021-01-19 ENCOUNTER — PATIENT MESSAGE (OUTPATIENT)
Dept: INTERNAL MEDICINE | Facility: CLINIC | Age: 46
End: 2021-01-19

## 2021-01-22 ENCOUNTER — OFFICE VISIT (OUTPATIENT)
Dept: INTERNAL MEDICINE | Facility: CLINIC | Age: 46
End: 2021-01-22
Payer: COMMERCIAL

## 2021-01-22 VITALS
DIASTOLIC BLOOD PRESSURE: 74 MMHG | WEIGHT: 233.69 LBS | OXYGEN SATURATION: 99 % | BODY MASS INDEX: 33.46 KG/M2 | HEART RATE: 56 BPM | TEMPERATURE: 97 F | SYSTOLIC BLOOD PRESSURE: 118 MMHG | HEIGHT: 70 IN

## 2021-01-22 DIAGNOSIS — I10 ESSENTIAL HYPERTENSION: ICD-10-CM

## 2021-01-22 DIAGNOSIS — Z00.00 PREVENTATIVE HEALTH CARE: Primary | ICD-10-CM

## 2021-01-22 DIAGNOSIS — E78.1 HYPERTRIGLYCERIDEMIA: ICD-10-CM

## 2021-01-22 DIAGNOSIS — G47.33 OSA (OBSTRUCTIVE SLEEP APNEA): ICD-10-CM

## 2021-01-22 DIAGNOSIS — I10 HYPERTENSION, UNSPECIFIED TYPE: ICD-10-CM

## 2021-01-22 DIAGNOSIS — E03.9 HYPOTHYROIDISM, UNSPECIFIED TYPE: ICD-10-CM

## 2021-01-22 PROBLEM — U07.1 COVID-19: Status: RESOLVED | Noted: 2021-01-02 | Resolved: 2021-01-22

## 2021-01-22 PROCEDURE — 99999 PR PBB SHADOW E&M-EST. PATIENT-LVL III: CPT | Mod: PBBFAC,,, | Performed by: INTERNAL MEDICINE

## 2021-01-22 PROCEDURE — 99396 PR PREVENTIVE VISIT,EST,40-64: ICD-10-PCS | Mod: S$GLB,,, | Performed by: INTERNAL MEDICINE

## 2021-01-22 PROCEDURE — 1126F AMNT PAIN NOTED NONE PRSNT: CPT | Mod: S$GLB,,, | Performed by: INTERNAL MEDICINE

## 2021-01-22 PROCEDURE — 3078F DIAST BP <80 MM HG: CPT | Mod: CPTII,S$GLB,, | Performed by: INTERNAL MEDICINE

## 2021-01-22 PROCEDURE — 3078F PR MOST RECENT DIASTOLIC BLOOD PRESSURE < 80 MM HG: ICD-10-PCS | Mod: CPTII,S$GLB,, | Performed by: INTERNAL MEDICINE

## 2021-01-22 PROCEDURE — 3074F PR MOST RECENT SYSTOLIC BLOOD PRESSURE < 130 MM HG: ICD-10-PCS | Mod: CPTII,S$GLB,, | Performed by: INTERNAL MEDICINE

## 2021-01-22 PROCEDURE — 3008F PR BODY MASS INDEX (BMI) DOCUMENTED: ICD-10-PCS | Mod: CPTII,S$GLB,, | Performed by: INTERNAL MEDICINE

## 2021-01-22 PROCEDURE — 3008F BODY MASS INDEX DOCD: CPT | Mod: CPTII,S$GLB,, | Performed by: INTERNAL MEDICINE

## 2021-01-22 PROCEDURE — 99212 PR OFFICE/OUTPT VISIT, EST, LEVL II, 10-19 MIN: ICD-10-PCS | Mod: 25,S$GLB,, | Performed by: INTERNAL MEDICINE

## 2021-01-22 PROCEDURE — 99212 OFFICE O/P EST SF 10 MIN: CPT | Mod: 25,S$GLB,, | Performed by: INTERNAL MEDICINE

## 2021-01-22 PROCEDURE — 99999 PR PBB SHADOW E&M-EST. PATIENT-LVL III: ICD-10-PCS | Mod: PBBFAC,,, | Performed by: INTERNAL MEDICINE

## 2021-01-22 PROCEDURE — 99396 PREV VISIT EST AGE 40-64: CPT | Mod: S$GLB,,, | Performed by: INTERNAL MEDICINE

## 2021-01-22 PROCEDURE — 1126F PR PAIN SEVERITY QUANTIFIED, NO PAIN PRESENT: ICD-10-PCS | Mod: S$GLB,,, | Performed by: INTERNAL MEDICINE

## 2021-01-22 PROCEDURE — 3074F SYST BP LT 130 MM HG: CPT | Mod: CPTII,S$GLB,, | Performed by: INTERNAL MEDICINE

## 2021-01-22 RX ORDER — FENOFIBRATE 160 MG/1
160 TABLET ORAL DAILY
Qty: 90 TABLET | Refills: 1 | Status: SHIPPED | OUTPATIENT
Start: 2021-01-22 | End: 2021-12-01 | Stop reason: SDUPTHER

## 2021-01-22 RX ORDER — METOPROLOL SUCCINATE 100 MG/1
100 TABLET, EXTENDED RELEASE ORAL DAILY
Qty: 90 TABLET | Refills: 1 | Status: SHIPPED | OUTPATIENT
Start: 2021-01-22 | End: 2021-10-25

## 2021-01-22 RX ORDER — LEVOTHYROXINE SODIUM 50 UG/1
50 TABLET ORAL
Qty: 90 TABLET | Refills: 1 | Status: SHIPPED | OUTPATIENT
Start: 2021-01-22 | End: 2021-10-19 | Stop reason: SDUPTHER

## 2021-03-16 ENCOUNTER — LAB VISIT (OUTPATIENT)
Dept: LAB | Facility: HOSPITAL | Age: 46
End: 2021-03-16
Attending: INTERNAL MEDICINE
Payer: COMMERCIAL

## 2021-03-16 DIAGNOSIS — E03.9 HYPOTHYROIDISM, UNSPECIFIED TYPE: ICD-10-CM

## 2021-03-16 DIAGNOSIS — I10 HYPERTENSION, UNSPECIFIED TYPE: ICD-10-CM

## 2021-03-16 LAB
ALBUMIN SERPL BCP-MCNC: 4.1 G/DL (ref 3.5–5.2)
ALP SERPL-CCNC: 47 U/L (ref 55–135)
ALT SERPL W/O P-5'-P-CCNC: 28 U/L (ref 10–44)
ANION GAP SERPL CALC-SCNC: 10 MMOL/L (ref 8–16)
AST SERPL-CCNC: 25 U/L (ref 10–40)
BILIRUB SERPL-MCNC: 0.6 MG/DL (ref 0.1–1)
BUN SERPL-MCNC: 14 MG/DL (ref 6–20)
CALCIUM SERPL-MCNC: 9.1 MG/DL (ref 8.7–10.5)
CHLORIDE SERPL-SCNC: 104 MMOL/L (ref 95–110)
CHOLEST SERPL-MCNC: 224 MG/DL (ref 120–199)
CHOLEST/HDLC SERPL: 5.6 {RATIO} (ref 2–5)
CO2 SERPL-SCNC: 24 MMOL/L (ref 23–29)
CREAT SERPL-MCNC: 1.2 MG/DL (ref 0.5–1.4)
EST. GFR  (AFRICAN AMERICAN): >60 ML/MIN/1.73 M^2
EST. GFR  (NON AFRICAN AMERICAN): >60 ML/MIN/1.73 M^2
GLUCOSE SERPL-MCNC: 99 MG/DL (ref 70–110)
HDLC SERPL-MCNC: 40 MG/DL (ref 40–75)
HDLC SERPL: 17.9 % (ref 20–50)
LDLC SERPL CALC-MCNC: 156.6 MG/DL (ref 63–159)
NONHDLC SERPL-MCNC: 184 MG/DL
POTASSIUM SERPL-SCNC: 4.2 MMOL/L (ref 3.5–5.1)
PROT SERPL-MCNC: 6.8 G/DL (ref 6–8.4)
SODIUM SERPL-SCNC: 138 MMOL/L (ref 136–145)
TRIGL SERPL-MCNC: 137 MG/DL (ref 30–150)
TSH SERPL DL<=0.005 MIU/L-ACNC: 2.72 UIU/ML (ref 0.4–4)

## 2021-03-16 PROCEDURE — 80061 LIPID PANEL: CPT | Performed by: INTERNAL MEDICINE

## 2021-03-16 PROCEDURE — 36415 COLL VENOUS BLD VENIPUNCTURE: CPT | Mod: PO | Performed by: INTERNAL MEDICINE

## 2021-03-16 PROCEDURE — 84443 ASSAY THYROID STIM HORMONE: CPT | Performed by: INTERNAL MEDICINE

## 2021-03-16 PROCEDURE — 80053 COMPREHEN METABOLIC PANEL: CPT | Performed by: INTERNAL MEDICINE

## 2021-03-23 ENCOUNTER — PATIENT MESSAGE (OUTPATIENT)
Dept: PSYCHIATRY | Facility: CLINIC | Age: 46
End: 2021-03-23

## 2021-03-23 DIAGNOSIS — F41.1 GAD (GENERALIZED ANXIETY DISORDER): ICD-10-CM

## 2021-03-23 RX ORDER — DULOXETIN HYDROCHLORIDE 60 MG/1
CAPSULE, DELAYED RELEASE ORAL
Qty: 30 CAPSULE | Refills: 0 | Status: SHIPPED | OUTPATIENT
Start: 2021-03-23 | End: 2021-04-20

## 2021-03-23 RX ORDER — LAMOTRIGINE 150 MG/1
TABLET ORAL
Qty: 60 TABLET | Refills: 0 | Status: SHIPPED | OUTPATIENT
Start: 2021-03-23 | End: 2021-04-20 | Stop reason: SDUPTHER

## 2021-03-24 ENCOUNTER — OFFICE VISIT (OUTPATIENT)
Dept: INTERNAL MEDICINE | Facility: CLINIC | Age: 46
End: 2021-03-24
Payer: COMMERCIAL

## 2021-03-24 VITALS
DIASTOLIC BLOOD PRESSURE: 74 MMHG | SYSTOLIC BLOOD PRESSURE: 118 MMHG | WEIGHT: 233 LBS | HEART RATE: 70 BPM | BODY MASS INDEX: 33.43 KG/M2

## 2021-03-24 DIAGNOSIS — Z00.00 PREVENTATIVE HEALTH CARE: Primary | ICD-10-CM

## 2021-03-24 DIAGNOSIS — E78.1 HYPERTRIGLYCERIDEMIA: ICD-10-CM

## 2021-03-24 DIAGNOSIS — I10 ESSENTIAL HYPERTENSION: ICD-10-CM

## 2021-03-24 DIAGNOSIS — E03.9 HYPOTHYROIDISM, UNSPECIFIED TYPE: ICD-10-CM

## 2021-03-24 PROCEDURE — 3008F BODY MASS INDEX DOCD: CPT | Mod: CPTII,,, | Performed by: INTERNAL MEDICINE

## 2021-03-24 PROCEDURE — 3078F DIAST BP <80 MM HG: CPT | Mod: CPTII,,, | Performed by: INTERNAL MEDICINE

## 2021-03-24 PROCEDURE — 99214 PR OFFICE/OUTPT VISIT, EST, LEVL IV, 30-39 MIN: ICD-10-PCS | Mod: 95,,, | Performed by: INTERNAL MEDICINE

## 2021-03-24 PROCEDURE — 3074F SYST BP LT 130 MM HG: CPT | Mod: CPTII,,, | Performed by: INTERNAL MEDICINE

## 2021-03-24 PROCEDURE — 3078F PR MOST RECENT DIASTOLIC BLOOD PRESSURE < 80 MM HG: ICD-10-PCS | Mod: CPTII,,, | Performed by: INTERNAL MEDICINE

## 2021-03-24 PROCEDURE — 3008F PR BODY MASS INDEX (BMI) DOCUMENTED: ICD-10-PCS | Mod: CPTII,,, | Performed by: INTERNAL MEDICINE

## 2021-03-24 PROCEDURE — 99214 OFFICE O/P EST MOD 30 MIN: CPT | Mod: 95,,, | Performed by: INTERNAL MEDICINE

## 2021-03-24 PROCEDURE — 3074F PR MOST RECENT SYSTOLIC BLOOD PRESSURE < 130 MM HG: ICD-10-PCS | Mod: CPTII,,, | Performed by: INTERNAL MEDICINE

## 2021-04-15 ENCOUNTER — PATIENT MESSAGE (OUTPATIENT)
Dept: RESEARCH | Facility: HOSPITAL | Age: 46
End: 2021-04-15

## 2021-04-20 ENCOUNTER — PATIENT MESSAGE (OUTPATIENT)
Dept: PSYCHIATRY | Facility: CLINIC | Age: 46
End: 2021-04-20

## 2021-04-20 DIAGNOSIS — F41.1 GAD (GENERALIZED ANXIETY DISORDER): ICD-10-CM

## 2021-04-20 RX ORDER — LAMOTRIGINE 150 MG/1
TABLET ORAL
Qty: 180 TABLET | Refills: 0 | Status: SHIPPED | OUTPATIENT
Start: 2021-04-20 | End: 2021-07-28 | Stop reason: SDUPTHER

## 2021-04-20 RX ORDER — DULOXETIN HYDROCHLORIDE 60 MG/1
CAPSULE, DELAYED RELEASE ORAL
Qty: 90 CAPSULE | Refills: 0 | Status: SHIPPED | OUTPATIENT
Start: 2021-04-20 | End: 2021-07-19 | Stop reason: SDUPTHER

## 2021-06-17 ENCOUNTER — OFFICE VISIT (OUTPATIENT)
Dept: SURGERY | Facility: CLINIC | Age: 46
End: 2021-06-17
Payer: COMMERCIAL

## 2021-06-17 VITALS
HEART RATE: 71 BPM | HEIGHT: 70 IN | BODY MASS INDEX: 34.72 KG/M2 | SYSTOLIC BLOOD PRESSURE: 135 MMHG | DIASTOLIC BLOOD PRESSURE: 79 MMHG | WEIGHT: 242.5 LBS

## 2021-06-17 DIAGNOSIS — Z01.818 PRE-OP TESTING: ICD-10-CM

## 2021-06-17 DIAGNOSIS — R10.32 LEFT GROIN PAIN: Primary | ICD-10-CM

## 2021-06-17 PROCEDURE — 99213 OFFICE O/P EST LOW 20 MIN: CPT | Mod: S$GLB,CS,, | Performed by: SURGERY

## 2021-06-17 PROCEDURE — 3008F PR BODY MASS INDEX (BMI) DOCUMENTED: ICD-10-PCS | Mod: CPTII,S$GLB,, | Performed by: SURGERY

## 2021-06-17 PROCEDURE — 3008F BODY MASS INDEX DOCD: CPT | Mod: CPTII,S$GLB,, | Performed by: SURGERY

## 2021-06-17 PROCEDURE — 99213 PR OFFICE/OUTPT VISIT, EST, LEVL III, 20-29 MIN: ICD-10-PCS | Mod: S$GLB,CS,, | Performed by: SURGERY

## 2021-06-17 PROCEDURE — 99999 PR PBB SHADOW E&M-EST. PATIENT-LVL IV: CPT | Mod: PBBFAC,,, | Performed by: SURGERY

## 2021-06-17 PROCEDURE — 1126F PR PAIN SEVERITY QUANTIFIED, NO PAIN PRESENT: ICD-10-PCS | Mod: S$GLB,,, | Performed by: SURGERY

## 2021-06-17 PROCEDURE — 99999 PR PBB SHADOW E&M-EST. PATIENT-LVL IV: ICD-10-PCS | Mod: PBBFAC,,, | Performed by: SURGERY

## 2021-06-17 PROCEDURE — 1126F AMNT PAIN NOTED NONE PRSNT: CPT | Mod: S$GLB,,, | Performed by: SURGERY

## 2021-06-17 RX ORDER — SODIUM CHLORIDE 9 MG/ML
INJECTION, SOLUTION INTRAVENOUS CONTINUOUS
Status: CANCELLED | OUTPATIENT
Start: 2021-06-17

## 2021-06-23 ENCOUNTER — ANESTHESIA EVENT (OUTPATIENT)
Dept: SURGERY | Facility: HOSPITAL | Age: 46
End: 2021-06-23
Payer: COMMERCIAL

## 2021-06-23 ENCOUNTER — CLINICAL SUPPORT (OUTPATIENT)
Dept: LAB | Facility: HOSPITAL | Age: 46
End: 2021-06-23
Attending: SURGERY
Payer: COMMERCIAL

## 2021-06-23 ENCOUNTER — HOSPITAL ENCOUNTER (OUTPATIENT)
Dept: PREADMISSION TESTING | Facility: HOSPITAL | Age: 46
Discharge: HOME | End: 2021-06-23
Attending: SURGERY
Payer: COMMERCIAL

## 2021-06-23 VITALS
BODY MASS INDEX: 34.65 KG/M2 | HEART RATE: 65 BPM | RESPIRATION RATE: 16 BRPM | HEIGHT: 70 IN | DIASTOLIC BLOOD PRESSURE: 92 MMHG | WEIGHT: 242 LBS | OXYGEN SATURATION: 98 % | SYSTOLIC BLOOD PRESSURE: 150 MMHG

## 2021-06-23 DIAGNOSIS — Z01.818 PREOP EXAMINATION: ICD-10-CM

## 2021-06-23 DIAGNOSIS — Z01.818 PREOP EXAMINATION: Primary | ICD-10-CM

## 2021-06-23 PROCEDURE — 93010 EKG 12-LEAD: ICD-10-PCS | Mod: ,,, | Performed by: INTERNAL MEDICINE

## 2021-06-23 PROCEDURE — 93010 ELECTROCARDIOGRAM REPORT: CPT | Mod: ,,, | Performed by: INTERNAL MEDICINE

## 2021-06-23 PROCEDURE — 93005 ELECTROCARDIOGRAM TRACING: CPT

## 2021-06-23 RX ORDER — SODIUM CHLORIDE, SODIUM LACTATE, POTASSIUM CHLORIDE, CALCIUM CHLORIDE 600; 310; 30; 20 MG/100ML; MG/100ML; MG/100ML; MG/100ML
INJECTION, SOLUTION INTRAVENOUS CONTINUOUS
Status: CANCELLED | OUTPATIENT
Start: 2021-06-23

## 2021-06-23 RX ORDER — LIDOCAINE HYDROCHLORIDE 10 MG/ML
1 INJECTION, SOLUTION EPIDURAL; INFILTRATION; INTRACAUDAL; PERINEURAL ONCE
Status: CANCELLED | OUTPATIENT
Start: 2021-06-23 | End: 2021-06-23

## 2021-06-24 ENCOUNTER — PATIENT MESSAGE (OUTPATIENT)
Dept: SURGERY | Facility: HOSPITAL | Age: 46
End: 2021-06-24

## 2021-06-28 ENCOUNTER — HOSPITAL ENCOUNTER (OUTPATIENT)
Facility: HOSPITAL | Age: 46
Discharge: HOME | End: 2021-06-28
Attending: SURGERY | Admitting: SURGERY
Payer: COMMERCIAL

## 2021-06-28 ENCOUNTER — ANESTHESIA (OUTPATIENT)
Dept: SURGERY | Facility: HOSPITAL | Age: 46
End: 2021-06-28
Payer: COMMERCIAL

## 2021-06-28 VITALS
TEMPERATURE: 97 F | BODY MASS INDEX: 33.64 KG/M2 | HEART RATE: 65 BPM | OXYGEN SATURATION: 99 % | RESPIRATION RATE: 16 BRPM | HEIGHT: 70 IN | WEIGHT: 235 LBS | DIASTOLIC BLOOD PRESSURE: 63 MMHG | SYSTOLIC BLOOD PRESSURE: 122 MMHG

## 2021-06-28 DIAGNOSIS — R10.32 LEFT GROIN PAIN: Primary | ICD-10-CM

## 2021-06-28 LAB — SARS-COV-2 RDRP RESP QL NAA+PROBE: NEGATIVE

## 2021-06-28 PROCEDURE — C1781 MESH (IMPLANTABLE): HCPCS | Performed by: SURGERY

## 2021-06-28 PROCEDURE — 37000008 HC ANESTHESIA 1ST 15 MINUTES: Performed by: SURGERY

## 2021-06-28 PROCEDURE — 63600175 PHARM REV CODE 636 W HCPCS: Performed by: NURSE PRACTITIONER

## 2021-06-28 PROCEDURE — 71000039 HC RECOVERY, EACH ADD'L HOUR: Performed by: SURGERY

## 2021-06-28 PROCEDURE — 63600175 PHARM REV CODE 636 W HCPCS: Performed by: NURSE ANESTHETIST, CERTIFIED REGISTERED

## 2021-06-28 PROCEDURE — 63600175 PHARM REV CODE 636 W HCPCS: Performed by: ANESTHESIOLOGY

## 2021-06-28 PROCEDURE — 71000033 HC RECOVERY, INTIAL HOUR: Performed by: SURGERY

## 2021-06-28 PROCEDURE — 36000711: Performed by: SURGERY

## 2021-06-28 PROCEDURE — U0002 COVID-19 LAB TEST NON-CDC: HCPCS | Performed by: SURGERY

## 2021-06-28 PROCEDURE — 49505 PR REPAIR ING HERNIA,5+Y/O,REDUCIBL: ICD-10-PCS | Mod: 52,LT,, | Performed by: SURGERY

## 2021-06-28 PROCEDURE — 49505 PRP I/HERN INIT REDUC >5 YR: CPT | Mod: 52,LT,, | Performed by: SURGERY

## 2021-06-28 PROCEDURE — 25000003 PHARM REV CODE 250: Performed by: NURSE ANESTHETIST, CERTIFIED REGISTERED

## 2021-06-28 PROCEDURE — 37000009 HC ANESTHESIA EA ADD 15 MINS: Performed by: SURGERY

## 2021-06-28 PROCEDURE — 71000016 HC POSTOP RECOV ADDL HR: Performed by: SURGERY

## 2021-06-28 PROCEDURE — 71000015 HC POSTOP RECOV 1ST HR: Performed by: SURGERY

## 2021-06-28 PROCEDURE — 36000710: Performed by: SURGERY

## 2021-06-28 PROCEDURE — 63600175 PHARM REV CODE 636 W HCPCS: Performed by: SURGERY

## 2021-06-28 PROCEDURE — 25000003 PHARM REV CODE 250: Performed by: SURGERY

## 2021-06-28 DEVICE — MESH PROGRIP LAP 10X15CM LEFT: Type: IMPLANTABLE DEVICE | Site: GROIN | Status: FUNCTIONAL

## 2021-06-28 RX ORDER — OXYCODONE HYDROCHLORIDE 5 MG/1
5 TABLET ORAL ONCE
Status: COMPLETED | OUTPATIENT
Start: 2021-06-28 | End: 2021-06-28

## 2021-06-28 RX ORDER — HYDROMORPHONE HYDROCHLORIDE 2 MG/ML
0.5 INJECTION, SOLUTION INTRAMUSCULAR; INTRAVENOUS; SUBCUTANEOUS EVERY 5 MIN PRN
Status: DISCONTINUED | OUTPATIENT
Start: 2021-06-28 | End: 2021-06-28 | Stop reason: HOSPADM

## 2021-06-28 RX ORDER — SODIUM CHLORIDE 0.9 % (FLUSH) 0.9 %
10 SYRINGE (ML) INJECTION
Status: DISCONTINUED | OUTPATIENT
Start: 2021-06-28 | End: 2021-06-28 | Stop reason: HOSPADM

## 2021-06-28 RX ORDER — CEFAZOLIN SODIUM 2 G/50ML
2 SOLUTION INTRAVENOUS
Status: COMPLETED | OUTPATIENT
Start: 2021-06-28 | End: 2021-06-28

## 2021-06-28 RX ORDER — SODIUM CHLORIDE 9 MG/ML
INJECTION, SOLUTION INTRAVENOUS CONTINUOUS
Status: DISCONTINUED | OUTPATIENT
Start: 2021-06-28 | End: 2021-06-28 | Stop reason: HOSPADM

## 2021-06-28 RX ORDER — ONDANSETRON 2 MG/ML
INJECTION INTRAMUSCULAR; INTRAVENOUS
Status: DISCONTINUED | OUTPATIENT
Start: 2021-06-28 | End: 2021-06-28

## 2021-06-28 RX ORDER — DEXAMETHASONE SODIUM PHOSPHATE 4 MG/ML
INJECTION, SOLUTION INTRA-ARTICULAR; INTRALESIONAL; INTRAMUSCULAR; INTRAVENOUS; SOFT TISSUE
Status: DISCONTINUED | OUTPATIENT
Start: 2021-06-28 | End: 2021-06-28

## 2021-06-28 RX ORDER — FENTANYL CITRATE 50 UG/ML
INJECTION, SOLUTION INTRAMUSCULAR; INTRAVENOUS
Status: DISCONTINUED | OUTPATIENT
Start: 2021-06-28 | End: 2021-06-28

## 2021-06-28 RX ORDER — ACETAMINOPHEN 500 MG
1000 TABLET ORAL EVERY 8 HOURS
Refills: 0 | COMMUNITY
Start: 2021-06-28 | End: 2021-07-01

## 2021-06-28 RX ORDER — LIDOCAINE HYDROCHLORIDE 10 MG/ML
1 INJECTION, SOLUTION EPIDURAL; INFILTRATION; INTRACAUDAL; PERINEURAL ONCE
Status: DISCONTINUED | OUTPATIENT
Start: 2021-06-28 | End: 2021-06-28 | Stop reason: HOSPADM

## 2021-06-28 RX ORDER — OXYCODONE HYDROCHLORIDE 5 MG/1
5 TABLET ORAL EVERY 6 HOURS PRN
Qty: 20 TABLET | Refills: 0 | Status: SHIPPED | OUTPATIENT
Start: 2021-06-28 | End: 2021-07-05

## 2021-06-28 RX ORDER — PROPOFOL 10 MG/ML
VIAL (ML) INTRAVENOUS
Status: DISCONTINUED | OUTPATIENT
Start: 2021-06-28 | End: 2021-06-28

## 2021-06-28 RX ORDER — SODIUM CHLORIDE, SODIUM LACTATE, POTASSIUM CHLORIDE, CALCIUM CHLORIDE 600; 310; 30; 20 MG/100ML; MG/100ML; MG/100ML; MG/100ML
INJECTION, SOLUTION INTRAVENOUS CONTINUOUS
Status: DISCONTINUED | OUTPATIENT
Start: 2021-06-28 | End: 2021-06-28 | Stop reason: HOSPADM

## 2021-06-28 RX ORDER — ONDANSETRON 2 MG/ML
4 INJECTION INTRAMUSCULAR; INTRAVENOUS ONCE AS NEEDED
Status: DISCONTINUED | OUTPATIENT
Start: 2021-06-28 | End: 2021-06-28 | Stop reason: HOSPADM

## 2021-06-28 RX ORDER — BUPIVACAINE HYDROCHLORIDE 5 MG/ML
INJECTION, SOLUTION PERINEURAL
Status: DISCONTINUED | OUTPATIENT
Start: 2021-06-28 | End: 2021-06-28 | Stop reason: HOSPADM

## 2021-06-28 RX ORDER — IBUPROFEN 200 MG
600 TABLET ORAL EVERY 8 HOURS
Refills: 0 | COMMUNITY
Start: 2021-06-28 | End: 2021-07-01

## 2021-06-28 RX ORDER — KETOROLAC TROMETHAMINE 30 MG/ML
INJECTION, SOLUTION INTRAMUSCULAR; INTRAVENOUS
Status: DISCONTINUED | OUTPATIENT
Start: 2021-06-28 | End: 2021-06-28

## 2021-06-28 RX ORDER — ROCURONIUM BROMIDE 10 MG/ML
INJECTION, SOLUTION INTRAVENOUS
Status: DISCONTINUED | OUTPATIENT
Start: 2021-06-28 | End: 2021-06-28

## 2021-06-28 RX ORDER — MIDAZOLAM HYDROCHLORIDE 1 MG/ML
INJECTION, SOLUTION INTRAMUSCULAR; INTRAVENOUS
Status: DISCONTINUED | OUTPATIENT
Start: 2021-06-28 | End: 2021-06-28

## 2021-06-28 RX ORDER — NEOSTIGMINE METHYLSULFATE 1 MG/ML
INJECTION, SOLUTION INTRAVENOUS
Status: DISCONTINUED | OUTPATIENT
Start: 2021-06-28 | End: 2021-06-28

## 2021-06-28 RX ORDER — LIDOCAINE HYDROCHLORIDE 20 MG/ML
INJECTION INTRAVENOUS
Status: DISCONTINUED | OUTPATIENT
Start: 2021-06-28 | End: 2021-06-28

## 2021-06-28 RX ORDER — ACETAMINOPHEN 10 MG/ML
INJECTION, SOLUTION INTRAVENOUS
Status: DISCONTINUED | OUTPATIENT
Start: 2021-06-28 | End: 2021-06-28

## 2021-06-28 RX ADMIN — HYDROMORPHONE HYDROCHLORIDE 0.5 MG: 2 INJECTION INTRAMUSCULAR; INTRAVENOUS; SUBCUTANEOUS at 02:06

## 2021-06-28 RX ADMIN — SODIUM CHLORIDE, SODIUM LACTATE, POTASSIUM CHLORIDE, AND CALCIUM CHLORIDE: .6; .31; .03; .02 INJECTION, SOLUTION INTRAVENOUS at 12:06

## 2021-06-28 RX ADMIN — ROCURONIUM BROMIDE 10 MG: 10 INJECTION, SOLUTION INTRAVENOUS at 12:06

## 2021-06-28 RX ADMIN — LIDOCAINE HYDROCHLORIDE 100 MG: 20 INJECTION, SOLUTION INTRAVENOUS at 11:06

## 2021-06-28 RX ADMIN — OXYCODONE 5 MG: 5 TABLET ORAL at 02:06

## 2021-06-28 RX ADMIN — SODIUM CHLORIDE, SODIUM LACTATE, POTASSIUM CHLORIDE, AND CALCIUM CHLORIDE: .6; .31; .03; .02 INJECTION, SOLUTION INTRAVENOUS at 11:06

## 2021-06-28 RX ADMIN — FENTANYL CITRATE 100 MCG: 50 INJECTION, SOLUTION INTRAMUSCULAR; INTRAVENOUS at 11:06

## 2021-06-28 RX ADMIN — ROCURONIUM BROMIDE 50 MG: 10 INJECTION, SOLUTION INTRAVENOUS at 11:06

## 2021-06-28 RX ADMIN — NEOSTIGMINE METHYLSULFATE 4 MG: 1 INJECTION INTRAVENOUS at 01:06

## 2021-06-28 RX ADMIN — MIDAZOLAM 2 MG: 1 INJECTION INTRAMUSCULAR; INTRAVENOUS at 11:06

## 2021-06-28 RX ADMIN — PROPOFOL 200 MG: 10 INJECTION, EMULSION INTRAVENOUS at 11:06

## 2021-06-28 RX ADMIN — ACETAMINOPHEN 1000 MG: 10 INJECTION, SOLUTION INTRAVENOUS at 11:06

## 2021-06-28 RX ADMIN — GLYCOPYRROLATE 0.7 MG: 0.2 INJECTION, SOLUTION INTRAMUSCULAR; INTRAVITREAL at 01:06

## 2021-06-28 RX ADMIN — DEXAMETHASONE SODIUM PHOSPHATE 8 MG: 4 INJECTION, SOLUTION INTRA-ARTICULAR; INTRALESIONAL; INTRAMUSCULAR; INTRAVENOUS; SOFT TISSUE at 11:06

## 2021-06-28 RX ADMIN — CEFAZOLIN SODIUM 2 G: 2 SOLUTION INTRAVENOUS at 11:06

## 2021-06-28 RX ADMIN — FENTANYL CITRATE 50 MCG: 50 INJECTION, SOLUTION INTRAMUSCULAR; INTRAVENOUS at 01:06

## 2021-06-28 RX ADMIN — KETOROLAC TROMETHAMINE 30 MG: 30 INJECTION, SOLUTION INTRAMUSCULAR; INTRAVENOUS at 01:06

## 2021-06-28 RX ADMIN — ONDANSETRON 8 MG: 2 INJECTION, SOLUTION INTRAMUSCULAR; INTRAVENOUS at 01:06

## 2021-06-30 ENCOUNTER — NURSE TRIAGE (OUTPATIENT)
Dept: ADMINISTRATIVE | Facility: CLINIC | Age: 46
End: 2021-06-30

## 2021-06-30 ENCOUNTER — PATIENT MESSAGE (OUTPATIENT)
Dept: SURGERY | Facility: CLINIC | Age: 46
End: 2021-06-30

## 2021-07-05 ENCOUNTER — PATIENT MESSAGE (OUTPATIENT)
Dept: SURGERY | Facility: CLINIC | Age: 46
End: 2021-07-05

## 2021-07-15 ENCOUNTER — OFFICE VISIT (OUTPATIENT)
Dept: SURGERY | Facility: CLINIC | Age: 46
End: 2021-07-15
Payer: COMMERCIAL

## 2021-07-15 VITALS
DIASTOLIC BLOOD PRESSURE: 89 MMHG | TEMPERATURE: 97 F | HEART RATE: 80 BPM | WEIGHT: 239.06 LBS | BODY MASS INDEX: 34.23 KG/M2 | HEIGHT: 70 IN | SYSTOLIC BLOOD PRESSURE: 146 MMHG

## 2021-07-15 DIAGNOSIS — N50.812 LEFT TESTICULAR PAIN: Primary | ICD-10-CM

## 2021-07-15 DIAGNOSIS — Z87.19 S/P INGUINAL HERNIA REPAIR: ICD-10-CM

## 2021-07-15 DIAGNOSIS — Z98.890 S/P INGUINAL HERNIA REPAIR: ICD-10-CM

## 2021-07-15 PROCEDURE — 99024 POSTOP FOLLOW-UP VISIT: CPT | Mod: S$GLB,,, | Performed by: SURGERY

## 2021-07-15 PROCEDURE — 99999 PR PBB SHADOW E&M-EST. PATIENT-LVL III: ICD-10-PCS | Mod: PBBFAC,,, | Performed by: SURGERY

## 2021-07-15 PROCEDURE — 3008F PR BODY MASS INDEX (BMI) DOCUMENTED: ICD-10-PCS | Mod: CPTII,S$GLB,, | Performed by: SURGERY

## 2021-07-15 PROCEDURE — 99999 PR PBB SHADOW E&M-EST. PATIENT-LVL III: CPT | Mod: PBBFAC,,, | Performed by: SURGERY

## 2021-07-15 PROCEDURE — 1125F AMNT PAIN NOTED PAIN PRSNT: CPT | Mod: S$GLB,,, | Performed by: SURGERY

## 2021-07-15 PROCEDURE — 99024 PR POST-OP FOLLOW-UP VISIT: ICD-10-PCS | Mod: S$GLB,,, | Performed by: SURGERY

## 2021-07-15 PROCEDURE — 1125F PR PAIN SEVERITY QUANTIFIED, PAIN PRESENT: ICD-10-PCS | Mod: S$GLB,,, | Performed by: SURGERY

## 2021-07-15 PROCEDURE — 3008F BODY MASS INDEX DOCD: CPT | Mod: CPTII,S$GLB,, | Performed by: SURGERY

## 2021-07-15 RX ORDER — KETOROLAC TROMETHAMINE 10 MG/1
10 TABLET, FILM COATED ORAL EVERY 8 HOURS
Qty: 15 TABLET | Refills: 0 | Status: SHIPPED | OUTPATIENT
Start: 2021-07-15 | End: 2021-07-20

## 2021-07-15 RX ORDER — LEVOFLOXACIN 500 MG/1
500 TABLET, FILM COATED ORAL DAILY
Qty: 7 TABLET | Refills: 0 | Status: SHIPPED | OUTPATIENT
Start: 2021-07-15 | End: 2021-07-22

## 2021-07-20 ENCOUNTER — PATIENT MESSAGE (OUTPATIENT)
Dept: INTERNAL MEDICINE | Facility: CLINIC | Age: 46
End: 2021-07-20

## 2021-07-26 ENCOUNTER — PATIENT MESSAGE (OUTPATIENT)
Dept: PSYCHIATRY | Facility: CLINIC | Age: 46
End: 2021-07-26

## 2021-07-26 ENCOUNTER — TELEPHONE (OUTPATIENT)
Dept: PSYCHIATRY | Facility: CLINIC | Age: 46
End: 2021-07-26

## 2021-07-28 ENCOUNTER — OFFICE VISIT (OUTPATIENT)
Dept: PSYCHIATRY | Facility: CLINIC | Age: 46
End: 2021-07-28
Payer: COMMERCIAL

## 2021-07-28 DIAGNOSIS — F33.40 RECURRENT MAJOR DEPRESSIVE DISORDER, IN REMISSION: Primary | ICD-10-CM

## 2021-07-28 DIAGNOSIS — F41.1 GAD (GENERALIZED ANXIETY DISORDER): ICD-10-CM

## 2021-07-28 PROCEDURE — 99214 OFFICE O/P EST MOD 30 MIN: CPT | Mod: 95,,, | Performed by: NURSE PRACTITIONER

## 2021-07-28 PROCEDURE — 99214 PR OFFICE/OUTPT VISIT, EST, LEVL IV, 30-39 MIN: ICD-10-PCS | Mod: 95,,, | Performed by: NURSE PRACTITIONER

## 2021-07-28 RX ORDER — DULOXETIN HYDROCHLORIDE 60 MG/1
60 CAPSULE, DELAYED RELEASE ORAL DAILY
Qty: 90 CAPSULE | Refills: 0 | Status: SHIPPED | OUTPATIENT
Start: 2021-07-28 | End: 2021-11-17 | Stop reason: SDUPTHER

## 2021-07-28 RX ORDER — LAMOTRIGINE 150 MG/1
TABLET ORAL
Qty: 180 TABLET | Refills: 0 | Status: SHIPPED | OUTPATIENT
Start: 2021-07-28 | End: 2021-11-17 | Stop reason: SDUPTHER

## 2021-08-01 ENCOUNTER — PATIENT MESSAGE (OUTPATIENT)
Dept: SLEEP MEDICINE | Facility: CLINIC | Age: 46
End: 2021-08-01

## 2021-08-17 ENCOUNTER — PATIENT MESSAGE (OUTPATIENT)
Dept: SLEEP MEDICINE | Facility: CLINIC | Age: 46
End: 2021-08-17

## 2021-08-17 ENCOUNTER — OFFICE VISIT (OUTPATIENT)
Dept: SLEEP MEDICINE | Facility: CLINIC | Age: 46
End: 2021-08-17
Payer: COMMERCIAL

## 2021-08-17 ENCOUNTER — PATIENT OUTREACH (OUTPATIENT)
Dept: ADMINISTRATIVE | Facility: OTHER | Age: 46
End: 2021-08-17

## 2021-08-17 DIAGNOSIS — G47.33 OSA (OBSTRUCTIVE SLEEP APNEA): Primary | ICD-10-CM

## 2021-08-17 PROCEDURE — 99204 PR OFFICE/OUTPT VISIT, NEW, LEVL IV, 45-59 MIN: ICD-10-PCS | Mod: 95,,, | Performed by: PSYCHIATRY & NEUROLOGY

## 2021-08-17 PROCEDURE — 1160F PR REVIEW ALL MEDS BY PRESCRIBER/CLIN PHARMACIST DOCUMENTED: ICD-10-PCS | Mod: CPTII,,, | Performed by: PSYCHIATRY & NEUROLOGY

## 2021-08-17 PROCEDURE — 99204 OFFICE O/P NEW MOD 45 MIN: CPT | Mod: 95,,, | Performed by: PSYCHIATRY & NEUROLOGY

## 2021-08-17 PROCEDURE — 1159F MED LIST DOCD IN RCRD: CPT | Mod: CPTII,,, | Performed by: PSYCHIATRY & NEUROLOGY

## 2021-08-17 PROCEDURE — 1159F PR MEDICATION LIST DOCUMENTED IN MEDICAL RECORD: ICD-10-PCS | Mod: CPTII,,, | Performed by: PSYCHIATRY & NEUROLOGY

## 2021-08-17 PROCEDURE — 1160F RVW MEDS BY RX/DR IN RCRD: CPT | Mod: CPTII,,, | Performed by: PSYCHIATRY & NEUROLOGY

## 2021-09-07 NOTE — PROGRESS NOTES
Bandaid applied, tolerated well, pt instructed to wait 15 minutes.   Rx Refill Note  Requested Prescriptions     Pending Prescriptions Disp Refills   • furosemide (LASIX) 40 MG tablet 90 tablet 1     Sig: Take 1 tablet by mouth Daily.      Last office visit with prescribing clinician: 12/4/2020      Next office visit with prescribing clinician: 9/5/2021            Indra Virgen MA  09/07/21, 07:13 EDT

## 2021-09-17 ENCOUNTER — PATIENT MESSAGE (OUTPATIENT)
Dept: SLEEP MEDICINE | Facility: CLINIC | Age: 46
End: 2021-09-17

## 2021-09-23 ENCOUNTER — PATIENT MESSAGE (OUTPATIENT)
Dept: INTERNAL MEDICINE | Facility: CLINIC | Age: 46
End: 2021-09-23

## 2021-10-13 ENCOUNTER — PATIENT MESSAGE (OUTPATIENT)
Dept: PSYCHIATRY | Facility: CLINIC | Age: 46
End: 2021-10-13
Payer: COMMERCIAL

## 2021-10-25 ENCOUNTER — PATIENT MESSAGE (OUTPATIENT)
Dept: INTERNAL MEDICINE | Facility: CLINIC | Age: 46
End: 2021-10-25
Payer: COMMERCIAL

## 2021-10-25 DIAGNOSIS — I10 ESSENTIAL HYPERTENSION: Primary | ICD-10-CM

## 2021-10-25 DIAGNOSIS — I10 HYPERTENSION, UNSPECIFIED TYPE: ICD-10-CM

## 2021-10-25 RX ORDER — METOPROLOL SUCCINATE 100 MG/1
100 TABLET, EXTENDED RELEASE ORAL DAILY
Qty: 90 TABLET | Refills: 1 | OUTPATIENT
Start: 2021-10-25

## 2021-10-25 RX ORDER — METOPROLOL SUCCINATE 100 MG/1
TABLET, EXTENDED RELEASE ORAL
Qty: 90 TABLET | Refills: 1 | Status: SHIPPED | OUTPATIENT
Start: 2021-10-25 | End: 2022-04-27 | Stop reason: SDUPTHER

## 2021-10-25 RX ORDER — METOPROLOL SUCCINATE 100 MG/1
100 TABLET, EXTENDED RELEASE ORAL DAILY
Qty: 90 TABLET | Refills: 1 | Status: CANCELLED | OUTPATIENT
Start: 2021-10-25

## 2021-11-16 ENCOUNTER — LAB VISIT (OUTPATIENT)
Dept: LAB | Facility: HOSPITAL | Age: 46
End: 2021-11-16
Attending: INTERNAL MEDICINE
Payer: COMMERCIAL

## 2021-11-16 DIAGNOSIS — I10 ESSENTIAL HYPERTENSION: ICD-10-CM

## 2021-11-16 LAB
ALBUMIN SERPL BCP-MCNC: 3.9 G/DL (ref 3.5–5.2)
ALP SERPL-CCNC: 50 U/L (ref 55–135)
ALT SERPL W/O P-5'-P-CCNC: 41 U/L (ref 10–44)
ANION GAP SERPL CALC-SCNC: 7 MMOL/L (ref 8–16)
AST SERPL-CCNC: 32 U/L (ref 10–40)
BASOPHILS # BLD AUTO: 0.06 K/UL (ref 0–0.2)
BASOPHILS NFR BLD: 0.8 % (ref 0–1.9)
BILIRUB SERPL-MCNC: 0.3 MG/DL (ref 0.1–1)
BUN SERPL-MCNC: 17 MG/DL (ref 6–20)
CALCIUM SERPL-MCNC: 9.3 MG/DL (ref 8.7–10.5)
CHLORIDE SERPL-SCNC: 106 MMOL/L (ref 95–110)
CHOLEST SERPL-MCNC: 271 MG/DL (ref 120–199)
CHOLEST/HDLC SERPL: 6.5 {RATIO} (ref 2–5)
CO2 SERPL-SCNC: 26 MMOL/L (ref 23–29)
CREAT SERPL-MCNC: 1 MG/DL (ref 0.5–1.4)
DIFFERENTIAL METHOD: ABNORMAL
EOSINOPHIL # BLD AUTO: 0.2 K/UL (ref 0–0.5)
EOSINOPHIL NFR BLD: 2.6 % (ref 0–8)
ERYTHROCYTE [DISTWIDTH] IN BLOOD BY AUTOMATED COUNT: 15.1 % (ref 11.5–14.5)
EST. GFR  (AFRICAN AMERICAN): >60 ML/MIN/1.73 M^2
EST. GFR  (NON AFRICAN AMERICAN): >60 ML/MIN/1.73 M^2
GLUCOSE SERPL-MCNC: 103 MG/DL (ref 70–110)
HCT VFR BLD AUTO: 47.8 % (ref 40–54)
HDLC SERPL-MCNC: 42 MG/DL (ref 40–75)
HDLC SERPL: 15.5 % (ref 20–50)
HGB BLD-MCNC: 15.3 G/DL (ref 14–18)
IMM GRANULOCYTES # BLD AUTO: 0.08 K/UL (ref 0–0.04)
IMM GRANULOCYTES NFR BLD AUTO: 1.1 % (ref 0–0.5)
LDLC SERPL CALC-MCNC: 172.8 MG/DL (ref 63–159)
LYMPHOCYTES # BLD AUTO: 2.4 K/UL (ref 1–4.8)
LYMPHOCYTES NFR BLD: 33.4 % (ref 18–48)
MCH RBC QN AUTO: 28.9 PG (ref 27–31)
MCHC RBC AUTO-ENTMCNC: 32 G/DL (ref 32–36)
MCV RBC AUTO: 90 FL (ref 82–98)
MONOCYTES # BLD AUTO: 0.5 K/UL (ref 0.3–1)
MONOCYTES NFR BLD: 6.2 % (ref 4–15)
NEUTROPHILS # BLD AUTO: 4.1 K/UL (ref 1.8–7.7)
NEUTROPHILS NFR BLD: 55.9 % (ref 38–73)
NONHDLC SERPL-MCNC: 229 MG/DL
NRBC BLD-RTO: 0 /100 WBC
PLATELET # BLD AUTO: 277 K/UL (ref 150–450)
PMV BLD AUTO: 10.6 FL (ref 9.2–12.9)
POTASSIUM SERPL-SCNC: 4 MMOL/L (ref 3.5–5.1)
PROSTATE SPECIFIC ANTIGEN, TOTAL: 0.4 NG/ML (ref 0–4)
PROT SERPL-MCNC: 6.6 G/DL (ref 6–8.4)
PSA FREE MFR SERPL: 55 %
PSA FREE SERPL-MCNC: 0.22 NG/ML (ref 0–1.5)
RBC # BLD AUTO: 5.3 M/UL (ref 4.6–6.2)
SODIUM SERPL-SCNC: 139 MMOL/L (ref 136–145)
TRIGL SERPL-MCNC: 281 MG/DL (ref 30–150)
TSH SERPL DL<=0.005 MIU/L-ACNC: 3.55 UIU/ML (ref 0.4–4)
WBC # BLD AUTO: 7.28 K/UL (ref 3.9–12.7)

## 2021-11-16 PROCEDURE — 85025 COMPLETE CBC W/AUTO DIFF WBC: CPT | Performed by: INTERNAL MEDICINE

## 2021-11-16 PROCEDURE — 84153 ASSAY OF PSA TOTAL: CPT | Performed by: INTERNAL MEDICINE

## 2021-11-16 PROCEDURE — 84443 ASSAY THYROID STIM HORMONE: CPT | Performed by: INTERNAL MEDICINE

## 2021-11-16 PROCEDURE — 36415 COLL VENOUS BLD VENIPUNCTURE: CPT | Mod: PO | Performed by: INTERNAL MEDICINE

## 2021-11-16 PROCEDURE — 80053 COMPREHEN METABOLIC PANEL: CPT | Performed by: INTERNAL MEDICINE

## 2021-11-16 PROCEDURE — 80061 LIPID PANEL: CPT | Performed by: INTERNAL MEDICINE

## 2021-11-17 ENCOUNTER — OFFICE VISIT (OUTPATIENT)
Dept: PSYCHIATRY | Facility: CLINIC | Age: 46
End: 2021-11-17
Payer: COMMERCIAL

## 2021-11-17 DIAGNOSIS — F41.1 GAD (GENERALIZED ANXIETY DISORDER): ICD-10-CM

## 2021-11-17 DIAGNOSIS — F33.40 RECURRENT MAJOR DEPRESSIVE DISORDER, IN REMISSION: Primary | ICD-10-CM

## 2021-11-17 PROCEDURE — 99214 PR OFFICE/OUTPT VISIT, EST, LEVL IV, 30-39 MIN: ICD-10-PCS | Mod: 95,,, | Performed by: NURSE PRACTITIONER

## 2021-11-17 PROCEDURE — 99214 OFFICE O/P EST MOD 30 MIN: CPT | Mod: 95,,, | Performed by: NURSE PRACTITIONER

## 2021-11-17 RX ORDER — LAMOTRIGINE 150 MG/1
TABLET ORAL
Qty: 180 TABLET | Refills: 0 | Status: SHIPPED | OUTPATIENT
Start: 2021-11-17 | End: 2022-04-05 | Stop reason: SDUPTHER

## 2021-11-17 RX ORDER — DULOXETIN HYDROCHLORIDE 60 MG/1
60 CAPSULE, DELAYED RELEASE ORAL DAILY
Qty: 90 CAPSULE | Refills: 0 | Status: SHIPPED | OUTPATIENT
Start: 2021-11-17 | End: 2022-02-28

## 2021-12-01 ENCOUNTER — PATIENT MESSAGE (OUTPATIENT)
Dept: INTERNAL MEDICINE | Facility: CLINIC | Age: 46
End: 2021-12-01

## 2021-12-01 ENCOUNTER — OFFICE VISIT (OUTPATIENT)
Dept: INTERNAL MEDICINE | Facility: CLINIC | Age: 46
End: 2021-12-01
Payer: COMMERCIAL

## 2021-12-01 VITALS
DIASTOLIC BLOOD PRESSURE: 82 MMHG | WEIGHT: 246.69 LBS | HEART RATE: 69 BPM | SYSTOLIC BLOOD PRESSURE: 124 MMHG | OXYGEN SATURATION: 98 % | BODY MASS INDEX: 35.4 KG/M2

## 2021-12-01 DIAGNOSIS — E78.1 HYPERTRIGLYCERIDEMIA: ICD-10-CM

## 2021-12-01 DIAGNOSIS — I10 PRIMARY HYPERTENSION: ICD-10-CM

## 2021-12-01 DIAGNOSIS — M47.816 OSTEOARTHRITIS OF LUMBAR SPINE, UNSPECIFIED SPINAL OSTEOARTHRITIS COMPLICATION STATUS: Primary | ICD-10-CM

## 2021-12-01 DIAGNOSIS — Z98.890 S/P LAMINECTOMY: ICD-10-CM

## 2021-12-01 DIAGNOSIS — M51.9 LUMBAR DISC DISEASE: ICD-10-CM

## 2021-12-01 DIAGNOSIS — E66.9 OBESITY, CLASS II, BMI 35-39.9: ICD-10-CM

## 2021-12-01 DIAGNOSIS — E78.5 DYSLIPIDEMIA: ICD-10-CM

## 2021-12-01 DIAGNOSIS — Z98.890 S/P LUMBAR LAMINECTOMY: ICD-10-CM

## 2021-12-01 DIAGNOSIS — I10 PRIMARY HYPERTENSION: Primary | ICD-10-CM

## 2021-12-01 DIAGNOSIS — E03.9 HYPOTHYROIDISM, UNSPECIFIED TYPE: ICD-10-CM

## 2021-12-01 DIAGNOSIS — K76.0 FATTY LIVER: ICD-10-CM

## 2021-12-01 DIAGNOSIS — Z00.00 PREVENTATIVE HEALTH CARE: Primary | ICD-10-CM

## 2021-12-01 PROBLEM — E66.812 OBESITY, CLASS II, BMI 35-39.9: Status: ACTIVE | Noted: 2017-08-09

## 2021-12-01 PROCEDURE — 99999 PR PBB SHADOW E&M-EST. PATIENT-LVL III: ICD-10-PCS | Mod: PBBFAC,,, | Performed by: INTERNAL MEDICINE

## 2021-12-01 PROCEDURE — 99999 PR PBB SHADOW E&M-EST. PATIENT-LVL III: CPT | Mod: PBBFAC,,, | Performed by: INTERNAL MEDICINE

## 2021-12-01 PROCEDURE — 99396 PREV VISIT EST AGE 40-64: CPT | Mod: S$GLB,,, | Performed by: INTERNAL MEDICINE

## 2021-12-01 PROCEDURE — 99396 PR PREVENTIVE VISIT,EST,40-64: ICD-10-PCS | Mod: S$GLB,,, | Performed by: INTERNAL MEDICINE

## 2021-12-01 RX ORDER — LEVOTHYROXINE SODIUM 75 UG/1
75 TABLET ORAL
Qty: 90 TABLET | Refills: 1 | Status: SHIPPED | OUTPATIENT
Start: 2021-12-01 | End: 2022-06-03

## 2021-12-01 RX ORDER — FENOFIBRATE 160 MG/1
160 TABLET ORAL DAILY
Qty: 90 TABLET | Refills: 1 | Status: SHIPPED | OUTPATIENT
Start: 2021-12-01 | End: 2022-10-26

## 2021-12-02 ENCOUNTER — TELEPHONE (OUTPATIENT)
Dept: NEUROSURGERY | Facility: CLINIC | Age: 46
End: 2021-12-02
Payer: COMMERCIAL

## 2021-12-02 DIAGNOSIS — R52 PAIN: Primary | ICD-10-CM

## 2021-12-04 ENCOUNTER — OFFICE VISIT (OUTPATIENT)
Dept: URGENT CARE | Facility: CLINIC | Age: 46
End: 2021-12-04
Payer: COMMERCIAL

## 2021-12-04 VITALS
SYSTOLIC BLOOD PRESSURE: 143 MMHG | TEMPERATURE: 98 F | DIASTOLIC BLOOD PRESSURE: 89 MMHG | OXYGEN SATURATION: 96 % | HEIGHT: 70 IN | RESPIRATION RATE: 18 BRPM | WEIGHT: 240 LBS | BODY MASS INDEX: 34.36 KG/M2 | HEART RATE: 67 BPM

## 2021-12-04 DIAGNOSIS — J11.1 FLU SYNDROME: Primary | ICD-10-CM

## 2021-12-04 DIAGNOSIS — R05.9 COUGH IN ADULT: ICD-10-CM

## 2021-12-04 LAB
CTP QC/QA: YES
CTP QC/QA: YES
FLUAV AG NPH QL: NEGATIVE
FLUBV AG NPH QL: NEGATIVE
SARS-COV-2 RDRP RESP QL NAA+PROBE: NEGATIVE

## 2021-12-04 PROCEDURE — 87804 INFLUENZA ASSAY W/OPTIC: CPT | Mod: QW,S$GLB,, | Performed by: FAMILY MEDICINE

## 2021-12-04 PROCEDURE — U0002 COVID-19 LAB TEST NON-CDC: HCPCS | Mod: QW,S$GLB,, | Performed by: FAMILY MEDICINE

## 2021-12-04 PROCEDURE — 99213 OFFICE O/P EST LOW 20 MIN: CPT | Mod: 25,S$GLB,CS, | Performed by: FAMILY MEDICINE

## 2021-12-04 PROCEDURE — 87804 POCT INFLUENZA A/B: ICD-10-PCS | Mod: 59,QW,S$GLB, | Performed by: FAMILY MEDICINE

## 2021-12-04 PROCEDURE — U0002: ICD-10-PCS | Mod: QW,S$GLB,, | Performed by: FAMILY MEDICINE

## 2021-12-04 PROCEDURE — 99213 PR OFFICE/OUTPT VISIT, EST, LEVL III, 20-29 MIN: ICD-10-PCS | Mod: 25,S$GLB,CS, | Performed by: FAMILY MEDICINE

## 2021-12-04 RX ORDER — OSELTAMIVIR PHOSPHATE 75 MG/1
75 CAPSULE ORAL 2 TIMES DAILY
Qty: 10 CAPSULE | Refills: 0 | Status: SHIPPED | OUTPATIENT
Start: 2021-12-04 | End: 2021-12-12

## 2021-12-04 RX ORDER — BENZONATATE 100 MG/1
200 CAPSULE ORAL 3 TIMES DAILY PRN
Qty: 30 CAPSULE | Refills: 1 | Status: SHIPPED | OUTPATIENT
Start: 2021-12-04 | End: 2022-10-06

## 2021-12-04 RX ORDER — LORATADINE 10 MG/1
10 TABLET ORAL DAILY
Qty: 30 TABLET | Refills: 2 | OUTPATIENT
Start: 2021-12-04 | End: 2021-12-15

## 2021-12-07 RX ORDER — OSELTAMIVIR PHOSPHATE 75 MG/1
75 CAPSULE ORAL 2 TIMES DAILY
Qty: 10 CAPSULE | Refills: 0 | Status: CANCELLED | OUTPATIENT
Start: 2021-12-07 | End: 2021-12-12

## 2021-12-13 ENCOUNTER — PATIENT MESSAGE (OUTPATIENT)
Dept: INTERNAL MEDICINE | Facility: CLINIC | Age: 46
End: 2021-12-13
Payer: COMMERCIAL

## 2021-12-15 ENCOUNTER — HOSPITAL ENCOUNTER (EMERGENCY)
Facility: HOSPITAL | Age: 46
Discharge: HOME | End: 2021-12-15
Attending: EMERGENCY MEDICINE
Payer: COMMERCIAL

## 2021-12-15 ENCOUNTER — PATIENT OUTREACH (OUTPATIENT)
Dept: ADMINISTRATIVE | Facility: OTHER | Age: 46
End: 2021-12-15
Payer: COMMERCIAL

## 2021-12-15 VITALS
HEART RATE: 77 BPM | TEMPERATURE: 98 F | OXYGEN SATURATION: 95 % | DIASTOLIC BLOOD PRESSURE: 75 MMHG | WEIGHT: 240 LBS | SYSTOLIC BLOOD PRESSURE: 143 MMHG | HEIGHT: 70 IN | BODY MASS INDEX: 34.36 KG/M2 | RESPIRATION RATE: 20 BRPM

## 2021-12-15 DIAGNOSIS — R09.1 PLEURISY: ICD-10-CM

## 2021-12-15 DIAGNOSIS — J40 BRONCHITIS: Primary | ICD-10-CM

## 2021-12-15 PROCEDURE — 99284 EMERGENCY DEPT VISIT MOD MDM: CPT | Mod: 25

## 2021-12-15 PROCEDURE — 63600175 PHARM REV CODE 636 W HCPCS: Performed by: EMERGENCY MEDICINE

## 2021-12-15 PROCEDURE — 96372 THER/PROPH/DIAG INJ SC/IM: CPT

## 2021-12-15 RX ORDER — METHYLPREDNISOLONE SOD SUCC 125 MG
125 VIAL (EA) INJECTION
Status: COMPLETED | OUTPATIENT
Start: 2021-12-15 | End: 2021-12-15

## 2021-12-15 RX ORDER — ETODOLAC 200 MG/1
400 CAPSULE ORAL 2 TIMES DAILY PRN
Qty: 30 CAPSULE | Refills: 0 | Status: SHIPPED | OUTPATIENT
Start: 2021-12-15 | End: 2021-12-23 | Stop reason: ALTCHOICE

## 2021-12-15 RX ORDER — PREDNISONE 20 MG/1
20 TABLET ORAL DAILY
Qty: 5 TABLET | Refills: 0 | Status: SHIPPED | OUTPATIENT
Start: 2021-12-15 | End: 2021-12-20

## 2021-12-15 RX ADMIN — METHYLPREDNISOLONE SODIUM SUCCINATE 125 MG: 125 INJECTION, POWDER, FOR SOLUTION INTRAMUSCULAR; INTRAVENOUS at 10:12

## 2021-12-16 ENCOUNTER — HOSPITAL ENCOUNTER (OUTPATIENT)
Dept: RADIOLOGY | Facility: HOSPITAL | Age: 46
Discharge: HOME | End: 2021-12-16
Attending: PHYSICIAN ASSISTANT
Payer: COMMERCIAL

## 2021-12-16 DIAGNOSIS — R52 PAIN: ICD-10-CM

## 2021-12-16 PROCEDURE — 72120 X-RAY BEND ONLY L-S SPINE: CPT | Mod: TC,FY

## 2021-12-16 PROCEDURE — 72120 XR LUMBAR SPINE FLEXION AND EXTENSION ONLY: ICD-10-PCS | Mod: 26,,, | Performed by: RADIOLOGY

## 2021-12-16 PROCEDURE — 72120 X-RAY BEND ONLY L-S SPINE: CPT | Mod: 26,,, | Performed by: RADIOLOGY

## 2021-12-23 ENCOUNTER — OFFICE VISIT (OUTPATIENT)
Dept: NEUROSURGERY | Facility: CLINIC | Age: 46
End: 2021-12-23
Payer: COMMERCIAL

## 2021-12-23 VITALS — WEIGHT: 240.06 LBS | HEIGHT: 70 IN | BODY MASS INDEX: 34.37 KG/M2

## 2021-12-23 DIAGNOSIS — M54.16 LUMBAR RADICULOPATHY: ICD-10-CM

## 2021-12-23 DIAGNOSIS — M47.26 OTHER SPONDYLOSIS WITH RADICULOPATHY, LUMBAR REGION: Primary | ICD-10-CM

## 2021-12-23 DIAGNOSIS — M54.41 CHRONIC BILATERAL LOW BACK PAIN WITH RIGHT-SIDED SCIATICA: ICD-10-CM

## 2021-12-23 DIAGNOSIS — M51.36 DDD (DEGENERATIVE DISC DISEASE), LUMBAR: ICD-10-CM

## 2021-12-23 DIAGNOSIS — G89.29 CHRONIC BILATERAL LOW BACK PAIN WITH RIGHT-SIDED SCIATICA: ICD-10-CM

## 2021-12-23 PROCEDURE — 99999 PR PBB SHADOW E&M-EST. PATIENT-LVL IV: CPT | Mod: PBBFAC,,, | Performed by: PHYSICIAN ASSISTANT

## 2021-12-23 PROCEDURE — 1160F RVW MEDS BY RX/DR IN RCRD: CPT | Mod: CPTII,S$GLB,, | Performed by: PHYSICIAN ASSISTANT

## 2021-12-23 PROCEDURE — 99204 OFFICE O/P NEW MOD 45 MIN: CPT | Mod: S$GLB,,, | Performed by: PHYSICIAN ASSISTANT

## 2021-12-23 PROCEDURE — 1159F PR MEDICATION LIST DOCUMENTED IN MEDICAL RECORD: ICD-10-PCS | Mod: CPTII,S$GLB,, | Performed by: PHYSICIAN ASSISTANT

## 2021-12-23 PROCEDURE — 1160F PR REVIEW ALL MEDS BY PRESCRIBER/CLIN PHARMACIST DOCUMENTED: ICD-10-PCS | Mod: CPTII,S$GLB,, | Performed by: PHYSICIAN ASSISTANT

## 2021-12-23 PROCEDURE — 99204 PR OFFICE/OUTPT VISIT, NEW, LEVL IV, 45-59 MIN: ICD-10-PCS | Mod: S$GLB,,, | Performed by: PHYSICIAN ASSISTANT

## 2021-12-23 PROCEDURE — 1159F MED LIST DOCD IN RCRD: CPT | Mod: CPTII,S$GLB,, | Performed by: PHYSICIAN ASSISTANT

## 2021-12-23 PROCEDURE — 3008F PR BODY MASS INDEX (BMI) DOCUMENTED: ICD-10-PCS | Mod: CPTII,S$GLB,, | Performed by: PHYSICIAN ASSISTANT

## 2021-12-23 PROCEDURE — 3008F BODY MASS INDEX DOCD: CPT | Mod: CPTII,S$GLB,, | Performed by: PHYSICIAN ASSISTANT

## 2021-12-23 PROCEDURE — 99999 PR PBB SHADOW E&M-EST. PATIENT-LVL IV: ICD-10-PCS | Mod: PBBFAC,,, | Performed by: PHYSICIAN ASSISTANT

## 2021-12-23 RX ORDER — GABAPENTIN 300 MG/1
CAPSULE ORAL
Qty: 270 CAPSULE | Refills: 0 | Status: SHIPPED | OUTPATIENT
Start: 2021-12-23 | End: 2022-01-19 | Stop reason: SDUPTHER

## 2021-12-23 RX ORDER — DICLOFENAC SODIUM 75 MG/1
75 TABLET, DELAYED RELEASE ORAL 2 TIMES DAILY PRN
Qty: 180 TABLET | Refills: 0 | Status: SHIPPED | OUTPATIENT
Start: 2021-12-23 | End: 2022-01-19 | Stop reason: SDUPTHER

## 2021-12-23 RX ORDER — METHOCARBAMOL 750 MG/1
750 TABLET, FILM COATED ORAL DAILY PRN
Qty: 90 TABLET | Refills: 0 | Status: SHIPPED | OUTPATIENT
Start: 2021-12-23 | End: 2022-01-02

## 2022-01-08 ENCOUNTER — HOSPITAL ENCOUNTER (OUTPATIENT)
Dept: RADIOLOGY | Facility: HOSPITAL | Age: 47
Discharge: HOME | End: 2022-01-08
Attending: PHYSICIAN ASSISTANT
Payer: COMMERCIAL

## 2022-01-08 DIAGNOSIS — M54.41 CHRONIC BILATERAL LOW BACK PAIN WITH RIGHT-SIDED SCIATICA: ICD-10-CM

## 2022-01-08 DIAGNOSIS — G89.29 CHRONIC BILATERAL LOW BACK PAIN WITH RIGHT-SIDED SCIATICA: ICD-10-CM

## 2022-01-08 DIAGNOSIS — M54.16 LUMBAR RADICULOPATHY: ICD-10-CM

## 2022-01-08 DIAGNOSIS — M51.36 DDD (DEGENERATIVE DISC DISEASE), LUMBAR: ICD-10-CM

## 2022-01-08 DIAGNOSIS — M47.26 OTHER SPONDYLOSIS WITH RADICULOPATHY, LUMBAR REGION: ICD-10-CM

## 2022-01-08 PROCEDURE — 72148 MRI LUMBAR SPINE W/O DYE: CPT | Mod: TC

## 2022-01-08 PROCEDURE — 72148 MRI LUMBAR SPINE WITHOUT CONTRAST: ICD-10-PCS | Mod: 26,,, | Performed by: RADIOLOGY

## 2022-01-08 PROCEDURE — 72148 MRI LUMBAR SPINE W/O DYE: CPT | Mod: 26,,, | Performed by: RADIOLOGY

## 2022-01-18 ENCOUNTER — PATIENT OUTREACH (OUTPATIENT)
Dept: ADMINISTRATIVE | Facility: OTHER | Age: 47
End: 2022-01-18
Payer: COMMERCIAL

## 2022-01-18 DIAGNOSIS — Z12.11 ENCOUNTER FOR FIT (FECAL IMMUNOCHEMICAL TEST) SCREENING: Primary | ICD-10-CM

## 2022-01-18 NOTE — PROGRESS NOTES
Health Maintenance Due   Topic Date Due    Hepatitis C Screening  Never done    COVID-19 Vaccine (1) Never done    Pneumococcal Vaccines (Age 0-64) (1 of 2 - PPSV23) Never done    HIV Screening  Never done    Colorectal Cancer Screening  Never done    Influenza Vaccine (1) 09/01/2021     Updates were requested from care everywhere.  Chart was reviewed for overdue Proactive [facility] Encounters ([facility]LL[facility]) topics (CRS, Breast Cancer Screening, Eye exam)  Health Maintenance has been updated.  LIN[place] immunization registry triggered.  Immunizations were reconciled.  Order placed for FIT kit.

## 2022-01-19 ENCOUNTER — OFFICE VISIT (OUTPATIENT)
Dept: NEUROSURGERY | Facility: CLINIC | Age: 47
End: 2022-01-19
Payer: COMMERCIAL

## 2022-01-19 ENCOUNTER — PATIENT MESSAGE (OUTPATIENT)
Dept: INTERNAL MEDICINE | Facility: CLINIC | Age: 47
End: 2022-01-19
Payer: COMMERCIAL

## 2022-01-19 ENCOUNTER — PATIENT MESSAGE (OUTPATIENT)
Dept: NEUROSURGERY | Facility: CLINIC | Age: 47
End: 2022-01-19
Payer: COMMERCIAL

## 2022-01-19 VITALS — WEIGHT: 240.06 LBS | BODY MASS INDEX: 34.37 KG/M2 | HEIGHT: 70 IN

## 2022-01-19 DIAGNOSIS — M51.36 DDD (DEGENERATIVE DISC DISEASE), LUMBAR: ICD-10-CM

## 2022-01-19 DIAGNOSIS — M54.41 CHRONIC BILATERAL LOW BACK PAIN WITH RIGHT-SIDED SCIATICA: Primary | ICD-10-CM

## 2022-01-19 DIAGNOSIS — M47.26 OTHER SPONDYLOSIS WITH RADICULOPATHY, LUMBAR REGION: ICD-10-CM

## 2022-01-19 DIAGNOSIS — M54.16 LUMBAR RADICULOPATHY: ICD-10-CM

## 2022-01-19 DIAGNOSIS — G89.29 CHRONIC BILATERAL LOW BACK PAIN WITH RIGHT-SIDED SCIATICA: Primary | ICD-10-CM

## 2022-01-19 PROCEDURE — 1160F PR REVIEW ALL MEDS BY PRESCRIBER/CLIN PHARMACIST DOCUMENTED: ICD-10-PCS | Mod: CPTII,S$GLB,, | Performed by: PHYSICIAN ASSISTANT

## 2022-01-19 PROCEDURE — 3008F BODY MASS INDEX DOCD: CPT | Mod: CPTII,S$GLB,, | Performed by: PHYSICIAN ASSISTANT

## 2022-01-19 PROCEDURE — 1160F RVW MEDS BY RX/DR IN RCRD: CPT | Mod: CPTII,S$GLB,, | Performed by: PHYSICIAN ASSISTANT

## 2022-01-19 PROCEDURE — 1159F PR MEDICATION LIST DOCUMENTED IN MEDICAL RECORD: ICD-10-PCS | Mod: CPTII,S$GLB,, | Performed by: PHYSICIAN ASSISTANT

## 2022-01-19 PROCEDURE — 99214 OFFICE O/P EST MOD 30 MIN: CPT | Mod: S$GLB,,, | Performed by: PHYSICIAN ASSISTANT

## 2022-01-19 PROCEDURE — 1159F MED LIST DOCD IN RCRD: CPT | Mod: CPTII,S$GLB,, | Performed by: PHYSICIAN ASSISTANT

## 2022-01-19 PROCEDURE — 99999 PR PBB SHADOW E&M-EST. PATIENT-LVL IV: ICD-10-PCS | Mod: PBBFAC,,, | Performed by: PHYSICIAN ASSISTANT

## 2022-01-19 PROCEDURE — 99999 PR PBB SHADOW E&M-EST. PATIENT-LVL IV: CPT | Mod: PBBFAC,,, | Performed by: PHYSICIAN ASSISTANT

## 2022-01-19 PROCEDURE — 99214 PR OFFICE/OUTPT VISIT, EST, LEVL IV, 30-39 MIN: ICD-10-PCS | Mod: S$GLB,,, | Performed by: PHYSICIAN ASSISTANT

## 2022-01-19 PROCEDURE — 3008F PR BODY MASS INDEX (BMI) DOCUMENTED: ICD-10-PCS | Mod: CPTII,S$GLB,, | Performed by: PHYSICIAN ASSISTANT

## 2022-01-19 RX ORDER — DICLOFENAC SODIUM 75 MG/1
75 TABLET, DELAYED RELEASE ORAL 2 TIMES DAILY PRN
Qty: 180 TABLET | Refills: 0 | OUTPATIENT
Start: 2022-01-19

## 2022-01-19 RX ORDER — METHOCARBAMOL 750 MG/1
750 TABLET, FILM COATED ORAL DAILY PRN
Qty: 90 TABLET | Refills: 0 | Status: SHIPPED | OUTPATIENT
Start: 2022-01-19 | End: 2022-01-29

## 2022-01-19 RX ORDER — GABAPENTIN 300 MG/1
CAPSULE ORAL
Qty: 270 CAPSULE | Refills: 0 | Status: SHIPPED | OUTPATIENT
Start: 2022-01-19 | End: 2022-04-20

## 2022-01-19 RX ORDER — GABAPENTIN 300 MG/1
CAPSULE ORAL
Qty: 270 CAPSULE | Refills: 0 | OUTPATIENT
Start: 2022-01-19

## 2022-01-19 RX ORDER — DICLOFENAC SODIUM 75 MG/1
75 TABLET, DELAYED RELEASE ORAL 2 TIMES DAILY PRN
Qty: 180 TABLET | Refills: 0 | Status: SHIPPED | OUTPATIENT
Start: 2022-01-19 | End: 2022-04-20

## 2022-01-20 NOTE — PROGRESS NOTES
"Subjective:     Patient ID:  Jesse Hernández is a 46 y.o. male.    Renzo    Chief Complaint: Back and right leg pain    HPI    Jesse Hernández is a 46 y.o. male who presents for MRI follow up.    Jesse Hernández is a 46 y.o. male who presents with the above CC.  Patient had right L4-5 and right L5-S1 laminectomy with Dr. Marysol Mcintosh in 2009. He did well after that spine surgery.  In 2012 he was moving a washer and started having more low back pain at that time.  He has had low back pain since then that has gotten progressively worse in the past year.  Back pain rated 9/10.  In the last 6 months he has started having pain radiating down the right posterior leg to the bottom of the foot that comes and goes rated 8/10.  For a few weeks he had pain in the left posterior leg to the knee but this has gone away.     The back pain is worse than the right leg pain.     He had physical therapy in 2019 without relief.  He had epidural injections before his last surgery.  He has not had any interventional pain procedures since.  He did have upper back pain at 1 time and had in office injections with his physician that did help.  He currently does not have midback pain.     He has not filled the three prescriptions yet.     Patient denies any recent accidents or trauma, no saddle anesthesias, and no bowel or bladder incontinence.      Review of Systems:  Constitution: Negative for chills, fever, night sweats and weight loss.   Musculoskeletal: Negative for falls.   Gastrointestinal: Negative for bowel incontinence, nausea and vomiting.   Genitourinary: Negative for bladder incontinence.   Neurological: Negative for disturbances in coordination and loss of balance.      Objective:      Vitals:    01/19/22 1349   Weight: 108.9 kg (240 lb 1.3 oz)   Height: 5' 10" (1.778 m)   PainSc:   2         Physical Exam:    General:  Jesse Hernández is well-developed, well-nourished, appears stated age, in no acute distress, alert and " oriented to person, place, and time.    Patient sits comfortably in the exam room and answers questions appropriately. Grossly patient is able to move bilateral lower extremities without difficulty.     XRAY Interpretation:      Lumbar spine xrays were personally reviewed today.  No fractures.  No movement on flexion and extension. Mild multilevel DDD and spondylosis.     MRI Interpretation:     Lumbar spine MRI was personally reviewed today. Right laminectomy defects at L4-5 and L5-S1.  No central stenosis.  No hnp or nfs.    Assessment:          1. Chronic bilateral low back pain with right-sided sciatica    2. Other spondylosis with radiculopathy, lumbar region    3. Lumbar radiculopathy    4. DDD (degenerative disc disease), lumbar            Plan:          Orders Placed This Encounter    Ambulatory referral/consult to Physical/Occupational Therapy     Recommend PT through [facility].  If pain persists would recommend seeing pain management to discuss possible interventional pain procedures.    Try diclofenac, robaxin, and gabapentin.    FU in 3 months.      Follow-Up:  Follow up in about 3 months (around 4/19/2022). If there are any questions prior to this, the patient was instructed to contact the office.       Anna Corona, [facility], PA-C  Neurosurgery  [facility]

## 2022-01-20 NOTE — TELEPHONE ENCOUNTER
Diclofenac, Robaxin, and gabapentin sent to [facility].    TABATHA Hills, PA-C  Neurosurgery  [facility]  01/19/2022

## 2022-03-18 ENCOUNTER — PATIENT MESSAGE (OUTPATIENT)
Dept: ADMINISTRATIVE | Facility: HOSPITAL | Age: 47
End: 2022-03-18
Payer: COMMERCIAL

## 2022-03-23 DIAGNOSIS — Z12.11 SCREENING FOR COLON CANCER: ICD-10-CM

## 2022-04-05 ENCOUNTER — PATIENT MESSAGE (OUTPATIENT)
Dept: PSYCHIATRY | Facility: CLINIC | Age: 47
End: 2022-04-05
Payer: COMMERCIAL

## 2022-04-07 LAB — HEMOCCULT STL QL IA: NEGATIVE

## 2022-04-17 ENCOUNTER — HOSPITAL ENCOUNTER (EMERGENCY)
Facility: HOSPITAL | Age: 47
Discharge: HOME | End: 2022-04-17
Attending: EMERGENCY MEDICINE
Payer: COMMERCIAL

## 2022-04-17 VITALS
OXYGEN SATURATION: 97 % | HEIGHT: 69 IN | HEART RATE: 66 BPM | DIASTOLIC BLOOD PRESSURE: 72 MMHG | RESPIRATION RATE: 19 BRPM | SYSTOLIC BLOOD PRESSURE: 126 MMHG | WEIGHT: 240 LBS | TEMPERATURE: 98 F | BODY MASS INDEX: 35.55 KG/M2

## 2022-04-17 DIAGNOSIS — K92.1 HEMATOCHEZIA: Primary | ICD-10-CM

## 2022-04-17 DIAGNOSIS — K92.2 GASTROINTESTINAL HEMORRHAGE, UNSPECIFIED GASTROINTESTINAL HEMORRHAGE TYPE: ICD-10-CM

## 2022-04-17 LAB
ALBUMIN SERPL BCP-MCNC: 4 G/DL (ref 3.5–5.2)
ALP SERPL-CCNC: 50 U/L (ref 55–135)
ALT SERPL W/O P-5'-P-CCNC: 29 U/L (ref 10–44)
ANION GAP SERPL CALC-SCNC: 13 MMOL/L (ref 8–16)
AST SERPL-CCNC: 23 U/L (ref 10–40)
BASOPHILS # BLD AUTO: 0.05 K/UL (ref 0–0.2)
BASOPHILS NFR BLD: 0.6 % (ref 0–1.9)
BILIRUB SERPL-MCNC: 0.3 MG/DL (ref 0.1–1)
BUN SERPL-MCNC: 18 MG/DL (ref 6–20)
CALCIUM SERPL-MCNC: 9.5 MG/DL (ref 8.7–10.5)
CHLORIDE SERPL-SCNC: 107 MMOL/L (ref 95–110)
CO2 SERPL-SCNC: 20 MMOL/L (ref 23–29)
CREAT SERPL-MCNC: 1 MG/DL (ref 0.5–1.4)
DIFFERENTIAL METHOD: ABNORMAL
EOSINOPHIL # BLD AUTO: 0.1 K/UL (ref 0–0.5)
EOSINOPHIL NFR BLD: 1.8 % (ref 0–8)
ERYTHROCYTE [DISTWIDTH] IN BLOOD BY AUTOMATED COUNT: 13.8 % (ref 11.5–14.5)
EST. GFR  (AFRICAN AMERICAN): >60 ML/MIN/1.73 M^2
EST. GFR  (NON AFRICAN AMERICAN): >60 ML/MIN/1.73 M^2
GLUCOSE SERPL-MCNC: 94 MG/DL (ref 70–110)
HCT VFR BLD AUTO: 42.9 % (ref 40–54)
HGB BLD-MCNC: 14.4 G/DL (ref 14–18)
IMM GRANULOCYTES # BLD AUTO: 0.05 K/UL (ref 0–0.04)
IMM GRANULOCYTES NFR BLD AUTO: 0.6 % (ref 0–0.5)
LYMPHOCYTES # BLD AUTO: 2.6 K/UL (ref 1–4.8)
LYMPHOCYTES NFR BLD: 32.5 % (ref 18–48)
MCH RBC QN AUTO: 29.8 PG (ref 27–31)
MCHC RBC AUTO-ENTMCNC: 33.6 G/DL (ref 32–36)
MCV RBC AUTO: 89 FL (ref 82–98)
MONOCYTES # BLD AUTO: 0.6 K/UL (ref 0.3–1)
MONOCYTES NFR BLD: 7.4 % (ref 4–15)
NEUTROPHILS # BLD AUTO: 4.5 K/UL (ref 1.8–7.7)
NEUTROPHILS NFR BLD: 57.1 % (ref 38–73)
NRBC BLD-RTO: 0 /100 WBC
PLATELET # BLD AUTO: 274 K/UL (ref 150–450)
PMV BLD AUTO: 10.5 FL (ref 9.2–12.9)
POTASSIUM SERPL-SCNC: 4.3 MMOL/L (ref 3.5–5.1)
PROT SERPL-MCNC: 7.6 G/DL (ref 6–8.4)
RBC # BLD AUTO: 4.84 M/UL (ref 4.6–6.2)
SODIUM SERPL-SCNC: 140 MMOL/L (ref 136–145)
WBC # BLD AUTO: 7.85 K/UL (ref 3.9–12.7)

## 2022-04-17 PROCEDURE — 85025 COMPLETE CBC W/AUTO DIFF WBC: CPT | Performed by: EMERGENCY MEDICINE

## 2022-04-17 PROCEDURE — 80053 COMPREHEN METABOLIC PANEL: CPT | Performed by: EMERGENCY MEDICINE

## 2022-04-17 PROCEDURE — 99283 EMERGENCY DEPT VISIT LOW MDM: CPT | Mod: 25

## 2022-04-18 ENCOUNTER — OFFICE VISIT (OUTPATIENT)
Dept: GASTROENTEROLOGY | Facility: CLINIC | Age: 47
End: 2022-04-18
Payer: COMMERCIAL

## 2022-04-18 VITALS — HEIGHT: 69 IN | BODY MASS INDEX: 35.95 KG/M2 | WEIGHT: 242.75 LBS

## 2022-04-18 DIAGNOSIS — K92.2 GASTROINTESTINAL HEMORRHAGE, UNSPECIFIED GASTROINTESTINAL HEMORRHAGE TYPE: ICD-10-CM

## 2022-04-18 DIAGNOSIS — Z12.11 SCREENING FOR COLON CANCER: Primary | ICD-10-CM

## 2022-04-18 DIAGNOSIS — K92.1 HEMATOCHEZIA: ICD-10-CM

## 2022-04-18 PROCEDURE — 3008F PR BODY MASS INDEX (BMI) DOCUMENTED: ICD-10-PCS | Mod: CPTII,S$GLB,, | Performed by: NURSE PRACTITIONER

## 2022-04-18 PROCEDURE — 99204 OFFICE O/P NEW MOD 45 MIN: CPT | Mod: S$GLB,,, | Performed by: NURSE PRACTITIONER

## 2022-04-18 PROCEDURE — 99999 PR PBB SHADOW E&M-EST. PATIENT-LVL IV: ICD-10-PCS | Mod: PBBFAC,,, | Performed by: NURSE PRACTITIONER

## 2022-04-18 PROCEDURE — 1159F MED LIST DOCD IN RCRD: CPT | Mod: CPTII,S$GLB,, | Performed by: NURSE PRACTITIONER

## 2022-04-18 PROCEDURE — 99204 PR OFFICE/OUTPT VISIT, NEW, LEVL IV, 45-59 MIN: ICD-10-PCS | Mod: S$GLB,,, | Performed by: NURSE PRACTITIONER

## 2022-04-18 PROCEDURE — 1159F PR MEDICATION LIST DOCUMENTED IN MEDICAL RECORD: ICD-10-PCS | Mod: CPTII,S$GLB,, | Performed by: NURSE PRACTITIONER

## 2022-04-18 PROCEDURE — 99999 PR PBB SHADOW E&M-EST. PATIENT-LVL IV: CPT | Mod: PBBFAC,,, | Performed by: NURSE PRACTITIONER

## 2022-04-18 PROCEDURE — 3008F BODY MASS INDEX DOCD: CPT | Mod: CPTII,S$GLB,, | Performed by: NURSE PRACTITIONER

## 2022-04-18 RX ORDER — MELOXICAM 7.5 MG/1
7.5 TABLET ORAL DAILY
COMMUNITY
End: 2022-05-12 | Stop reason: ALTCHOICE

## 2022-04-18 NOTE — PROGRESS NOTES
GASTROENTEROLOGY CLINIC NOTE    Chief Complaint: Diagnoses of Hematochezia and Gastrointestinal hemorrhage, unspecified gastrointestinal hemorrhage type were pertinent to this visit.  Referring provider/PCP: Roxanne Wolfe MD    HPI:  Jesse Hernández is a 47 y.o. male who is a new patient to me without a significant GI PMH.  He is here today to establish care for hematochezia and colon cancer screening.  This is a new problem that began one day ago.  Patient reports he had a bowel movement last night with bright red blood noted in stool and in toilet bowl.  This prompted him to seek care at the emergency room.  CRUZ was performed and according to ER physician's note, faint amount of dried red blood noted with exam; no hemorrhoids or fissures noted.  Labs were also obtained and are stable.  Patient reports he had 3 bowel movements this morning.  First bowel movement was stool and he did not notice blood in his stool but did notice it with wiping.  This was followed by two episodes of feeling urge to have a bowel movement and passing of bright red blood without stool.  Denies constipation prior to start of symptoms.  No straining with bowel movements or abdominal pain.  No changes in his diet but does report eating boiled seafood yesterday prior to start of symptoms.     Of note, he is taking Mobic daily which was started a week ago.     Treatments Tried: None  NSAIDs: Mobic  Anticoagulation or Antiplatelet: No    Prior Upper Endoscopy: No  Prior Colonoscopy: No  Family h/o Colon Cancer: No  Family h/o Crohn's Disease or Ulcerative Colitis: No  Family h/o Celiac Sprue: No  Abdominal Surgeries: 2019 umbilical hernia repair; 2021 inguinal hernia repair    Review of Systems   Constitutional: Negative for weight loss.   HENT: Negative for sore throat.    Eyes: Negative for blurred vision.   Respiratory: Negative for cough.    Cardiovascular: Negative for chest pain.   Gastrointestinal: Positive for blood in stool.  Negative for abdominal pain, constipation, diarrhea, heartburn, melena, nausea and vomiting.   Genitourinary: Negative for dysuria.   Musculoskeletal: Negative for myalgias.   Skin: Negative for rash.   Neurological: Negative for headaches.   Endo/Heme/Allergies: Negative for environmental allergies.   Psychiatric/Behavioral: Negative for suicidal ideas. The patient is not nervous/anxious.        Past Medical History: has a past medical history of Hypertension and KAILEE (obstructive sleep apnea).    Past Surgical History: has a past surgical history that includes Back surgery; Spine surgery; Umbilical hernia repair (N/A, 11/25/2019); Hernia repair; Patent ductus arterious ligation; and Robot-assisted laparoscopic repair of inguinal hernia (Left, 6/28/2021).    Family History:family history includes Cancer in his mother; Dementia in his mother; Diabetes in his paternal grandmother.    Allergies: Review of patient's allergies indicates:  No Known Allergies    Social History: reports that he quit smoking about 13 years ago. He has quit using smokeless tobacco. He reports current alcohol use. He reports that he does not use drugs.    Home medications:   Current Outpatient Medications on File Prior to Visit   Medication Sig Dispense Refill    benzonatate (TESSALON PERLES) 100 MG capsule Take 2 capsules (200 mg total) by mouth 3 (three) times daily as needed for Cough. 30 capsule 1    diclofenac (VOLTAREN) 75 MG EC tablet Take 1 tablet (75 mg total) by mouth 2 (two) times daily as needed (pain). 180 tablet 0    DULoxetine (CYMBALTA) 60 MG capsule TAKE 1 CAPSULE(60 MG) BY MOUTH EVERY DAY 90 capsule 0    fenofibrate 160 MG Tab Take 1 tablet (160 mg total) by mouth once daily. 90 tablet 1    gabapentin (NEURONTIN) 300 MG capsule Take one capsule by mouth  at night for one week then increased to one capsule  every 12 hours for one week then increased to one capsule by mouth every 8 hours and continue with three times a day  "270 capsule 0    lamoTRIgine (LAMICTAL) 150 MG Tab TAKE 2 TABLETS(300 MG) BY MOUTH EVERY  tablet 0    levothyroxine (SYNTHROID) 75 MCG tablet Take 1 tablet (75 mcg total) by mouth before breakfast. 90 tablet 1    metoprolol succinate (TOPROL-XL) 100 MG 24 hr tablet TAKE 1 TABLET(100 MG) BY MOUTH EVERY DAY 90 tablet 1    [DISCONTINUED] loratadine (CLARITIN) 10 mg tablet Take 1 tablet (10 mg total) by mouth once daily. 30 tablet 2     No current facility-administered medications on file prior to visit.       Vital signs:  Ht 5' 9" (1.753 m)   Wt 110.1 kg (242 lb 11.6 oz)   BMI 35.84 kg/m²     Physical Exam  Vitals reviewed.   Constitutional:       General: He is not in acute distress.     Appearance: Normal appearance. He is not ill-appearing.   HENT:      Head: Normocephalic.   Cardiovascular:      Rate and Rhythm: Normal rate and regular rhythm.      Heart sounds: Normal heart sounds. No murmur heard.  Pulmonary:      Effort: Pulmonary effort is normal. No respiratory distress.      Breath sounds: Normal breath sounds.   Chest:      Chest wall: No tenderness.   Abdominal:      General: Bowel sounds are normal. There is no distension.      Palpations: Abdomen is soft.      Tenderness: There is no abdominal tenderness. Negative signs include Taveras's sign.      Hernia: No hernia is present.   Skin:     General: Skin is warm.   Neurological:      Mental Status: He is alert and oriented to person, place, and time.   Psychiatric:         Mood and Affect: Mood normal.         Behavior: Behavior normal.         Routine labs:  Lab Results   Component Value Date    WBC 7.85 04/17/2022    HGB 14.4 04/17/2022    HCT 42.9 04/17/2022    MCV 89 04/17/2022     04/17/2022     Lab Results   Component Value Date    INR 1.0 12/25/2019     No results found for: IRON, FERRITIN, TIBC, FESATURATED  Lab Results   Component Value Date     04/17/2022    K 4.3 04/17/2022     04/17/2022    CO2 20 (L) 04/17/2022 "    BUN 18 04/17/2022    CREATININE 1.0 04/17/2022     Lab Results   Component Value Date    ALBUMIN 4.0 04/17/2022    ALT 29 04/17/2022    AST 23 04/17/2022    ALKPHOS 50 (L) 04/17/2022    BILITOT 0.3 04/17/2022     No results found for: GLUCOSE  Lab Results   Component Value Date    TSH 3.551 11/16/2021     Lab Results   Component Value Date    CALCIUM 9.5 04/17/2022       Imaging:    I have reviewed prior labs, imaging, and notes.      Assessment:  1. Screening for colon cancer    2. Hematochezia    3. Gastrointestinal hemorrhage, unspecified gastrointestinal hemorrhage type        Plan:  Orders Placed This Encounter    COVID-19 Routine Screening    Case Request Endoscopy: COLONOSCOPY     Colonoscopy. Miralax prep.    Discussed risks versus benefits given the sensitivity to the prep solution limitations.  Patient understands risks and wishes to proceed with Miralax Dulcolax prep for colonoscopy.       Plan of care discussed with patient who is in agreement and verbalized understanding.     I have explained the planned procedures to the patient.The risks, benefits and alternatives of the procedure were also explained in detail. Patient verbalized understanding, all questions were answered. The patient agrees to proceed as planned    Follow Up: As Needed Pending Above Workup          Vika Miner, BEKA,FNP-BC  [facility]

## 2022-04-18 NOTE — ED NOTES
Pt c/o 1 episode of rectal bleed this PM. Pt admits to mid- morning nausea x 1 month, but no hemoptysis or hematemesis. Pt is A & O x 3, denies SOB, fever, chills and N/V/D. No obvious respiratory distress noted. Respirations are even and unlabored. Skin is warm and dry w/ pink mucosa. VS. ORQUIDEA x 3mm. BBS- CTA . Abd- SNT. PSM x 4 exts. Bed is locked, in the low position and locked for safety. Call bell and wife @ BS. Will continue to monitor closely.

## 2022-04-18 NOTE — ED NOTES
Pt is A & O x 3, appropriate and resting comfortably. Pt denies respiratory distress, respirations are even and unlabored. Skin is warm and dry w/ pink mucosa. VS.  Wife remains at BS. Will continue to monitor closely.

## 2022-04-18 NOTE — PATIENT INSTRUCTIONS
Miralax Prep    [facility] [address]  Clinic Office [phone]  Endoscopy Lab [phone]    You are scheduled for a Colonoscopy with  on 04/22/2021  at [facility] in [place].    Check in at the Hospital -1st floor, Information desk.   Call [phone] to reschedule.    An adult friend/family member must come with you to drive you home.  You cannot drive, take a taxi, Uber/Lyft or bus to leave the Endoscopy Center alone.  If you do not have someone to drive you home, your test will be cancelled.     Please follow the directions of your doctor if you take any pills that thin your blood. If you take these meds: Aggrenox, Brilinta, Effient, Eliquis, Lovenox, Plavix, Pletal, Pradaxa, Ticilid, Xarelto or Coumadin, let the doctor's office know.    DON'T: On the morning of the test do not take insulin or pills for diabetes.     DO: On the morning of the test, do take any pills for blood pressure, heart, anti-rejection and or seizures with a small sip of water. Bring any inhalers with you.    To have a good prep, you must follow these instructions - please do not use the directions from the pharmacy.    You need to buy medicine from the drugstore. You do not need a prescription from the doctor. Generic medicine is ok.  4 Dulcolax pills - 5mg  Gas X (simethicone) 125mg capsules or tablets   1 Bottle of Miralax - 238gram bottle  128 ounces of Gatorade (yellow or green)      The Day Before the test:    You can only drink CLEAR LIQUIDS the whole day before your test.  You can't eat any food for the whole day.    You CAN have:  Water, Coffee or decaf coffee (no milk or cream)  Tea  Soft drinks - regular and sugar free  Jello (green or yellow)  Apple Juice, grape juice, white cranberry juice  Gatorade, Power Aid, Crystal Light, Chepe Aid  Lemonade and Limeade  Bouillon, clear soup  Snowball, popsicles  YOU CAN'T DRINK ANYTHING RED  YOU CAN'T DRINK ALCOHOL  ONLY DRINK WHAT IS ON THE  LIST    At 4:00 PM  Take 4 pills of Dulcolax.  Take 1 dose of simethicone (Gas-X) tablets or capsules. Use as Directed.       At 6:00 PM,   Drink 1 glass (8 ounces) every 10 minutes until 64 ounces of Gatorade is finished. (Keep it cold and in refrigerator as much as you can while drinking it).   Add 1 capful of Miralax to each 8 ounces of Gatorade = 4 capfuls in 32-ounce bottle = 8 capfuls in 64-ounce bottle.    Make it in the morning. Put it in the refrigerator AFTER you make it. It tastes better if it is cold. Do NOT put this solution over ice. It is ok to drink with a straw.    The Day of the test - We will call you 2 days before your test to tell you what time to get there.    5 hours before you come to the hospital (this may be in the middle of the night)  Drink 1 glass (8 ounces) every 10 minutes until 64 ounces of Gatorade is finished. (Keep it cold and in refrigerator as much as you can while drinking it).   Add 1 capful of Miralax to each 8 ounces of Gatorade = 4 capfuls in 32-ounce bottle = 8 capfuls in 64-ounce bottle.    Make it in the morning. Put it in the refrigerator AFTER you make it. It tastes better if it is cold. Do NOT put this solution over ice. It is ok to drink with a straw    YOU CAN'T EAT OR DRINK ANYTHING ELSE ONCE YOU FINISH THE PREP    Leave all valuables and jewelry at home. You will be at the hospital for 2-4 hours.    Call the Endoscopy department at [phone] with any questions about your procedure.

## 2022-04-18 NOTE — ED PROVIDER NOTES
Encounter Date: 4/17/2022    SCRIBE #1 NOTE: I, Gary Thacker, am scribing for, and in the presence of, Pieter Streeter MD.       History     Chief Complaint   Patient presents with    Rectal Bleeding     Patient presents to the ED with reports of passing bright red blood when using the restroom this evening. Denies any pain. States only having one episode this evening. Reports having had a hx of hemorrhoids but reports he does not feel any present at this time.      Jesse Hernández is a 47 y.o. male with a past medical history of Hypertension and Hemorrhoids presents to the Emergency Department for evaluation of Rectal Bleeding. Patient reports passing bright red blood in his stool when using the restroom 2 hours ago. He also complains of nausea. He denies any pain, fatigue, SOB, or weakness. Patient is a non-smoker but chews tobacco. No known drug allergies.     The history is provided by the patient. No  was used.     Review of patient's allergies indicates:  No Known Allergies  Past Medical History:   Diagnosis Date    Hypertension     KAILEE (obstructive sleep apnea)      Past Surgical History:   Procedure Laterality Date    BACK SURGERY      HERNIA REPAIR      PATENT DUCTUS ARTERIOUS LIGATION      infancy    ROBOT-ASSISTED LAPAROSCOPIC REPAIR OF INGUINAL HERNIA Left 6/28/2021    Procedure: ROBOTIC REPAIR, HERNIA, INGUINAL;  Surgeon: Patrick Rehman Jr., MD;  Location: [facility] OR;  Service: General;  Laterality: Left;  Left vs bilateral    SPINE SURGERY      UMBILICAL HERNIA REPAIR N/A 11/25/2019    Procedure: REPAIR, HERNIA, UMBILICAL, AGE 5 YEARS OR OLDER - open with mesh;  Surgeon: Patrick Rehman Jr., MD;  Location: [facility] OR;  Service: General;  Laterality: N/A;     Family History   Problem Relation Age of Onset    Cancer Mother     Dementia Mother     Diabetes Paternal Grandmother      Social History     Tobacco Use    Smoking status: Former Smoker     Quit date:  2008     Years since quittin.3    Smokeless tobacco: Former User   Substance Use Topics    Alcohol use: Yes    Drug use: No     Review of Systems   Constitutional: Negative for chills, fatigue and fever.   HENT: Negative for sore throat.    Eyes: Negative for redness.   Respiratory: Negative for shortness of breath.    Cardiovascular: Negative for chest pain.   Gastrointestinal: Positive for anal bleeding, blood in stool and nausea. Negative for abdominal pain, diarrhea and vomiting.   Genitourinary: Negative for dysuria and hematuria.   Musculoskeletal: Negative for back pain.   Skin: Negative for rash.   Neurological: Negative for weakness and headaches.   All other systems reviewed and are negative.      Physical Exam     Initial Vitals [22]   BP Pulse Resp Temp SpO2   138/83 78 18 98.3 °F (36.8 °C) 98 %      MAP       --         Physical Exam    Nursing note and vitals reviewed.  Constitutional: He appears well-developed and well-nourished.   HENT:   Head: Normocephalic and atraumatic.   Eyes: EOM are normal. Pupils are equal, round, and reactive to light.   Neck: Neck supple.   Normal range of motion.  Cardiovascular: Normal rate, regular rhythm, normal heart sounds and intact distal pulses.   Pulmonary/Chest: Breath sounds normal. No respiratory distress.   Abdominal: Abdomen is soft. Bowel sounds are normal. He exhibits no distension. There is no abdominal tenderness.   Genitourinary:    Genitourinary Comments: Done with chaperone, faint amount of dried red blood per rectum, CRUZ no hemorrhoids or fissures noted normal stool, no gross blood     Musculoskeletal:         General: Normal range of motion.      Cervical back: Normal range of motion and neck supple.     Neurological: He is alert and oriented to person, place, and time. GCS score is 15. GCS eye subscore is 4. GCS verbal subscore is 5. GCS motor subscore is 6.   Skin: Skin is warm and dry. Capillary refill takes less than 2  seconds.   Psychiatric: He has a normal mood and affect. His behavior is normal. Judgment and thought content normal.         ED Course   Procedures  Labs Reviewed   CBC W/ AUTO DIFFERENTIAL - Abnormal; Notable for the following components:       Result Value    Immature Granulocytes 0.6 (*)     Immature Grans (Abs) 0.05 (*)     All other components within normal limits   COMPREHENSIVE METABOLIC PANEL - Abnormal; Notable for the following components:    CO2 20 (*)     Alkaline Phosphatase 50 (*)     All other components within normal limits          Imaging Results    None          Medications - No data to display  Medical Decision Making:   Initial Assessment:   Pleasant 47-year-old male nonsmoker, but chews tobacco, reports a history of external hemorrhoids, as well as a history of hypertension, presents the ER for evaluation of bright red blood per rectum.  Onset today around 831 bowel movement of bright red blood mixed with stools.  No abdominal pain chest pain shortness of breath.  Does endorse some nausea.  Hemodynamically stable no acute distress.  Rectal revealed scant amount of dried  red blood in rectum but no gross blood, no fissures no hemorrhoids noted.  Differential includes external versus internal hemorrhoid from a diverticular bleed, rectal fissure, other cause.  Will plan blood work observation reassess.  Clinical Tests:   Lab Tests: Reviewed and Ordered          Scribe Attestation:   Scribe #1: I performed the above scribed service and the documentation accurately describes the services I performed. I attest to the accuracy of the note.        ED Course as of 04/18/22 0250   Sun Apr 17, 2022   2321 Resting bed no acute distress patient remained hemodynamically stable.  Rectal exam no gross blood.  Hemoglobin 14 4. No further signs of GI bleeding.  Discussed with patient.  Discussed plan discharge home continue monitoring symptoms will give GI referral strict return precautions discussed which  patient understood agreed with and patient will be discharged.   [SE]      ED Course User Index  [SE] Pieter Streeter MD             Clinical Impression:   Final diagnoses:  [K92.1] Hematochezia (Primary)  [K92.2] Gastrointestinal hemorrhage, unspecified gastrointestinal hemorrhage type          ED Disposition Condition    Discharge Stable          My Scribe Attestation: I acknowledge that the documentation on this chart was provided by described on the date of service noted above and that the documentation in the chart accurately reflects work and decisions made by me alone.        ED Prescriptions     None        Follow-up Information     Follow up With Specialties Details Why Contact Info Additional Information    Roxanne Wolfe MD Internal Medicine Schedule an appointment as soon as possible for a visit  As needed [address] [facility] [id number] [phone]       [facility] Gastroenterology Schedule an appointment as soon as possible for a visit in 2 days  [address], [facility] [address]  [facility] [address] [phone] Please park in [address] or [address] and use [address]dy neal. Take Medical Office Bldg elevators.           Pieter Streeter MD  04/18/22 025

## 2022-04-20 ENCOUNTER — OFFICE VISIT (OUTPATIENT)
Dept: DERMATOLOGY | Facility: CLINIC | Age: 47
End: 2022-04-20
Payer: COMMERCIAL

## 2022-04-20 ENCOUNTER — TELEPHONE (OUTPATIENT)
Dept: ENDOSCOPY | Facility: HOSPITAL | Age: 47
End: 2022-04-20
Payer: COMMERCIAL

## 2022-04-20 DIAGNOSIS — L30.9 HAND ECZEMA: ICD-10-CM

## 2022-04-20 DIAGNOSIS — D48.5 NEOPLASM OF UNCERTAIN BEHAVIOR OF SKIN: Primary | ICD-10-CM

## 2022-04-20 DIAGNOSIS — D23.9 DERMATOFIBROMA: ICD-10-CM

## 2022-04-20 DIAGNOSIS — L28.1 PRURIGO NODULARIS: ICD-10-CM

## 2022-04-20 PROCEDURE — 88305 TISSUE EXAM BY PATHOLOGIST: ICD-10-PCS | Mod: 26,,, | Performed by: PATHOLOGY

## 2022-04-20 PROCEDURE — 88305 TISSUE EXAM BY PATHOLOGIST: CPT | Mod: 26,,, | Performed by: PATHOLOGY

## 2022-04-20 PROCEDURE — 99204 PR OFFICE/OUTPT VISIT, NEW, LEVL IV, 45-59 MIN: ICD-10-PCS | Mod: 25,S$GLB,, | Performed by: DERMATOLOGY

## 2022-04-20 PROCEDURE — 88305 TISSUE EXAM BY PATHOLOGIST: CPT | Mod: 59 | Performed by: PATHOLOGY

## 2022-04-20 PROCEDURE — 99999 PR PBB SHADOW E&M-EST. PATIENT-LVL III: CPT | Mod: PBBFAC,,, | Performed by: DERMATOLOGY

## 2022-04-20 PROCEDURE — 99204 OFFICE O/P NEW MOD 45 MIN: CPT | Mod: 25,S$GLB,, | Performed by: DERMATOLOGY

## 2022-04-20 PROCEDURE — 11103 TANGNTL BX SKIN EA SEP/ADDL: CPT | Mod: S$GLB,,, | Performed by: DERMATOLOGY

## 2022-04-20 PROCEDURE — 1159F PR MEDICATION LIST DOCUMENTED IN MEDICAL RECORD: ICD-10-PCS | Mod: CPTII,S$GLB,, | Performed by: DERMATOLOGY

## 2022-04-20 PROCEDURE — 1159F MED LIST DOCD IN RCRD: CPT | Mod: CPTII,S$GLB,, | Performed by: DERMATOLOGY

## 2022-04-20 PROCEDURE — 11102 TANGNTL BX SKIN SINGLE LES: CPT | Mod: S$GLB,,, | Performed by: DERMATOLOGY

## 2022-04-20 PROCEDURE — 11102 PR TANGENTIAL BIOPSY, SKIN, SINGLE LESION: ICD-10-PCS | Mod: S$GLB,,, | Performed by: DERMATOLOGY

## 2022-04-20 PROCEDURE — 99999 PR PBB SHADOW E&M-EST. PATIENT-LVL III: ICD-10-PCS | Mod: PBBFAC,,, | Performed by: DERMATOLOGY

## 2022-04-20 PROCEDURE — 11103 PR TANGENTIAL BIOPSY, SKIN, EA ADDTL LESION: ICD-10-PCS | Mod: S$GLB,,, | Performed by: DERMATOLOGY

## 2022-04-20 RX ORDER — MOMETASONE FUROATE 1 MG/G
OINTMENT TOPICAL DAILY
Qty: 15 G | Refills: 2 | Status: SHIPPED | OUTPATIENT
Start: 2022-04-20 | End: 2022-06-01

## 2022-04-20 NOTE — PROGRESS NOTES
Subjective:       Patient ID:  Jesse Hernández is a 47 y.o. male who presents for   Chief Complaint   Patient presents with    Lesion     Right arm , right leg      Lesion - Initial  Affected locations: right lower leg and right arm  Duration: 2 years  Signs / symptoms: dryness and cracking  Severity: mild  Timing: constant  Relieving factors/Treatments tried: OTC moisturizer and OTC antibiotic cream (neosporin)        Review of Systems   Constitutional: Negative for fever, chills and fatigue.   Skin: Positive for dry skin.   Hematologic/Lymphatic: Does not bruise/bleed easily.        Objective:    Physical Exam   Constitutional: He appears well-developed and well-nourished. No distress.   Neurological: He is alert and oriented to person, place, and time. He is not disoriented.   Psychiatric: He has a normal mood and affect.   Skin:   Areas Examined (abnormalities noted in diagram):   RUE Inspected  RLE Inspected                  Diagram Legend     Erythematous scaling macule/papule c/w actinic keratosis       Vascular papule c/w angioma      Pigmented verrucoid papule/plaque c/w seborrheic keratosis      Yellow umbilicated papule c/w sebaceous hyperplasia      Irregularly shaped tan macule c/w lentigo     1-2 mm smooth white papules consistent with Milia      Movable subcutaneous cyst with punctum c/w epidermal inclusion cyst      Subcutaneous movable cyst c/w pilar cyst      Firm pink to brown papule c/w dermatofibroma      Pedunculated fleshy papule(s) c/w skin tag(s)      Evenly pigmented macule c/w junctional nevus     Mildly variegated pigmented, slightly irregular-bordered macule c/w mildly atypical nevus      Flesh colored to evenly pigmented papule c/w intradermal nevus       Pink pearly papule/plaque c/w basal cell carcinoma      Erythematous hyperkeratotic cursted plaque c/w SCC      Surgical scar with no sign of skin cancer recurrence      Open and closed comedones      Inflammatory papules and  pustules      Verrucoid papule consistent consistent with wart     Erythematous eczematous patches and plaques     Dystrophic onycholytic nail with subungual debris c/w onychomycosis     Umbilicated papule    Erythematous-base heme-crusted tan verrucoid plaque consistent with inflamed seborrheic keratosis     Erythematous Silvery Scaling Plaque c/w Psoriasis     See annotation              Assessment / Plan:      Pathology Orders:     Normal Orders This Visit    Specimen to Pathology, Dermatology     Questions:    Procedure Type: Dermatology and skin neoplasms    Number of Specimens: 2    ------------------------: -------------------------    Spec 1 Procedure: Biopsy    Spec 1 Clinical Impression: r/o scc vs pruigo nod    Spec 1 Source: right upper arm    ------------------------: -------------------------    Spec 2 Procedure: Biopsy    Spec 2 Clinical Impression: r/o scc vs pruigo nod    Spec 2 Source: right mid forearm    Release to patient: Immediate        Neoplasm of uncertain behavior of skin  -     Specimen to Pathology, Dermatology  Shave biopsy procedure note:    Shave biopsy performed after verbal consent including risk of infection, scar, recurrence, need for additional treatment of site. Area prepped with alcohol, anesthetized with approximately 1.0cc of 1% lidocaine with epinephrine. Lesional tissue shaved with razor blade. Hemostasis achieved with application of aluminum chloride followed by hyfrecation. No complications. Dressing applied. Wound care explained.      Prurigo nodularis  -     mometasone (ELOCON) 0.1 % ointment; Apply topically once daily. Cover with bandaid  Dispense: 15 g; Refill: 2  Discussed good skin care regimen including using a mild soap and moisturizing cream 1 - 2 times per day. Limit to one bath/shower per day and use lukewarm (not hot) water.     Discussed with patient the importance of not manipulating skin lesions. Trauma often exacerbates condition. Trimming nails is  recommended to avoid puncturing skin.     Use ice to relieve intense pruritus.      Counseling on topical steroids:  Patient counseled that the prolonged use of topical steroids can result in the increased appearance superficial blood vessels (telangiectasias) lightening (hypopigmentation), and   thinning of the skin ( atrophy).  Patient understands to avoid using high potency steroids in skin folds, the groin or the face.  The patient verbalized understanding of proper use and possible adverse effects of topical steroids.  All patient's questions and concerns were addressed.    Dermatofibroma  This is a benign scar-like lesion secondary to minor trauma. No treatment required.     Hand eczema    - okay to use mometasone ointment to red scaly areas   Recommend Elta MD So Silky Hand CREME or [employer]'s working hands after every hand washing and every hour while awake (when flared).    Recommend Neutrogena Norwegian Hand Cream or Vaseline jelly +/- under white cotton gloves every night.        F/u 3 mo for hands and TBSE           No follow-ups on file.

## 2022-04-20 NOTE — TELEPHONE ENCOUNTER
Spoke with patient about arrival time @ 0730.   Covid test = Rapid    Prep instructions reviewed: the day before the procedure, follow a clear liquid diet all day, then start the first 1/2 of prep at 5pm and take 2nd 1/2 of prep @ 0230.  Pt must be completely NPO when prep completed @ 0430.              Medications: Do not take Insulin or oral diabetic medications the day of the procedure.  Take as prescribed: heart, seizure and blood pressure medication in the morning with a sip of water (less than an ounce).  Take any breathing medications and bring inhalers to hospital with you Leave all valuables and jewelry at home.     Wear comfortable clothes to procedure to change into hospital gown You cannot drive for 24 hours after your procedure because you will receive sedation for your procedure to make you comfortable.  A ride must be provided at discharge.

## 2022-04-20 NOTE — PATIENT INSTRUCTIONS
Shave Biopsy Wound Care    Your doctor has performed a shave biopsy today.  A band aid and vaseline ointment has been placed over the site.  This should remain in place for NO LONGER THAN 48 hours.  It is fine to remove the bandaid after 24 hours, if the area is no longer bleeding. It is recommended that you keep the area dry (do not wet)) for the first 24 hours.  After 24 hours, wash the area with warm soap and water and apply Vaseline jelly.  Many patients prefer to use Neosporin or Bacitracin ointment.  This is acceptable; however, know that you can develop an allergy to this medication even if you have used it safely for years.  It is important to keep the area moist.  Letting it dry out and get air slows healing time, and will worsen the scar.        If you notice increasing redness, tenderness, pain, or yellow drainage at the biopsy site, please notify your doctor.  These are signs of an infection.    If your biopsy site is bleeding, apply firm pressure for 15 minutes straight.  Repeat for another 15 minutes, if it is still bleeding.   If the surgical site continues to bleed, then please contact your doctor.      For [facility] users:   You will receive your biopsy results in [facility] as soon as they are available. Please be assured that your physician/provider will review your results and will then determine what further treatment, evaluation, or planning is required. You should be contacted by your physician's/provider's office within 5 business days of receiving your results; If not, please reach out to directly. This is one more way [facility] is putting you first.     [facility] [place] [address]/ [phone] FAX/ [email or web address]    Recommend when washing hands use lukewarm water with gentle soap such as dove hand soap, pat dry do not rub then apply hand cream ([employer] working hands)    Recommend Elta MD So Silky Hand CREME or [employer] working hands q hand washing and q  hour while awake.    Recommend applying steroid ointment to hot spots or rough dry areas or red scaly areas, then apply vaseline jelly all over at night   Under white cotton gloves    Once clear, Recommend Vaseline jelly under white cotton gloves every night.     Discontinue smoking.

## 2022-04-22 ENCOUNTER — ANESTHESIA EVENT (OUTPATIENT)
Dept: ENDOSCOPY | Facility: HOSPITAL | Age: 47
End: 2022-04-22
Payer: COMMERCIAL

## 2022-04-22 ENCOUNTER — ANESTHESIA (OUTPATIENT)
Dept: ENDOSCOPY | Facility: HOSPITAL | Age: 47
End: 2022-04-22
Payer: COMMERCIAL

## 2022-04-22 ENCOUNTER — HOSPITAL ENCOUNTER (OUTPATIENT)
Facility: HOSPITAL | Age: 47
Discharge: HOME | End: 2022-04-22
Attending: INTERNAL MEDICINE | Admitting: INTERNAL MEDICINE
Payer: COMMERCIAL

## 2022-04-22 VITALS
DIASTOLIC BLOOD PRESSURE: 72 MMHG | WEIGHT: 240 LBS | HEIGHT: 69 IN | HEART RATE: 55 BPM | OXYGEN SATURATION: 96 % | RESPIRATION RATE: 21 BRPM | SYSTOLIC BLOOD PRESSURE: 128 MMHG | BODY MASS INDEX: 35.55 KG/M2 | TEMPERATURE: 98 F

## 2022-04-22 DIAGNOSIS — Z12.11 SCREEN FOR COLON CANCER: ICD-10-CM

## 2022-04-22 LAB
CTP QC/QA: YES
SARS-COV-2 AG RESP QL IA.RAPID: NEGATIVE

## 2022-04-22 PROCEDURE — 37000009 HC ANESTHESIA EA ADD 15 MINS: Performed by: INTERNAL MEDICINE

## 2022-04-22 PROCEDURE — 63600175 PHARM REV CODE 636 W HCPCS: Performed by: NURSE ANESTHETIST, CERTIFIED REGISTERED

## 2022-04-22 PROCEDURE — G0121 COLON CA SCRN NOT HI RSK IND: ICD-10-PCS | Mod: ,,, | Performed by: INTERNAL MEDICINE

## 2022-04-22 PROCEDURE — 37000008 HC ANESTHESIA 1ST 15 MINUTES: Performed by: INTERNAL MEDICINE

## 2022-04-22 PROCEDURE — 25000003 PHARM REV CODE 250: Performed by: NURSE ANESTHETIST, CERTIFIED REGISTERED

## 2022-04-22 PROCEDURE — 25000003 PHARM REV CODE 250: Performed by: INTERNAL MEDICINE

## 2022-04-22 PROCEDURE — G0121 COLON CA SCRN NOT HI RSK IND: HCPCS | Performed by: INTERNAL MEDICINE

## 2022-04-22 PROCEDURE — G0121 COLON CA SCRN NOT HI RSK IND: HCPCS | Mod: ,,, | Performed by: INTERNAL MEDICINE

## 2022-04-22 RX ORDER — PROPOFOL 10 MG/ML
VIAL (ML) INTRAVENOUS
Status: DISCONTINUED | OUTPATIENT
Start: 2022-04-22 | End: 2022-04-22

## 2022-04-22 RX ORDER — SODIUM CHLORIDE 9 MG/ML
INJECTION, SOLUTION INTRAVENOUS CONTINUOUS
Status: DISCONTINUED | OUTPATIENT
Start: 2022-04-22 | End: 2022-04-22 | Stop reason: HOSPADM

## 2022-04-22 RX ORDER — PROPOFOL 10 MG/ML
VIAL (ML) INTRAVENOUS CONTINUOUS PRN
Status: DISCONTINUED | OUTPATIENT
Start: 2022-04-22 | End: 2022-04-22

## 2022-04-22 RX ORDER — LIDOCAINE HYDROCHLORIDE 20 MG/ML
INJECTION, SOLUTION EPIDURAL; INFILTRATION; INTRACAUDAL; PERINEURAL
Status: DISCONTINUED | OUTPATIENT
Start: 2022-04-22 | End: 2022-04-22

## 2022-04-22 RX ORDER — DEXTROMETHORPHAN/PSEUDOEPHED 2.5-7.5/.8
DROPS ORAL
Status: COMPLETED | OUTPATIENT
Start: 2022-04-22 | End: 2022-04-22

## 2022-04-22 RX ORDER — SODIUM CHLORIDE 0.9 % (FLUSH) 0.9 %
10 SYRINGE (ML) INJECTION
Status: DISCONTINUED | OUTPATIENT
Start: 2022-04-22 | End: 2022-04-22 | Stop reason: HOSPADM

## 2022-04-22 RX ADMIN — SODIUM CHLORIDE: 0.9 INJECTION, SOLUTION INTRAVENOUS at 07:04

## 2022-04-22 RX ADMIN — PROPOFOL 20 MG: 10 INJECTION, EMULSION INTRAVENOUS at 08:04

## 2022-04-22 RX ADMIN — PROPOFOL 150 MCG/KG/MIN: 10 INJECTION, EMULSION INTRAVENOUS at 08:04

## 2022-04-22 RX ADMIN — PROPOFOL 80 MG: 10 INJECTION, EMULSION INTRAVENOUS at 08:04

## 2022-04-22 RX ADMIN — SIMETHICONE 40 MG: 20 SUSPENSION/ DROPS ORAL at 08:04

## 2022-04-22 RX ADMIN — LIDOCAINE HYDROCHLORIDE 50 MG: 20 INJECTION, SOLUTION EPIDURAL; INFILTRATION; INTRACAUDAL; PERINEURAL at 08:04

## 2022-04-22 NOTE — H&P
Short Stay Endoscopy History and Physical    PCP - Roxanne Wolfe MD    Procedure - Colonoscopy  ASA - per anesthesia  Mallampati - per anesthesia  History of Anesthesia problems - no  Family history Anesthesia problems - no   Plan of anesthesia - General    HPI:  This is a 47 y.o. male here for evaluation of : asymptomatic screening exam      ROS:  Constitutional: No fevers, chills, No weight loss  CV: No chest pain  Pulm: No cough, No shortness of breath  GI: see HPI  Derm: No rash    Medical History:  has a past medical history of Hypertension and KAILEE (obstructive sleep apnea).    Surgical History:  has a past surgical history that includes Back surgery; Spine surgery; Umbilical hernia repair (N/A, 11/25/2019); Hernia repair; Patent ductus arterious ligation; and Robot-assisted laparoscopic repair of inguinal hernia (Left, 6/28/2021).    Family History: family history includes Cancer in his mother; Dementia in his mother; Diabetes in his paternal grandmother.. Otherwise no colon cancer, inflammatory bowel disease, or GI malignancies.    Social History:  reports that he quit smoking about 13 years ago. He has quit using smokeless tobacco. He reports current alcohol use. He reports that he does not use drugs.    Review of patient's allergies indicates:  No Known Allergies    Medications:   Medications Prior to Admission   Medication Sig Dispense Refill Last Dose    benzonatate (TESSALON PERLES) 100 MG capsule Take 2 capsules (200 mg total) by mouth 3 (three) times daily as needed for Cough. 30 capsule 1     DULoxetine (CYMBALTA) 60 MG capsule TAKE 1 CAPSULE(60 MG) BY MOUTH EVERY DAY 90 capsule 0     fenofibrate 160 MG Tab Take 1 tablet (160 mg total) by mouth once daily. 90 tablet 1     lamoTRIgine (LAMICTAL) 150 MG Tab TAKE 2 TABLETS(300 MG) BY MOUTH EVERY  tablet 0     levothyroxine (SYNTHROID) 75 MCG tablet Take 1 tablet (75 mcg total) by mouth before breakfast. 90 tablet 1     meloxicam  (MOBIC) 7.5 MG tablet Take 7.5 mg by mouth once daily.       metoprolol succinate (TOPROL-XL) 100 MG 24 hr tablet TAKE 1 TABLET(100 MG) BY MOUTH EVERY DAY 90 tablet 1     mometasone (ELOCON) 0.1 % ointment Apply topically once daily. Cover with bandaid 15 g 2          Physical Exam:    Vital Signs: There were no vitals filed for this visit.    General Appearance: Well appearing in no acute distress  Eyes:    No scleral icterus  ENT: Neck supple, Lips, mucosa, and tongue normal; teeth and gums normal  Abdomen: Soft, non tender, non distended with positive bowel sounds. No hepatosplenomegaly, ascites, or mass.  Extremities: 2+ pulses, no clubbing, cyanosis or edema  Skin: No rash      Labs:  Lab Results   Component Value Date    WBC 7.85 04/17/2022    HGB 14.4 04/17/2022    HCT 42.9 04/17/2022     04/17/2022    CHOL 271 (H) 11/16/2021    TRIG 281 (H) 11/16/2021    HDL 42 11/16/2021    ALT 29 04/17/2022    AST 23 04/17/2022     04/17/2022    K 4.3 04/17/2022     04/17/2022    CREATININE 1.0 04/17/2022    BUN 18 04/17/2022    CO2 20 (L) 04/17/2022    TSH 3.551 11/16/2021    PSA 0.41 12/29/2020    INR 1.0 12/25/2019       I have explained the risks and benefits of endoscopy procedures to the patient including but not limited to bleeding, perforation, infection, and death.  The patient was asked if they understand and allowed to ask any further questions to their satisfaction.    Lamonte Carcamo MD

## 2022-04-22 NOTE — TRANSFER OF CARE
"Anesthesia Transfer of Care Note    Patient: Jesse Hernández    Procedure(s) Performed: Procedure(s) (LRB):  COLONOSCOPY miralax prep (N/A)    Patient location: GI    Anesthesia Type: MAC    Transport from OR: Transported from OR on room air with adequate spontaneous ventilation    Post pain: adequate analgesia    Post assessment: no apparent anesthetic complications    Post vital signs: stable    Level of consciousness: awake    Nausea/Vomiting: no nausea/vomiting    Complications: none    Transfer of care protocol was followed      Last vitals:   Visit Vitals  /84 (BP Location: Left arm, Patient Position: Lying)   Pulse 70   Temp 36.5 °C (97.7 °F) (Temporal)   Resp 18   Ht 5' 9" (1.753 m)   Wt 108.9 kg (240 lb)   SpO2 95%   BMI 35.44 kg/m²     "

## 2022-04-22 NOTE — PLAN OF CARE
Dr Carcamo @ bedside to discuss procedure findings. Discharge instructions given. All questions answered. Ambulatory at bedside. PIV removed per order. VSS. Free from nausea and pain. Follow-up care instructions given. Verbalized understanding. Wheeled to front of hospital w/ tech.

## 2022-04-22 NOTE — ANESTHESIA POSTPROCEDURE EVALUATION
Anesthesia Post Evaluation    Patient: Jesse Hernández    Procedure(s) Performed: Procedure(s) (LRB):  COLONOSCOPY miralax prep (N/A)    Final Anesthesia Type: MAC      Patient location during evaluation: GI PACU  Patient participation: Yes- Able to Participate  Level of consciousness: awake and alert  Post-procedure vital signs: reviewed and stable  Pain management: adequate  Airway patency: patent    PONV status at discharge: No PONV  Anesthetic complications: no      Cardiovascular status: blood pressure returned to baseline  Respiratory status: unassisted and room air  Hydration status: euvolemic  Follow-up not needed.          Vitals Value Taken Time   /63 04/22/22 0753   Temp 36.5 °C (97.7 °F) 04/22/22 0753   Pulse 80 04/22/22 0753   Resp 18 04/22/22 0753   SpO2 95 % 04/22/22 0753         No case tracking events are documented in the log.      Pain/Alex Score: No data recorded

## 2022-04-22 NOTE — [employer] PATIENT INSTRUCTIONS
Discharge Summary/Instructions after an Endoscopic Procedure  Patient Name: Jesse Hernández  Patient MRN: [id number]  Patient YOB: 1975 Friday, April 22, 2022  Lamonte Carcamo MD  Dear patient,  As a result of recent federal legislation (The Federal Cures Act), you may   receive lab or pathology results from your procedure in your [facility]   account before your physician is able to contact you. Your physician or   their representative will relay the results to you with their   recommendations at their soonest availability.  Thank you,  Your health is very important to us during the Covid Crisis. Following your   procedure today, you will receive a daily text for 2 weeks asking about   signs or symptoms of Covid 19.  Please respond to this text when you   receive it so we can follow up and keep you as safe as possible.   RESTRICTIONS:  During your procedure today, you received medications for sedation.  These   medications may affect your judgment, balance and coordination.  Therefore,   for 24 hours, you have the following restrictions:   - DO NOT drive a car, operate machinery, make legal/financial decisions,   sign important papers or drink alcohol.    ACTIVITY:  Today: no heavy lifting, straining or running due to procedural   sedation/anesthesia.  The following day: return to full activity including work.  DIET:  Eat and drink normally unless instructed otherwise.     TREATMENT FOR COMMON SIDE EFFECTS:  - Mild abdominal pain, nausea, belching, bloating or excessive gas:  rest,   eat lightly and use a heating pad.  - Sore Throat: treat with throat lozenges and/or gargle with warm salt   water.  - Because air was used during the procedure, expelling large amounts of air   from your rectum or belching is normal.  - If a bowel prep was taken, you may not have a bowel movement for 1-3 days.    This is normal.  SYMPTOMS TO WATCH FOR AND REPORT TO YOUR PHYSICIAN:  1. Abdominal pain or bloating, other than gas  cramps.  2. Chest pain.  3. Back pain.  4. Signs of infection such as: chills or fever occurring within 24 hours   after the procedure.  5. Rectal bleeding, which would show as bright red, maroon, or black stools.   (A tablespoon of blood from the rectum is not serious, especially if   hemorrhoids are present.)  6. Vomiting.  7. Weakness or dizziness.  GO DIRECTLY TO THE NEAREST EMERGENCY ROOM IF YOU HAVE ANY OF THE FOLLOWING:      Difficulty breathing              Chills and/or fever over 101 F   Persistent vomiting and/or vomiting blood   Severe abdominal pain   Severe chest pain   Black, tarry stools   Bleeding- more than one tablespoon   Any other symptom or condition that you feel may need urgent attention  Your doctor recommends these additional instructions:  If any biopsies were taken, your doctors clinic will contact you in 1 to 2   weeks with any results.  - Discharge patient to home.   - Patient has a contact number available for emergencies.  The signs and   symptoms of potential delayed complications were discussed with the   patient.  Return to normal activities tomorrow.  Written discharge   instructions were provided to the patient.   - Resume previous diet.   - Continue present medications.   - Repeat colonoscopy in 10 years for screening purposes.   - Use preparation H suppository nightly for 10 days, then as needed. Use   metamucil daily  For questions, problems or results please call your physician - Lamonte Carcamo MD.  EMERGENCY PHONE NUMBER: [phone],  LAB RESULTS: [phone]  IF A COMPLICATION OR EMERGENCY SITUATION ARISES AND YOU ARE UNABLE TO REACH   YOUR PHYSICIAN - GO DIRECTLY TO THE EMERGENCY ROOM.  Lamonte Carcamo MD  4/22/2022 8:53:43 AM  This report has been verified and signed electronically.  Dear patient,  As a result of recent federal legislation (The Federal Cures Act), you may   receive lab or pathology results from your procedure in your [facility]   account before  your physician is able to contact you. Your physician or   their representative will relay the results to you with their   recommendations at their soonest availability.  Thank you,  [employer]

## 2022-04-22 NOTE — DISCHARGE INSTRUCTIONS
7/4/2021  9:39 PM  Patient arrives from home complaining of left sided pain and possible leaking of fluid. See attachment

## 2022-04-22 NOTE — ANESTHESIA PREPROCEDURE EVALUATION
04/22/2022  Jesse Hernández is a 47 y.o., male for colonoscopy under MAC    Past Medical History:   Diagnosis Date    Hypertension     KAILEE (obstructive sleep apnea)      Past Surgical History:   Procedure Laterality Date    BACK SURGERY      HERNIA REPAIR      PATENT DUCTUS ARTERIOUS LIGATION      infancy    ROBOT-ASSISTED LAPAROSCOPIC REPAIR OF INGUINAL HERNIA Left 6/28/2021    Procedure: ROBOTIC REPAIR, HERNIA, INGUINAL;  Surgeon: Patrick Rehman Jr., MD;  Location: [facility] OR;  Service: General;  Laterality: Left;  Left vs bilateral    SPINE SURGERY      UMBILICAL HERNIA REPAIR N/A 11/25/2019    Procedure: REPAIR, HERNIA, UMBILICAL, AGE 5 YEARS OR OLDER - open with mesh;  Surgeon: Patrick Rehman Jr., MD;  Location: [facility] OR;  Service: General;  Laterality: N/A;       Pre-op Assessment    I have reviewed the Patient Summary Reports.    I have reviewed the Nursing Notes. I have reviewed the NPO Status.   I have reviewed the Medications.     Review of Systems  Anesthesia Hx:  No problems with previous Anesthesia  Denies Family Hx of Anesthesia complications.    Social:  Former Smoker, Social Alcohol Use    Hematology/Oncology:  Hematology Normal        Cardiovascular:   Exercise tolerance: good Hypertension  Denies Angina.        Pulmonary:   Sleep Apnea, CPAP    Education provided regarding risk of obstructive sleep apnea     Renal/:  Renal/ Normal     Hepatic/GI:   Liver Disease, (fatty liver)  Hernia, Inguinal Hernia   Neurological:  Neurology Normal    Endocrine:  Endocrine Normal    Psych:   anxiety depression          Physical Exam  General:  Obesity      Airway/Jaw/Neck:  Airway Findings: Tongue: Normal   General Airway Assessment: Adult Mallampati: II  TM Distance: Normal, at least 6 cm        Chest/Lungs:  Chest/Lungs Findings: Clear to auscultation, Normal Respiratory Rate       Heart/Vascular:  Heart Findings: Rate: Normal  Rhythm: Regular Rhythm  Sounds: Normal        Mental Status:  Mental Status Findings:  Cooperative, Alert and Oriented         Anesthesia Plan  Type of Anesthesia, risks & benefits discussed:  Anesthesia Type:  MAC    Patient's Preference:   Plan Factors:          Intra-op Monitoring Plan: standard ASA monitors  Intra-op Monitoring Plan Comments:   Post Op Pain Control Plan:   Post Op Pain Control Plan Comments:     Induction:   IV  Beta Blocker:         Informed Consent: Informed consent signed with the Patient and all parties understand the risks and agree with anesthesia plan.  All questions answered.    ASA Score: 2     Day of Surgery Review of History & Physical:        Anesthesia Plan Notes:           Ready For Surgery From Anesthesia Perspective.           Physical Exam  General: Obesity    Airway:  Mallampati: II   TM Distance: Normal, at least 6 cm  Tongue: Normal    Chest/Lungs:  Clear to auscultation, Normal Respiratory Rate    Heart:  Rate: Normal  Rhythm: Regular Rhythm  Sounds: Normal          Anesthesia Plan  Type of Anesthesia, risks & benefits discussed:    Anesthesia Type: MAC  Intra-op Monitoring Plan: standard ASA monitors  Induction:  IV  Informed Consent: Informed consent signed with the Patient and all parties understand the risks and agree with anesthesia plan.  All questions answered.   ASA Score: 2  Anesthesia Plan Notes:       Ready For Surgery From Anesthesia Perspective.       .

## 2022-04-25 ENCOUNTER — TELEPHONE (OUTPATIENT)
Dept: ENDOSCOPY | Facility: HOSPITAL | Age: 47
End: 2022-04-25
Payer: COMMERCIAL

## 2022-04-28 LAB
FINAL PATHOLOGIC DIAGNOSIS: NORMAL
GROSS: NORMAL
Lab: NORMAL
MICROSCOPIC EXAM: NORMAL

## 2022-05-03 DIAGNOSIS — F41.1 GAD (GENERALIZED ANXIETY DISORDER): ICD-10-CM

## 2022-05-03 RX ORDER — LAMOTRIGINE 150 MG/1
TABLET ORAL
Qty: 60 TABLET | Refills: 0 | Status: SHIPPED | OUTPATIENT
Start: 2022-05-03 | End: 2022-05-11 | Stop reason: SDUPTHER

## 2022-05-09 NOTE — PROGRESS NOTES
Lesion is a benign scratch papule or pickers nodule. It can occur from chronically rubbing or scratching the area

## 2022-05-11 ENCOUNTER — OFFICE VISIT (OUTPATIENT)
Dept: PSYCHIATRY | Facility: CLINIC | Age: 47
End: 2022-05-11
Payer: COMMERCIAL

## 2022-05-11 DIAGNOSIS — F41.1 GAD (GENERALIZED ANXIETY DISORDER): ICD-10-CM

## 2022-05-11 DIAGNOSIS — F33.40 RECURRENT MAJOR DEPRESSIVE DISORDER, IN REMISSION: Primary | ICD-10-CM

## 2022-05-11 PROCEDURE — 99214 PR OFFICE/OUTPT VISIT, EST, LEVL IV, 30-39 MIN: ICD-10-PCS | Mod: 95,,, | Performed by: NURSE PRACTITIONER

## 2022-05-11 PROCEDURE — 99214 OFFICE O/P EST MOD 30 MIN: CPT | Mod: 95,,, | Performed by: NURSE PRACTITIONER

## 2022-05-11 RX ORDER — DULOXETIN HYDROCHLORIDE 60 MG/1
60 CAPSULE, DELAYED RELEASE ORAL DAILY
Qty: 90 CAPSULE | Refills: 1 | Status: SHIPPED | OUTPATIENT
Start: 2022-05-11 | End: 2022-12-05 | Stop reason: SDUPTHER

## 2022-05-11 RX ORDER — LAMOTRIGINE 150 MG/1
TABLET ORAL
Qty: 60 TABLET | Refills: 2 | Status: SHIPPED | OUTPATIENT
Start: 2022-05-11 | End: 2022-11-04

## 2022-05-11 NOTE — PROGRESS NOTES
"Outpatient Psychiatry Follow-Up Visit (MD/NP)    5/11/2022   The patient location is: [place], [place]  The chief complaint leading to consultation is: Depression and anxiety    Visit type: audiovisual    Face to Face time with patient: 22 minutes  32 minutes of total time spent on the encounter, which includes face to face time and non-face to face time preparing to see the patient (eg, review of tests), Obtaining and/or reviewing separately obtained history, Documenting clinical information in the electronic or other health record, Independently interpreting results (not separately reported) and communicating results to the patient/family/caregiver, or Care coordination (not separately reported).         Each patient to whom he or she provides medical services by telemedicine is:  (1) informed of the relationship between the physician and patient and the respective role of any other health care provider with respect to management of the patient; and (2) notified that he or she may decline to receive medical services by telemedicine and may withdraw from such care at any time.    Notes:       Clinical Status of Patient:  Outpatient (Ambulatory)    Chief Complaint:  Jesse Hernández is a 47 y.o. male who presents today for follow-up of depression and anxiety.  Met with patient.      Interval History and Content of Current Session:  Interim Events/Subjective Report/Content of Current Session:   Patient arrived on time for his scheduled appointment. 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 He described mood as "busy but anxious about being late at times"and his affect was euthymic. He stated he has been feeling a little anxious about being late for places but stated his anxiety is still manageable.     He stated he recovered form his 2 hernia surgeries. He stated he is having tennis elbows and shoulders pain due to over working. He stated he still has the bone spur but his back pain has " improved since his last visit. He reported improved depression, mood,  and anxiety with his current medication regimen of duloxetine 60 mg po daily and Lamictal 300 mg po daily. He denied SI/HI/AH/VH and no delusion noted or reported. Patient denied symptoms of debby or hypomania. He reported occasional alcohol use and denied any type of illicit drug use. He reported adequate sleep and appetite. He reported medication compliance and denied any side or adverse effects to his current medication of Lamictal and duloxetine. He continues to rate his anxiety at 6/10 and depression 0/10 with 10/10 being the most severe. He stated he wants to keep taking the same medications with the same dosage due to their effectiveness.      Psychiatric Review Of Systems - Is patient experiencing or having changes in:  sleep: Gets 8 hours of sleep per night. Uses his CPAP machine  appetite: no  weight: no  energy/anergy: yes from working so many hours  interest/pleasure/anhedonia: no  somatic symptoms: no  anxiety/panic: yes but manageable  guilty/hopelessness: no  concentration: no  S.I.B.s/risky behavior: no  Irritability: yes at times   Racing thoughts: no  Impulsive behaviors: no  Paranoia:no  AVH:no,   Suicidal thoughts/plan/intent: no    Psychotherapy:  · Target symptoms: anxiety , mood/depression  · Why chosen therapy is appropriate versus another modality: relevant to diagnosis, patient responds to this modality, evidence based practice  · Outcome monitoring methods: self-report, observation  · Therapeutic intervention type: insight oriented psychotherapy, supportive psychotherapy  · Topics discussed/themes: building skills sets for symptom management, symptom recognition  · The patient's response to the intervention is accepting, motivated. The patient's progress toward treatment goals is good.   · Duration of intervention: 10 minutes.    Review of Systems   · PSYCHIATRIC: Pertinant items are noted in the  "narrative.  · CONSTITUTIONAL: No weight gain or loss.   · MUSCULOSKELETAL: No pain or stiffness of the joints.  · NEUROLOGIC: No weakness, sensory changes, seizures, confusion, memory loss, tremor or other abnormal movements.  · ENDOCRINE: No polydipsia or polyuria.  · INTEGUMENTARY: No rashes or lacerations.  · EYES: No exophthalmos, jaundice or blindness.  · ENT: No dizziness, tinnitus or hearing loss.  · RESPIRATORY: No shortness of breath.  · CARDIOVASCULAR: No tachycardia or chest pain.  · GASTROINTESTINAL: No nausea, vomiting, pain, constipation or diarrhea.  · GENITOURINARY: No frequency, dysuria or sexual dysfunction.  · HEMATOLOGIC/LYMPHATIC: No excessive bleeding, prolonged or excessive bleeding after dental extraction/injury.  · ALLERGIC/IMMUNOLOGIC: No allergic response to materials, foods or animals at this time.    Past Medical, Family and Social History: The patient's past medical, family and social history have been reviewed and updated as appropriate within the electronic medical record - see encounter notes.    Compliance: yes    Side effects: None    Risk Parameters:  Patient reports no suicidal ideation  Patient reports no homicidal ideation  Patient reports no self-injurious behavior  Patient reports no violent behavior    Exam (detailed: at least 9 elements; comprehensive: all 15 elements)   Constitutional  Vitals:  Most recent vital signs, dated greater than 90 days prior to this appointment, were reviewed.   There were no vitals filed for this visit.     General:  unremarkable, age appropriate     Musculoskeletal  Muscle Strength/Tone:  no dystonia, no tremor, no tic   Gait & Station:  non-ataxic     Psychiatric  Speech:  no latency; no press   Mood & Affect:  "busy but anxious"  euthymic   Thought Process:  normal and logical   Associations:  intact   Thought Content:  normal, no suicidality, no homicidality, delusions, or paranoia   Insight:  intact   Judgement: behavior is adequate to " circumstances   Orientation:  grossly intact   Memory: intact for content of interview   Language: grossly intact   Attention Span & Concentration:  able to focus   Fund of Knowledge:  intact and appropriate to age and level of education     Assessment and Diagnosis   Status/Progress: Based on the examination today, the patient's problem(s) is/are improved.  New problems have not been presented today.   Co-morbidities, Diagnostic uncertainty and Lack of compliance are not complicating management of the primary condition.  There are no active rule-out diagnoses for this patient at this time.     General Impression:  Doing well.        ICD-10-CM ICD-9-CM   1. Recurrent major depressive disorder, in remission  F33.40 296.35   2. ALISON (generalized anxiety disorder)  F41.1 300.02       Intervention/Counseling/Treatment Plan   · Medication Management: Continue current medications. The risks and benefits of medication were discussed with the patient.  · Labs, Diagnostic Studies: reviewed all recent labs are reviewed   · Continue duloxetine 60mg po daily for MDD and ALISON  · Continue Lamictal 300mg po daily (take 2 tabs of 150 mg in the morning) for mood stabilization. Warned against SJS and he agreed to continue taking.   -Discussed informed consent, diagnosis, risks and benefits of proposed treatment above vs alternative treatments vs no treatment, and potential side effects of these treatments. The patient expresses understanding of the above and displays the capacity to agree with this treatment given said understanding. Patient also agrees that, currently, the benefits outweigh the risks and would like to pursue treatment at this time. Answered all questions and discussed follow up. Encouraged patient to contact us with any questions or concerns.   -Encouraged Patient to keep future appointments.  -Take medications as prescribed   -Patient to present to ED for thoughts to harm herself or others          Return to Clinic:  3 months, 6 months or earlier as needed      Stella Bowles, PMHNP-BC

## 2022-05-12 ENCOUNTER — HOSPITAL ENCOUNTER (EMERGENCY)
Facility: HOSPITAL | Age: 47
Discharge: HOME | End: 2022-05-12
Attending: EMERGENCY MEDICINE
Payer: COMMERCIAL

## 2022-05-12 VITALS
HEART RATE: 65 BPM | OXYGEN SATURATION: 97 % | DIASTOLIC BLOOD PRESSURE: 82 MMHG | BODY MASS INDEX: 35.55 KG/M2 | HEIGHT: 69 IN | TEMPERATURE: 99 F | SYSTOLIC BLOOD PRESSURE: 161 MMHG | RESPIRATION RATE: 16 BRPM | WEIGHT: 240 LBS

## 2022-05-12 DIAGNOSIS — S50.02XA LEFT ELBOW CONTUSION: ICD-10-CM

## 2022-05-12 PROCEDURE — 99283 EMERGENCY DEPT VISIT LOW MDM: CPT | Mod: 25

## 2022-05-12 PROCEDURE — 25000003 PHARM REV CODE 250: Performed by: PHYSICIAN ASSISTANT

## 2022-05-12 RX ORDER — HYDROCODONE BITARTRATE AND ACETAMINOPHEN 10; 325 MG/1; MG/1
1 TABLET ORAL
Status: COMPLETED | OUTPATIENT
Start: 2022-05-12 | End: 2022-05-12

## 2022-05-12 RX ORDER — KETOROLAC TROMETHAMINE 10 MG/1
10 TABLET, FILM COATED ORAL EVERY 6 HOURS
Qty: 20 TABLET | Refills: 0 | Status: SHIPPED | OUTPATIENT
Start: 2022-05-12 | End: 2022-05-17

## 2022-05-12 RX ADMIN — HYDROCODONE BITARTRATE AND ACETAMINOPHEN 1 TABLET: 10; 325 TABLET ORAL at 12:05

## 2022-05-12 NOTE — DISCHARGE INSTRUCTIONS
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
## 2022-05-30 ENCOUNTER — LAB VISIT (OUTPATIENT)
Dept: LAB | Facility: HOSPITAL | Age: 47
End: 2022-05-30
Attending: INTERNAL MEDICINE
Payer: COMMERCIAL

## 2022-05-30 DIAGNOSIS — I10 PRIMARY HYPERTENSION: ICD-10-CM

## 2022-05-30 LAB
ALBUMIN SERPL BCP-MCNC: 3.8 G/DL (ref 3.5–5.2)
ALP SERPL-CCNC: 42 U/L (ref 55–135)
ALT SERPL W/O P-5'-P-CCNC: 24 U/L (ref 10–44)
ANION GAP SERPL CALC-SCNC: 9 MMOL/L (ref 8–16)
AST SERPL-CCNC: 23 U/L (ref 10–40)
BILIRUB SERPL-MCNC: 0.5 MG/DL (ref 0.1–1)
BUN SERPL-MCNC: 22 MG/DL (ref 6–20)
CALCIUM SERPL-MCNC: 9.2 MG/DL (ref 8.7–10.5)
CHLORIDE SERPL-SCNC: 106 MMOL/L (ref 95–110)
CHOLEST SERPL-MCNC: 211 MG/DL (ref 120–199)
CHOLEST/HDLC SERPL: 4.8 {RATIO} (ref 2–5)
CO2 SERPL-SCNC: 23 MMOL/L (ref 23–29)
CREAT SERPL-MCNC: 0.9 MG/DL (ref 0.5–1.4)
EST. GFR  (AFRICAN AMERICAN): >60 ML/MIN/1.73 M^2
EST. GFR  (NON AFRICAN AMERICAN): >60 ML/MIN/1.73 M^2
GLUCOSE SERPL-MCNC: 93 MG/DL (ref 70–110)
HDLC SERPL-MCNC: 44 MG/DL (ref 40–75)
HDLC SERPL: 20.9 % (ref 20–50)
LDLC SERPL CALC-MCNC: 134.2 MG/DL (ref 63–159)
NONHDLC SERPL-MCNC: 167 MG/DL
POTASSIUM SERPL-SCNC: 4.5 MMOL/L (ref 3.5–5.1)
PROT SERPL-MCNC: 6.1 G/DL (ref 6–8.4)
SODIUM SERPL-SCNC: 138 MMOL/L (ref 136–145)
TRIGL SERPL-MCNC: 164 MG/DL (ref 30–150)
TSH SERPL DL<=0.005 MIU/L-ACNC: 1.85 UIU/ML (ref 0.4–4)

## 2022-05-30 PROCEDURE — 80053 COMPREHEN METABOLIC PANEL: CPT | Performed by: INTERNAL MEDICINE

## 2022-05-30 PROCEDURE — 36415 COLL VENOUS BLD VENIPUNCTURE: CPT | Mod: PO | Performed by: INTERNAL MEDICINE

## 2022-05-30 PROCEDURE — 80061 LIPID PANEL: CPT | Performed by: INTERNAL MEDICINE

## 2022-05-30 PROCEDURE — 84443 ASSAY THYROID STIM HORMONE: CPT | Performed by: INTERNAL MEDICINE

## 2022-05-31 ENCOUNTER — TELEPHONE (OUTPATIENT)
Dept: INTERNAL MEDICINE | Facility: CLINIC | Age: 47
End: 2022-05-31
Payer: COMMERCIAL

## 2022-05-31 NOTE — TELEPHONE ENCOUNTER
----- Message from [place] Richard sent at 5/31/2022  3:45 PM CDT -----  Contact: pt  Type:  Patient Returning Call    Who Called: pt   Who Left Message for Patient:office   Does the patient know what this is regarding?: n/a  Would the patient rather a call back or a response via [employer]?  Call   Best Call Back Number:[phone]  Additional Information:

## 2022-06-01 ENCOUNTER — PATIENT MESSAGE (OUTPATIENT)
Dept: INTERNAL MEDICINE | Facility: CLINIC | Age: 47
End: 2022-06-01

## 2022-06-01 ENCOUNTER — OFFICE VISIT (OUTPATIENT)
Dept: INTERNAL MEDICINE | Facility: CLINIC | Age: 47
End: 2022-06-01
Payer: COMMERCIAL

## 2022-06-01 DIAGNOSIS — Z00.00 PREVENTATIVE HEALTH CARE: ICD-10-CM

## 2022-06-01 DIAGNOSIS — E78.1 HYPERTRIGLYCERIDEMIA: Primary | ICD-10-CM

## 2022-06-01 DIAGNOSIS — E78.5 DYSLIPIDEMIA: ICD-10-CM

## 2022-06-01 DIAGNOSIS — I10 ESSENTIAL HYPERTENSION: ICD-10-CM

## 2022-06-01 PROCEDURE — 99213 OFFICE O/P EST LOW 20 MIN: CPT | Mod: 95,,, | Performed by: INTERNAL MEDICINE

## 2022-06-01 PROCEDURE — 1159F MED LIST DOCD IN RCRD: CPT | Mod: CPTII,95,, | Performed by: INTERNAL MEDICINE

## 2022-06-01 PROCEDURE — 99213 PR OFFICE/OUTPT VISIT, EST, LEVL III, 20-29 MIN: ICD-10-PCS | Mod: 95,,, | Performed by: INTERNAL MEDICINE

## 2022-06-01 PROCEDURE — 1160F RVW MEDS BY RX/DR IN RCRD: CPT | Mod: CPTII,95,, | Performed by: INTERNAL MEDICINE

## 2022-06-01 PROCEDURE — 1159F PR MEDICATION LIST DOCUMENTED IN MEDICAL RECORD: ICD-10-PCS | Mod: CPTII,95,, | Performed by: INTERNAL MEDICINE

## 2022-06-01 PROCEDURE — 1160F PR REVIEW ALL MEDS BY PRESCRIBER/CLIN PHARMACIST DOCUMENTED: ICD-10-PCS | Mod: CPTII,95,, | Performed by: INTERNAL MEDICINE

## 2022-06-01 NOTE — PROGRESS NOTES
Subjective:       Patient ID: Jesse Hernández is a 47 y.o. male.    Chief Complaint: Hypertension    HPI 47-year-old male presents to virtual video visit follow-up hypertension dyslipidemia compliant with medication denies side effects or issues.  Otherwise negative  Review of Systems    otherwise negative  Objective:      Physical Exam  General: Well-appearing, well-nourished.  No distress  HEENT: conjunctivae are normal.   Hearing is grossly normal.  Nasopharynx jacy congestion  Oropharynx is clear.  Neck: Supple.  Visually No thyroid megaly.   Heart: Regular rate   Lungs:  respiratory effort normal.  Abdomen:  nontender  Extremities:   No edema.  Psych: Oriented to time person place.  Judgment and insight seem unimpaired.  Mood and affect are appropriate.  Assessment:       Problem List Items Addressed This Visit     Hypertriglyceridemia - Primary    Dyslipidemia      Other Visit Diagnoses     Essential hypertension        Preventative health care        Relevant Orders    CBC Auto Differential    Comprehensive Metabolic Panel    TSH    Lipid Panel    PSA, Screening          Plan:       Jesse was seen today for hypertension.    Diagnoses and all orders for this visit:    Hypertriglyceridemia  Controlled.  Continue current medical regimen.  Prescription refills addressed.  Followup advised. See after visit summary.  Dyslipidemia    Essential hypertension  Controlled.  Continue current medical regimen.  Prescription refills addressed.  Followup advised. See after visit summary.  Preventative health care  -     CBC Auto Differential; Future  -     Comprehensive Metabolic Panel; Future  -     TSH; Future  -     Lipid Panel; Future  -     PSA, Screening; Future       The patient location is:  [facility]  The chief complaint leading to consultation is:  Hypertension dyslipidemia    Visit type: audiovisual    Face to Face time with patient:  15  Twenty minutes of total time spent on the encounter, which includes face to face  time and non-face to face time preparing to see the patient (eg, review of tests), Obtaining and/or reviewing separately obtained history, Documenting clinical information in the electronic or other health record, Independently interpreting results (not separately reported) and communicating results to the patient/family/caregiver, or Care coordination (not separately reported).         Each patient to whom he or she provides medical services by telemedicine is:  (1) informed of the relationship between the physician and patient and the respective role of any other health care provider with respect to management of the patient; and (2) notified that he or she may decline to receive medical services by telemedicine and may withdraw from such care at any time.    Notes:

## 2022-06-02 VITALS — DIASTOLIC BLOOD PRESSURE: 79 MMHG | SYSTOLIC BLOOD PRESSURE: 114 MMHG

## 2022-06-03 DIAGNOSIS — E03.9 HYPOTHYROIDISM, UNSPECIFIED TYPE: ICD-10-CM

## 2022-06-03 RX ORDER — LEVOTHYROXINE SODIUM 75 UG/1
TABLET ORAL
Qty: 90 TABLET | Refills: 3 | Status: SHIPPED | OUTPATIENT
Start: 2022-06-03 | End: 2023-01-06 | Stop reason: SDUPTHER

## 2022-06-03 NOTE — TELEPHONE ENCOUNTER
Refill Authorization Note   Jesse Hernández  is requesting a refill authorization.  Brief Assessment and Rationale for Refill:  Approve     Medication Therapy Plan:       Medication Reconciliation Completed: No   Comments:     No Care Gaps recommended.     Note composed:3:12 PM 06/03/2022

## 2022-06-03 NOTE — TELEPHONE ENCOUNTER
No new care gaps identified.  [facility] Embedded Care Gaps. Reference number: [id number]. 6/03/2022   9:25:00 AM CDT

## 2022-06-07 ENCOUNTER — PATIENT MESSAGE (OUTPATIENT)
Dept: INTERNAL MEDICINE | Facility: CLINIC | Age: 47
End: 2022-06-07
Payer: COMMERCIAL

## 2022-06-07 DIAGNOSIS — M25.529 ELBOW PAIN, UNSPECIFIED LATERALITY: Primary | ICD-10-CM

## 2022-06-09 ENCOUNTER — TELEPHONE (OUTPATIENT)
Dept: ADMINISTRATIVE | Facility: OTHER | Age: 47
End: 2022-06-09
Payer: COMMERCIAL

## 2022-06-10 ENCOUNTER — TELEPHONE (OUTPATIENT)
Dept: ADMINISTRATIVE | Facility: OTHER | Age: 47
End: 2022-06-10
Payer: COMMERCIAL

## 2022-06-23 ENCOUNTER — OFFICE VISIT (OUTPATIENT)
Dept: ORTHOPEDICS | Facility: CLINIC | Age: 47
End: 2022-06-23
Payer: COMMERCIAL

## 2022-06-23 VITALS — WEIGHT: 240.06 LBS | BODY MASS INDEX: 35.56 KG/M2 | HEIGHT: 69 IN

## 2022-06-23 DIAGNOSIS — M77.11 LATERAL EPICONDYLITIS OF BOTH ELBOWS: ICD-10-CM

## 2022-06-23 DIAGNOSIS — M25.529 ELBOW PAIN, UNSPECIFIED LATERALITY: ICD-10-CM

## 2022-06-23 DIAGNOSIS — M77.12 LATERAL EPICONDYLITIS OF BOTH ELBOWS: ICD-10-CM

## 2022-06-23 PROCEDURE — 1160F RVW MEDS BY RX/DR IN RCRD: CPT | Mod: CPTII,S$GLB,, | Performed by: ORTHOPAEDIC SURGERY

## 2022-06-23 PROCEDURE — 3008F PR BODY MASS INDEX (BMI) DOCUMENTED: ICD-10-PCS | Mod: CPTII,S$GLB,, | Performed by: ORTHOPAEDIC SURGERY

## 2022-06-23 PROCEDURE — 1160F PR REVIEW ALL MEDS BY PRESCRIBER/CLIN PHARMACIST DOCUMENTED: ICD-10-PCS | Mod: CPTII,S$GLB,, | Performed by: ORTHOPAEDIC SURGERY

## 2022-06-23 PROCEDURE — 1159F PR MEDICATION LIST DOCUMENTED IN MEDICAL RECORD: ICD-10-PCS | Mod: CPTII,S$GLB,, | Performed by: ORTHOPAEDIC SURGERY

## 2022-06-23 PROCEDURE — 1159F MED LIST DOCD IN RCRD: CPT | Mod: CPTII,S$GLB,, | Performed by: ORTHOPAEDIC SURGERY

## 2022-06-23 PROCEDURE — 99999 PR PBB SHADOW E&M-EST. PATIENT-LVL IV: ICD-10-PCS | Mod: PBBFAC,,, | Performed by: ORTHOPAEDIC SURGERY

## 2022-06-23 PROCEDURE — 20551 NJX 1 TENDON ORIGIN/INSJ: CPT | Mod: S$GLB,,, | Performed by: ORTHOPAEDIC SURGERY

## 2022-06-23 PROCEDURE — 3008F BODY MASS INDEX DOCD: CPT | Mod: CPTII,S$GLB,, | Performed by: ORTHOPAEDIC SURGERY

## 2022-06-23 PROCEDURE — 20551 PR INJECT TENDON ORIGIN/INSERT: ICD-10-PCS | Mod: S$GLB,,, | Performed by: ORTHOPAEDIC SURGERY

## 2022-06-23 PROCEDURE — 99999 PR PBB SHADOW E&M-EST. PATIENT-LVL IV: CPT | Mod: PBBFAC,,, | Performed by: ORTHOPAEDIC SURGERY

## 2022-06-23 PROCEDURE — 99203 OFFICE O/P NEW LOW 30 MIN: CPT | Mod: 25,S$GLB,, | Performed by: ORTHOPAEDIC SURGERY

## 2022-06-23 PROCEDURE — 99203 PR OFFICE/OUTPT VISIT, NEW, LEVL III, 30-44 MIN: ICD-10-PCS | Mod: 25,S$GLB,, | Performed by: ORTHOPAEDIC SURGERY

## 2022-06-23 RX ORDER — TRIAMCINOLONE ACETONIDE 40 MG/ML
40 INJECTION, SUSPENSION INTRA-ARTICULAR; INTRAMUSCULAR
Status: COMPLETED | OUTPATIENT
Start: 2022-06-23 | End: 2022-06-23

## 2022-06-23 RX ADMIN — TRIAMCINOLONE ACETONIDE 40 MG: 40 INJECTION, SUSPENSION INTRA-ARTICULAR; INTRAMUSCULAR at 02:06

## 2022-06-23 NOTE — PROGRESS NOTES
Subjective:      Patient ID: Jesse Hrenández is a 47 y.o. male.    Chief Complaint: Consult and Elbow Pain (bilateral )      HPI  Jesse Hernández is a  47 y.o. male presenting today for bilateral elbow pain.  There was not a history of trauma.  Onset of symptoms began several months ago   Initially symptoms were in the right elbow but then more recently symptoms have gotten worse than left  He may have bumped the left elbow was seen in emergency rooms few weeks ago x-rays negative for fracture  He did have injections several months ago which did help out  No numbness or tingling reported.      Review of patient's allergies indicates:  No Known Allergies      Current Outpatient Medications   Medication Sig Dispense Refill    DULoxetine (CYMBALTA) 60 MG capsule Take 1 capsule (60 mg total) by mouth once daily. 90 capsule 1    fenofibrate 160 MG Tab Take 1 tablet (160 mg total) by mouth once daily. 90 tablet 1    lamoTRIgine (LAMICTAL) 150 MG Tab TAKE 2 TABLETS(300 MG) BY MOUTH EVERY DAY 60 tablet 2    levothyroxine (SYNTHROID) 75 MCG tablet TAKE 1 TABLET(75 MCG) BY MOUTH BEFORE BREAKFAST 90 tablet 3    metoprolol succinate (TOPROL-XL) 100 MG 24 hr tablet Take 1 tablet (100 mg total) by mouth once daily. 90 tablet 1    benzonatate (TESSALON PERLES) 100 MG capsule Take 2 capsules (200 mg total) by mouth 3 (three) times daily as needed for Cough. (Patient not taking: Reported on 6/23/2022) 30 capsule 1     No current facility-administered medications for this visit.       Past Medical History:   Diagnosis Date    Hypertension     KAILEE (obstructive sleep apnea)        Past Surgical History:   Procedure Laterality Date    BACK SURGERY      COLONOSCOPY N/A 4/22/2022    Procedure: COLONOSCOPY miralax prep;  Surgeon: Lamonte Carcamo MD;  Location[facility];  Service: Endoscopy;  Laterality: N/A;  Patient needs a rapid covid test.    HERNIA REPAIR      PATENT DUCTUS ARTERIOUS LIGATION      infancy     "ROBOT-ASSISTED LAPAROSCOPIC REPAIR OF INGUINAL HERNIA Left 6/28/2021    Procedure: ROBOTIC REPAIR, HERNIA, INGUINAL;  Surgeon: Patrick Rehman Jr., MD;  Location: [facility] OR;  Service: General;  Laterality: Left;  Left vs bilateral    SPINE SURGERY      UMBILICAL HERNIA REPAIR N/A 11/25/2019    Procedure: REPAIR, HERNIA, UMBILICAL, AGE 5 YEARS OR OLDER - open with mesh;  Surgeon: Patrick Rehman Jr., MD;  Location: [facility] OR;  Service: General;  Laterality: N/A;       Review of Systems:  ROS    OBJECTIVE:     PHYSICAL EXAM:  Height: 5' 9" (175.3 cm) Weight: 108.9 kg (240 lb 1.3 oz)  Vitals:    06/23/22 1334   Weight: 108.9 kg (240 lb 1.3 oz)   Height: 5' 9" (1.753 m)   PainSc:   2   PainLoc: Elbow     Well developed, well nourished male in no acute distress  Alert and oriented x 3  HEENT- Normal exam  Lungs- Clear to auscultation  Heart- Regular rate and rhythm  Abdomen- Soft nontender  Extremity exam- in examination of the elbows demonstrates some tenderness laterally over the lateral epicondylar area right worse than left  No significant swelling  Range of motion elbows full he does have some pain on terminal extension  No instability  Does have pain with resisted wrist extension  Tinel sign negative at the elbow bilaterally    RADIOGRAPHS:  AP lateral x-ray of the elbow left shows no fracture or other abnormalities   Comments: I have personally reviewed the imaging and I agree with the above radiologist's report.    ASSESSMENT/PLAN:     IMPRESSION:  Bilateral lateral epicondylitis of the elbows    PLAN:  I explained the nature of the problem the patient  Recommended injections today  After pause for time-out identified each elbow injected bilaterally with combination Kenalog 20 mg 0.5 cc xylocaine sterile technique  Tolerated the procedure well without complication I have also recommended tennis elbow straps were both elbows use during the day  I have also ordered some therapy for stretching strengthening " both forearms and elbows  Continue anti-inflammatory medication by mouth  Follow-up 4-6 weeks       - We talked at length about the anatomy and pathophysiology of   Encounter Diagnoses   Name Primary?    Elbow pain, unspecified laterality     Lateral epicondylitis of both elbows            Disclaimer: This note has been generated using voice-recognition software. There may be typographical errors that have been missed during proof-reading.

## 2022-07-05 ENCOUNTER — PATIENT MESSAGE (OUTPATIENT)
Dept: INTERNAL MEDICINE | Facility: CLINIC | Age: 47
End: 2022-07-05
Payer: COMMERCIAL

## 2022-08-23 ENCOUNTER — OFFICE VISIT (OUTPATIENT)
Dept: ORTHOPEDICS | Facility: CLINIC | Age: 47
End: 2022-08-23
Payer: COMMERCIAL

## 2022-08-23 VITALS — HEIGHT: 69 IN | BODY MASS INDEX: 34.8 KG/M2 | WEIGHT: 235 LBS

## 2022-08-23 DIAGNOSIS — M77.11 LATERAL EPICONDYLITIS OF BOTH ELBOWS: Primary | ICD-10-CM

## 2022-08-23 DIAGNOSIS — M77.12 LATERAL EPICONDYLITIS OF BOTH ELBOWS: Primary | ICD-10-CM

## 2022-08-23 PROCEDURE — 1159F MED LIST DOCD IN RCRD: CPT | Mod: CPTII,S$GLB,, | Performed by: PHYSICIAN ASSISTANT

## 2022-08-23 PROCEDURE — 99203 OFFICE O/P NEW LOW 30 MIN: CPT | Mod: S$GLB,,, | Performed by: PHYSICIAN ASSISTANT

## 2022-08-23 PROCEDURE — 1160F PR REVIEW ALL MEDS BY PRESCRIBER/CLIN PHARMACIST DOCUMENTED: ICD-10-PCS | Mod: CPTII,S$GLB,, | Performed by: PHYSICIAN ASSISTANT

## 2022-08-23 PROCEDURE — 3008F PR BODY MASS INDEX (BMI) DOCUMENTED: ICD-10-PCS | Mod: CPTII,S$GLB,, | Performed by: PHYSICIAN ASSISTANT

## 2022-08-23 PROCEDURE — 3008F BODY MASS INDEX DOCD: CPT | Mod: CPTII,S$GLB,, | Performed by: PHYSICIAN ASSISTANT

## 2022-08-23 PROCEDURE — 99999 PR PBB SHADOW E&M-EST. PATIENT-LVL IV: ICD-10-PCS | Mod: PBBFAC,,, | Performed by: PHYSICIAN ASSISTANT

## 2022-08-23 PROCEDURE — 99999 PR PBB SHADOW E&M-EST. PATIENT-LVL IV: CPT | Mod: PBBFAC,,, | Performed by: PHYSICIAN ASSISTANT

## 2022-08-23 PROCEDURE — 1159F PR MEDICATION LIST DOCUMENTED IN MEDICAL RECORD: ICD-10-PCS | Mod: CPTII,S$GLB,, | Performed by: PHYSICIAN ASSISTANT

## 2022-08-23 PROCEDURE — 99203 PR OFFICE/OUTPT VISIT, NEW, LEVL III, 30-44 MIN: ICD-10-PCS | Mod: S$GLB,,, | Performed by: PHYSICIAN ASSISTANT

## 2022-08-23 PROCEDURE — 1160F RVW MEDS BY RX/DR IN RCRD: CPT | Mod: CPTII,S$GLB,, | Performed by: PHYSICIAN ASSISTANT

## 2022-08-23 NOTE — PROGRESS NOTES
Subjective:      Patient ID: Jesse Hernández is a 47 y.o. male.    Chief Complaint: No chief complaint on file.      HPI  Jesse Hernández is a  47 y.o. male presenting today for bilateral elbow pain.  There was not a history of trauma.  Onset of symptoms began several months ago at the beginning of 2022. He has tried bilateral CSI injections (x3) and bilateral bracing, which provide temporary relief. He has lost his right brace, and would like another today.  No numbness or tingling reported.      Last injection: 6- bilateral lateral epicondylitis    Review of patient's allergies indicates:  No Known Allergies      Current Outpatient Medications   Medication Sig Dispense Refill    metoprolol succinate (TOPROL-XL) 100 MG 24 hr tablet TAKE 1 TABLET(100 MG) BY MOUTH EVERY DAY 90 tablet 3    benzonatate (TESSALON PERLES) 100 MG capsule Take 2 capsules (200 mg total) by mouth 3 (three) times daily as needed for Cough. (Patient not taking: Reported on 6/23/2022) 30 capsule 1    DULoxetine (CYMBALTA) 60 MG capsule Take 1 capsule (60 mg total) by mouth once daily. 90 capsule 1    fenofibrate 160 MG Tab Take 1 tablet (160 mg total) by mouth once daily. 90 tablet 1    lamoTRIgine (LAMICTAL) 150 MG Tab TAKE 2 TABLETS(300 MG) BY MOUTH EVERY DAY 60 tablet 2    levothyroxine (SYNTHROID) 75 MCG tablet TAKE 1 TABLET(75 MCG) BY MOUTH BEFORE BREAKFAST 90 tablet 3     No current facility-administered medications for this visit.       Past Medical History:   Diagnosis Date    Hypertension     KAILEE (obstructive sleep apnea)        Past Surgical History:   Procedure Laterality Date    BACK SURGERY      COLONOSCOPY N/A 4/22/2022    Procedure: COLONOSCOPY miralax prep;  Surgeon: Lamonte Carcamo MD;  Location: [facility];  Service: Endoscopy;  Laterality: N/A;  Patient needs a rapid covid test.    HERNIA REPAIR      PATENT DUCTUS ARTERIOUS LIGATION      infancy    ROBOT-ASSISTED LAPAROSCOPIC REPAIR OF INGUINAL HERNIA Left  "6/28/2021    Procedure: ROBOTIC REPAIR, HERNIA, INGUINAL;  Surgeon: Patrick Rehman Jr., MD;  Location: [facility] OR;  Service: General;  Laterality: Left;  Left vs bilateral    SPINE SURGERY      UMBILICAL HERNIA REPAIR N/A 11/25/2019    Procedure: REPAIR, HERNIA, UMBILICAL, AGE 5 YEARS OR OLDER - open with mesh;  Surgeon: Patrick Rehman Jr., MD;  Location: [facility] OR;  Service: General;  Laterality: N/A;       Review of Systems:  Review of Systems   Constitutional: Negative for chills and fever.   Respiratory: Negative for shortness of breath.    Cardiovascular: Negative for chest pain and leg swelling.   Gastrointestinal: Negative for nausea and vomiting.   Genitourinary: Negative for dysuria.   Musculoskeletal: Negative for falls.   Skin: Negative for rash.   Neurological: Negative for dizziness and sensory change.       OBJECTIVE:     PHYSICAL EXAM:       Ht 5' 9" (1.753 m)   Wt 106.6 kg (235 lb)   BMI 34.70 kg/m²      Well developed, well nourished male in no acute distress    Extremity exam- in examination of the elbows demonstrates some tenderness laterally over the lateral epicondylar area bilaterally  No significant swelling  Range of motion elbows full he does have some pain on terminal extension  No instability  (+) pain with resisted wrist extension  Tinel sign negative at the elbow bilaterally      ASSESSMENT/PLAN:     IMPRESSION:  Bilateral lateral epicondylitis of the elbows    PLAN    Bracing: R tennis elbow strap issued today  PT: formal referral updated to begin gentle stretching, strengthening, TENS, and other modalities as indicated  Continue anti-inflammatory medication by mouth  Activity modifications reviewed  Follow-up: with Dr. Ricks upon return from work trip in October (6wks) to discuss improvement, repeat steroid injection, or evaluate for surgical candidacy   The pt verbalized understanding and agreement with plan    Encounter Diagnosis   Name Primary?    Lateral epicondylitis " of both elbows Yes

## 2022-08-25 ENCOUNTER — CLINICAL SUPPORT (OUTPATIENT)
Dept: REHABILITATION | Facility: HOSPITAL | Age: 47
End: 2022-08-25
Payer: COMMERCIAL

## 2022-08-25 DIAGNOSIS — M79.622 PAIN IN BOTH UPPER ARMS: ICD-10-CM

## 2022-08-25 DIAGNOSIS — M25.60 STIFFNESS IN JOINT: ICD-10-CM

## 2022-08-25 DIAGNOSIS — M77.12 LATERAL EPICONDYLITIS OF BOTH ELBOWS: ICD-10-CM

## 2022-08-25 DIAGNOSIS — M79.621 PAIN IN BOTH UPPER ARMS: ICD-10-CM

## 2022-08-25 DIAGNOSIS — M77.11 LATERAL EPICONDYLITIS OF BOTH ELBOWS: ICD-10-CM

## 2022-08-25 DIAGNOSIS — R53.1 WEAKNESS: ICD-10-CM

## 2022-08-25 PROCEDURE — 97165 OT EVAL LOW COMPLEX 30 MIN: CPT | Mod: PN

## 2022-08-25 PROCEDURE — 97140 MANUAL THERAPY 1/> REGIONS: CPT | Mod: PN

## 2022-08-25 NOTE — PLAN OF CARE
[facility] Occupational Therapy  Initial Evaluation     Date: 8/25/2022  Patient: Jesse Hernández  Chart Number: [id number]    Therapy Diagnosis:   Encounter Diagnoses   Name Primary?    Lateral epicondylitis of both elbows     Pain in both upper arms     Weakness     Stiffness in joint      Physician: Gali Jane PA-C    Physician Orders: OT evaluate and treat;   Eval and treat bilateral lateral epicondylitis         Medical Diagnosis: M77.11,M77.12 (ICD-10-CM) - Lateral epicondylitis of both elbows  Evaluation Date: 8/25/2022  Insurance Authorization period Expiration: 12/31/2022  Plan of Care Expiration Period: 10/21/2022    Visit # / Visits Authortized: 1 / 50  Time In:7:00  Time Out: 7:45  Total Billable Time: 45 minutes    Precautions: Standard     Subjective     Involved Side: Bilateral   Dominant Side: Right  Date of Onset: 6 months  Mechanism of Injury: insidious onset with progressively worsening symptoms   History of Current Condition: pt presents to clinic today with chronic bilateral elbow pain, weakness and stiffness  Surgical Procedure: none  Imaging: Left:Significant soft tissue edema and possible elbow joint effusion raises the question of occult radial head fracture, though no acute displaced fracture line is visualized.  Suggest correlation with mechanism of injury.  Previous Therapy: none    Patient's Goals for Therapy: to decrease pain and increase functional use     Pain:  Functional Pain Scale Rating 0-10:   4/10 on average  0/10 at best  8/10 at worst  Location: bilateral lateral epicondyles and triceps    Description: Aching  Aggravating Factors: Laying, Night Time, Lifting and gripping  Easing Factors: pain medication and injection    Occupation:  Construction  Working presently: employed  Duties: lifting, carrying, pushing, pulling    Functional Limitations/Social History:    Previous functional status includes: Independent with all ADLs.     Current  FunctionalStatus   Home/Living environment : lives with their family      Limitation of Functional Status as follows:   ADLs/IADLs:     - Feeding: Independent    - Bathing: Independent    - Dressing/Grooming: Independent    - Driving: Independent     Leisure: Fishing, Hunting and Golfing      Past Medical History/Physical Systems Review:   Jesse Hernández  has a past medical history of Hypertension and KAILEE (obstructive sleep apnea).    Jesse Hernández  has a past surgical history that includes Back surgery; Spine surgery; Umbilical hernia repair (N/A, 11/25/2019); Hernia repair; Patent ductus arterious ligation; Robot-assisted laparoscopic repair of inguinal hernia (Left, 6/28/2021); and Colonoscopy (N/A, 4/22/2022).    Jesse has a current medication list which includes the following prescription(s): benzonatate, duloxetine, fenofibrate, lamotrigine, levothyroxine, metoprolol succinate, and [DISCONTINUED] loratadine.    Review of patient's allergies indicates:  No Known Allergies       Objective     Observation/Appearance: bilateral elbow braces    Sensation Test: WNL    Edema. Measured in centimeters.   8/25/2022 8/25/2022    Left Right   2in. Above elbow 32 32   2in. Below elbow 30 30.5   Wrist Crease 18 19   MCPs 22 22.5     Range of Motion/Strength:   Left  Right  Pain/Dysfunction with Movement    AROM MMT AROM MMT    Elbow Extension -10 5/5 -10 5/5    Elbow Flexion 125 5/5 115 5/5    Pronation  90 5/5 90 5/5    Supination 70 5/5 70 5/5    Wrist Extension 52 5/5 52 5/5    Wrist Flexion 70 5/5 70 5/5    Radial Deviation 18 5/5 18 5/5    Ulnar Deviation 28 5/5 28 5/5           Strength (Dynamometer Rung II) and Pinch Strength (Pinch Gauge)  Measured in pounds.   8/25/2022 8/25/2022    Left Right   Elbow Flexed 80 85   Elbow Extended 40 20   Key Pinch 22 14   3pt Pinch 13 13   2pt Pinch 12 11     Special Tests: bilateral   - Cozen's (lateral epicondylitis) test: -   - Mill's (lateral epicondylitis) test: -   -  "Extensor Digitorum (lateral epicondylitis) test: -   - Medial epicondylitis test: -    CMS Impairment/Limitation/Restriction for FOTO Elbow Survey    Therapist reviewed FOTO scores for Jesse Hernández on 8/25/2022.   FOTO documents entered into [facility] - see Media section.    Limitation Score: 49%  Category: Self Care         Treatment     Treatment Time In: 730  Treatment Time Out: 745  Total Treatment time separate from Evaluation time:15    Jesse received the following manual therapy techniques for 10 minutes:   -ASTYM to Bilateral elbow, wrist and hand     Jesse received therapeutic exercises for 5 minutes including:  -ASTYM stretches 1-3 3/30"  Home Exercise Program/Education:  Issued HEP (see patient instructions in EMR) and educated on modality use for pain management . Exercises were reviewed and Jesse was able to demonstrate them prior to the end of the session.   Pt received a written copy of exercises to perform at home. Jesse demonstrated good  understanding of the education provided.  Pt was advised to perform these exercises free of pain, and to stop performing them if pain occurs.    Patient/Family Education: role of OT, goals for OT, scheduling/cancellations - pt verbalized understanding. Discussed insurance limitations with patient.    Additional Education provided: ASTYM education    Assessment     Jesse Hernández is a 47 y.o. male referred to outpatient occupational therapy and presents with a medical diagnosis of bilateral elbow pain, resulting in Decreased ROM, Decreased  strength, Decreased pinch strength, Decreased muscle strength, Decreased functional hand use, Increased pain and Edema and demonstrates limitations as described in the chart below. Following medical record review it is determined that pt will benefit from occupational therapy services in order to maximize pain free and/or functional use of bilateral elbows. The following goals were discussed with the patient and patient is in " agreement with them as to be addressed in the treatment plan. The patient's rehab potential is Good.     Anticipated barriers to occupational therapy: none  Pt has no cultural, educational or language barriers to learning provided.    Profile and History Assessment of Occupational Performance Level of Clinical Decision Making Complexity Score   Occupational Profile:   Jesse Hernández is a 47 y.o. 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 Jesse Hernández has difficulty with  housework/household chores and hunting, fishing,   affecting his/her daily functional abilities. His/her main goal for therapy is to decrease pain and increase functional use.     Comorbidities:   HTN    Medical and Therapy History Review:   Brief               Performance Deficits    Physical:  Joint Mobility  Muscle Power/Strength  Muscle Endurance   Strength  Pinch Strength  Muscle Tone  Postural Control  Pain    Cognitive:  No Deficits    Psychosocial:    No Deficits     Clinical Decision Making:  low    Assessment Process:  Problem-Focused Assessments    Modification/Need for Assistance:  Not Necessary    Intervention Selection:  Several Treatment Options       low  Based on PMHX, co morbidities , data from assessments and functional level of assistance required with task and clinical presentation directly impacting function.       The following goals were discussed with the patient and patient is in agreement with them as to be addressed in the treatment plan.     Goals:   Long Term Goals (LTGs); to be met by discharge.  1: Pt will report a pain level of 2 out of 10 with functional  during work task.   2: Pt will demonstrate an improved FOTO score to 15% limited for improved performance with functional tasks  3: Pt will return to prior level of function for work tasks and household management.   4. Pt will demonstrate improved  within functional limits grossly bilateral hands for improved  performance with work tasks.    Short Term Goals (STGs); to be met within 4 weeks (9/24/22).  1: Pt will report 4 out of 10 pain level with funtional  bilateral hands.  2: Pt will demonstrate increased  bilateral hands with arms extended by 5 pounds for improved performance with using tools at work  3: Pt will demonstrate invreased elbow range of motion by 10 degrees bilateral elbows for improved performance with hunting/fishing  4: Pt will demonstrate independence with issued HEP.   5: Pt will demonstrate an improved FOTO score to 30% limited for improved performance with functional tasks    Plan   Certification Period/Plan of care expiration: 8/25/2022 to 10/21/2022.    Outpatient Occupational Therapy 1 times weekly for 8 weeks to include the following interventions: Manual therapy/joint mobilizations, Modalities for pain management, Therapeutic exercises/activities., Strengthening, Orthotic Fabrication/Fit/Training, Joint Protection and Energy Conservation.      LEO Cardozo

## 2022-08-25 NOTE — PATIENT INSTRUCTIONS
[facility]  OCCUPATIONAL THERAPY  HOME EXERCISE PROGRAM         Therapist: Elina BAILEY

## 2022-08-31 NOTE — PROGRESS NOTES
"  Occupational Therapy Treatment Note     Date: 9/1/2022  Name: Jesse Hernández  Clinic Number: [id number]    Therapy Diagnosis:   Encounter Diagnoses   Name Primary?    Pain in both upper arms Yes    Weakness     Stiffness in joint      Physician: Gali Jane PA-C    Physician Orders: OT evaluate and treat;    Eval and treat bilateral lateral epicondylitis         Medical Diagnosis: M77.11,M77.12 (ICD-10-CM) - Lateral epicondylitis of both elbows  Evaluation Date: 8/25/2022  Insurance Authorization period Expiration: 12/31/2022  Plan of Care Expiration Period: 10/21/2022     Visit # / Visits Authortized: 2/ 50  Time In:7:00  Time Out: 7:50  Total Billable Time: 50 minutes     Precautions: Standard     Subjective     Pt reports: "I felt good after that treatment last time."  he was compliant with home exercise program given last session.   Response to previous treatment:decreased pain  Functional change: able to progress with exercises, increased range of motion, increased  and pinch    Pain: 2/10  Location: bilateral elbow      Objective     Objective Measures updated at progress report unless specified.   Range of Motion/Strength:    Left   Right   Pain/Dysfunction with Movement     AROM MMT AROM MMT     Elbow Extension 0(+10) 5/5 0(+10) 5/5     Elbow Flexion 130(+5) 5/5 130(+15) 5/5     Pronation  90 5/5 90 5/5     Supination 70 5/5 70 5/5     Wrist Extension 52 5/5 52 5/5     Wrist Flexion 70 5/5 70 5/5     Radial Deviation 18 5/5 18 5/5     Ulnar Deviation 28 5/5 28 5/5            Strength (Dynamometer Rung II) and Pinch Strength (Pinch Gauge)  Measured in pounds.    8/25/2022 8/25/2022 9/1/2022 9/1/2022     Left Right Left Right   Elbow Flexed 80 85 80 85   Elbow Extended 40 20 60 70   Key Pinch 22 14 22 24   3pt Pinch 13 13 15 17   2pt Pinch 12 11 10 10      Treatment     Jesse received the following manual therapy techniques for 10 minutes:   -ASTYM to bilateral elbow, wrist and hand    Jesse " "received therapeutic exercises for 40 minutes including:  Exercises        ASTYM stretches 1-3 Bilateral 3/30"   Eccetric wrist strengthening bilateral 2#  2/10   José Luis twists Red flex bar  5x each hand   Theraband horizontal abduction Green   2/15   Theraband external rotation Green  2/15                     Home Exercises and Education Provided     Education provided:   - Progress towards goals     Written Home Exercises Provided: Patient instructed to cont prior HEP.  Exercises were reviewed and Jesse was able to demonstrate them prior to the end of the session.  Jesse demonstrated good  understanding of the HEP provided.   .   See EMR under Patient Instructions for exercises provided prior visit.      Assessment   Pt with good participation this date. Pain report low throughout session. Fatigue noted with exercises however able to complete all in a pain free range of motion and with good technique. Pt with improved range of motion and strength noted in bilateral upper extremities. Signs and symptoms of fibrotic tissue noted during ASTYM however tolerated treatment better today versus last session. Pt motivated and reports compliance with home exercise program.     Jesse is progressing well towards his goals and there are no updates to goals at this time. Pt prognosis is Good.     Pt will continue to benefit from skilled outpatient occupational therapy to address the deficits listed in the problem list on initial evaluation provide pt/family education and to maximize pt's level of independence in the home and community environment.     Anticipated barriers to occupational therapy: none    Pt's spiritual, cultural and educational needs considered and pt agreeable to plan of care and goals.    Goals:  Long Term Goals (LTGs); to be met by discharge.  1: Pt will report a pain level of 2 out of 10 with functional  during work task.  -Not met, progressing      2: Pt will demonstrate an improved FOTO score to 15% " limited for improved performance with functional tasks -Not met, progressing  3: Pt will return to prior level of function for work tasks and household management. -Not met, progressing  4. Pt will demonstrate improved  within functional limits grossly bilateral hands for improved performance with work tasks. -Not met, progressing     Short Term Goals (STGs); to be met within 4 weeks (9/24/22).  1: Pt will report 4 out of 10 pain level with funtional  bilateral hands. -Met  2: Pt will demonstrate increased  bilateral hands with arms extended by 5 pounds for improved performance with using tools at work -Met  3: Pt will demonstrate invreased elbow range of motion by 10 degrees bilateral elbows for improved performance with hunting/fishing -Met  4: Pt will demonstrate independence with issued HEP. -met, ongoing  5: Pt will demonstrate an improved FOTO score to 30% limited for improved performance with functional tasks -Not met, progressing    Plan   Continue with OT POC  Updates/Grading for next session: progress with ASTYM stretches and strengthening      LEO Cardozo

## 2022-09-01 ENCOUNTER — CLINICAL SUPPORT (OUTPATIENT)
Dept: REHABILITATION | Facility: HOSPITAL | Age: 47
End: 2022-09-01
Attending: PHYSICIAN ASSISTANT
Payer: COMMERCIAL

## 2022-09-01 DIAGNOSIS — R53.1 WEAKNESS: ICD-10-CM

## 2022-09-01 DIAGNOSIS — M25.60 STIFFNESS IN JOINT: ICD-10-CM

## 2022-09-01 DIAGNOSIS — M79.622 PAIN IN BOTH UPPER ARMS: Primary | ICD-10-CM

## 2022-09-01 DIAGNOSIS — M79.621 PAIN IN BOTH UPPER ARMS: Primary | ICD-10-CM

## 2022-09-01 PROCEDURE — 97140 MANUAL THERAPY 1/> REGIONS: CPT | Mod: PN

## 2022-09-01 PROCEDURE — 97110 THERAPEUTIC EXERCISES: CPT | Mod: PN

## 2022-09-04 ENCOUNTER — OFFICE VISIT (OUTPATIENT)
Dept: URGENT CARE | Facility: CLINIC | Age: 47
End: 2022-09-04
Payer: COMMERCIAL

## 2022-09-04 VITALS
RESPIRATION RATE: 18 BRPM | HEIGHT: 69 IN | BODY MASS INDEX: 34.8 KG/M2 | DIASTOLIC BLOOD PRESSURE: 79 MMHG | TEMPERATURE: 98 F | SYSTOLIC BLOOD PRESSURE: 121 MMHG | HEART RATE: 65 BPM | OXYGEN SATURATION: 97 % | WEIGHT: 235 LBS

## 2022-09-04 DIAGNOSIS — Z23 NEED FOR TDAP VACCINATION: ICD-10-CM

## 2022-09-04 DIAGNOSIS — S91.332A PUNCTURE WOUND OF LEFT FOOT, INITIAL ENCOUNTER: Primary | ICD-10-CM

## 2022-09-04 PROCEDURE — 1159F PR MEDICATION LIST DOCUMENTED IN MEDICAL RECORD: ICD-10-PCS | Mod: CPTII,S$GLB,,

## 2022-09-04 PROCEDURE — 90715 TDAP VACCINE 7 YRS/> IM: CPT | Mod: S$GLB,,,

## 2022-09-04 PROCEDURE — 3078F DIAST BP <80 MM HG: CPT | Mod: CPTII,S$GLB,,

## 2022-09-04 PROCEDURE — 3008F BODY MASS INDEX DOCD: CPT | Mod: CPTII,S$GLB,,

## 2022-09-04 PROCEDURE — 90471 IMMUNIZATION ADMIN: CPT | Mod: S$GLB,,,

## 2022-09-04 PROCEDURE — 3074F PR MOST RECENT SYSTOLIC BLOOD PRESSURE < 130 MM HG: ICD-10-PCS | Mod: CPTII,S$GLB,,

## 2022-09-04 PROCEDURE — 3074F SYST BP LT 130 MM HG: CPT | Mod: CPTII,S$GLB,,

## 2022-09-04 PROCEDURE — 3008F PR BODY MASS INDEX (BMI) DOCUMENTED: ICD-10-PCS | Mod: CPTII,S$GLB,,

## 2022-09-04 PROCEDURE — 90715 TDAP VACCINE GREATER THAN OR EQUAL TO 7YO IM: ICD-10-PCS | Mod: S$GLB,,,

## 2022-09-04 PROCEDURE — 90471 TDAP VACCINE GREATER THAN OR EQUAL TO 7YO IM: ICD-10-PCS | Mod: S$GLB,,,

## 2022-09-04 PROCEDURE — 1160F RVW MEDS BY RX/DR IN RCRD: CPT | Mod: CPTII,S$GLB,,

## 2022-09-04 PROCEDURE — 3078F PR MOST RECENT DIASTOLIC BLOOD PRESSURE < 80 MM HG: ICD-10-PCS | Mod: CPTII,S$GLB,,

## 2022-09-04 PROCEDURE — 1160F PR REVIEW ALL MEDS BY PRESCRIBER/CLIN PHARMACIST DOCUMENTED: ICD-10-PCS | Mod: CPTII,S$GLB,,

## 2022-09-04 PROCEDURE — 99213 OFFICE O/P EST LOW 20 MIN: CPT | Mod: 25,S$GLB,,

## 2022-09-04 PROCEDURE — 99213 PR OFFICE/OUTPT VISIT, EST, LEVL III, 20-29 MIN: ICD-10-PCS | Mod: 25,S$GLB,,

## 2022-09-04 PROCEDURE — 1159F MED LIST DOCD IN RCRD: CPT | Mod: CPTII,S$GLB,,

## 2022-09-04 RX ORDER — CIPROFLOXACIN 500 MG/1
500 TABLET ORAL EVERY 12 HOURS
Qty: 14 TABLET | Refills: 0 | Status: SHIPPED | OUTPATIENT
Start: 2022-09-04 | End: 2022-09-11

## 2022-09-04 NOTE — PATIENT INSTRUCTIONS
Take full course of antibiotics until completion.    When do I need to call the doctor?   Signs of infection. These include a fever of 100.4°F (38°C) or higher, chills, or wound that will not heal.  The pain in and around the area gets much worse.  There is a bad smell or pus (thick yellow, green, or gray fluid) coming from your wound.  You notice a crunchy feeling or blisters in the skin around the wound.  The redness around your wound gets bigger or is spreading up your arm or leg.  Fluid that is not pus drains from your wound.  Your swelling doesnt improve or gets worse.

## 2022-09-07 NOTE — PROGRESS NOTES
"  Occupational Therapy Treatment Note     Date: 9/8/2022  Name: Jesse Hernández  Clinic Number: [id number]    Therapy Diagnosis:   Encounter Diagnoses   Name Primary?    Pain in both upper arms Yes    Weakness     Stiffness in joint      Physician: Gali Jane PA-C    Physician Orders: OT evaluate and treat;    Eval and treat bilateral lateral epicondylitis         Medical Diagnosis: M77.11,M77.12 (ICD-10-CM) - Lateral epicondylitis of both elbows  Evaluation Date: 8/25/2022  Insurance Authorization period Expiration: 12/31/2022  Plan of Care Expiration Period: 10/21/2022     Visit # / Visits Authortized: 3/ 50  Time In:7:00  Time Out: 7:50  Total Billable Time: 50 minutes     Precautions: Standard     Subjective     Pt reports: "I was a little sore after last session."  he was compliant with home exercise program given last session.   Response to previous treatment:increased pain  Functional change: able to progress with exercises, increased range of motion, increased  and pinch    Pain: 3/10  Location: bilateral elbow Left greater than right    Objective     Objective Measures updated at progress report unless specified.   Range of Motion/Strength:    Left   Right   Pain/Dysfunction with Movement     AROM MMT AROM MMT     Elbow Extension 0(+10) 5/5 0(+10) 5/5     Elbow Flexion 130(+5) 5/5 130(+15) 5/5     Pronation  90 5/5 90 5/5     Supination 70 5/5 70 5/5     Wrist Extension 52 5/5 52 5/5     Wrist Flexion 70 5/5 70 5/5     Radial Deviation 18 5/5 18 5/5     Ulnar Deviation 28 5/5 28 5/5            Strength (Dynamometer Rung II) and Pinch Strength (Pinch Gauge)  Measured in pounds.    8/25/2022 8/25/2022 9/1/2022 9/1/2022     Left Right Left Right   Elbow Flexed 80 85 80 85   Elbow Extended 40 20 60 70   Key Pinch 22 14 22 24   3pt Pinch 13 13 15 17   2pt Pinch 12 11 10 10      Treatment     Jesse received the following manual therapy techniques for 10 minutes:   -ASTYM to bilateral elbow, wrist and " "hand    Jesse received therapeutic exercises for 40 minutes including:  Exercises    Scifit UBE forward reverse Level 3   3minutes each direction   ASTYM stretches 1-3 Bilateral 3/30"   Eccentric wrist strengthening bilateral 2#  2/10   José Luis twists Red flex bar  5x each hand   Theraband horizontal abduction Green   2/15   Theraband external rotation Green  2/15   Hammer Swings 2/15   Radial Nerve glides 10x each     Home Exercises and Education Provided     Education provided:   - Progress towards goals     Written Home Exercises Provided: Patient instructed to cont prior HEP.  Exercises were reviewed and Jesse was able to demonstrate them prior to the end of the session.  Jesse demonstrated good  understanding of the HEP provided.   .   See EMR under Patient Instructions for exercises provided prior visit.      Assessment   Pt with good participation this date. Pain slightly increased from previous session however remains low and did not limit him with progression of treatment. Good endurance with exercises, pt able to complete all with good technique. Signs and symptoms of fibrotic tissue noted during ASTYM however tolerated treatment better today versus last session. Pt motivated and reports compliance with home exercise program.     Jesse is progressing well towards his goals and there are no updates to goals at this time. Pt prognosis is Good.     Pt will continue to benefit from skilled outpatient occupational therapy to address the deficits listed in the problem list on initial evaluation provide pt/family education and to maximize pt's level of independence in the home and community environment.     Anticipated barriers to occupational therapy: none    Pt's spiritual, cultural and educational needs considered and pt agreeable to plan of care and goals.    Goals:  Long Term Goals (LTGs); to be met by discharge.  1: Pt will report a pain level of 2 out of 10 with functional  during work task.  -Not met, " progressing      2: Pt will demonstrate an improved FOTO score to 15% limited for improved performance with functional tasks -Not met, progressing  3: Pt will return to prior level of function for work tasks and household management. -Not met, progressing  4. Pt will demonstrate improved  within functional limits grossly bilateral hands for improved performance with work tasks. -Not met, progressing     Short Term Goals (STGs); to be met within 4 weeks (9/24/22).  1: Pt will report 4 out of 10 pain level with funtional  bilateral hands. -Met  2: Pt will demonstrate increased  bilateral hands with arms extended by 5 pounds for improved performance with using tools at work -Met  3: Pt will demonstrate invreased elbow range of motion by 10 degrees bilateral elbows for improved performance with hunting/fishing -Met  4: Pt will demonstrate independence with issued HEP. -met, ongoing  5: Pt will demonstrate an improved FOTO score to 30% limited for improved performance with functional tasks -Not met, progressing    Plan   Continue with OT POC  Updates/Grading for next session: progress with ASTYM stretches and strengthening      LEO Cardozo

## 2022-09-08 ENCOUNTER — CLINICAL SUPPORT (OUTPATIENT)
Dept: REHABILITATION | Facility: HOSPITAL | Age: 47
End: 2022-09-08
Attending: PHYSICIAN ASSISTANT
Payer: COMMERCIAL

## 2022-09-08 DIAGNOSIS — M25.60 STIFFNESS IN JOINT: ICD-10-CM

## 2022-09-08 DIAGNOSIS — M79.622 PAIN IN BOTH UPPER ARMS: Primary | ICD-10-CM

## 2022-09-08 DIAGNOSIS — R53.1 WEAKNESS: ICD-10-CM

## 2022-09-08 DIAGNOSIS — M79.621 PAIN IN BOTH UPPER ARMS: Primary | ICD-10-CM

## 2022-09-08 PROCEDURE — 97110 THERAPEUTIC EXERCISES: CPT | Mod: PN

## 2022-09-08 PROCEDURE — 97140 MANUAL THERAPY 1/> REGIONS: CPT | Mod: PN

## 2022-09-13 NOTE — PROGRESS NOTES
"  Occupational Therapy Treatment Note     Date: 9/14/2022  Name: Jesse Hernández  Clinic Number: [id number]    Therapy Diagnosis:   Encounter Diagnoses   Name Primary?    Pain in both upper arms Yes    Weakness     Stiffness in joint        Physician: Gali Jane PA-C    Physician Orders: OT evaluate and treat;    Eval and treat bilateral lateral epicondylitis         Medical Diagnosis: M77.11,M77.12 (ICD-10-CM) - Lateral epicondylitis of both elbows  Evaluation Date: 8/25/2022  Insurance Authorization period Expiration: 12/31/2022  Plan of Care Expiration Period: 10/21/2022     Visit # / Visits Authortized: 4/ 50 FOTO:50%  Time In:7:50  Time Out: 8:35  Total Billable Time: 45 minutes     Precautions: Standard     Subjective     Pt reports: "I've been doing floors all last week and this week so this right arm is hurting a little more than usual."  he was compliant with home exercise program given last session.   Response to previous treatment:same pain  Functional change: increased  and pinch decreased range of motion    Pain: 3/10  Location: bilateral elbow Left greater than right    Objective     Objective Measures updated at progress report unless specified.   Range of Motion/Strength:    Left   Right   Pain/Dysfunction with Movement     AROM MMT AROM MMT     Elbow Extension 0(=) 5/5 0(=) 5/5     Elbow Flexion 130(=) 5/5 120(-10) 5/5     Pronation  90(=) 5/5 90(=) 5/5     Supination 70(=) 5/5 70(=) 5/5     Wrist Extension 52(=) 5/5 52(=) 5/5     Wrist Flexion 70(=) 5/5 70(=) 5/5     Radial Deviation 18(=) 5/5 18(=) 5/5     Ulnar Deviation 28(=) 5/5 28(=) 5/5            Strength (Dynamometer Rung II) and Pinch Strength (Pinch Gauge)  Measured in pounds.    8/25/2022 8/25/2022 9/1/2022 9/1/2022 9/14/2022 9/14/2022     Left Right Left Right Left Right   Elbow Flexed 80 85 80 85 90(+10) 95(+10)   Elbow Extended 40 20 60 70 85(+25) 55*(-15)   Key Pinch 22 14 22 24 22(=) 23(-1)   3pt Pinch 13 13 15 17 17(+2) " "15*(-2)   2pt Pinch 12 11 10 10 12(+2) 12(+2)      Treatment     Jesse received the following manual therapy techniques for 10 minutes:   -ASTYM to bilateral elbow, wrist and hand    Jesse received therapeutic exercises for 35 minutes including:  Exercises    Scifit UBE forward reverse Level 3   3minutes each direction   ASTYM stretches 1-3 Bilateral 3/30"   Eccentric wrist strengthening bilateral 2#  2/10   José Luis twists Red flex bar  5x each hand   Theraband horizontal abduction Green   2/15   Theraband external rotation Green  2/15   Hammer Swings 2/15   Radial Nerve glides 10x each     Home Exercises and Education Provided     Education provided:   - Progress towards goals     Written Home Exercises Provided: Patient instructed to cont prior HEP.  Exercises were reviewed and Jesse was able to demonstrate them prior to the end of the session.  Jesse demonstrated good  understanding of the HEP provided.   .   See EMR under Patient Instructions for exercises provided prior visit.      Assessment   Pt reports increased use upper extremities over the weekend and as a result has increased pain in right as compared to left. Despite that he still is demonstrating overall improved range of motion and strength since initial evaluation. He seems to have a setback with range of motion and strength since last measurements die to overuse. Reports inconsistent with stretches and ice. Reinforced importance of adhering to both with this diagnosis.  Signs and symptoms of fibrotic tissue noted during ASTYM however tolerated treatment better today versus last session. FOTO score increased from last session indicating a poorer functional use bilateral upper extremities with self care tasks.     Jesse is progressing fairly towards his goals and there are no updates to goals at this time. Pt prognosis is Good.     Pt will continue to benefit from skilled outpatient occupational therapy to address the deficits listed in the problem list on " initial evaluation provide pt/family education and to maximize pt's level of independence in the home and community environment.     Anticipated barriers to occupational therapy: none    Pt's spiritual, cultural and educational needs considered and pt agreeable to plan of care and goals.    Goals:  Long Term Goals (LTGs); to be met by discharge.  1: Pt will report a pain level of 2 out of 10 with functional  during work task.  -Not met, progressing      2: Pt will demonstrate an improved FOTO score to 15% limited for improved performance with functional tasks -Not met, progressing  3: Pt will return to prior level of function for work tasks and household management. -Not met, progressing  4. Pt will demonstrate improved  within functional limits grossly bilateral hands for improved performance with work tasks. -Not met, progressing     Short Term Goals (STGs); to be met within 4 weeks (9/24/22).  1: Pt will report 4 out of 10 pain level with funtional  bilateral hands. -Met  2: Pt will demonstrate increased  bilateral hands with arms extended by 5 pounds for improved performance with using tools at work -Met  3: Pt will demonstrate invreased elbow range of motion by 10 degrees bilateral elbows for improved performance with hunting/fishing -Met  4: Pt will demonstrate independence with issued HEP. -met, ongoing  5: Pt will demonstrate an improved FOTO score to 30% limited for improved performance with functional tasks -Not met, progressing    Plan   Continue with OT POC  Updates/Grading for next session: Pt going out of town for work then to follow up with Dr. Ricks on 10/6/2022. Plan to progress as indicated by MD.       LEO Cardozo

## 2022-09-14 ENCOUNTER — CLINICAL SUPPORT (OUTPATIENT)
Dept: REHABILITATION | Facility: HOSPITAL | Age: 47
End: 2022-09-14
Payer: COMMERCIAL

## 2022-09-14 DIAGNOSIS — R53.1 WEAKNESS: ICD-10-CM

## 2022-09-14 DIAGNOSIS — M79.622 PAIN IN BOTH UPPER ARMS: Primary | ICD-10-CM

## 2022-09-14 DIAGNOSIS — M25.60 STIFFNESS IN JOINT: ICD-10-CM

## 2022-09-14 DIAGNOSIS — M79.621 PAIN IN BOTH UPPER ARMS: Primary | ICD-10-CM

## 2022-09-14 PROCEDURE — 97140 MANUAL THERAPY 1/> REGIONS: CPT | Mod: PN

## 2022-09-14 PROCEDURE — 97110 THERAPEUTIC EXERCISES: CPT | Mod: PN

## 2022-09-17 ENCOUNTER — PATIENT MESSAGE (OUTPATIENT)
Dept: INTERNAL MEDICINE | Facility: CLINIC | Age: 47
End: 2022-09-17
Payer: COMMERCIAL

## 2022-09-30 ENCOUNTER — PATIENT MESSAGE (OUTPATIENT)
Dept: INTERNAL MEDICINE | Facility: CLINIC | Age: 47
End: 2022-09-30
Payer: COMMERCIAL

## 2022-10-06 ENCOUNTER — OFFICE VISIT (OUTPATIENT)
Dept: ORTHOPEDICS | Facility: CLINIC | Age: 47
End: 2022-10-06
Payer: COMMERCIAL

## 2022-10-06 VITALS — HEIGHT: 69 IN | WEIGHT: 235 LBS | BODY MASS INDEX: 34.8 KG/M2

## 2022-10-06 DIAGNOSIS — M25.512 ACUTE PAIN OF LEFT SHOULDER: Primary | ICD-10-CM

## 2022-10-06 DIAGNOSIS — S46.012A TRAUMATIC INCOMPLETE TEAR OF LEFT ROTATOR CUFF, INITIAL ENCOUNTER: ICD-10-CM

## 2022-10-06 PROCEDURE — 3008F PR BODY MASS INDEX (BMI) DOCUMENTED: ICD-10-PCS | Mod: CPTII,S$GLB,, | Performed by: ORTHOPAEDIC SURGERY

## 2022-10-06 PROCEDURE — 3008F BODY MASS INDEX DOCD: CPT | Mod: CPTII,S$GLB,, | Performed by: ORTHOPAEDIC SURGERY

## 2022-10-06 PROCEDURE — 99213 PR OFFICE/OUTPT VISIT, EST, LEVL III, 20-29 MIN: ICD-10-PCS | Mod: S$GLB,,, | Performed by: ORTHOPAEDIC SURGERY

## 2022-10-06 PROCEDURE — 1159F MED LIST DOCD IN RCRD: CPT | Mod: CPTII,S$GLB,, | Performed by: ORTHOPAEDIC SURGERY

## 2022-10-06 PROCEDURE — 99999 PR PBB SHADOW E&M-EST. PATIENT-LVL III: CPT | Mod: PBBFAC,,, | Performed by: ORTHOPAEDIC SURGERY

## 2022-10-06 PROCEDURE — 99999 PR PBB SHADOW E&M-EST. PATIENT-LVL III: ICD-10-PCS | Mod: PBBFAC,,, | Performed by: ORTHOPAEDIC SURGERY

## 2022-10-06 PROCEDURE — 1159F PR MEDICATION LIST DOCUMENTED IN MEDICAL RECORD: ICD-10-PCS | Mod: CPTII,S$GLB,, | Performed by: ORTHOPAEDIC SURGERY

## 2022-10-06 PROCEDURE — 99213 OFFICE O/P EST LOW 20 MIN: CPT | Mod: S$GLB,,, | Performed by: ORTHOPAEDIC SURGERY

## 2022-10-06 RX ORDER — TRAMADOL HYDROCHLORIDE 50 MG/1
50 TABLET ORAL EVERY 6 HOURS PRN
Qty: 30 TABLET | Refills: 0 | Status: SHIPPED | OUTPATIENT
Start: 2022-10-06 | End: 2022-10-16

## 2022-10-06 RX ORDER — HYDROCODONE BITARTRATE AND ACETAMINOPHEN 10; 325 MG/1; MG/1
TABLET ORAL
COMMUNITY
Start: 2022-10-03 | End: 2023-03-10

## 2022-10-06 NOTE — PROGRESS NOTES
Subjective:      Patient ID: Jesse Hernández is a 47 y.o. male.  Chief Complaint: Follow-up (Bilateral elbow c/o loss of stregnth)      HPI  Jesse Hernández is a  47 y.o. male presenting today for follow up of total elbow pain related to epicondylitis.  He reports that he is here today for new injury he sustained to his left shoulder 3 weeks ago   After a fall he injured his left shoulder  He has had severe pain ever since difficulty with use particularly elevating left arm  X-rays reported to him as negative for fracture in the ER  No numbness or tingling reported.    Review of patient's allergies indicates:  No Known Allergies      Current Outpatient Medications   Medication Sig Dispense Refill    DULoxetine (CYMBALTA) 60 MG capsule Take 1 capsule (60 mg total) by mouth once daily. 90 capsule 1    fenofibrate 160 MG Tab Take 1 tablet (160 mg total) by mouth once daily. 90 tablet 1    HYDROcodone-acetaminophen (NORCO)  mg per tablet Take by mouth.      lamoTRIgine (LAMICTAL) 150 MG Tab TAKE 2 TABLETS(300 MG) BY MOUTH EVERY DAY 60 tablet 2    levothyroxine (SYNTHROID) 75 MCG tablet TAKE 1 TABLET(75 MCG) BY MOUTH BEFORE BREAKFAST 90 tablet 3    metoprolol succinate (TOPROL-XL) 100 MG 24 hr tablet TAKE 1 TABLET(100 MG) BY MOUTH EVERY DAY 90 tablet 3     No current facility-administered medications for this visit.       Past Medical History:   Diagnosis Date    Hypertension     KAILEE (obstructive sleep apnea)        Past Surgical History:   Procedure Laterality Date    BACK SURGERY      COLONOSCOPY N/A 4/22/2022    Procedure: COLONOSCOPY miralax prep;  Surgeon: Lamonte Carcamo MD;  Location: [facility] ENDO;  Service: Endoscopy;  Laterality: N/A;  Patient needs a rapid covid test.    HERNIA REPAIR      PATENT DUCTUS ARTERIOUS LIGATION      infancy    ROBOT-ASSISTED LAPAROSCOPIC REPAIR OF INGUINAL HERNIA Left 6/28/2021    Procedure: ROBOTIC REPAIR, HERNIA, INGUINAL;  Surgeon: Patrick Rehman Jr., MD;  Location: [facility]  "OR;  Service: General;  Laterality: Left;  Left vs bilateral    SPINE SURGERY      UMBILICAL HERNIA REPAIR N/A 11/25/2019    Procedure: REPAIR, HERNIA, UMBILICAL, AGE 5 YEARS OR OLDER - open with mesh;  Surgeon: Patrick Rehman Jr., MD;  Location: [facility] OR;  Service: General;  Laterality: N/A;       OBJECTIVE:   PHYSICAL EXAM:  Height: 5' 9" (175.3 cm) Weight: 106.6 kg (235 lb)  Vitals:    10/06/22 1307   Weight: 106.6 kg (235 lb)   Height: 5' 9" (1.753 m)   PainSc:   2   PainLoc: Elbow     Ortho/SPM Exam  Examination left shoulder no bruising no swelling  There is diffuse tenderness   Range of motion limited due to pain  Positive impingement sign  Positive supraspinatus stress test   And there is weakness of the rotator cuff   Neurologic exam intact    RADIOGRAPHS:  AP lateral x-ray left shoulder demonstrate no fracture or dislocation  Comments: I have personally reviewed the imaging and I agree with the above radiologist's report.    ASSESSMENT/PLAN:     IMPRESSION:  1. Left shoulder injury.      2. Possible rotator cuff tear left shoulder    PLAN:  I am concerned about the rotator cuff so I have ordered an MRI scan left shoulder to check the rotator cuff for possible tear  In the meantime avoid overhead lifting   Motrin by mouth  Follow-up after the MRI is complete    FOLLOW UP:  After the MRI is complete    Disclaimer: This note has been generated using voice-recognition software. There may be typographical errors that have been missed during proof-reading.     "

## 2022-10-20 ENCOUNTER — HOSPITAL ENCOUNTER (OUTPATIENT)
Dept: RADIOLOGY | Facility: HOSPITAL | Age: 47
Discharge: HOME | End: 2022-10-20
Attending: ORTHOPAEDIC SURGERY
Payer: COMMERCIAL

## 2022-10-20 DIAGNOSIS — M25.512 ACUTE PAIN OF LEFT SHOULDER: ICD-10-CM

## 2022-10-20 PROCEDURE — 73221 MRI JOINT UPR EXTREM W/O DYE: CPT | Mod: 26,LT,, | Performed by: RADIOLOGY

## 2022-10-20 PROCEDURE — 73221 MRI JOINT UPR EXTREM W/O DYE: CPT | Mod: TC,LT

## 2022-10-20 PROCEDURE — 73221 MRI SHOULDER WITHOUT CONTRAST LEFT: ICD-10-PCS | Mod: 26,LT,, | Performed by: RADIOLOGY

## 2022-10-27 ENCOUNTER — OFFICE VISIT (OUTPATIENT)
Dept: ORTHOPEDICS | Facility: CLINIC | Age: 47
End: 2022-10-27
Payer: COMMERCIAL

## 2022-10-27 VITALS — HEIGHT: 69 IN | BODY MASS INDEX: 34.8 KG/M2 | WEIGHT: 235 LBS

## 2022-10-27 DIAGNOSIS — S46.012D TRAUMATIC INCOMPLETE TEAR OF LEFT ROTATOR CUFF, SUBSEQUENT ENCOUNTER: Primary | ICD-10-CM

## 2022-10-27 PROCEDURE — 99999 PR PBB SHADOW E&M-EST. PATIENT-LVL III: CPT | Mod: PBBFAC,,, | Performed by: ORTHOPAEDIC SURGERY

## 2022-10-27 PROCEDURE — 1159F PR MEDICATION LIST DOCUMENTED IN MEDICAL RECORD: ICD-10-PCS | Mod: CPTII,S$GLB,, | Performed by: ORTHOPAEDIC SURGERY

## 2022-10-27 PROCEDURE — 99999 PR PBB SHADOW E&M-EST. PATIENT-LVL III: ICD-10-PCS | Mod: PBBFAC,,, | Performed by: ORTHOPAEDIC SURGERY

## 2022-10-27 PROCEDURE — 99213 PR OFFICE/OUTPT VISIT, EST, LEVL III, 20-29 MIN: ICD-10-PCS | Mod: S$GLB,,, | Performed by: ORTHOPAEDIC SURGERY

## 2022-10-27 PROCEDURE — 99213 OFFICE O/P EST LOW 20 MIN: CPT | Mod: S$GLB,,, | Performed by: ORTHOPAEDIC SURGERY

## 2022-10-27 PROCEDURE — 1159F MED LIST DOCD IN RCRD: CPT | Mod: CPTII,S$GLB,, | Performed by: ORTHOPAEDIC SURGERY

## 2022-10-27 RX ORDER — TRAMADOL HYDROCHLORIDE 50 MG/1
50 TABLET ORAL EVERY 6 HOURS PRN
COMMUNITY
Start: 2022-10-20 | End: 2023-03-10

## 2022-10-27 NOTE — PROGRESS NOTES
Subjective:      Patient ID: Jesse Hernández is a 47 y.o. male.  Chief Complaint: Results (MRI - LEFT SHOULDER )      HPI  Jesse Hernández is a  47 y.o. male presenting today for follow up of left shoulder injury.  He reports that he is still having pain in left shoulder and difficulty with use   Recent MRI scan of the left shoulder showed a partial tear of the rotator cuff  Went over that with the patient today   He has not had therapy yet for his shoulder.    Review of patient's allergies indicates:  No Known Allergies      Current Outpatient Medications   Medication Sig Dispense Refill    DULoxetine (CYMBALTA) 60 MG capsule Take 1 capsule (60 mg total) by mouth once daily. 90 capsule 1    fenofibrate 160 MG Tab Take 1 tablet (160 mg total) by mouth once daily. 90 tablet 1    lamoTRIgine (LAMICTAL) 150 MG Tab TAKE 2 TABLETS(300 MG) BY MOUTH EVERY DAY 60 tablet 2    levothyroxine (SYNTHROID) 75 MCG tablet TAKE 1 TABLET(75 MCG) BY MOUTH BEFORE BREAKFAST 90 tablet 3    metoprolol succinate (TOPROL-XL) 100 MG 24 hr tablet TAKE 1 TABLET(100 MG) BY MOUTH EVERY DAY 90 tablet 3    traMADoL (ULTRAM) 50 mg tablet Take 50 mg by mouth every 6 (six) hours as needed.      HYDROcodone-acetaminophen (NORCO)  mg per tablet Take by mouth.       No current facility-administered medications for this visit.       Past Medical History:   Diagnosis Date    Hypertension     KAILEE (obstructive sleep apnea)        Past Surgical History:   Procedure Laterality Date    BACK SURGERY      COLONOSCOPY N/A 4/22/2022    Procedure: COLONOSCOPY miralax prep;  Surgeon: Lamonte Carcamo MD;  Location: [facility] ENDO;  Service: Endoscopy;  Laterality: N/A;  Patient needs a rapid covid test.    HERNIA REPAIR      PATENT DUCTUS ARTERIOUS LIGATION      infancy    ROBOT-ASSISTED LAPAROSCOPIC REPAIR OF INGUINAL HERNIA Left 6/28/2021    Procedure: ROBOTIC REPAIR, HERNIA, INGUINAL;  Surgeon: Patrick Rehman Jr., MD;  Location: [facility] OR;  Service: General;  " Laterality: Left;  Left vs bilateral    SPINE SURGERY      UMBILICAL HERNIA REPAIR N/A 11/25/2019    Procedure: REPAIR, HERNIA, UMBILICAL, AGE 5 YEARS OR OLDER - open with mesh;  Surgeon: Patrick Rehman Jr., MD;  Location: [facility];  Service: General;  Laterality: N/A;       OBJECTIVE:   PHYSICAL EXAM:  Height: 5' 9" (175.3 cm) Weight: 106.6 kg (235 lb 0.2 oz)  Vitals:    10/27/22 1333   Weight: 106.6 kg (235 lb 0.2 oz)   Height: 5' 9" (1.753 m)   PainSc:   2   PainLoc: Shoulder     Ortho/SPM Exam  Examination left shoulder mild diffuse tenderness no bruising no swelling   Range of motion is limited due to patient resistance I can not tell if he is really getting stiffer fused just tensing up but he certainly can not tolerate any passive elevation or even external rotation   No active elevation is possible but poor effort noted   Neurologic exam intact    RADIOGRAPHS:  MRI demonstrates partial tear rotator cuff left shoulder  Comments: I have personally reviewed the imaging and I agree with the above radiologist's report.    ASSESSMENT/PLAN:     IMPRESSION:  Partial tear rotator cuff left shoulder posttraumatic    PLAN:  I explained the nature of the injury to the patient   I am very concerned about his stiffness so I recommended we get him in the therapy to work on range of motion   I explained to the patient the most of these will get better without surgery but surgery would be the backup plan if therapy is not helpful   Continue current medications including tramadol for pain I have encouraged him to use his left arm and shoulder so it does not stiffen up further    FOLLOW UP:  4-6 weeks    Disclaimer: This note has been generated using voice-recognition software. There may be typographical errors that have been missed during proof-reading.     "

## 2022-10-31 ENCOUNTER — CLINICAL SUPPORT (OUTPATIENT)
Dept: REHABILITATION | Facility: HOSPITAL | Age: 47
End: 2022-10-31
Payer: COMMERCIAL

## 2022-10-31 DIAGNOSIS — S46.012D TRAUMATIC INCOMPLETE TEAR OF LEFT ROTATOR CUFF, SUBSEQUENT ENCOUNTER: ICD-10-CM

## 2022-10-31 DIAGNOSIS — Z74.09 STIFFNESS DUE TO IMMOBILITY: ICD-10-CM

## 2022-10-31 DIAGNOSIS — M25.60 STIFFNESS DUE TO IMMOBILITY: ICD-10-CM

## 2022-10-31 DIAGNOSIS — M79.602 PAIN OF LEFT UPPER EXTREMITY: ICD-10-CM

## 2022-10-31 PROCEDURE — 97110 THERAPEUTIC EXERCISES: CPT | Mod: PN

## 2022-10-31 PROCEDURE — 97165 OT EVAL LOW COMPLEX 30 MIN: CPT | Mod: PN

## 2022-10-31 NOTE — PLAN OF CARE
[facility] Occupational Therapy  Initial Evaluation     Date: 10/31/2022  Patient: Jesse Hernández  Chart Number: [id number]    Therapy Diagnosis:   Encounter Diagnoses   Name Primary?    Traumatic incomplete tear of left rotator cuff, subsequent encounter     Pain of left upper extremity     Stiffness due to immobility      Physician: Gali Jane PA-C    Physician Orders: OT evaluate and treat  Medical Diagnosis: S46.012D (ICD-10-CM) - Traumatic incomplete tear of left rotator cuff, subsequent encounter  Evaluation Date: 10/31/2022  Insurance Authorization period Expiration: 12/31/2022  Plan of Care Expiration Period: 12/30/2022  Next MD appointment:12/5/2022    Visit # / Visits Authorized: 1 / 20  Time In:3:30  Time Out: 4:15  Total Billable Time: 45 minutes    Precautions: Standard     Subjective     Involved Side: Left  Dominant Side: Right  Date of Onset: 9/16/2022  Mechanism of Injury: s/p fall resulting in shoulder pain  History of Current Condition: pt presents to clinic with decreased range of motion, weakness and pain all of which limit him functionally  Surgical Procedure: none  Imaging:    Focal high-grade partial tear involving the bursal surface fibers of the anterior supraspinatus tendon about 10 mm transverse diameter with significant tendinosis of the remainder of the tendon.  Trace of fluid noted in the subacromial subdeltoid bursa.  Mild AC joint arthrosis.  Previous Therapy: none for shoulder    Patient's Goals for Therapy: to decrease pain and increase functional use    Pain:  Functional Pain Scale Rating 0-10:   5/10 on average  0/10 at best  10/10 at worst  Location: anterior shoulder and posterior shoulder  Description: Aching  Aggravating Factors: Laying, Night Time, and Lifting  Easing Factors: pain medication, ice, and injection    Occupation:  Construction  Working presently: employed  Duties: lifting, carrying, pushing, pulling    Functional Limitations/Social  History:    Previous functional status includes: Independent with all ADLs.     Current FunctionalStatus   Home/Living environment : lives with their family      Limitation of Functional Status as follows:   ADLs/IADLs:     - Feeding: Independent    - Bathing: difficulty bathing    - Dressing/Grooming: difficulty dressing    - Driving: Independent     Leisure: Fishing, Hunting and Golfing      Past Medical History/Physical Systems Review:   Jesse Hernández  has a past medical history of Hypertension and KAILEE (obstructive sleep apnea).    Jesse Hernández  has a past surgical history that includes Back surgery; Spine surgery; Umbilical hernia repair (N/A, 11/25/2019); Hernia repair; Patent ductus arterious ligation; Robot-assisted laparoscopic repair of inguinal hernia (Left, 6/28/2021); and Colonoscopy (N/A, 4/22/2022).    Jesse has a current medication list which includes the following prescription(s): duloxetine, fenofibrate, hydrocodone-acetaminophen, lamotrigine, levothyroxine, metoprolol succinate, tramadol, and [DISCONTINUED] loratadine.    Review of patient's allergies indicates:  No Known Allergies       Objective     Sensation Test: Patient denies any numbness/tingling    Observation/Inspection: rounded shoulders, forward head    Range of Motion/Strength:   Shoulder  Left   Right  Pain/Dysfunction with Movement    AROM PROM MMT AROM PROM MMT    Flexion 80 85 3-/5 WNL WNL WNL    Extension 50 WNL 3-/5 WNL WNL WNL    Abduction 50 55 3-/5 WNL WNL WNL    HorizAdduction 20 WNL 3/5 WNL WNL WNL    Internal rotation L4 To stomach 3/5 WNL WNL WNL    ER at 90° abd NT NT NT WNL WNL WNL    ER at 0° abd 50 55 3-/5 WNL WNL WNL    NT=Not tested  ROM Comments: Pain at end range    Painful Arc: noted with descending motion    Tenderness upon Palpation:      Positive: Bicipital Groove and Supraspinatus Region    Special Tests:  AC Joint Left Right   Empty Can Test + -   Drop Arm test + -   Champagne Toast + -   Resisted External  Rotation 45* Internal Roatation - -   Bear Hug - -   Abad's + -   Hawkin's Kenndy + -   Neer's Test + -     Scapular Control/Dyskinesis:    Normal / Subtle / Obvious  Comments    Left  subtle -    Right  Normal -       CMS Impairment/Limitation/Restriction for FOTO Shoulder Survey    Therapist reviewed FOTO scores for Jesse Hernández on 10/31/2022.   FOTO documents entered into [employer] - see Media section.    Limitation Score: 60%  Category: Self Care       Treatment     Treatment Time In: 345  Treatment Time Out: 355  Total Treatment time separate from Evaluation time:10    Jesse received therapeutic exercises for 10 minutes including:  RTC isometrics, shoulder shrugs, scapular retraction    Home Exercise Program/Education:  Issued HEP (see patient instructions in EMR) and educated on modality use for pain management . Exercises were reviewed and Jesse was able to demonstrate them prior to the end of the session.   Pt received a written copy of exercises to perform at home. Jesse demonstrated good  understanding of the education provided.  Pt was advised to perform these exercises free of pain, and to stop performing them if pain occurs.    Patient/Family Education: role of OT, goals for OT, scheduling/cancellations - pt verbalized understanding. Discussed insurance limitations with patient.    Assessment     Jesse Hernández is a 47 y.o. male referred to outpatient occupational therapy and presents with a medical diagnosis of left shoulder pain, resulting in Decreased ROM, Decreased muscle strength, Increased pain, and Joint Stiffness and demonstrates limitations as described in the chart below. Following medical record review it is determined that pt will benefit from occupational therapy services in order to maximize pain free and/or functional use of left shoulder. The following goals were discussed with the patient and patient is in agreement with them as to be addressed in the treatment plan. The patient's rehab  potential is Good.     Anticipated barriers to occupational therapy: none  Pt has no cultural, educational or language barriers to learning provided.    Profile and History Assessment of Occupational Performance Level of Clinical Decision Making Complexity Score   Occupational Profile:   Jesse Hernández is a 47 y.o. male who lives with their family and is currently employed as contractor. Jesse Hernández has difficulty with  bathing, grooming, and dressing  housework/household chores and work tasks  affecting his/her daily functional abilities. His/her main goal for therapy is to decrease pain and increase functional use.     Comorbidities:   HTN    Medical and Therapy History Review:   Expanded               Performance Deficits    Physical:  Joint Mobility  Joint Stability  Muscle Power/Strength  Muscle Endurance  Muscle Tone  Postural Control  Pain    Cognitive:  No Deficits    Psychosocial:    No Deficits     Clinical Decision Making:  low    Assessment Process:  Problem-Focused Assessments    Modification/Need for Assistance:  Not Necessary    Intervention Selection:  Several Treatment Options       low  Based on PMHX, co morbidities , data from assessments and functional level of assistance required with task and clinical presentation directly impacting function.       The following goals were discussed with the patient and patient is in agreement with them as to be addressed in the treatment plan.     Goals:     Short Term Goals to be met in 4 weeks: (11/30/2022)  1) Initiate Hep   2) Pt will increase Left shoulder AROM by 10 degrees grossly for improved performance with overhead ADL's  3) Pt will report 6/10 pain in (Left)shoulder at worst  4) Pt will demonstrate increased MMT to 4-/5 grossly Left shoulder  5) Patient will be able to achieve less than or equal to 45% on FOTO shoulder survey demonstrating overall improved functional ability with upper extremity.     Long Term Goals to be met by discharge:  1)  Independent with HEP  2) Pt will demonstrate (Left) shoulder AROM WNL grossly for [place] with ADL's  3) Pt will demonstrate (Left) shoulder MMT WNL grossly for [place] with functional activities  4) Independent and pain free with ADL's and IADL's  5) Patient will be able to achieve less than or equal to 25% on FOTO shoulder survey demonstrating overall improved functional ability with upper extremity.       Plan   Certification Period/Plan of care expiration: 10/31/2022 to 12/30/2022.    Outpatient Occupational Therapy 2 times weekly for 8 weeks to include the following interventions: Manual therapy/joint mobilizations, Modalities for pain management, Therapeutic exercises/activities., Strengthening, Joint Protection, and Energy Conservation.      LEO Cardozo

## 2022-11-07 ENCOUNTER — CLINICAL SUPPORT (OUTPATIENT)
Dept: REHABILITATION | Facility: HOSPITAL | Age: 47
End: 2022-11-07
Attending: INTERNAL MEDICINE
Payer: COMMERCIAL

## 2022-11-07 DIAGNOSIS — M25.60 STIFFNESS DUE TO IMMOBILITY: ICD-10-CM

## 2022-11-07 DIAGNOSIS — Z74.09 STIFFNESS DUE TO IMMOBILITY: ICD-10-CM

## 2022-11-07 DIAGNOSIS — M79.602 PAIN OF LEFT UPPER EXTREMITY: Primary | ICD-10-CM

## 2022-11-07 PROCEDURE — 97110 THERAPEUTIC EXERCISES: CPT | Mod: PN

## 2022-11-07 PROCEDURE — 97140 MANUAL THERAPY 1/> REGIONS: CPT | Mod: PN

## 2022-11-07 NOTE — PROGRESS NOTES
"  Occupational Therapy Treatment Note     Date: 11/7/2022  Name: Jesse Hernández  Clinic Number: [id number]    Therapy Diagnosis:   Encounter Diagnoses   Name Primary?    Pain of left upper extremity Yes    Stiffness due to immobility      Physician: Tamir Ricks Jr., *    Physician Orders: OT evaluate and treat  Medical Diagnosis: S46.012D (ICD-10-CM) - Traumatic incomplete tear of left rotator cuff, subsequent encounter  Evaluation Date: 10/31/2022  Insurance Authorization period Expiration: 12/31/2022  Plan of Care Expiration Period: 12/30/2022  Next MD appointment:12/5/2022     Visit # / Visits Authorized: 2 / 20  Time In:8:45  Time Out: 9:30  Total Billable Time: 45 minutes     Precautions: Standard     Subjective     Pt reports: "Jolynn been doing my exercises."  he was compliant with home exercise program given last session.   Response to previous treatment: moderate pain  Functional change: able to progress with exercises    Pain: 5/10  Location: left shoulder      Objective     Objective Measures updated at progress report unless specified.   Range of Motion/Strength:   Shoulder   Left       AROM PROM MMT   Flexion 80 85 3-/5   Extension 50 WNL 3-/5   Abduction 50 55 3-/5   HorizAdduction 20 WNL 3/5   Internal rotation L4 To stomach 3/5   ER at 90° abd NT NT NT   ER at 0° abd 50 55 3-/5   NT=Not tested  ROM Comments: Pain at end range  Treatment     Jesse received the following manual therapy techniques for 10 minutes:   - consisting of patient supine for Left biceps and upper trapezius soft tissue mobilization, pectoralis lift, subscapularis myofascial release and stretch, PROM with endrange stretching, glenohumeral joint inferior anterior posterior glides grade I-II, gentle shoulder oscillations    Jesse received therapeutic exercises for 35 minutes including:  Exercises        PROM (Left) Shoulder Flexion/Abduction/Internal rotation/External Rotation 10x     Supine dowel Flexion/Chest press 0  2/10 "   Sidelying Abduction  0  2/10   Sidelying External Rotation 0  2/10   pulleys 3'   Wall slides towel  2/15   Theraband Extension pull downs Red  2/15   Theraband Rows Red  2/15   Theraband Horizontal abduction Red  2/15   Theraband External Rotation Red  2/15       Home Exercises and Education Provided     Education provided:   - Progress towards goals   -issued updated home exercise program with red band    Written Home Exercises Provided: Patient instructed to cont prior HEP.  Exercises were reviewed and Jesse was able to demonstrate them prior to the end of the session.  Jesse demonstrated good  understanding of the HEP provided.   .   See EMR under Patient Instructions for exercises provided prior visit.      Assessment   Pt with good participation this date. Pain moderate throughout session however did not limit pt progressing with exercises. Pt able to perform active assisted range of motion and band exercises in a pain free range of motion and without c/o. Soft tissue restrictions noted during manual however responded well to treatment. Pt motivated.    Jesse is progressing well towards his goals and there are no updates to goals at this time. Pt prognosis is Good.     Pt will continue to benefit from skilled outpatient occupational therapy to address the deficits listed in the problem list on initial evaluation provide pt/family education and to maximize pt's level of independence in the home and community environment.     Anticipated barriers to occupational therapy: none    Pt's spiritual, cultural and educational needs considered and pt agreeable to plan of care and goals.    Goals:  Short Term Goals to be met in 4 weeks: (11/30/2022)  1) Initiate Hep -met, ongoing  2) Pt will increase Left shoulder AROM by 10 degrees grossly for improved performance with overhead activities of daily living's -Not met, progressing  3) Pt will report 6/10 pain in (Left)shoulder at worst -Not met, progressing  4) Pt will  demonstrate increased MMT to 4-/5 grossly Left shoulder -Not met, progressing  5) Patient will be able to achieve less than or equal to 45% on FOTO shoulder survey demonstrating overall improved functional ability with upper extremity. -Not met, progressing     Long Term Goals to be met by discharge:  1) Independent with home exercise program -Not met, progressing  2) Pt will demonstrate (Left) shoulder AROM WNL grossly for [place] with activities of daily living's -Not met, progressing  3) Pt will demonstrate (Left) shoulder MMT WNL grossly for [place] with functional activities -Not met, progressing  4) Independent and pain free with ADL's and IADL's -Not met, progressing  5) Patient will be able to achieve less than or equal to 25% on FOTO shoulder survey demonstrating overall improved functional ability with upper extremity. -Not met, progressing       Plan   Continue with OT POC  Updates/Grading for next session: progress as able      LEO Cardozo

## 2022-11-08 NOTE — PROGRESS NOTES
"  Occupational Therapy Treatment Note     Date: 11/9/2022  Name: Jesse Hernández  Clinic Number: [id number]    Therapy Diagnosis:   Encounter Diagnoses   Name Primary?    Pain of left upper extremity Yes    Stiffness due to immobility        Physician: Tamir Ricks Jr., *    Physician Orders: OT evaluate and treat  Medical Diagnosis: S46.012D (ICD-10-CM) - Traumatic incomplete tear of left rotator cuff, subsequent encounter  Evaluation Date: 10/31/2022  Insurance Authorization period Expiration: 12/31/2022  Plan of Care Expiration Period: 12/30/2022  Next MD appointment:12/5/2022     Visit # / Visits Authorized: 3 / 20  Time In:9:15  Time Out: 10:00  Total Billable Time: 45 minutes     Precautions: Standard     Subjective     Pt reports: "I have so much pain in this shoulder with descending motion."  he was compliant with home exercise program given last session.   Response to previous treatment: decreased pain  Functional change: slight improvement noted with range of motion     Pain: 4/10  Location: left shoulder      Objective     Objective Measures updated at progress report unless specified.   Range of Motion/Strength:   Shoulder   Left       AROM PROM MMT   Flexion 90(+10) 85 3-/5   Extension 55(+5) WNL 3-/5   Abduction 50(=) 55 3-/5   HorizAdduction 20(=) WNL 3/5   Internal rotation L4(=) To stomach 3/5   ER at 90° abd NT NT NT   ER at 0° abd 50(=) 55 3-/5   NT=Not tested  ROM Comments: Pain at end range  Treatment     Jesse received the following manual therapy techniques for 10 minutes:   - consisting of patient supine for Left biceps and upper trapezius soft tissue mobilization, pectoralis lift, subscapularis myofascial release and stretch, PROM with endrange stretching, glenohumeral joint inferior anterior posterior glides grade I-II, gentle shoulder oscillations    Jesse received therapeutic exercises for 35 minutes including:  Exercises        PROM (Left) Shoulder Flexion/Abduction/Internal " rotation/External Rotation 10x     Supine dowel Flexion/Chest press 0  2/15   Sidelying Abduction  0  2/15   Sidelying External Rotation 0  2/15   pulleys 3'   Wall slides towel  2/15   Theraband Extension pull downs Red  2/15   Theraband Rows Red  2/15   Theraband Horizontal abduction Red  2/15   Theraband External Rotation Red  2/15       Home Exercises and Education Provided     Education provided:   - Progress towards goals   -issued updated home exercise program with red band    Written Home Exercises Provided: Patient instructed to cont prior HEP.  Exercises were reviewed and Jesse was able to demonstrate them prior to the end of the session.  Jesse demonstrated good  understanding of the HEP provided.   .   See EMR under Patient Instructions for exercises provided prior visit.      Assessment   Pt with good participation this date. Pain moderate throughout session however did not limit pt progressing with exercises and slightly improved from previous session. Pt with slight improvement noted in range of motion with flexion. Continues to be limited by pain and weakness.  Soft tissue restrictions noted during manual however responded well to treatment. Pt motivated.    Jesse is progressing well towards his goals and there are no updates to goals at this time. Pt prognosis is Good.     Pt will continue to benefit from skilled outpatient occupational therapy to address the deficits listed in the problem list on initial evaluation provide pt/family education and to maximize pt's level of independence in the home and community environment.     Anticipated barriers to occupational therapy: none    Pt's spiritual, cultural and educational needs considered and pt agreeable to plan of care and goals.    Goals:  Short Term Goals to be met in 4 weeks: (11/30/2022)  1) Initiate Hep -met, ongoing  2) Pt will increase Left shoulder AROM by 10 degrees grossly for improved performance with overhead activities of daily living's  -Not met, progressing  3) Pt will report 6/10 pain in (Left)shoulder at worst -Not met, progressing  4) Pt will demonstrate increased MMT to 4-/5 grossly Left shoulder -Not met, progressing  5) Patient will be able to achieve less than or equal to 45% on FOTO shoulder survey demonstrating overall improved functional ability with upper extremity. -Not met, progressing     Long Term Goals to be met by discharge:  1) Independent with home exercise program -Not met, progressing  2) Pt will demonstrate (Left) shoulder AROM WNL grossly for [place] with activities of daily living's -Not met, progressing  3) Pt will demonstrate (Left) shoulder MMT WNL grossly for [place] with functional activities -Not met, progressing  4) Independent and pain free with ADL's and IADL's -Not met, progressing  5) Patient will be able to achieve less than or equal to 25% on FOTO shoulder survey demonstrating overall improved functional ability with upper extremity. -Not met, progressing       Plan   Continue with OT POC  Updates/Grading for next session: progress as able      LEO Cardozo

## 2022-11-09 ENCOUNTER — CLINICAL SUPPORT (OUTPATIENT)
Dept: REHABILITATION | Facility: HOSPITAL | Age: 47
End: 2022-11-09
Attending: INTERNAL MEDICINE
Payer: COMMERCIAL

## 2022-11-09 DIAGNOSIS — M25.60 STIFFNESS DUE TO IMMOBILITY: ICD-10-CM

## 2022-11-09 DIAGNOSIS — Z74.09 STIFFNESS DUE TO IMMOBILITY: ICD-10-CM

## 2022-11-09 DIAGNOSIS — M79.602 PAIN OF LEFT UPPER EXTREMITY: Primary | ICD-10-CM

## 2022-11-09 PROCEDURE — 97140 MANUAL THERAPY 1/> REGIONS: CPT | Mod: PN

## 2022-11-09 PROCEDURE — 97110 THERAPEUTIC EXERCISES: CPT | Mod: PN

## 2022-11-14 ENCOUNTER — CLINICAL SUPPORT (OUTPATIENT)
Dept: REHABILITATION | Facility: HOSPITAL | Age: 47
End: 2022-11-14
Attending: INTERNAL MEDICINE
Payer: COMMERCIAL

## 2022-11-14 DIAGNOSIS — M25.60 STIFFNESS DUE TO IMMOBILITY: ICD-10-CM

## 2022-11-14 DIAGNOSIS — M79.602 PAIN OF LEFT UPPER EXTREMITY: Primary | ICD-10-CM

## 2022-11-14 DIAGNOSIS — Z74.09 STIFFNESS DUE TO IMMOBILITY: ICD-10-CM

## 2022-11-14 PROCEDURE — 97140 MANUAL THERAPY 1/> REGIONS: CPT | Mod: PN

## 2022-11-14 PROCEDURE — 97110 THERAPEUTIC EXERCISES: CPT | Mod: PN

## 2022-11-14 NOTE — PROGRESS NOTES
"  Occupational Therapy Treatment Note     Date: 11/14/2022  Name: Jesse Hernández  Clinic Number: [id number]    Therapy Diagnosis:   Encounter Diagnoses   Name Primary?    Pain of left upper extremity Yes    Stiffness due to immobility      Physician: Tamir Ricks Jr., *    Physician Orders: OT evaluate and treat  Medical Diagnosis: S46.012D (ICD-10-CM) - Traumatic incomplete tear of left rotator cuff, subsequent encounter  Evaluation Date: 10/31/2022  Insurance Authorization period Expiration: 12/31/2022  Plan of Care Expiration Period: 12/30/2022  Next MD appointment:12/5/2022     Visit # / Visits Authorized: 4 / 20  Time In:8:55  Time Out: 9:30  Total Billable Time: 35 minutes     Precautions: Standard     Subjective     Pt reports: "I'm having trouble sleeping."  he was compliant with home exercise program given last session.   Response to previous treatment: increased pain  Functional change: improvement noted with range of motion     Pain: 5/10  Location: left shoulder      Objective     Objective Measures updated at progress report unless specified.   Range of Motion/Strength:   Shoulder   Left       AROM PROM MMT   Flexion 90(+10) 120(+35) 3-/5   Extension 55(+5) WNL 3-/5   Abduction 50(=) 90(+35) 3-/5   HorizAdduction 20(=) WNL 3/5   Internal rotation L4(=) To stomach 3/5   ER at 90° abd NT NT NT   ER at 0° abd 50(=) 65(+10) 3-/5   NT=Not tested  ROM Comments: Pain at end range  Treatment     Jesse received the following manual therapy techniques for 10 minutes:   - consisting of patient supine for Left biceps and upper trapezius soft tissue mobilization, pectoralis lift, subscapularis myofascial release and stretch, PROM with endrange stretching, glenohumeral joint inferior anterior posterior glides grade I-II, gentle shoulder oscillations    Jesse received therapeutic exercises for 25 minutes including:  Exercises        PROM (Left) Shoulder Flexion/Abduction/Internal rotation/External Rotation 10x   "   Supine dowel Flexion/Chest press 0  2/15   Sidelying Abduction  0  2/15   Sidelying External Rotation 0  2/15   pulleys 3'   Wall slides towel  2/15   Theraband Extension pull downs Red  2/15   Theraband Rows Red  2/15   Theraband Horizontal abduction Red  2/15   Theraband External Rotation Red  2/15     Home Exercises and Education Provided     Education provided:   - Progress towards goals   -issued updated home exercise program with red band    Written Home Exercises Provided: Patient instructed to cont prior HEP.  Exercises were reviewed and Jesse was able to demonstrate them prior to the end of the session.  Jesse demonstrated good  understanding of the HEP provided.     See EMR under Patient Instructions for exercises provided prior visit.      Assessment   Pt with good participation this date. Pain increased from previous session however did not limit pt in session. Passive range of motion improved from initial evaluation. Continues to be limited by pain and weakness.  Soft tissue restrictions noted during manual however responded well to treatment. Pt motivated.    Jesse is progressing well towards his goals and there are no updates to goals at this time. Pt prognosis is Good.     Pt will continue to benefit from skilled outpatient occupational therapy to address the deficits listed in the problem list on initial evaluation provide pt/family education and to maximize pt's level of independence in the home and community environment.     Anticipated barriers to occupational therapy: none    Pt's spiritual, cultural and educational needs considered and pt agreeable to plan of care and goals.    Goals:  Short Term Goals to be met in 4 weeks: (11/30/2022)  1) Initiate Hep -met, ongoing  2) Pt will increase Left shoulder AROM by 10 degrees grossly for improved performance with overhead activities of daily living's -Not met, progressing  3) Pt will report 6/10 pain in (Left)shoulder at worst -Not met,  progressing  4) Pt will demonstrate increased MMT to 4-/5 grossly Left shoulder -Not met, progressing  5) Patient will be able to achieve less than or equal to 45% on FOTO shoulder survey demonstrating overall improved functional ability with upper extremity. -Not met, progressing     Long Term Goals to be met by discharge:  1) Independent with home exercise program -Not met, progressing  2) Pt will demonstrate (Left) shoulder AROM WNL grossly for [place] with activities of daily living's -Not met, progressing  3) Pt will demonstrate (Left) shoulder MMT WNL grossly for [place] with functional activities -Not met, progressing  4) Independent and pain free with ADL's and IADL's -Not met, progressing  5) Patient will be able to achieve less than or equal to 25% on FOTO shoulder survey demonstrating overall improved functional ability with upper extremity. -Not met, progressing       Plan   Continue with OT POC  Updates/Grading for next session: progress as able      LEO Cardozo

## 2022-11-15 NOTE — PROGRESS NOTES
"  Occupational Therapy Treatment Note     Date: 11/16/2022  Name: Jesse Hernández  Clinic Number: [id number]    Therapy Diagnosis:   Encounter Diagnoses   Name Primary?    Pain of left upper extremity Yes    Stiffness due to immobility      Physician: Tamir Ricks Jr., *    Physician Orders: OT evaluate and treat  Medical Diagnosis: S46.012D (ICD-10-CM) - Traumatic incomplete tear of left rotator cuff, subsequent encounter  Evaluation Date: 10/31/2022  Insurance Authorization period Expiration: 12/31/2022  Plan of Care Expiration Period: 12/30/2022  Next MD appointment:12/5/2022     Visit # / Visits Authorized: 5/ 20 FOTO:51%  Time In:8:30  Time Out: 9:15  Total Billable Time: 45 minutes     Precautions: Standard     Subjective     Pt reports: "I went and saw another Orthopedic and he agrees that I will need a surgery."  he was compliant with home exercise program given last session.   Response to previous treatment: decreased pain  Functional change: improved FOTO score    Pain: 3/10  Location: left shoulder      Objective     Objective Measures updated at progress report unless specified.   Range of Motion/Strength:   Shoulder   Left       AROM PROM MMT   Flexion 90(+10) 120(+35) 3-/5   Extension 55(+5) WNL 3-/5   Abduction 50(=) 90(+35) 3-/5   HorizAdduction 20(=) WNL 3/5   Internal rotation L4(=) To stomach 3/5   ER at 90° abd NT NT NT   ER at 0° abd 50(=) 65(+10) 3-/5   NT=Not tested  ROM Comments: Pain at end range  Treatment     Jesse received the following manual therapy techniques for 10 minutes:   - consisting of patient supine for Left biceps and upper trapezius soft tissue mobilization, pectoralis lift, subscapularis myofascial release and stretch, PROM with endrange stretching, glenohumeral joint inferior anterior posterior glides grade I-II, gentle shoulder oscillations    Jesse received therapeutic exercises for 35 minutes including:  Exercises        PROM (Left) Shoulder Flexion/Abduction/Internal " rotation/External Rotation 10x     Supine dowel Flexion/Chest press 0  2/15   Sidelying Abduction  0  2/15   Sidelying External Rotation 0  2/15   pulleys 3'   Wall slides towel  2/15   Theraband Extension pull downs Green  2/15   Theraband Rows Green  2/15   Theraband Horizontal abduction Red  2/15   Theraband External Rotation Red  2/15     Home Exercises and Education Provided     Education provided:   - Progress towards goals   -issued updated home exercise program with red band    Written Home Exercises Provided: Patient instructed to cont prior HEP.  Exercises were reviewed and Jesse was able to demonstrate them prior to the end of the session.  Jesse demonstrated good  understanding of the HEP provided.     See EMR under Patient Instructions for exercises provided prior visit.      Assessment   Pt with good participation this date. Pain decreased initially however he had more difficulty performing exercises this date. Improved FOTO score indicating increased functional use Left shoulder with self care tasks. Soft tissue restrictions noted in biceps and pectoralis however respond well to manual. Pt motivated.     Jesse is progressing well towards his goals and there are no updates to goals at this time. Pt prognosis is Good.     Pt will continue to benefit from skilled outpatient occupational therapy to address the deficits listed in the problem list on initial evaluation provide pt/family education and to maximize pt's level of independence in the home and community environment.     Anticipated barriers to occupational therapy: none    Pt's spiritual, cultural and educational needs considered and pt agreeable to plan of care and goals.    Goals:  Short Term Goals to be met in 4 weeks: (11/30/2022)  1) Initiate Hep -met, ongoing  2) Pt will increase Left shoulder AROM by 10 degrees grossly for improved performance with overhead activities of daily living's -Not met, progressing  3) Pt will report 6/10 pain in  (Left)shoulder at worst -Not met, progressing  4) Pt will demonstrate increased MMT to 4-/5 grossly Left shoulder -Not met, progressing  5) Patient will be able to achieve less than or equal to 45% on FOTO shoulder survey demonstrating overall improved functional ability with upper extremity. -Not met, progressing     Long Term Goals to be met by discharge:  1) Independent with home exercise program -Not met, progressing  2) Pt will demonstrate (Left) shoulder AROM WNL grossly for [place] with activities of daily living's -Not met, progressing  3) Pt will demonstrate (Left) shoulder MMT WNL grossly for [place] with functional activities -Not met, progressing  4) Independent and pain free with ADL's and IADL's -Not met, progressing  5) Patient will be able to achieve less than or equal to 25% on FOTO shoulder survey demonstrating overall improved functional ability with upper extremity. -Not met, progressing       Plan   Continue with OT POC  Updates/Grading for next session: progress as able      LEO Cardozo

## 2022-11-16 ENCOUNTER — CLINICAL SUPPORT (OUTPATIENT)
Dept: REHABILITATION | Facility: HOSPITAL | Age: 47
End: 2022-11-16
Attending: INTERNAL MEDICINE
Payer: COMMERCIAL

## 2022-11-16 DIAGNOSIS — Z74.09 STIFFNESS DUE TO IMMOBILITY: ICD-10-CM

## 2022-11-16 DIAGNOSIS — M79.602 PAIN OF LEFT UPPER EXTREMITY: Primary | ICD-10-CM

## 2022-11-16 DIAGNOSIS — M25.60 STIFFNESS DUE TO IMMOBILITY: ICD-10-CM

## 2022-11-16 PROCEDURE — 97110 THERAPEUTIC EXERCISES: CPT | Mod: PN

## 2022-11-16 PROCEDURE — 97140 MANUAL THERAPY 1/> REGIONS: CPT | Mod: PN

## 2022-11-21 NOTE — PROGRESS NOTES
"  Occupational Therapy Treatment Note     Date: 11/22/2022  Name: Jesse Hernández  Clinic Number: [id number]    Therapy Diagnosis:   Encounter Diagnoses   Name Primary?    Pain of left upper extremity Yes    Stiffness due to immobility      Physician: Tamir Ricks Jr., *    Physician Orders: OT evaluate and treat  Medical Diagnosis: S46.012D (ICD-10-CM) - Traumatic incomplete tear of left rotator cuff, subsequent encounter  Evaluation Date: 10/31/2022  Insurance Authorization period Expiration: 12/31/2022  Plan of Care Expiration Period: 12/30/2022  Next MD appointment:12/5/2022     Visit # / Visits Authorized: 6/ 20 FOTO:51%  Time In:8:30  Time Out: 9:15  Total Billable Time: 45 minutes     Precautions: Standard     Subjective     Pt reports: "Flying back last night my shoulder was really hurting me."  he was compliant with home exercise program given last session.   Response to previous treatment: increased pain  Functional change: increased range of motion     Pain: 7/10  Location: left shoulder      Objective     Objective Measures updated at progress report unless specified.   Range of Motion/Strength:   Shoulder   Left       AROM PROM MMT   Flexion 90(=) 120(+35) 3-/5   Extension 55(=) WNL 3-/5   Abduction 70(+20) 90(+35) 3-/5   HorizAdduction 20(=) WNL 3/5   Internal rotation L4(=) To stomach 3/5   ER at 90° abd NT NT NT   ER at 0° abd 65(+15) 65(+10) 3-/5   NT=Not tested  ROM Comments: Pain at end range  Treatment     Jesse received the following manual therapy techniques for 10 minutes:   - consisting of patient supine for Left biceps and upper trapezius soft tissue mobilization, pectoralis lift, subscapularis myofascial release and stretch, PROM with endrange stretching, glenohumeral joint inferior anterior posterior glides grade I-II, gentle shoulder oscillations    Jesse received therapeutic exercises for 35 minutes including:  Exercises        PROM (Left) Shoulder Flexion/Abduction/Internal " rotation/External Rotation 10x     Supine dowel Flexion/Chest press 0  2/15   Sidelying Abduction  0  2/15   Sidelying External Rotation 0  2/15   pulleys 3'   Wall slides towel  2/15   Theraband Extension pull downs Green  2/15   Theraband Rows Green  2/15   Theraband Horizontal abduction Red  2/15   Theraband External Rotation Red  2/15     Home Exercises and Education Provided     Education provided:   - Progress towards goals   -issued updated home exercise program with red band    Written Home Exercises Provided: Patient instructed to cont prior HEP.  Exercises were reviewed and Jesse was able to demonstrate them prior to the end of the session.  Jesse demonstrated good  understanding of the HEP provided.     See EMR under Patient Instructions for exercises provided prior visit.      Assessment   Pt with fair participation this date. Pain increased this date and pt guarded and painful during passive range of motion. Difficulty completing some exercises due to pain.  Soft tissue restrictions noted in biceps and pectoralis however respond fairly to manual. Range of motion steadily improving with certain motions. Pt continues to be very limited by weakness and pain. Pt motivated.     Jesse is progressing well towards his goals and there are no updates to goals at this time. Pt prognosis is Good.     Pt will continue to benefit from skilled outpatient occupational therapy to address the deficits listed in the problem list on initial evaluation provide pt/family education and to maximize pt's level of independence in the home and community environment.     Anticipated barriers to occupational therapy: none    Pt's spiritual, cultural and educational needs considered and pt agreeable to plan of care and goals.    Goals:  Short Term Goals to be met in 4 weeks: (11/30/2022)  1) Initiate Hep -met, ongoing  2) Pt will increase Left shoulder AROM by 10 degrees grossly for improved performance with overhead activities of  daily living's -Not met, progressing  3) Pt will report 6/10 pain in (Left)shoulder at worst -Not met, progressing  4) Pt will demonstrate increased MMT to 4-/5 grossly Left shoulder -Not met, progressing  5) Patient will be able to achieve less than or equal to 45% on FOTO shoulder survey demonstrating overall improved functional ability with upper extremity. -Not met, progressing     Long Term Goals to be met by discharge:  1) Independent with home exercise program -Not met, progressing  2) Pt will demonstrate (Left) shoulder AROM WNL grossly for [place] with activities of daily living's -Not met, progressing  3) Pt will demonstrate (Left) shoulder MMT WNL grossly for [place] with functional activities -Not met, progressing  4) Independent and pain free with ADL's and IADL's -Not met, progressing  5) Patient will be able to achieve less than or equal to 25% on FOTO shoulder survey demonstrating overall improved functional ability with upper extremity. -Not met, progressing       Plan   Continue with OT POC  Updates/Grading for next session: progress as able      LEO Cardozo

## 2022-11-22 ENCOUNTER — CLINICAL SUPPORT (OUTPATIENT)
Dept: REHABILITATION | Facility: HOSPITAL | Age: 47
End: 2022-11-22
Attending: INTERNAL MEDICINE
Payer: COMMERCIAL

## 2022-11-22 DIAGNOSIS — M79.602 PAIN OF LEFT UPPER EXTREMITY: Primary | ICD-10-CM

## 2022-11-22 DIAGNOSIS — Z74.09 STIFFNESS DUE TO IMMOBILITY: ICD-10-CM

## 2022-11-22 DIAGNOSIS — M25.60 STIFFNESS DUE TO IMMOBILITY: ICD-10-CM

## 2022-11-22 PROCEDURE — 97110 THERAPEUTIC EXERCISES: CPT | Mod: PN

## 2022-11-22 PROCEDURE — 97140 MANUAL THERAPY 1/> REGIONS: CPT | Mod: PN

## 2022-12-01 ENCOUNTER — LAB VISIT (OUTPATIENT)
Dept: LAB | Facility: HOSPITAL | Age: 47
End: 2022-12-01
Attending: INTERNAL MEDICINE
Payer: COMMERCIAL

## 2022-12-01 DIAGNOSIS — Z00.00 PREVENTATIVE HEALTH CARE: ICD-10-CM

## 2022-12-01 LAB
ALBUMIN SERPL BCP-MCNC: 3.9 G/DL (ref 3.5–5.2)
ALP SERPL-CCNC: 50 U/L (ref 55–135)
ALT SERPL W/O P-5'-P-CCNC: 21 U/L (ref 10–44)
ANION GAP SERPL CALC-SCNC: 11 MMOL/L (ref 8–16)
AST SERPL-CCNC: 17 U/L (ref 10–40)
BASOPHILS # BLD AUTO: 0.07 K/UL (ref 0–0.2)
BASOPHILS NFR BLD: 0.9 % (ref 0–1.9)
BILIRUB SERPL-MCNC: 0.4 MG/DL (ref 0.1–1)
BUN SERPL-MCNC: 16 MG/DL (ref 6–20)
CALCIUM SERPL-MCNC: 10.1 MG/DL (ref 8.7–10.5)
CHLORIDE SERPL-SCNC: 100 MMOL/L (ref 95–110)
CHOLEST SERPL-MCNC: 254 MG/DL (ref 120–199)
CHOLEST/HDLC SERPL: 6 {RATIO} (ref 2–5)
CO2 SERPL-SCNC: 26 MMOL/L (ref 23–29)
COMPLEXED PSA SERPL-MCNC: 0.45 NG/ML (ref 0–4)
CREAT SERPL-MCNC: 1 MG/DL (ref 0.5–1.4)
DIFFERENTIAL METHOD: ABNORMAL
EOSINOPHIL # BLD AUTO: 0.1 K/UL (ref 0–0.5)
EOSINOPHIL NFR BLD: 1.8 % (ref 0–8)
ERYTHROCYTE [DISTWIDTH] IN BLOOD BY AUTOMATED COUNT: 13.6 % (ref 11.5–14.5)
EST. GFR  (NO RACE VARIABLE): >60 ML/MIN/1.73 M^2
GLUCOSE SERPL-MCNC: 98 MG/DL (ref 70–110)
HCT VFR BLD AUTO: 49.6 % (ref 40–54)
HDLC SERPL-MCNC: 42 MG/DL (ref 40–75)
HDLC SERPL: 16.5 % (ref 20–50)
HGB BLD-MCNC: 15.9 G/DL (ref 14–18)
IMM GRANULOCYTES # BLD AUTO: 0.14 K/UL (ref 0–0.04)
IMM GRANULOCYTES NFR BLD AUTO: 1.8 % (ref 0–0.5)
LDLC SERPL CALC-MCNC: 178.2 MG/DL (ref 63–159)
LYMPHOCYTES # BLD AUTO: 1.9 K/UL (ref 1–4.8)
LYMPHOCYTES NFR BLD: 24.3 % (ref 18–48)
MCH RBC QN AUTO: 28.8 PG (ref 27–31)
MCHC RBC AUTO-ENTMCNC: 32.1 G/DL (ref 32–36)
MCV RBC AUTO: 90 FL (ref 82–98)
MONOCYTES # BLD AUTO: 0.6 K/UL (ref 0.3–1)
MONOCYTES NFR BLD: 7.2 % (ref 4–15)
NEUTROPHILS # BLD AUTO: 5.1 K/UL (ref 1.8–7.7)
NEUTROPHILS NFR BLD: 64 % (ref 38–73)
NONHDLC SERPL-MCNC: 212 MG/DL
NRBC BLD-RTO: 0 /100 WBC
PLATELET # BLD AUTO: 376 K/UL (ref 150–450)
PMV BLD AUTO: 10.3 FL (ref 9.2–12.9)
POTASSIUM SERPL-SCNC: 4.4 MMOL/L (ref 3.5–5.1)
PROT SERPL-MCNC: 7.3 G/DL (ref 6–8.4)
RBC # BLD AUTO: 5.52 M/UL (ref 4.6–6.2)
SODIUM SERPL-SCNC: 137 MMOL/L (ref 136–145)
TRIGL SERPL-MCNC: 169 MG/DL (ref 30–150)
TSH SERPL DL<=0.005 MIU/L-ACNC: 3.6 UIU/ML (ref 0.4–4)
WBC # BLD AUTO: 7.97 K/UL (ref 3.9–12.7)

## 2022-12-01 PROCEDURE — 80061 LIPID PANEL: CPT | Performed by: INTERNAL MEDICINE

## 2022-12-01 PROCEDURE — 84153 ASSAY OF PSA TOTAL: CPT | Performed by: INTERNAL MEDICINE

## 2022-12-01 PROCEDURE — 85025 COMPLETE CBC W/AUTO DIFF WBC: CPT | Performed by: INTERNAL MEDICINE

## 2022-12-01 PROCEDURE — 36415 COLL VENOUS BLD VENIPUNCTURE: CPT | Mod: PO | Performed by: INTERNAL MEDICINE

## 2022-12-01 PROCEDURE — 84443 ASSAY THYROID STIM HORMONE: CPT | Performed by: INTERNAL MEDICINE

## 2022-12-01 PROCEDURE — 80053 COMPREHEN METABOLIC PANEL: CPT | Performed by: INTERNAL MEDICINE

## 2022-12-05 ENCOUNTER — PATIENT MESSAGE (OUTPATIENT)
Dept: PSYCHIATRY | Facility: CLINIC | Age: 47
End: 2022-12-05
Payer: COMMERCIAL

## 2022-12-05 DIAGNOSIS — F41.1 GAD (GENERALIZED ANXIETY DISORDER): ICD-10-CM

## 2022-12-05 RX ORDER — LAMOTRIGINE 150 MG/1
TABLET ORAL
Qty: 60 TABLET | Refills: 2 | Status: SHIPPED | OUTPATIENT
Start: 2022-12-05 | End: 2023-03-08 | Stop reason: SDUPTHER

## 2022-12-05 RX ORDER — DULOXETIN HYDROCHLORIDE 60 MG/1
60 CAPSULE, DELAYED RELEASE ORAL DAILY
Qty: 90 CAPSULE | Refills: 0 | Status: SHIPPED | OUTPATIENT
Start: 2022-12-05 | End: 2023-03-08 | Stop reason: SDUPTHER

## 2022-12-16 ENCOUNTER — TELEPHONE (OUTPATIENT)
Dept: INTERNAL MEDICINE | Facility: CLINIC | Age: 47
End: 2022-12-16
Payer: COMMERCIAL

## 2022-12-27 DIAGNOSIS — Z98.890 PERSONAL HISTORY OF SURGERY TO HEART AND GREAT VESSELS, PRESENTING HAZARDS TO HEALTH: ICD-10-CM

## 2022-12-27 DIAGNOSIS — Z98.890 S/P ARTHROSCOPY OF LEFT SHOULDER: Primary | ICD-10-CM

## 2023-01-03 ENCOUNTER — CLINICAL SUPPORT (OUTPATIENT)
Dept: REHABILITATION | Facility: HOSPITAL | Age: 48
End: 2023-01-03
Payer: COMMERCIAL

## 2023-01-03 DIAGNOSIS — M25.60 STIFFNESS DUE TO IMMOBILITY: ICD-10-CM

## 2023-01-03 DIAGNOSIS — M79.602 PAIN OF LEFT UPPER EXTREMITY: ICD-10-CM

## 2023-01-03 DIAGNOSIS — Z74.09 STIFFNESS DUE TO IMMOBILITY: ICD-10-CM

## 2023-01-03 DIAGNOSIS — R53.1 WEAKNESS: ICD-10-CM

## 2023-01-03 PROBLEM — M79.621 PAIN IN BOTH UPPER ARMS: Status: RESOLVED | Noted: 2022-08-25 | Resolved: 2023-01-03

## 2023-01-03 PROBLEM — M79.622 PAIN IN BOTH UPPER ARMS: Status: RESOLVED | Noted: 2022-08-25 | Resolved: 2023-01-03

## 2023-01-03 PROCEDURE — 97165 OT EVAL LOW COMPLEX 30 MIN: CPT | Mod: PN

## 2023-01-03 PROCEDURE — 97110 THERAPEUTIC EXERCISES: CPT | Mod: PN

## 2023-01-03 NOTE — PLAN OF CARE
[facility] Occupational Therapy  Initial Evaluation     Date: 1/3/2023  Patient: Jesse Hernández  Chart Number: [id number]    Therapy Diagnosis:   Encounter Diagnoses   Name Primary?    Pain of left upper extremity     Weakness     Stiffness due to immobility      Physician: Gilbert Jorgensen Jr., MD    Physician Orders: OT evaluate and treat  Medical Diagnosis: Z98.890 (ICD-10-CM) - S/P arthroscopy of left shoulder  Evaluation Date: 1/3/2023  Insurance Authorization period Expiration: 12/31/2023  Plan of Care Expiration Period: 3/3/3023  Next MD appointment: 1/17/2023    Visit # / Visits Authorized: 1 / 50  Time In:1:45  Time Out: 2:30  Total Billable Time: 45 minutes    Precautions: Standard and Weightbearing    Subjective     Involved Side: Left  Dominant Side: Right  Date of Onset: DOS:12/6/2022  Mechanism of Injury: s/p fall in September resulting in injury  History of Current Condition: Pt presents to clinic 4 weeks post op with sling intact, no abduction pillow. Pt with decreased range of motion, weakness and pain all of which limit him functionally  Surgical Procedure: s/p Left RCR, biceps tenodesis, SAD, DCE, and labral debridement  Imaging: see imaging  Previous Therapy: received therapy preoperatively without success    Patient's Goals for Therapy: to return to prior level of function     Pain:  Functional Pain Scale Rating 0-10:   6/10 on average  2/10 at best  10/10 at worst  Location: global left shoulder, worse in anterior shoulder  Description: Aching  Aggravating Factors: Laying, Night Time, and Lifting  Easing Factors: pain medication and ice    Occupation:  Construction  Working presently: employed  Duties: unable to perform lifting, carrying, pushing, pulling     Functional Limitations/Social History:     Previous functional status includes: Independent with all ADLs.      Current FunctionalStatus              Home/Living environment : lives with their family                             Limitation of Functional Status as follows:              ADLs/IADLs:                           - Feeding: Independent                          - Bathing: difficulty bathing                          - Dressing/Grooming: difficulty dressing                          - Driving: Independent                 Leisure: Fishing, Hunting, and Golfing, unable to perform work tasks     Past Medical History/Physical Systems Review:   Jesse Hernández  has a past medical history of Hypertension and KAILEE (obstructive sleep apnea).    Jesse Hernández  has a past surgical history that includes Back surgery; Spine surgery; Umbilical hernia repair (N/A, 11/25/2019); Hernia repair; Patent ductus arterious ligation; Robot-assisted laparoscopic repair of inguinal hernia (Left, 6/28/2021); and Colonoscopy (N/A, 4/22/2022).    Jesse has a current medication list which includes the following prescription(s): duloxetine, duloxetine, fenofibrate, hydrocodone-acetaminophen, lamotrigine, levothyroxine, metoprolol succinate, tramadol, and [DISCONTINUED] loratadine.    Review of patient's allergies indicates:  No Known Allergies     Objective     Sensation Test: Patient denies any numbness/tingling    Observation/Inspection:rounded shoulders    Range of Motion/Strength:   Shoulder  Left   Right  Pain/Dysfunction with Movement    AROM PROM MMT AROM PROM MMT    Flexion NT 60 NT WNL WNL WNL    Extension NT NT NT WNL WNL WNL    Abduction NT 50 NT WNL WNL WNL    HorizAdduction NT NT NT WNL WNL WNL    Internal rotation NT To stomach NT WNL WNL WNL    ER at 90° abd NT NT NT WNL WNL WNL    ER at 0° abd NT 10 NT WNL WNL WNL    NT=Not tested  ROM Comments: Pain at end range    Painful Arc: noted with descending motion, pt guarded during passive range of motion     Tenderness upon Palpation:      Positive: AC joint, Bicipital Groove, and Supraspinatus Region    Special Tests: deferred     Scapular Control/Dyskinesis:    Normal / Subtle / Obvious  Comments     Left  Not assessed -    Right  Normal -       CMS Impairment/Limitation/Restriction for FOTO Shoulder Survey    Therapist reviewed FOTO scores for Jesse Hernández on 1/3/2023.   FOTO documents entered into [employer] - see Media section.    Limitation Score: 54%  Category: Self Care         Treatment     Treatment Time In: 2:20  Treatment Time Out: 2:30  Total Treatment time separate from Evaluation time:10    Jesse received therapeutic exercises for 10 minutes including:  -pendulums, shoulder shrugs, scapular retraction, passive range of motion elbow flexion/extension, pronation/supination, wrist flexion/extension    Home Exercise Program/Education:  Issued HEP (see patient instructions in EMR) and educated on modality use for pain management . Exercises were reviewed and Jesse was able to demonstrate them prior to the end of the session.   Pt received a written copy of exercises to perform at home. Jesse demonstrated good  understanding of the education provided.  Pt was advised to perform these exercises free of pain, and to stop performing them if pain occurs.    Patient/Family Education: role of OT, goals for OT, scheduling/cancellations - pt verbalized understanding. Discussed insurance limitations with patient.    Assessment     Jesse Hernández is a 47 y.o. male referred to outpatient occupational therapy and presents with a medical diagnosis of Left RCR, SAD, DCE, biceps tenodesis, resulting in Decreased ROM, Decreased muscle strength, Increased pain, and Joint Stiffness and demonstrates limitations as described in the chart below. Following medical record review it is determined that pt will benefit from occupational therapy services in order to maximize pain free and/or functional use of left shoulder. The following goals were discussed with the patient and patient is in agreement with them as to be addressed in the treatment plan. The patient's rehab potential is Good.     Anticipated barriers to occupational  therapy: none  Pt has no cultural, educational or language barriers to learning provided.    Profile and History Assessment of Occupational Performance Level of Clinical Decision Making Complexity Score   Occupational Profile:   Jesse Hernández is a 47 y.o. male who lives with their family and is currently employed in construction. Jesse Hernández has difficulty with  grooming and dressing  driving/transportation management, housework/household chores, and work tasks, hunting, fishing, golfing  affecting his/her daily functional abilities. His/her main goal for therapy is to return to PLOF.     Comorbidities:   HTN    Medical and Therapy History Review:   Expanded               Performance Deficits    Physical:  Joint Mobility  Joint Stability  Muscle Power/Strength  Muscle Endurance  Skin Integrity/Scar Formation  Muscle Tone  Postural Control  Pain    Cognitive:  No Deficits    Psychosocial:    No Deficits     Clinical Decision Making:  low    Assessment Process:  Problem-Focused Assessments    Modification/Need for Assistance:  Not Necessary    Intervention Selection:  Several Treatment Options       low  Based on PMHX, co morbidities , data from assessments and functional level of assistance required with task and clinical presentation directly impacting function.       The following goals were discussed with the patient and patient is in agreement with them as to be addressed in the treatment plan.     Goals:     Short Term Goals to be met in 4 weeks: (2/2/2023)  1) Initiate Hep   2) Pt will increase Left shoulder PROM by 10 degrees grossly for improved performance with overhead ADL's  3) Pt will report 6/10 pain in (Left)shoulder at worst  4) Pt will progress to active assisted range of motion exercises  5) Patient will be able to achieve less than or equal to 45% on FOTO shoulder survey demonstrating overall improved functional ability with upper extremity.     Long Term Goals to be met by discharge:  1)  Independent with HEP  2) Pt will demonstrate (Left) shoulder AROM WFL grossly for [place] with ADL's  3) Pt will demonstrate (Left) shoulder MMT WFL grossly for [place] with functional activities  4) Independent and pain free with ADL's and IADL's  5) Patient will be able to achieve less than or equal to 25% on FOTO shoulder survey demonstrating overall improved functional ability with upper extremity.       Plan   Certification Period/Plan of care expiration: 1/3/2023 to 3/3/2023.    Outpatient Occupational Therapy 2 times weekly for 8 weeks to include the following interventions: Manual therapy/joint mobilizations, Modalities for pain management, Therapeutic exercises/activities., Strengthening, Joint Protection, and Energy Conservation.      LEO Cardozo

## 2023-01-04 ENCOUNTER — PATIENT MESSAGE (OUTPATIENT)
Dept: INTERNAL MEDICINE | Facility: CLINIC | Age: 48
End: 2023-01-04
Payer: COMMERCIAL

## 2023-01-05 ENCOUNTER — CLINICAL SUPPORT (OUTPATIENT)
Dept: REHABILITATION | Facility: HOSPITAL | Age: 48
End: 2023-01-05
Payer: COMMERCIAL

## 2023-01-05 DIAGNOSIS — R53.1 WEAKNESS: ICD-10-CM

## 2023-01-05 DIAGNOSIS — M25.60 STIFFNESS DUE TO IMMOBILITY: ICD-10-CM

## 2023-01-05 DIAGNOSIS — M79.602 PAIN OF LEFT UPPER EXTREMITY: Primary | ICD-10-CM

## 2023-01-05 DIAGNOSIS — Z74.09 STIFFNESS DUE TO IMMOBILITY: ICD-10-CM

## 2023-01-05 PROCEDURE — 97110 THERAPEUTIC EXERCISES: CPT | Mod: PN

## 2023-01-05 PROCEDURE — 97140 MANUAL THERAPY 1/> REGIONS: CPT | Mod: PN

## 2023-01-05 NOTE — PROGRESS NOTES
"  Occupational Therapy Treatment Note     Date: 1/5/2023  Name: Jesse Hernández  Clinic Number: [id number]    Therapy Diagnosis:   Encounter Diagnoses   Name Primary?    Pain of left upper extremity Yes    Weakness     Stiffness due to immobility      Physician: Gilbert Jorgensen Jr., MD  Physician Orders: OT evaluate and treat  Medical Diagnosis: Z98.890 (ICD-10-CM) - S/P arthroscopy of left shoulder  Evaluation Date: 1/3/2023; DOS:12/6/2022  Insurance Authorization period Expiration: 12/31/2023  Plan of Care Expiration Period: 3/3/3023  Next MD appointment: 1/17/2023     Visit # / Visits Authorized: 2/ 50  Time In:1:45  Time Out: 2:30  Total Billable Time: 45 minutes     Precautions: Standard and Weightbearing    Subjective     Pt reports: "I started wearing that abduction pillow thing and that has been helping."  he was compliant with home exercise program given last session.   Response to previous treatment:moderate pain  Functional change: increased range of motion     Pain: 6/10  Location: left shoulder    Objective     Objective Measures updated at progress report unless specified.   Range of Motion/Strength:   Shoulder Left     PROM   Flexion 70(+10)   Extension NT   Abduction 65(+15)   HorizAdduction NT   Internal rotation To stomach   ER at 90° abd NT   ER at 0° abd 10(=)   NT=Not tested  ROM Comments: Pain at end range  Treatment   Pt 4 weeks 2 days post op with sling and abduction pillow intact    Jesse received the following supervised modalities after being cleared for contradictions for 5 minutes:   -moist hot pack for pain management and tissue extensibility    Jesse received the following manual therapy techniques for 25 minutes:   -consisting of patient supine for Left biceps and upper trapezius soft tissue mobilization, PROM with endrange stretching, glenohumeral joint inferior anterior posterior glides grade I-II, scapular mobilization, gentle shoulder oscillations    Jesse received therapeutic exercises " for 15 minutes including:  Exercises        PROM (Left) Shoulder Flexion/Abduction/Internal rotation/External Rotation 10x     Shoulder shrugs 30x   Scapular retraction 30x   Active assisted range of motion mid range elbow flexion/extension 2/15   Pronation/supination 2/15   Composite flexion/extension 2/15   Pendulum 3 ways 30x         Home Exercises and Education Provided     Education provided:   - Progress towards goals     Written Home Exercises Provided: Patient instructed to cont prior HEP.  Exercises were reviewed and Jesse was able to demonstrate them prior to the end of the session.  Jesse demonstrated good  understanding of the HEP provided.     See EMR under Patient Instructions for exercises provided prior visit.      Assessment   Pt with good participation this date. Pain report remains moderate however did not limit him in session. Pt's passive range of motion improved from initial evaluation. Soft tissue restrictions noted during manual therapy however responded well to treatment. Pt able to complete all exercises in a pain free range of motion and good technique. Meeting post op landmarks.     Jesse is progressing well towards his goals and there are no updates to goals at this time. Pt prognosis is Good.     Pt will continue to benefit from skilled outpatient occupational therapy to address the deficits listed in the problem list on initial evaluation provide pt/family education and to maximize pt's level of independence in the home and community environment.     Anticipated barriers to occupational therapy: none    Pt's spiritual, cultural and educational needs considered and pt agreeable to plan of care and goals.    Goals:  Short Term Goals to be met in 4 weeks: (2/2/2023)  1) Initiate Hep -met, ongoing  2) Pt will increase Left shoulder PROM by 10 degrees grossly for improved performance with overhead activities of daily living's -Not met, progressing  3) Pt will report 6/10 pain in (Left)shoulder  at worst -Not met, progressing  4) Pt will progress to active assisted range of motion exercises -Not met, progressing  5) Patient will be able to achieve less than or equal to 45% on FOTO shoulder survey demonstrating overall improved functional ability with upper extremity. -Not met, progressing     Long Term Goals to be met by discharge:  1) Independent with home exercise program -Not met, progressing  2) Pt will demonstrate (Left) shoulder AROM WFL grossly for [place] with activities of daily living's -Not met, progressing  3) Pt will demonstrate (Left) shoulder MMT WFL grossly for [place] with functional activities -Not met, progressing  4) Independent and pain free with ADL's and IADL's -Not met, progressing  5) Patient will be able to achieve less than or equal to 25% on FOTO shoulder survey demonstrating overall improved functional ability with upper extremity. -Not met, progressing       Plan   Continue with OT POC  Updates/Grading for next session: progress as able      LEO Cardozo

## 2023-01-09 NOTE — PROGRESS NOTES
"  Occupational Therapy Treatment Note     Date: 1/10/2023  Name: Jesse Hernández  Clinic Number: [id number]    Therapy Diagnosis:   Encounter Diagnoses   Name Primary?    Pain of left upper extremity Yes    Weakness     Stiffness due to immobility      Physician: Gilbert Jorgensen Jr., MD  Physician Orders: OT evaluate and treat  Medical Diagnosis: Z98.890 (ICD-10-CM) - S/P arthroscopy of left shoulder  Evaluation Date: 1/3/2023; DOS:12/6/2022  Insurance Authorization period Expiration: 12/31/2023  Plan of Care Expiration Period: 3/3/3023  Next MD appointment: 1/17/2023     Visit # / Visits Authorized: 3/ 50  Time In:2:30  Time Out: 3:15  Total Billable Time: 45 minutes     Precautions: Standard and Weightbearing    Subjective     Pt reports: "I felt the best I've felt since surgery last Friday."  he was compliant with home exercise program given last session.   Response to previous treatment:decreased pain  Functional change: increased range of motion     Pain: 3/10  Location: left shoulder    Objective     Objective Measures updated at progress report unless specified.   Range of Motion/Strength:   Shoulder Left     PROM   Flexion 90(+20)   Extension NT   Abduction 70(+5)   HorizAdduction NT   Internal rotation To stomach   ER at 90° abd NT   ER at 0° abd 15(+5)   NT=Not tested  ROM Comments: Pain at end range  Treatment   Pt 5 weeks 0 days post op with sling and abduction pillow intact    Jesse received the following supervised modalities after being cleared for contradictions for 5 minutes:   -moist hot pack for pain management and tissue extensibility    Jesse received the following manual therapy techniques for 25 minutes:   -consisting of patient supine for Left biceps and upper trapezius soft tissue mobilization, PROM with endrange stretching, glenohumeral joint inferior anterior posterior glides grade I-II, scapular mobilization, gentle shoulder oscillations    Jesse received therapeutic exercises for 15 minutes " "including:  Exercises        PROM (Left) Shoulder Flexion/Abduction/Internal rotation/External Rotation 10x     Upper trapezius stretch 3/30"   Levator scapulae 3/30"   Shoulder shrugs 30x   Scapular retraction 30x   Active assisted range of motion mid range elbow flexion/extension 2/15   Pronation/supination 2/15   Composite flexion/extension 2/15   Pendulum 3 ways 30x         Home Exercises and Education Provided     Education provided:   - Progress towards goals     Written Home Exercises Provided: Patient instructed to cont prior HEP.  Exercises were reviewed and Jesse was able to demonstrate them prior to the end of the session.  Jesse demonstrated good  understanding of the HEP provided.     See EMR under Patient Instructions for exercises provided prior visit.      Assessment   Pt with good participation this date. Pain report decreased from previous session.  Pt's passive range of motion continues to steadily improve. Soft tissue restrictions noted during manual therapy however responded well to treatment. Pt able to complete all exercises in a pain free range of motion and good technique. Meeting post op landmarks.     Jesse is progressing well towards his goals and there are no updates to goals at this time. Pt prognosis is Good.     Pt will continue to benefit from skilled outpatient occupational therapy to address the deficits listed in the problem list on initial evaluation provide pt/family education and to maximize pt's level of independence in the home and community environment.     Anticipated barriers to occupational therapy: none    Pt's spiritual, cultural and educational needs considered and pt agreeable to plan of care and goals.    Goals:  Short Term Goals to be met in 4 weeks: (2/2/2023)  1) Initiate Hep -met, ongoing  2) Pt will increase Left shoulder PROM by 10 degrees grossly for improved performance with overhead activities of daily living's -Not met, progressing  3) Pt will report 6/10 pain " in (Left)shoulder at worst -Not met, progressing  4) Pt will progress to active assisted range of motion exercises -Not met, progressing  5) Patient will be able to achieve less than or equal to 45% on FOTO shoulder survey demonstrating overall improved functional ability with upper extremity. -Not met, progressing     Long Term Goals to be met by discharge:  1) Independent with home exercise program -Not met, progressing  2) Pt will demonstrate (Left) shoulder AROM WFL grossly for [place] with activities of daily living's -Not met, progressing  3) Pt will demonstrate (Left) shoulder MMT WFL grossly for [place] with functional activities -Not met, progressing  4) Independent and pain free with ADL's and IADL's -Not met, progressing  5) Patient will be able to achieve less than or equal to 25% on FOTO shoulder survey demonstrating overall improved functional ability with upper extremity. -Not met, progressing       Plan   Continue with OT POC  Updates/Grading for next session: progress as able      LEO Cardozo

## 2023-01-10 ENCOUNTER — CLINICAL SUPPORT (OUTPATIENT)
Dept: REHABILITATION | Facility: HOSPITAL | Age: 48
End: 2023-01-10
Payer: COMMERCIAL

## 2023-01-10 DIAGNOSIS — Z74.09 STIFFNESS DUE TO IMMOBILITY: ICD-10-CM

## 2023-01-10 DIAGNOSIS — M79.602 PAIN OF LEFT UPPER EXTREMITY: Primary | ICD-10-CM

## 2023-01-10 DIAGNOSIS — M25.60 STIFFNESS DUE TO IMMOBILITY: ICD-10-CM

## 2023-01-10 DIAGNOSIS — R53.1 WEAKNESS: ICD-10-CM

## 2023-01-10 PROCEDURE — 97140 MANUAL THERAPY 1/> REGIONS: CPT | Mod: PN

## 2023-01-10 PROCEDURE — 97110 THERAPEUTIC EXERCISES: CPT | Mod: PN

## 2023-01-11 NOTE — PROGRESS NOTES
"  Occupational Therapy Treatment Note     Date: 1/12/2023  Name: Jesse Hernández  Clinic Number: [id number]    Therapy Diagnosis:   Encounter Diagnoses   Name Primary?    Pain of left upper extremity Yes    Weakness     Stiffness due to immobility      Physician: Gilbert Jorgensen Jr., MD  Physician Orders: OT evaluate and treat  Medical Diagnosis: Z98.890 (ICD-10-CM) - S/P arthroscopy of left shoulder  Evaluation Date: 1/3/2023; DOS:12/6/2022  Insurance Authorization period Expiration: 12/31/2023  Plan of Care Expiration Period: 3/3/3023  Next MD appointment: 1/17/2023     Visit # / Visits Authorized: 3/ 50  Time In:2:00  Time Out: 2:45  Total Billable Time: 45 minutes     Precautions: Standard and Weightbearing    Subjective     Pt reports: "I was in some pain the past couple of days."  he was compliant with home exercise program given last session.   Response to previous treatment:increased pain  Functional change: range of motion remains good.    Pain: 5/10  Location: left shoulder    Objective     Objective Measures updated at progress report unless specified.   Range of Motion/Strength:   Shoulder Left     PROM   Flexion 90(=)   Extension NT   Abduction 70(=)   HorizAdduction NT   Internal rotation To stomach   ER at 90° abd NT   ER at 0° abd 15(=)   NT=Not tested  ROM Comments: Pain at end range  Treatment   Pt 5 weeks 2 days post op with sling and abduction pillow intact    Jesse received the following supervised modalities after being cleared for contradictions for 5 minutes:   -moist hot pack for pain management and tissue extensibility    Jesse received the following manual therapy techniques for 25 minutes:   -consisting of patient supine for Left biceps and upper trapezius soft tissue mobilization, PROM with endrange stretching, glenohumeral joint inferior anterior posterior glides grade I-II, scapular mobilization, gentle shoulder oscillations    Jesse received therapeutic exercises for 15 minutes " "including:  Exercises        PROM (Left) Shoulder Flexion/Abduction/Internal rotation/External Rotation 10x     Upper trapezius stretch 3/30"   Levator scapulae 3/30"   Shoulder shrugs 30x   Scapular retraction 30x   Active assisted range of motion mid range elbow flexion/extension 2/15   Pronation/supination 2/15   Sitting EOM dowel passive range of motion external rotation  2/15   Pendulum 3 ways 30x   Pulleys facing wall 3minutes     Home Exercises and Education Provided     Education provided:   - Progress towards goals     Written Home Exercises Provided: Patient instructed to cont prior HEP.  Exercises were reviewed and Jesse was able to demonstrate them prior to the end of the session.  Jesse demonstrated good  understanding of the HEP provided.     See EMR under Patient Instructions for exercises provided prior visit.      Assessment   Pt with good participation this date. Pain report increased from previous session however not regression noted in passive range of motion.  Pt's passive range of motion remains good as compared to last session. Soft tissue restrictions noted during manual therapy however responded well to treatment. Pt able to complete all exercises in a pain free range of motion and good technique. Tolerated progression of treatment well. Meeting post op landmarks.     Jesse is progressing well towards his goals and there are no updates to goals at this time. Pt prognosis is Good.     Pt will continue to benefit from skilled outpatient occupational therapy to address the deficits listed in the problem list on initial evaluation provide pt/family education and to maximize pt's level of independence in the home and community environment.     Anticipated barriers to occupational therapy: none    Pt's spiritual, cultural and educational needs considered and pt agreeable to plan of care and goals.    Goals:  Short Term Goals to be met in 4 weeks: (2/2/2023)  1) Initiate Hep -met, ongoing  2) Pt will " increase Left shoulder PROM by 10 degrees grossly for improved performance with overhead activities of daily living's -Not met, progressing  3) Pt will report 6/10 pain in (Left)shoulder at worst -met  4) Pt will progress to active assisted range of motion exercises -Not met, progressing  5) Patient will be able to achieve less than or equal to 45% on FOTO shoulder survey demonstrating overall improved functional ability with upper extremity. -Not met, progressing     Long Term Goals to be met by discharge:  1) Independent with home exercise program -Not met, progressing  2) Pt will demonstrate (Left) shoulder AROM WFL grossly for [place] with activities of daily living's -Not met, progressing  3) Pt will demonstrate (Left) shoulder MMT WFL grossly for [place] with functional activities -Not met, progressing  4) Independent and pain free with ADL's and IADL's -Not met, progressing  5) Patient will be able to achieve less than or equal to 25% on FOTO shoulder survey demonstrating overall improved functional ability with upper extremity. -Not met, progressing       Plan   Continue with OT POC  Updates/Grading for next session: pt to see MD next week. Plan to progress to active assisted range of motion phase unless otherwise indicated by MD.       LEO Cardozo

## 2023-01-12 ENCOUNTER — CLINICAL SUPPORT (OUTPATIENT)
Dept: REHABILITATION | Facility: HOSPITAL | Age: 48
End: 2023-01-12
Payer: COMMERCIAL

## 2023-01-12 DIAGNOSIS — Z74.09 STIFFNESS DUE TO IMMOBILITY: ICD-10-CM

## 2023-01-12 DIAGNOSIS — M25.60 STIFFNESS DUE TO IMMOBILITY: ICD-10-CM

## 2023-01-12 DIAGNOSIS — M79.602 PAIN OF LEFT UPPER EXTREMITY: Primary | ICD-10-CM

## 2023-01-12 DIAGNOSIS — R53.1 WEAKNESS: ICD-10-CM

## 2023-01-12 PROCEDURE — 97140 MANUAL THERAPY 1/> REGIONS: CPT | Mod: PN

## 2023-01-12 PROCEDURE — 97110 THERAPEUTIC EXERCISES: CPT | Mod: PN

## 2023-01-17 ENCOUNTER — CLINICAL SUPPORT (OUTPATIENT)
Dept: REHABILITATION | Facility: HOSPITAL | Age: 48
End: 2023-01-17
Payer: COMMERCIAL

## 2023-01-17 DIAGNOSIS — M25.60 STIFFNESS DUE TO IMMOBILITY: ICD-10-CM

## 2023-01-17 DIAGNOSIS — M79.602 PAIN OF LEFT UPPER EXTREMITY: Primary | ICD-10-CM

## 2023-01-17 DIAGNOSIS — R53.1 WEAKNESS: ICD-10-CM

## 2023-01-17 DIAGNOSIS — Z74.09 STIFFNESS DUE TO IMMOBILITY: ICD-10-CM

## 2023-01-17 PROCEDURE — 97140 MANUAL THERAPY 1/> REGIONS: CPT | Mod: PN

## 2023-01-17 PROCEDURE — 97110 THERAPEUTIC EXERCISES: CPT | Mod: PN

## 2023-01-17 NOTE — PROGRESS NOTES
"  Occupational Therapy Treatment Note     Date: 1/17/2023  Name: Jesse Hernández  Clinic Number: [id number]    Therapy Diagnosis:   Encounter Diagnoses   Name Primary?    Pain of left upper extremity Yes    Weakness     Stiffness due to immobility      Physician: Gilbert Jorgensen Jr., MD  Physician Orders: OT evaluate and treat  Medical Diagnosis: Z98.890 (ICD-10-CM) - S/P arthroscopy of left shoulder  Evaluation Date: 1/3/2023; DOS:12/6/2022  Insurance Authorization period Expiration: 12/31/2023  Plan of Care Expiration Period: 3/3/3023  Next MD appointment: 1/17/2023     Visit # / Visits Authorized: 4/ 50  Time In:3:05  Time Out: 3:50  Total Billable Time: 45 minutes     Precautions: Standard and Weightbearing    Subjective     Pt reports: "I saw the doctor this morning, he said I can wean out of the sling."  he was compliant with home exercise program given last session.   Response to previous treatment:decreased pain  Functional change: tolerated progression of treatment well    Pain: 4/10  Location: left shoulder    Objective     Objective Measures updated at progress report unless specified.   Range of Motion/Strength:   Shoulder Left     PROM   Flexion 90(=)   Extension NT   Abduction 70(=)   HorizAdduction NT   Internal rotation To stomach   ER at 90° abd NT   ER at 0° abd 15(=)   NT=Not tested  ROM Comments: Pain at end range  Treatment   Pt 6 weeks 0 days post op with sling intact-MD discharged abduction pillow and instructed ok to wean out of sling    Jesse received the following supervised modalities after being cleared for contradictions for 5 minutes:   -moist hot pack for pain management and tissue extensibility    Jesse received the following manual therapy techniques for 15 minutes:   -consisting of patient supine for Left biceps and upper trapezius soft tissue mobilization, PROM with endrange stretching, glenohumeral joint inferior anterior posterior glides grade I-II, scapular mobilization, gentle " "shoulder oscillations    Jesse received therapeutic exercises for 25 minutes including:  Exercises        PROM (Left) Shoulder Flexion/Abduction/Internal rotation/External Rotation 10x     Hand over hand chest press 2/10   Standing dowel abduction 2/10   Upper trapezius stretch 3/30"   Levator scapulae 3/30"   Shoulder shrugs 30x   Scapular retraction 30x   Active range of motion elbow flexion/extension 2/15   Sitting EOM dowel passive range of motion external rotation  2/15   Pendulum 3 ways 30x   Pulleys facing wall 3minutes   Wall slides left assisting right 2/10     Home Exercises and Education Provided     Education provided:   - Progress towards goals     Written Home Exercises Provided: Patient instructed to cont prior HEP.  Exercises were reviewed and Jesse was able to demonstrate them prior to the end of the session.  Jesse demonstrated good  understanding of the HEP provided.     See EMR under Patient Instructions for exercises provided prior visit.      Assessment   Pt with good participation this date. Pain report decreased from previous session. Soft tissue restrictions noted during manual therapy however responded well to treatment. Pt 6 weeks post op today, tolerated progression to phase 2/active assisted range of motion well. Pt able to complete all exercises in a pain free range of motion and good technique. Issued updated home exercise program. Meeting post op landmarks.     Jesse is progressing well towards his goals and there are no updates to goals at this time. Pt prognosis is Good.     Pt will continue to benefit from skilled outpatient occupational therapy to address the deficits listed in the problem list on initial evaluation provide pt/family education and to maximize pt's level of independence in the home and community environment.     Anticipated barriers to occupational therapy: none    Pt's spiritual, cultural and educational needs considered and pt agreeable to plan of care and " goals.    Goals:  Short Term Goals to be met in 4 weeks: (2/2/2023)  1) Initiate Hep -met, ongoing  2) Pt will increase Left shoulder PROM by 10 degrees grossly for improved performance with overhead activities of daily living's -met  3) Pt will report 6/10 pain in (Left)shoulder at worst -met  4) Pt will progress to active assisted range of motion exercises -met  5) Patient will be able to achieve less than or equal to 45% on FOTO shoulder survey demonstrating overall improved functional ability with upper extremity. -Not met, progressing     Long Term Goals to be met by discharge:  1) Independent with home exercise program -Not met, progressing  2) Pt will demonstrate (Left) shoulder AROM WFL grossly for [place] with activities of daily living's -Not met, progressing  3) Pt will demonstrate (Left) shoulder MMT WFL grossly for [place] with functional activities -Not met, progressing  4) Independent and pain free with ADL's and IADL's -Not met, progressing  5) Patient will be able to achieve less than or equal to 25% on FOTO shoulder survey demonstrating overall improved functional ability with upper extremity. -Not met, progressing       Plan   Continue with OT POC  Updates/Grading for next session:progress as able      LEO Cardozo

## 2023-01-19 ENCOUNTER — CLINICAL SUPPORT (OUTPATIENT)
Dept: REHABILITATION | Facility: HOSPITAL | Age: 48
End: 2023-01-19
Payer: COMMERCIAL

## 2023-01-19 DIAGNOSIS — M79.602 PAIN OF LEFT UPPER EXTREMITY: Primary | ICD-10-CM

## 2023-01-19 DIAGNOSIS — Z74.09 STIFFNESS DUE TO IMMOBILITY: ICD-10-CM

## 2023-01-19 DIAGNOSIS — R53.1 WEAKNESS: ICD-10-CM

## 2023-01-19 DIAGNOSIS — M25.60 STIFFNESS DUE TO IMMOBILITY: ICD-10-CM

## 2023-01-19 PROCEDURE — 97140 MANUAL THERAPY 1/> REGIONS: CPT | Mod: PN

## 2023-01-19 PROCEDURE — 97110 THERAPEUTIC EXERCISES: CPT | Mod: PN

## 2023-01-19 NOTE — PROGRESS NOTES
"  Occupational Therapy Treatment Note     Date: 1/19/2023  Name: Jesse Hernández  Clinic Number: [id number]    Therapy Diagnosis:   Encounter Diagnoses   Name Primary?    Pain of left upper extremity Yes    Weakness     Stiffness due to immobility      Physician: Gilbert Jorgensen Jr., MD  Physician Orders: OT evaluate and treat  Medical Diagnosis: Z98.890 (ICD-10-CM) - S/P arthroscopy of left shoulder  Evaluation Date: 1/3/2023; DOS:12/6/2022  Insurance Authorization period Expiration: 12/31/2023  Plan of Care Expiration Period: 3/3/3023  Next MD appointment: 2/9/2022     Visit # / Visits Authorized: 5/ 50 FOTO:71%  Time In:1:45  Time Out:2:30  Total Billable Time: 45 minutes     Precautions: Standard and Weightbearing    Subjective     Pt reports: "I was able to reach for my deodorant with more ease today."  he was compliant with home exercise program given last session.   Response to previous treatment:increased pain  Functional change: tolerated progression of treatment well, increased range of motion     Pain: 7/10  Location: left shoulder    Objective     Objective Measures updated at progress report unless specified.   Range of Motion/Strength:   Shoulder Left     PROM   Flexion 100(+10)   Extension NT   Abduction 75(+5)   HorizAdduction NT   Internal rotation To stomach   ER at 90° abd NT   ER at 0° abd 30(+15)   NT=Not tested  ROM Comments: Pain at end range  Treatment   Pt 6 weeks 2 days post op with sling intact-MD discharged abduction pillow and instructed ok to wean out of sling    Jesse received the following supervised modalities after being cleared for contradictions for 5 minutes:   -moist hot pack for pain management and tissue extensibility    Jesse received the following manual therapy techniques for 15 minutes:   -consisting of patient supine for Left biceps and upper trapezius soft tissue mobilization, PROM with endrange stretching, glenohumeral joint inferior anterior posterior glides grade I-II, " "scapular mobilization, gentle shoulder oscillations    Jesse received therapeutic exercises for 25 minutes including:  Exercises        PROM (Left) Shoulder Flexion/Abduction/Internal rotation/External Rotation 10x     Hand over hand chest press 2/15   Standing dowel abduction 2/15   Upper trapezius stretch 3/30"   Levator scapulae 3/30"   Shoulder shrugs 30x   Scapular retraction 30x   Active range of motion elbow flexion/extension 2/15   Sitting EOM dowel passive range of motion external rotation  2/15   Pendulum 3 ways 30x   Pulleys facing wall 3minutes   Wall slides left assisting right 2/10     Home Exercises and Education Provided     Education provided:   - Progress towards goals     Written Home Exercises Provided: Patient instructed to cont prior HEP.  Exercises were reviewed and Jesse was able to demonstrate them prior to the end of the session.  Jesse demonstrated good  understanding of the HEP provided.     See EMR under Patient Instructions for exercises provided prior visit.      Assessment   Pt with good participation this date. Pain report increased from previous session however range of motion continues to steadily improve and pt tolerated progression of treatment well. Soft tissue restrictions noted during manual therapy however responded well to treatment.  Meeting post op landmarks.     Jesse is progressing well towards his goals and there are no updates to goals at this time. Pt prognosis is Good.     Pt will continue to benefit from skilled outpatient occupational therapy to address the deficits listed in the problem list on initial evaluation provide pt/family education and to maximize pt's level of independence in the home and community environment.     Anticipated barriers to occupational therapy: none    Pt's spiritual, cultural and educational needs considered and pt agreeable to plan of care and goals.    Goals:  Short Term Goals to be met in 4 weeks: (2/2/2023)  1) Initiate Hep -met, " ongoing  2) Pt will increase Left shoulder PROM by 10 degrees grossly for improved performance with overhead activities of daily living's -met  3) Pt will report 6/10 pain in (Left)shoulder at worst -met  4) Pt will progress to active assisted range of motion exercises -met  5) Patient will be able to achieve less than or equal to 45% on FOTO shoulder survey demonstrating overall improved functional ability with upper extremity. -Not met, progressing     Long Term Goals to be met by discharge:  1) Independent with home exercise program -Not met, progressing  2) Pt will demonstrate (Left) shoulder AROM WFL grossly for [place] with activities of daily living's -Not met, progressing  3) Pt will demonstrate (Left) shoulder MMT WFL grossly for [place] with functional activities -Not met, progressing  4) Independent and pain free with ADL's and IADL's -Not met, progressing  5) Patient will be able to achieve less than or equal to 25% on FOTO shoulder survey demonstrating overall improved functional ability with upper extremity. -Not met, progressing       Plan   Continue with OT POC  Updates/Grading for next session:progress as able      LEO Cardozo

## 2023-01-24 ENCOUNTER — CLINICAL SUPPORT (OUTPATIENT)
Dept: REHABILITATION | Facility: HOSPITAL | Age: 48
End: 2023-01-24
Payer: COMMERCIAL

## 2023-01-24 DIAGNOSIS — M25.60 STIFFNESS DUE TO IMMOBILITY: ICD-10-CM

## 2023-01-24 DIAGNOSIS — M79.602 PAIN OF LEFT UPPER EXTREMITY: Primary | ICD-10-CM

## 2023-01-24 DIAGNOSIS — R53.1 WEAKNESS: ICD-10-CM

## 2023-01-24 DIAGNOSIS — Z74.09 STIFFNESS DUE TO IMMOBILITY: ICD-10-CM

## 2023-01-24 PROCEDURE — 97140 MANUAL THERAPY 1/> REGIONS: CPT | Mod: PN

## 2023-01-24 PROCEDURE — 97110 THERAPEUTIC EXERCISES: CPT | Mod: PN

## 2023-01-24 NOTE — PROGRESS NOTES
"  Occupational Therapy Treatment Note     Date: 1/24/2023  Name: Jesse Hernández  Clinic Number: [id number]    Therapy Diagnosis:   Encounter Diagnoses   Name Primary?    Pain of left upper extremity Yes    Weakness     Stiffness due to immobility        Physician: Gilbert Jorgensen Jr., MD  Physician Orders: OT evaluate and treat  Medical Diagnosis: Z98.890 (ICD-10-CM) - S/P arthroscopy of left shoulder  Evaluation Date: 1/3/2023; DOS:12/6/2022  Insurance Authorization period Expiration: 12/31/2023  Plan of Care Expiration Period: 3/3/3023  Next MD appointment: 2/9/2022     Visit # / Visits Authorized: 6(+1)/ 50 FOTO:71%  Time In:2:15  Time Out:3:00  Total Billable Time: 45 minutes     Precautions: Standard and Weightbearing    Subjective     Pt reports: "I feel like each week it's getting better and better."  he was compliant with home exercise program given last session.   Response to previous treatment:decreased pain  Functional change: tolerated progression of treatment well, increased range of motion     Pain: 3/10  Location: left shoulder    Objective     Objective Measures updated at progress report unless specified.   Range of Motion/Strength:   Shoulder Left     PROM   Flexion 110(+10)   Extension NT   Abduction 90(+15)   HorizAdduction NT   Internal rotation 50(+50)   ER at 90° abd 25(+25)   ER at 0° abd 45(+15)   NT=Not tested  ROM Comments: Pain at end range  Treatment   Pt 7 weeks 0 days post op without sling this date    Jesse received the following supervised modalities after being cleared for contradictions for 5 minutes:   -moist hot pack for pain management and tissue extensibility    Jesse received the following manual therapy techniques for 15 minutes:   -consisting of patient supine for Left biceps and upper trapezius soft tissue mobilization, PROM with endrange stretching, glenohumeral joint inferior anterior posterior glides grade I-II, scapular mobilization, gentle shoulder oscillations    Jesse " "received therapeutic exercises for 25 minutes including:  Exercises        PROM (Left) Shoulder Flexion/Abduction/Internal rotation/External Rotation 10x     Supine dowel chest press/flexion/external rotation  2/15   Standing dowel abduction 2/15   Upper trapezius stretch 3/30"   Levator scapulae 3/30"   Shoulder shrugs 30x   Scapular retraction 30x   Active range of motion elbow flexion/extension 2/15   Pulleys  3minutes   Wall slides  1/15     Home Exercises and Education Provided     Education provided:   - Progress towards goals     Written Home Exercises Provided: Patient instructed to cont prior HEP.  Exercises were reviewed and Jesse was able to demonstrate them prior to the end of the session.  Jesse demonstrated good  understanding of the HEP provided.     See EMR under Patient Instructions for exercises provided prior visit.      Assessment   Pt with good participation this date. Pain report decreased from previous session. Range of motion continues to steadily improve each treatment. Soft tissue restrictions noted during manual therapy however continues to respond well to treatment.  Tolerated progression of treatment well performing all exercises in pain free range of motion and good technique. Meeting post op landmarks.     Jesse is progressing well towards his goals and there are no updates to goals at this time. Pt prognosis is Good.     Pt will continue to benefit from skilled outpatient occupational therapy to address the deficits listed in the problem list on initial evaluation provide pt/family education and to maximize pt's level of independence in the home and community environment.     Anticipated barriers to occupational therapy: none    Pt's spiritual, cultural and educational needs considered and pt agreeable to plan of care and goals.    Goals:  Short Term Goals to be met in 4 weeks: (2/2/2023)  1) Initiate Hep -met, ongoing  2) Pt will increase Left shoulder PROM by 10 degrees grossly for " improved performance with overhead activities of daily living's -met  3) Pt will report 6/10 pain in (Left)shoulder at worst -met  4) Pt will progress to active assisted range of motion exercises -met  5) Patient will be able to achieve less than or equal to 45% on FOTO shoulder survey demonstrating overall improved functional ability with upper extremity. -Not met, progressing     Long Term Goals to be met by discharge:  1) Independent with home exercise program -Not met, progressing  2) Pt will demonstrate (Left) shoulder AROM WFL grossly for [place] with activities of daily living's -Not met, progressing  3) Pt will demonstrate (Left) shoulder MMT WFL grossly for [place] with functional activities -Not met, progressing  4) Independent and pain free with ADL's and IADL's -Not met, progressing  5) Patient will be able to achieve less than or equal to 25% on FOTO shoulder survey demonstrating overall improved functional ability with upper extremity. -Not met, progressing       Plan   Continue with OT POC  Updates/Grading for next session:progress as able      LEO Cardozo

## 2023-01-25 NOTE — PROGRESS NOTES
"  Occupational Therapy Treatment Note     Date: 1/26/2023  Name: Jesse Hernández  Clinic Number: [id number]    Therapy Diagnosis:   Encounter Diagnoses   Name Primary?    Pain of left upper extremity Yes    Weakness     Stiffness due to immobility      Physician: Gilbert Jorgensen Jr., MD  Physician Orders: OT evaluate and treat  Medical Diagnosis: Z98.890 (ICD-10-CM) - S/P arthroscopy of left shoulder  Evaluation Date: 1/3/2023; DOS:12/6/2022  Insurance Authorization period Expiration: 12/31/2023  Plan of Care Expiration Period: 3/3/3023  Next MD appointment: 2/9/2022     Visit # / Visits Authorized: 7(+1)/ 50 FOTO:71%  Time In:1:35  Time Out:2:30  Total Billable Time: 55 minutes     Precautions: Standard and Weightbearing    Subjective     Pt reports: "I keep trying to sleep in my bed again and I can't yet."  he was compliant with home exercise program given last session.   Response to previous treatment:increased pain  Functional change: tolerated progression of treatment well, increased range of motion     Pain: 4/10  Location: left shoulder    Objective     Objective Measures updated at progress report unless specified.   Range of Motion/Strength:   Shoulder Left     PROM   Flexion 120(+10)   Extension NT   Abduction 95(+5)   HorizAdduction NT   Internal rotation 50(=)   ER at 90° abd 40(+15)   ER at 0° abd 45(=)   NT=Not tested  ROM Comments: Pain at end range  Treatment   Pt 7 weeks 2 days post op without sling this date    Jesse received the following supervised modalities after being cleared for contradictions for 5 minutes:   -moist hot pack for pain management and tissue extensibility    Jesse received the following manual therapy techniques for 10 minutes:   -consisting of patient supine for Left biceps and upper trapezius soft tissue mobilization, PROM with endrange stretching, glenohumeral joint inferior anterior posterior glides grade I-II, scapular mobilization, gentle shoulder oscillations    Jesse received " "therapeutic exercises for 40 minutes including:  Exercises        PROM (Left) Shoulder Flexion/Abduction/Internal rotation/External Rotation 10x     Supine dowel chest press/flexion/external rotation/serratus punches  2/15   Standing dowel abduction 2/15   Upper trapezius stretch 3/30"   Levator scapulae 3/30"   Shoulder shrugs 30x   Scapular retraction 30x   Diagonal forward reaching with upper extremity ranger 2/15   Pulleys  3minutes   Wall slides  1/15     Home Exercises and Education Provided     Education provided:   - Progress towards goals     Written Home Exercises Provided: Patient instructed to cont prior HEP.  Exercises were reviewed and Jesse was able to demonstrate them prior to the end of the session.  Jesse demonstrated good  understanding of the HEP provided.     See EMR under Patient Instructions for exercises provided prior visit.      Assessment   Pt with good participation this date. Pain report increased from previous session however did not limit him in session. Range of motion continues to steadily improve each treatment. Soft tissue restrictions noted during manual therapy however continues to respond well to treatment.  Tolerated progression of treatment well performing all exercises in pain free range of motion and good technique. Meeting post op landmarks.     Jesse is progressing well towards his goals and there are no updates to goals at this time. Pt prognosis is Good.     Pt will continue to benefit from skilled outpatient occupational therapy to address the deficits listed in the problem list on initial evaluation provide pt/family education and to maximize pt's level of independence in the home and community environment.     Anticipated barriers to occupational therapy: none    Pt's spiritual, cultural and educational needs considered and pt agreeable to plan of care and goals.    Goals:  Short Term Goals to be met in 4 weeks: (2/2/2023)  1) Initiate Hep -met, ongoing  2) Pt will " increase Left shoulder PROM by 10 degrees grossly for improved performance with overhead activities of daily living's -met  3) Pt will report 6/10 pain in (Left)shoulder at worst -met  4) Pt will progress to active assisted range of motion exercises -met  5) Patient will be able to achieve less than or equal to 45% on FOTO shoulder survey demonstrating overall improved functional ability with upper extremity. -Not met, progressing     Long Term Goals to be met by discharge:  1) Independent with home exercise program -Not met, progressing  2) Pt will demonstrate (Left) shoulder AROM WFL grossly for [place] with activities of daily living's -Not met, progressing  3) Pt will demonstrate (Left) shoulder MMT WFL grossly for [place] with functional activities -Not met, progressing  4) Independent and pain free with ADL's and IADL's -Not met, progressing  5) Patient will be able to achieve less than or equal to 25% on FOTO shoulder survey demonstrating overall improved functional ability with upper extremity. -Not met, progressing       Plan   Continue with OT POC  Updates/Grading for next session:progress as able      LEO Cardozo

## 2023-01-26 ENCOUNTER — CLINICAL SUPPORT (OUTPATIENT)
Dept: REHABILITATION | Facility: HOSPITAL | Age: 48
End: 2023-01-26
Payer: COMMERCIAL

## 2023-01-26 DIAGNOSIS — Z74.09 STIFFNESS DUE TO IMMOBILITY: ICD-10-CM

## 2023-01-26 DIAGNOSIS — M79.602 PAIN OF LEFT UPPER EXTREMITY: Primary | ICD-10-CM

## 2023-01-26 DIAGNOSIS — R53.1 WEAKNESS: ICD-10-CM

## 2023-01-26 DIAGNOSIS — M25.60 STIFFNESS DUE TO IMMOBILITY: ICD-10-CM

## 2023-01-26 PROCEDURE — 97140 MANUAL THERAPY 1/> REGIONS: CPT | Mod: PN

## 2023-01-26 PROCEDURE — 97110 THERAPEUTIC EXERCISES: CPT | Mod: PN

## 2023-01-31 ENCOUNTER — CLINICAL SUPPORT (OUTPATIENT)
Dept: REHABILITATION | Facility: HOSPITAL | Age: 48
End: 2023-01-31
Payer: COMMERCIAL

## 2023-01-31 DIAGNOSIS — M79.602 PAIN OF LEFT UPPER EXTREMITY: Primary | ICD-10-CM

## 2023-01-31 DIAGNOSIS — Z74.09 STIFFNESS DUE TO IMMOBILITY: ICD-10-CM

## 2023-01-31 DIAGNOSIS — R53.1 WEAKNESS: ICD-10-CM

## 2023-01-31 DIAGNOSIS — M25.60 STIFFNESS DUE TO IMMOBILITY: ICD-10-CM

## 2023-01-31 PROCEDURE — 97140 MANUAL THERAPY 1/> REGIONS: CPT | Mod: PN

## 2023-01-31 PROCEDURE — 97110 THERAPEUTIC EXERCISES: CPT | Mod: PN

## 2023-01-31 NOTE — PROGRESS NOTES
"  Occupational Therapy Treatment Note     Date: 1/31/2023  Name: Jesse Hernández  Clinic Number: [id number]    Therapy Diagnosis:   Encounter Diagnoses   Name Primary?    Pain of left upper extremity Yes    Weakness     Stiffness due to immobility      Physician: Gilbert Jorgensen Jr., MD  Physician Orders: OT evaluate and treat  Medical Diagnosis: Z98.890 (ICD-10-CM) - S/P arthroscopy of left shoulder  Evaluation Date: 1/3/2023; DOS:12/6/2022  Insurance Authorization period Expiration: 12/31/2023  Plan of Care Expiration Period: 3/3/3023  Next MD appointment: 2/9/2022     Visit # / Visits Authorized: 8(+1)/ 50 FOTO:71%  Time In:1:35  Time Out:2:30  Total Billable Time: 55 minutes     Precautions: Standard and Weightbearing    Subjective     Pt reports: "I'm moving my arm better each week."  he was compliant with home exercise program given last session.   Response to previous treatment:decreased pain  Functional change: tolerated progression of treatment well, increased range of motion     Pain: 3/10  Location: left shoulder    Objective     Objective Measures updated at progress report unless specified.   Range of Motion/Strength:   Shoulder Left     PROM   Flexion 130(+10)   Extension NT   Abduction 105(+10)   HorizAdduction NT   Internal rotation 60(+10)   ER at 90° abd 50(+10)   ER at 0° abd 50(+5)   NT=Not tested  ROM Comments: Pain at end range  Treatment   Pt 7 weeks 5 days post op without sling this date    Jesse received the following manual therapy techniques for 10 minutes:   -consisting of patient supine for Left biceps and upper trapezius soft tissue mobilization, PROM with endrange stretching, glenohumeral joint inferior anterior posterior glides grade I-II, scapular mobilization, gentle shoulder oscillations    Jesse received therapeutic exercises for 45 minutes including:  Exercises    Scifit UBE forward/reverse Level 1  3minutes each direction   PROM (Left) Shoulder Flexion/Abduction/Internal " "rotation/External Rotation 10x     Supine dowel chest press/flexion/external rotation/serratus punches  2/15   Sidelying abduction/external rotation  2/15   Standing dowel abduction 2/15   Upper trapezius stretch 3/30"   Levator scapulae 3/30"   Theraband extension pulls Yellow  2/15   Theraband rows Yellow  2/15   Diagonal forward reaching with upper extremity ranger 2/15   Pulleys  3minutes   Wall slides  1/15     Home Exercises and Education Provided     Education provided:   - Progress towards goals     Written Home Exercises Provided: Patient instructed to cont prior HEP.  Exercises were reviewed and Jesse was able to demonstrate them prior to the end of the session.  Jesse demonstrated good  understanding of the HEP provided.     See EMR under Patient Instructions for exercises provided prior visit.      Assessment   Pt with good participation this date. Pain report decreased from previous session and remains low in sessions. Range of motion continues to steadily improve each treatment. Soft tissue restrictions noted during manual therapy however continues to respond well to treatment.  Tolerated progression of treatment well performing all exercises in pain free range of motion and good technique. Meeting post op landmarks.     Jesse is progressing well towards his goals and there are no updates to goals at this time. Pt prognosis is Good.     Pt will continue to benefit from skilled outpatient occupational therapy to address the deficits listed in the problem list on initial evaluation provide pt/family education and to maximize pt's level of independence in the home and community environment.     Anticipated barriers to occupational therapy: none    Pt's spiritual, cultural and educational needs considered and pt agreeable to plan of care and goals.    Goals:  Short Term Goals to be met in 4 weeks: (2/2/2023)  1) Initiate Hep -met, ongoing  2) Pt will increase Left shoulder PROM by 10 degrees grossly for " improved performance with overhead activities of daily living's -met  3) Pt will report 6/10 pain in (Left)shoulder at worst -met  4) Pt will progress to active assisted range of motion exercises -met  5) Patient will be able to achieve less than or equal to 45% on FOTO shoulder survey demonstrating overall improved functional ability with upper extremity. -Not met, progressing     Long Term Goals to be met by discharge:  1) Independent with home exercise program -Not met, progressing  2) Pt will demonstrate (Left) shoulder AROM WFL grossly for [place] with activities of daily living's -Not met, progressing  3) Pt will demonstrate (Left) shoulder MMT WFL grossly for [place] with functional activities -Not met, progressing  4) Independent and pain free with ADL's and IADL's -Not met, progressing  5) Patient will be able to achieve less than or equal to 25% on FOTO shoulder survey demonstrating overall improved functional ability with upper extremity. -Not met, progressing       Plan   Continue with OT POC  Updates/Grading for next session:progress as able      LEO Cardozo

## 2023-02-01 NOTE — PROGRESS NOTES
"  Occupational Therapy Treatment Note     Date: 2/2/2023  Name: Jesse Hernández  Clinic Number: [id number]    Therapy Diagnosis:   Encounter Diagnoses   Name Primary?    Pain of left upper extremity Yes    Weakness     Stiffness due to immobility      Physician: Gilbert Jorgensen Jr., MD  Physician Orders: OT evaluate and treat  Medical Diagnosis: Z98.890 (ICD-10-CM) - S/P arthroscopy of left shoulder  Evaluation Date: 1/3/2023; DOS:12/6/2022  Insurance Authorization period Expiration: 12/31/2023  Plan of Care Expiration Period: 3/3/3023  Next MD appointment: 2/9/2022     Visit # / Visits Authorized: 9(+1)/ 50 FOTO:71%  Time In:1:20  Time Out:2:20  Total Billable Time:60 minutes     Precautions: Standard and Weightbearing    Subjective     Pt reports: "I'm hurting a little bit more today, I've been sleeping in the bed and doing more though."  he was compliant with home exercise program given last session.   Response to previous treatment:increased pain  Functional change: tolerated progression of treatment well, increased range of motion     Pain: 5/10  Location: left shoulder    Objective     Objective Measures updated at progress report unless specified.   Range of Motion/Strength:   Shoulder Left     PROM   Flexion 130(=)   Extension NT   Abduction 115(+5)   HorizAdduction NT   Internal rotation 70(+10)   ER at 90° abd 55(+5)   ER at 0° abd 50(=)   NT=Not tested  ROM Comments: Pain at end range  Treatment   Pt 8 weeks 0 days post op without sling this date    Jesse received the following manual therapy techniques for 10 minutes:   -consisting of patient supine for Left biceps and upper trapezius soft tissue mobilization, PROM with endrange stretching, glenohumeral joint inferior anterior posterior glides grade I-II, scapular mobilization, gentle shoulder oscillations    Jesse received therapeutic exercises for 45 minutes including:  Exercises    Scifit UBE forward/reverse Level 1  3minutes each direction   PROM (Left) " "Shoulder Flexion/Abduction/Internal rotation/External Rotation 10x     Supine dowel chest press/flexion/external rotation/serratus punches  2/15   Sidelying abduction/external rotation  2/15   Standing dowel abduction 2/15   Upper trapezius stretch 3/30"   Levator scapulae 3/30"   Theraband extension pulls Yellow  2/15   Theraband rows Yellow  2/15   Diagonal forward reaching with upper extremity ranger 2/15   Pulleys  3minutes   Wall slides  1/15     Home Exercises and Education Provided     Education provided:   - Progress towards goals     Written Home Exercises Provided: Patient instructed to cont prior HEP.  Exercises were reviewed and Jesse was able to demonstrate them prior to the end of the session.  Jesse demonstrated good  understanding of the HEP provided.     See EMR under Patient Instructions for exercises provided prior visit.      Assessment   Pt with good participation this date. Pain report increased initially from previous session however improved after session. Range of motion continues to steadily improve each treatment. Soft tissue restrictions noted during manual therapy however continues to respond well to treatment.  Tolerated treatment well performing all exercises in pain free range of motion and good technique. Meeting post op landmarks.     Jesse is progressing well towards his goals and there are no updates to goals at this time. Pt prognosis is Good.     Pt will continue to benefit from skilled outpatient occupational therapy to address the deficits listed in the problem list on initial evaluation provide pt/family education and to maximize pt's level of independence in the home and community environment.     Anticipated barriers to occupational therapy: none    Pt's spiritual, cultural and educational needs considered and pt agreeable to plan of care and goals.    Goals:  Short Term Goals to be met in 4 weeks: (2/2/2023)  1) Initiate Hep -met, ongoing  2) Pt will increase Left shoulder " PROM by 10 degrees grossly for improved performance with overhead activities of daily living's -met  3) Pt will report 6/10 pain in (Left)shoulder at worst -met  4) Pt will progress to active assisted range of motion exercises -met  5) Patient will be able to achieve less than or equal to 45% on FOTO shoulder survey demonstrating overall improved functional ability with upper extremity. -Not met, progressing     Long Term Goals to be met by discharge:  1) Independent with home exercise program -Not met, progressing  2) Pt will demonstrate (Left) shoulder AROM WFL grossly for [place] with activities of daily living's -Not met, progressing  3) Pt will demonstrate (Left) shoulder MMT WFL grossly for [place] with functional activities -Not met, progressing  4) Independent and pain free with ADL's and IADL's -Not met, progressing  5) Patient will be able to achieve less than or equal to 25% on FOTO shoulder survey demonstrating overall improved functional ability with upper extremity. -Not met, progressing       Plan   Continue with OT POC  Updates/Grading for next session:progress as able      LEO Cardozo

## 2023-02-02 ENCOUNTER — CLINICAL SUPPORT (OUTPATIENT)
Dept: REHABILITATION | Facility: HOSPITAL | Age: 48
End: 2023-02-02
Payer: COMMERCIAL

## 2023-02-02 DIAGNOSIS — Z74.09 STIFFNESS DUE TO IMMOBILITY: ICD-10-CM

## 2023-02-02 DIAGNOSIS — R53.1 WEAKNESS: ICD-10-CM

## 2023-02-02 DIAGNOSIS — M25.60 STIFFNESS DUE TO IMMOBILITY: ICD-10-CM

## 2023-02-02 DIAGNOSIS — M79.602 PAIN OF LEFT UPPER EXTREMITY: Primary | ICD-10-CM

## 2023-02-02 PROCEDURE — 97140 MANUAL THERAPY 1/> REGIONS: CPT | Mod: PN

## 2023-02-02 PROCEDURE — 97110 THERAPEUTIC EXERCISES: CPT | Mod: PN

## 2023-02-06 NOTE — PROGRESS NOTES
"  Occupational Therapy Treatment Note     Date: 2/7/2023  Name: Jesse Hernández  Clinic Number: [id number]    Therapy Diagnosis:   Encounter Diagnoses   Name Primary?    Pain of left upper extremity Yes    Weakness     Stiffness due to immobility      Physician: Gilbert Jorgensen Jr., MD  Physician Orders: OT evaluate and treat  Medical Diagnosis: Z98.890 (ICD-10-CM) - S/P arthroscopy of left shoulder  Evaluation Date: 1/3/2023; DOS:12/6/2022  Insurance Authorization period Expiration: 12/31/2023  Plan of Care Expiration Period: 3/3/3023  Next MD appointment: 2/9/2022     Visit # / Visits Authorized: 10(+1)/ 50 FOTO:44%  Time In:2:10  Time Out:3:10  Total Billable Time:60 minutes     Precautions: Standard and Weightbearing    Subjective     Pt reports: "I'm feeling really good today and yesterday."  he was compliant with home exercise program given last session.   Response to previous treatment:decreased pain  Functional change: tolerated progression of treatment well, increased range of motion, improved FOTO    Pain: 2/10  Location: left shoulder    Objective     Objective Measures updated at progress report unless specified.   Range of Motion/Strength:   Shoulder Left      PROM AROM   Flexion 135(+5) 110   Extension NT 60   Abduction 120(+5) 85   HorizAdduction NT 10   Internal rotation 80(+10) unable   ER at 90° abd 55(=) 65   ER at 0° abd 55(+5) 50   NT=Not tested  ROM Comments: Pain at end range  Treatment   Pt 8 weeks 2 days post op without sling this date    Jesse received the following manual therapy techniques for 10 minutes:   -consisting of patient supine for Left biceps and upper trapezius soft tissue mobilization, PROM with endrange stretching, glenohumeral joint inferior anterior posterior glides grade I-II, scapular mobilization, gentle shoulder oscillations    Jesse received therapeutic exercises for 50 minutes including:  Exercises    Scifit UBE forward/reverse Level 1  3minutes each direction   PROM (Left) " "Shoulder Flexion/Abduction/Internal rotation/External Rotation 10x     Supine dowel chest press/flexion/external rotation/serratus punches  2/15   Sidelying abduction/external rotation  2/15   Standing dowel abduction 2/15   Upper trapezius stretch 3/30"   Levator scapulae 3/30"   Theraband extension pulls Red  2/15   Theraband rows Red  2/15   Pulleys  3minutes   Wall slides  2/15     Home Exercises and Education Provided     Education provided:   - Progress towards goals     Written Home Exercises Provided: Patient instructed to cont prior HEP.  Exercises were reviewed and Jesse was able to demonstrate them prior to the end of the session.  Jesse demonstrated good  understanding of the HEP provided.     See EMR under Patient Instructions for exercises provided prior visit.   Assessment   Pt with good participation this date. Pain report decreased from previous session. Range of motion continues to steadily improve each treatment, able to assess active range of motion this date. Soft tissue restrictions noted during manual therapy however continues to respond well to treatment.  Tolerated treatment well performing all exercises in pain free range of motion and good technique. Pt with an improved FOTO score indicating increased functional use Left upper extremity with self care tasks. Meeting post op landmarks.     Jesse is progressing well towards his goals and there are no updates to goals at this time. Pt prognosis is Good.     Pt will continue to benefit from skilled outpatient occupational therapy to address the deficits listed in the problem list on initial evaluation provide pt/family education and to maximize pt's level of independence in the home and community environment.     Anticipated barriers to occupational therapy: none    Pt's spiritual, cultural and educational needs considered and pt agreeable to plan of care and goals.    Goals:  Short Term Goals to be met in 4 weeks: (2/2/2023)  1) Initiate Hep " -met, ongoing  2) Pt will increase Left shoulder PROM by 10 degrees grossly for improved performance with overhead activities of daily living's -met  3) Pt will report 6/10 pain in (Left)shoulder at worst -met  4) Pt will progress to active assisted range of motion exercises -met  5) Patient will be able to achieve less than or equal to 45% on FOTO shoulder survey demonstrating overall improved functional ability with upper extremity. -Not met, progressing     Long Term Goals to be met by discharge:  1) Independent with home exercise program -Not met, progressing  2) Pt will demonstrate (Left) shoulder AROM WFL grossly for [place] with activities of daily living's -Not met, progressing  3) Pt will demonstrate (Left) shoulder MMT WFL grossly for [place] with functional activities -Not met, progressing  4) Independent and pain free with ADL's and IADL's -Not met, progressing  5) Patient will be able to achieve less than or equal to 25% on FOTO shoulder survey demonstrating overall improved functional ability with upper extremity. -Not met, progressing       Plan   Continue with OT POC  Updates/Grading for next session:progress as able      LEO Cardozo

## 2023-02-07 ENCOUNTER — CLINICAL SUPPORT (OUTPATIENT)
Dept: REHABILITATION | Facility: HOSPITAL | Age: 48
End: 2023-02-07
Payer: COMMERCIAL

## 2023-02-07 DIAGNOSIS — M79.602 PAIN OF LEFT UPPER EXTREMITY: Primary | ICD-10-CM

## 2023-02-07 DIAGNOSIS — Z74.09 STIFFNESS DUE TO IMMOBILITY: ICD-10-CM

## 2023-02-07 DIAGNOSIS — M25.60 STIFFNESS DUE TO IMMOBILITY: ICD-10-CM

## 2023-02-07 DIAGNOSIS — R53.1 WEAKNESS: ICD-10-CM

## 2023-02-07 PROCEDURE — 97140 MANUAL THERAPY 1/> REGIONS: CPT | Mod: PN

## 2023-02-07 PROCEDURE — 97110 THERAPEUTIC EXERCISES: CPT | Mod: PN

## 2023-02-08 NOTE — PROGRESS NOTES
"  Occupational Therapy Treatment Note     Date: 2/9/2023  Name: Jesse Hernández  Clinic Number: [id number]    Therapy Diagnosis:   Encounter Diagnoses   Name Primary?    Pain of left upper extremity Yes    Weakness     Stiffness due to immobility        Physician: Gilbert Jorgensen Jr., MD  Physician Orders: OT evaluate and treat  Medical Diagnosis: Z98.890 (ICD-10-CM) - S/P arthroscopy of left shoulder  Evaluation Date: 1/3/2023; DOS:12/6/2022  Insurance Authorization period Expiration: 12/31/2023  Plan of Care Expiration Period: 3/3/3023  Next MD appointment: 2/9/2022     Visit # / Visits Authorized: 11(+1)/ 50 FOTO:44%  Time In:1:00  Time Out:1:55  Total Billable Time:55 minutes     Precautions: Standard and Weightbearing    Subjective     Pt reports: "I'm a little sore today but therapy always helps me."  he was compliant with home exercise program given last session.   Response to previous treatment:increased pain prior, decreased pain after  Functional change: range of motion improved    Pain: 3/10  Location: left shoulder    Objective     Objective Measures updated at progress report unless specified.   Range of Motion/Strength:   Shoulder Left      PROM AROM   Flexion 135(+5) 110   Extension NT 60   Abduction 130(+10) 85   HorizAdduction NT 10   Internal rotation 80(+10) unable   ER at 90° abd 60(+5) 65   ER at 0° abd 55(+5) 50   NT=Not tested  ROM Comments: Pain at end range  Treatment   Pt 8 weeks 4 days post op without sling this date    Jesse received the following manual therapy techniques for 10 minutes:   -consisting of patient supine for Left biceps and upper trapezius soft tissue mobilization, PROM with endrange stretching, glenohumeral joint inferior anterior posterior glides grade I-II, scapular mobilization, gentle shoulder oscillations    Jesse received therapeutic exercises for 45 minutes including:  Exercises    Scifit UBE forward/reverse Level 1  3minutes each direction   PROM (Left) Shoulder " "Flexion/Abduction/Internal rotation/External Rotation 10x     Supine dowel chest press/flexion/external rotation/serratus punches  2/15   Sidelying abduction/external rotation  1#  2/15   Standing dowel abduction 2/15   Upper trapezius stretch 3/30"   Levator scapulae 3/30"   Theraband extension pulls Red  2/15   Theraband rows Red  2/15   Pulleys  3minutes   Wall slides with lift off 2/15   Serratus wall slides with bolster 2/15     Home Exercises and Education Provided     Education provided:   - Progress towards goals     Written Home Exercises Provided: Patient instructed to cont prior HEP.  Exercises were reviewed and Jesse was able to demonstrate them prior to the end of the session.  Jesse demonstrated good  understanding of the HEP provided.     See EMR under Patient Instructions for exercises provided prior visit.   Assessment   Pt with good participation this date. Pain report increased from previous session however improved after exercises. Range of motion continues to steadily improve each treatment.Soft tissue restrictions noted during manual therapy however continues to respond well to treatment.  Tolerated treatment well performing all exercises in pain free range of motion and good technique. Meeting post op landmarks.     Jesse is progressing well towards his goals and there are no updates to goals at this time. Pt prognosis is Good.     Pt will continue to benefit from skilled outpatient occupational therapy to address the deficits listed in the problem list on initial evaluation provide pt/family education and to maximize pt's level of independence in the home and community environment.     Anticipated barriers to occupational therapy: none    Pt's spiritual, cultural and educational needs considered and pt agreeable to plan of care and goals.    Goals:  Short Term Goals to be met in 4 weeks: (2/2/2023)  1) Initiate Hep -met, ongoing  2) Pt will increase Left shoulder PROM by 10 degrees grossly for " improved performance with overhead activities of daily living's -met  3) Pt will report 6/10 pain in (Left)shoulder at worst -met  4) Pt will progress to active assisted range of motion exercises -met  5) Patient will be able to achieve less than or equal to 45% on FOTO shoulder survey demonstrating overall improved functional ability with upper extremity. -met     Long Term Goals to be met by discharge:  1) Independent with home exercise program -Not met, progressing  2) Pt will demonstrate (Left) shoulder AROM WFL grossly for [place] with activities of daily living's -Not met, progressing  3) Pt will demonstrate (Left) shoulder MMT WFL grossly for [place] with functional activities -Not met, progressing  4) Independent and pain free with ADL's and IADL's -Not met, progressing  5) Patient will be able to achieve less than or equal to 25% on FOTO shoulder survey demonstrating overall improved functional ability with upper extremity. -Not met, progressing       Plan   Continue with OT POC  Updates/Grading for next session:progress as able      LEO Cardozo

## 2023-02-09 ENCOUNTER — CLINICAL SUPPORT (OUTPATIENT)
Dept: REHABILITATION | Facility: HOSPITAL | Age: 48
End: 2023-02-09
Payer: COMMERCIAL

## 2023-02-09 DIAGNOSIS — M25.60 STIFFNESS DUE TO IMMOBILITY: ICD-10-CM

## 2023-02-09 DIAGNOSIS — R53.1 WEAKNESS: ICD-10-CM

## 2023-02-09 DIAGNOSIS — M79.602 PAIN OF LEFT UPPER EXTREMITY: Primary | ICD-10-CM

## 2023-02-09 DIAGNOSIS — Z74.09 STIFFNESS DUE TO IMMOBILITY: ICD-10-CM

## 2023-02-09 PROCEDURE — 97110 THERAPEUTIC EXERCISES: CPT | Mod: PN

## 2023-02-09 PROCEDURE — 97140 MANUAL THERAPY 1/> REGIONS: CPT | Mod: PN

## 2023-02-13 NOTE — PROGRESS NOTES
"  Occupational Therapy Treatment Note     Date: 2/14/2023  Name: Jesse Hernández  Clinic Number: [id number]    Therapy Diagnosis:   Encounter Diagnoses   Name Primary?    Pain of left upper extremity Yes    Weakness     Stiffness due to immobility      Physician: Gilbert Jorgensen Jr., MD  Physician Orders: OT evaluate and treat  Medical Diagnosis: Z98.890 (ICD-10-CM) - S/P arthroscopy of left shoulder  Evaluation Date: 1/3/2023; DOS:12/6/2022  Insurance Authorization period Expiration: 12/31/2023  Plan of Care Expiration Period: 3/3/3023  Next MD appointment:none scheduled     Visit # / Visits Authorized: 12(+1)/ 50 FOTO:44%  Time In:2:15  Time Out:3:15  Total Billable Time: 60 minutes     Precautions: Standard and Weightbearing    Subjective     Pt reports: "I saw the doctor he said everything looked really good  he was compliant with home exercise program given last session.   Response to previous treatment:decreased pain  Functional change: range of motion improved    Pain: 2/10  Location: left shoulder    Objective     Objective Measures updated at progress report unless specified.   Range of Motion/Strength:   Shoulder Left      PROM AROM   Flexion 140(+5) 125(+15)   Extension NT 65(+5)   Abduction 140(+10) 95(+10)   HorizAdduction NT 20(+10)   Internal rotation 80(=) PSIS   ER at 90° abd 60(=) 70(+5)   ER at 0° abd 60(=) 50(=)   NT=Not tested  ROM Comments: Pain at end range  Treatment   Pt 10 weeks 0 days post op     Jesse received the following manual therapy techniques for 10 minutes:   -consisting of patient supine for Left biceps and upper trapezius soft tissue mobilization, PROM with endrange stretching, glenohumeral joint inferior anterior posterior glides grade I-II, scapular mobilization, gentle shoulder oscillations    Jesse received therapeutic exercises for 50 minutes including:  Exercises    Scifit UBE forward/reverse Level 1  3minutes each direction   PROM (Left) Shoulder Flexion/Abduction/Internal " "rotation/External Rotation 10x     Supine dowel chest press/flexion/external rotation/serratus punches 2#  2/15   Sidelying abduction/external rotation  1#  2/15   Standing dowel abduction 2/15   Supine pectoralis stretch 3/30"   Theraband extension pulls Green  2/15   Theraband rows Green  2/15   Pulleys  3minutes   Wall slides  2/15   Serratus wall slides with bolster 2/15     Home Exercises and Education Provided     Education provided:   - Progress towards goals     Written Home Exercises Provided: Patient instructed to cont prior HEP.  Exercises were reviewed and Jesse was able to demonstrate them prior to the end of the session.  Jesse demonstrated good  understanding of the HEP provided.     See EMR under Patient Instructions for exercises provided prior visit.   Assessment   Pt with good participation this date. Pain report decreased from previous session. Range of motion continues to steadily improve each treatment.Soft tissue restrictions noted during manual therapy however continues to respond well to treatment.  Tolerated progression of treatment well performing all exercises in pain free range of motion and good technique. Meeting post op landmarks.     Jesse is progressing well towards his goals and there are no updates to goals at this time. Pt prognosis is Good.     Pt will continue to benefit from skilled outpatient occupational therapy to address the deficits listed in the problem list on initial evaluation provide pt/family education and to maximize pt's level of independence in the home and community environment.     Anticipated barriers to occupational therapy: none    Pt's spiritual, cultural and educational needs considered and pt agreeable to plan of care and goals.    Goals:  Short Term Goals to be met in 4 weeks: (2/2/2023)  1) Initiate Hep -met, ongoing  2) Pt will increase Left shoulder PROM by 10 degrees grossly for improved performance with overhead activities of daily living's -met  3) Pt " will report 6/10 pain in (Left)shoulder at worst -met  4) Pt will progress to active assisted range of motion exercises -met  5) Patient will be able to achieve less than or equal to 45% on FOTO shoulder survey demonstrating overall improved functional ability with upper extremity. -met     Long Term Goals to be met by discharge:  1) Independent with home exercise program -Not met, progressing  2) Pt will demonstrate (Left) shoulder AROM WFL grossly for [place] with activities of daily living's -Not met, progressing  3) Pt will demonstrate (Left) shoulder MMT WFL grossly for [place] with functional activities -Not met, progressing  4) Independent and pain free with ADL's and IADL's -Not met, progressing  5) Patient will be able to achieve less than or equal to 25% on FOTO shoulder survey demonstrating overall improved functional ability with upper extremity. -Not met, progressing       Plan   Continue with OT POC  Updates/Grading for next session:progress as able      LEO Cardozo

## 2023-02-14 ENCOUNTER — CLINICAL SUPPORT (OUTPATIENT)
Dept: REHABILITATION | Facility: HOSPITAL | Age: 48
End: 2023-02-14
Payer: COMMERCIAL

## 2023-02-14 DIAGNOSIS — Z74.09 STIFFNESS DUE TO IMMOBILITY: ICD-10-CM

## 2023-02-14 DIAGNOSIS — M25.60 STIFFNESS DUE TO IMMOBILITY: ICD-10-CM

## 2023-02-14 DIAGNOSIS — R53.1 WEAKNESS: ICD-10-CM

## 2023-02-14 DIAGNOSIS — M79.602 PAIN OF LEFT UPPER EXTREMITY: Primary | ICD-10-CM

## 2023-02-14 PROCEDURE — 97140 MANUAL THERAPY 1/> REGIONS: CPT | Mod: PN

## 2023-02-14 PROCEDURE — 97110 THERAPEUTIC EXERCISES: CPT | Mod: PN

## 2023-02-15 NOTE — PROGRESS NOTES
"  Occupational Therapy Treatment Note     Date: 2/16/2023  Name: Jesse Hernández  Clinic Number: [id number]    Therapy Diagnosis:   Encounter Diagnoses   Name Primary?    Pain of left upper extremity Yes    Weakness     Stiffness due to immobility      Physician: Gilbert Jorgensen Jr., MD  Physician Orders: OT evaluate and treat  Medical Diagnosis: Z98.890 (ICD-10-CM) - S/P arthroscopy of left shoulder  Evaluation Date: 1/3/2023; DOS:12/6/2022  Insurance Authorization period Expiration: 12/31/2023  Plan of Care Expiration Period: 3/3/3023  Next MD appointment:none scheduled     Visit # / Visits Authorized: 13(+1)/ 50 FOTO:44%  Time In:2:15  Time Out:3:03  Total Billable Time: 48 minutes     Precautions: Standard and Weightbearing    Subjective     Pt reports: "I'm feeling a lot better today than I did the other day."  he was compliant with home exercise program given last session.   Response to previous treatment:increased pain  Functional change: range of motion improved    Pain: 4-5/10  Location: left shoulder    Objective     Objective Measures updated at progress report unless specified.   Range of Motion/Strength:   Shoulder Left      PROM AROM   Flexion 140(+5) 125(+15)   Extension NT 65(+5)   Abduction 140(+10) 95(+10)   HorizAdduction NT 20(+10)   Internal rotation 80(=) PSIS   ER at 90° abd 60(=) 70(+5)   ER at 0° abd 60(=) 50(=)   NT=Not tested  ROM Comments: Pain at end range  Treatment   Pt 10 weeks 2 days post op     Jesse received the following manual therapy techniques for 10 minutes:   -consisting of patient supine for Left biceps and upper trapezius soft tissue mobilization, PROM with endrange stretching, glenohumeral joint inferior anterior posterior glides grade I-II, scapular mobilization, gentle shoulder oscillations    Jesse received therapeutic exercises for 38 minutes including:  Exercises    Scifit UBE forward/reverse Level 1  3minutes each direction   PROM (Left) Shoulder Flexion/Abduction/Internal " "rotation/External Rotation 10x     Supine dowel chest press/flexion/external rotation/serratus punches 2#  2/15   Sidelying abduction/external rotation  1#  2/15   Supine pectoralis stretch 3/30"   Theraband extension pulls Green  2/15   Theraband rows Green  2/15   Pulleys  3minutes   Wall slides  2/15     Home Exercises and Education Provided     Education provided:   - Progress towards goals     Written Home Exercises Provided: Patient instructed to cont prior HEP.  Exercises were reviewed and Jesse was able to demonstrate them prior to the end of the session.  Jesse demonstrated good  understanding of the HEP provided.     See EMR under Patient Instructions for exercises provided prior visit.   Assessment   Pt with good participation this date. Pain report increased from previous session however pt able to complete exercises with ease. Range of motion continues to steadily improve each treatment. Soft tissue restrictions noted during manual therapy however continues to respond well to treatment.  Good endurance with exercises. Meeting post op landmarks.     Jesse is progressing well towards his goals and there are no updates to goals at this time. Pt prognosis is Good.     Pt will continue to benefit from skilled outpatient occupational therapy to address the deficits listed in the problem list on initial evaluation provide pt/family education and to maximize pt's level of independence in the home and community environment.     Anticipated barriers to occupational therapy: none    Pt's spiritual, cultural and educational needs considered and pt agreeable to plan of care and goals.    Goals:  Short Term Goals to be met in 4 weeks: (2/2/2023)  1) Initiate Hep -met, ongoing  2) Pt will increase Left shoulder PROM by 10 degrees grossly for improved performance with overhead activities of daily living's -met  3) Pt will report 6/10 pain in (Left)shoulder at worst -met  4) Pt will progress to active assisted range of " motion exercises -met  5) Patient will be able to achieve less than or equal to 45% on FOTO shoulder survey demonstrating overall improved functional ability with upper extremity. -met     Long Term Goals to be met by discharge:  1) Independent with home exercise program -Not met, progressing  2) Pt will demonstrate (Left) shoulder AROM WFL grossly for [place] with activities of daily living's -Not met, progressing  3) Pt will demonstrate (Left) shoulder MMT WFL grossly for [place] with functional activities -Not met, progressing  4) Independent and pain free with ADL's and IADL's -Not met, progressing  5) Patient will be able to achieve less than or equal to 25% on FOTO shoulder survey demonstrating overall improved functional ability with upper extremity. -Not met, progressing       Plan   Continue with OT POC  Updates/Grading for next session:progress as able      LEO Cardozo

## 2023-02-16 ENCOUNTER — CLINICAL SUPPORT (OUTPATIENT)
Dept: REHABILITATION | Facility: HOSPITAL | Age: 48
End: 2023-02-16
Payer: COMMERCIAL

## 2023-02-16 DIAGNOSIS — M25.60 STIFFNESS DUE TO IMMOBILITY: ICD-10-CM

## 2023-02-16 DIAGNOSIS — Z74.09 STIFFNESS DUE TO IMMOBILITY: ICD-10-CM

## 2023-02-16 DIAGNOSIS — R53.1 WEAKNESS: ICD-10-CM

## 2023-02-16 DIAGNOSIS — M79.602 PAIN OF LEFT UPPER EXTREMITY: Primary | ICD-10-CM

## 2023-02-16 PROCEDURE — 97140 MANUAL THERAPY 1/> REGIONS: CPT | Mod: PN

## 2023-02-16 PROCEDURE — 97110 THERAPEUTIC EXERCISES: CPT | Mod: PN

## 2023-02-23 ENCOUNTER — CLINICAL SUPPORT (OUTPATIENT)
Dept: REHABILITATION | Facility: HOSPITAL | Age: 48
End: 2023-02-23
Payer: COMMERCIAL

## 2023-02-23 DIAGNOSIS — M79.602 PAIN OF LEFT UPPER EXTREMITY: Primary | ICD-10-CM

## 2023-02-23 DIAGNOSIS — R53.1 WEAKNESS: ICD-10-CM

## 2023-02-23 DIAGNOSIS — M25.60 STIFFNESS DUE TO IMMOBILITY: ICD-10-CM

## 2023-02-23 DIAGNOSIS — Z74.09 STIFFNESS DUE TO IMMOBILITY: ICD-10-CM

## 2023-02-23 PROCEDURE — 97110 THERAPEUTIC EXERCISES: CPT | Mod: PN

## 2023-02-23 PROCEDURE — 97140 MANUAL THERAPY 1/> REGIONS: CPT | Mod: PN

## 2023-02-23 NOTE — PROGRESS NOTES
"  Occupational Therapy Treatment Note     Date: 2/23/2023  Name: Jesse Hernández  Clinic Number: [id number]    Therapy Diagnosis:   Encounter Diagnoses   Name Primary?    Pain of left upper extremity Yes    Weakness     Stiffness due to immobility      Physician: Gilbert Jorgensen Jr., MD  Physician Orders: OT evaluate and treat  Medical Diagnosis: Z98.890 (ICD-10-CM) - S/P arthroscopy of left shoulder  Evaluation Date: 1/3/2023; DOS:12/6/2022  Insurance Authorization period Expiration: 12/31/2023  Plan of Care Expiration Period: 3/3/3023  Next MD appointment:none scheduled     Visit # / Visits Authorized: 14(+1)/ 50 FOTO:44%  Time In:7:45  Time Out:8:30  Total Billable Time: 45 minutes     Precautions: Standard and Weightbearing    Subjective     Pt reports: "I just feel a clicking but not too much pain associated with it."  he was compliant with home exercise program given last session.   Response to previous treatment:decreased pain  Functional change: range of motion improved    Pain: 3/10  Location: left shoulder    Objective     Objective Measures updated at progress report unless specified.   Range of Motion/Strength:   Shoulder Left      PROM AROM   Flexion 140(=) 125(+15)   Extension NT 65(+5)   Abduction 145(+5) 95(+10)   HorizAdduction NT 20(+10)   Internal rotation 80(=) PSIS   ER at 90° abd 60(=) 70(+5)   ER at 0° abd 60(=) 50(=)   NT=Not tested  ROM Comments: Pain at end range  Treatment   Pt 11 weeks 2 days post op     Jesse received the following manual therapy techniques for 10 minutes:   -consisting of patient supine for Left biceps and upper trapezius soft tissue mobilization, PROM with endrange stretching, glenohumeral joint inferior anterior posterior glides grade I-II, scapular mobilization, gentle shoulder oscillations    Jesse received therapeutic exercises for 35 minutes including:  Exercises    Scifit UBE forward/reverse Level 1  3minutes each direction   PROM (Left) Shoulder " "Flexion/Abduction/Internal rotation/External Rotation 10x     Supine dowel chest press/flexion/external rotation/serratus punches 2#  2/15   Sidelying abduction/external rotation  1#  2/15   Supine pectoralis stretch 3/30"   Theraband extension pulls Green  2/15   Theraband rows Green  2/15   Theraband external rotation  Red  2/15   Pulleys  3minutes   Wall slides  2/15     Home Exercises and Education Provided     Education provided:   - Progress towards goals     Written Home Exercises Provided: Patient instructed to cont prior HEP.  Exercises were reviewed and Jesse was able to demonstrate them prior to the end of the session.  Jesse demonstrated good  understanding of the HEP provided.     See EMR under Patient Instructions for exercises provided prior visit.   Assessment   Pt with good participation this date. Pain report decreased from previous session however pt able to complete exercises with ease. Range of motion continues to steadily improve each treatment. Soft tissue restrictions noted during manual therapy however continues to respond well to treatment.  Good endurance with exercises able to increase weight without c/o and good technique. Meeting post op landmarks.     Jesse is progressing well towards his goals and there are no updates to goals at this time. Pt prognosis is Good.     Pt will continue to benefit from skilled outpatient occupational therapy to address the deficits listed in the problem list on initial evaluation provide pt/family education and to maximize pt's level of independence in the home and community environment.     Anticipated barriers to occupational therapy: none    Pt's spiritual, cultural and educational needs considered and pt agreeable to plan of care and goals.    Goals:  Short Term Goals to be met in 4 weeks: (2/2/2023)  1) Initiate Hep -met, ongoing  2) Pt will increase Left shoulder PROM by 10 degrees grossly for improved performance with overhead activities of daily " living's -met  3) Pt will report 6/10 pain in (Left)shoulder at worst -met  4) Pt will progress to active assisted range of motion exercises -met  5) Patient will be able to achieve less than or equal to 45% on FOTO shoulder survey demonstrating overall improved functional ability with upper extremity. -met     Long Term Goals to be met by discharge:  1) Independent with home exercise program -Not met, progressing  2) Pt will demonstrate (Left) shoulder AROM WFL grossly for [place] with activities of daily living's -Not met, progressing  3) Pt will demonstrate (Left) shoulder MMT WFL grossly for [place] with functional activities -Not met, progressing  4) Independent and pain free with ADL's and IADL's -Not met, progressing  5) Patient will be able to achieve less than or equal to 25% on FOTO shoulder survey demonstrating overall improved functional ability with upper extremity. -Not met, progressing       Plan   Continue with OT POC  Updates/Grading for next session:progress as able      LEO Cardozo

## 2023-02-28 ENCOUNTER — CLINICAL SUPPORT (OUTPATIENT)
Dept: REHABILITATION | Facility: HOSPITAL | Age: 48
End: 2023-02-28
Payer: COMMERCIAL

## 2023-02-28 DIAGNOSIS — M79.602 PAIN OF LEFT UPPER EXTREMITY: Primary | ICD-10-CM

## 2023-02-28 DIAGNOSIS — M25.60 STIFFNESS DUE TO IMMOBILITY: ICD-10-CM

## 2023-02-28 DIAGNOSIS — R53.1 WEAKNESS: ICD-10-CM

## 2023-02-28 DIAGNOSIS — Z74.09 STIFFNESS DUE TO IMMOBILITY: ICD-10-CM

## 2023-02-28 PROCEDURE — 97110 THERAPEUTIC EXERCISES: CPT | Mod: PN

## 2023-02-28 PROCEDURE — 97140 MANUAL THERAPY 1/> REGIONS: CPT | Mod: PN

## 2023-02-28 NOTE — PROGRESS NOTES
"  Occupational Therapy Treatment Note     Date: 2/28/2023  Name: Jesse Hernández  Clinic Number: [id number]    Therapy Diagnosis:   Encounter Diagnoses   Name Primary?    Pain of left upper extremity Yes    Weakness     Stiffness due to immobility        Physician: Gilbert Jorgensen Jr., MD  Physician Orders: OT evaluate and treat  Medical Diagnosis: Z98.890 (ICD-10-CM) - S/P arthroscopy of left shoulder  Evaluation Date: 1/3/2023; DOS:12/6/2022  Insurance Authorization period Expiration: 12/31/2023  Plan of Care Expiration Period: 3/3/3023  Next MD appointment: 3/10/2023     Visit # / Visits Authorized: 15(+1)/ 50 FOTO:44%  Time In:2:30  Time Out:3:15  Total Billable Time: 45 minutes     Precautions: Standard and Weightbearing    Subjective     Pt reports: "I just want to be able to lay on this left side."  he was compliant with home exercise program given last session.   Response to previous treatment:increased pain  Functional change: good endurance with exercises    Pain: 4/10  Location: left shoulder    Objective     Objective Measures updated at progress report unless specified.   Range of Motion/Strength:   Shoulder Left      PROM AROM   Flexion 140(=) 125(+15)   Extension NT 65(+5)   Abduction 145(+5) 95(+10)   HorizAdduction NT 20(+10)   Internal rotation 80(=) PSIS   ER at 90° abd 60(=) 70(+5)   ER at 0° abd 60(=) 50(=)   NT=Not tested  ROM Comments: Pain at end range  Treatment   Pt 12 weeks 0 days post op     Jesse received the following manual therapy techniques for 10 minutes:   -consisting of patient supine for Left biceps and upper trapezius soft tissue mobilization, PROM with endrange stretching, glenohumeral joint inferior anterior posterior glides grade I-II, scapular mobilization, gentle shoulder oscillations    Jesse received therapeutic exercises for 35 minutes including:  Exercises    Scifit UBE forward/reverse Level 1  3minutes each direction   PROM (Left) Shoulder Flexion/Abduction/Internal " "rotation/External Rotation 10x     Supine dowel flexion/serratus punches 2#  2/15   Sidelying abduction/external rotation  1#  2/15   Supine pectoralis stretch 3/30"   Theraband extension pulls Green  2/15   Theraband rows Green  2/15   Theraband external rotation/Horizontal abduction Red  2/15   Pulleys  3minutes   Wall slides  2/15     Home Exercises and Education Provided     Education provided:   - Progress towards goals     Written Home Exercises Provided: Patient instructed to cont prior HEP.  Exercises were reviewed and Jesse was able to demonstrate them prior to the end of the session.  Jesse demonstrated good  understanding of the HEP provided.     See EMR under Patient Instructions for exercises provided prior visit.   Assessment   Pt with good participation this date. Pain report increased from previous session however pt able to complete exercises with ease. Soft tissue restrictions noted during manual therapy however continues to respond well to treatment.  Good endurance with exercises completing all with good technique and without c/o.  Meeting post op landmarks.     Jesse is progressing well towards his goals and there are no updates to goals at this time. Pt prognosis is Good.     Pt will continue to benefit from skilled outpatient occupational therapy to address the deficits listed in the problem list on initial evaluation provide pt/family education and to maximize pt's level of independence in the home and community environment.     Anticipated barriers to occupational therapy: none    Pt's spiritual, cultural and educational needs considered and pt agreeable to plan of care and goals.    Goals:  Short Term Goals to be met in 4 weeks: (2/2/2023)  1) Initiate Hep -met, ongoing  2) Pt will increase Left shoulder PROM by 10 degrees grossly for improved performance with overhead activities of daily living's -met  3) Pt will report 6/10 pain in (Left)shoulder at worst -met  4) Pt will progress to active " assisted range of motion exercises -met  5) Patient will be able to achieve less than or equal to 45% on FOTO shoulder survey demonstrating overall improved functional ability with upper extremity. -met     Long Term Goals to be met by discharge:  1) Independent with home exercise program -Not met, progressing  2) Pt will demonstrate (Left) shoulder AROM WFL grossly for [place] with activities of daily living's -Not met, progressing  3) Pt will demonstrate (Left) shoulder MMT WFL grossly for [place] with functional activities -Not met, progressing  4) Independent and pain free with ADL's and IADL's -Not met, progressing  5) Patient will be able to achieve less than or equal to 25% on FOTO shoulder survey demonstrating overall improved functional ability with upper extremity. -Not met, progressing       Plan   Continue with OT POC  Updates/Grading for next session:progress as able      LEO Cardozo

## 2023-03-02 ENCOUNTER — CLINICAL SUPPORT (OUTPATIENT)
Dept: REHABILITATION | Facility: HOSPITAL | Age: 48
End: 2023-03-02
Payer: COMMERCIAL

## 2023-03-02 DIAGNOSIS — M25.60 STIFFNESS DUE TO IMMOBILITY: ICD-10-CM

## 2023-03-02 DIAGNOSIS — R53.1 WEAKNESS: ICD-10-CM

## 2023-03-02 DIAGNOSIS — Z74.09 STIFFNESS DUE TO IMMOBILITY: ICD-10-CM

## 2023-03-02 DIAGNOSIS — M79.602 PAIN OF LEFT UPPER EXTREMITY: Primary | ICD-10-CM

## 2023-03-02 PROCEDURE — 97140 MANUAL THERAPY 1/> REGIONS: CPT | Mod: PN

## 2023-03-02 PROCEDURE — 97110 THERAPEUTIC EXERCISES: CPT | Mod: PN

## 2023-03-02 NOTE — PLAN OF CARE
"    [facility]  Occupational Therapy Plan of Care Note    Name: Jesse Hernández  Clinic Number: [id number]    Therapy Diagnosis:   Encounter Diagnoses   Name Primary?    Pain of left upper extremity Yes    Weakness     Stiffness due to immobility      Physician: Gilbert Jorgensen Jr., MD    Visit Date: 3/2/2023    Physician Orders: OT evaluate and treat  Medical Diagnosis: Z98.890 (ICD-10-CM) - S/P arthroscopy of left shoulder  Evaluation Date: 1/3/2023; DOS:12/6/2022  Insurance Authorization period Expiration: 12/31/2023  Plan of Care Expiration Period: 5/26/2023  Next MD appointment: 3/10/2023     Visit # / Visits Authorized: 16(+1)/ 50 FOTO:44%  Time In:10:30  Time Out:11:30  Total Billable Time: 60 minutes     Precautions: Standard    SUBJECTIVE     Update: "I just feel a lot of clicking."    OBJECTIVE     Update:   Range of Motion/Strength:   Shoulder Left      PROM AROM   Flexion 145(+5) 125(=)   Extension NT 55(-10)   Abduction 145(+5) 95(=)   HorizAdduction NT 20(=)   Internal rotation 80(=) PSIS(=)   ER at 90° abd 60(=) 70(=)   ER at 0° abd 60(=) 50(=)   NT=Not tested  ROM Comments: Pain at end range  ASSESSMENT     Update: Pt has been attending OT x 2months for treatment left shoulder pain, weakness and stiffness s/p RCR. Pt has been motivated and pleasant throughout course of care. Pt demonstrating overall improved range of motion and strength in left upper extremity since initial evaluation. His pain report remains low in sessions and he is tolerating progression of treatment well. Pt meeting post op landmarks.  Pt with an overall improved FOTO score indicating increased functional use left upper extremity with self care tasks. Pt has met all short term goals and is progressing well towards to long term goals. Pt continues to be limited by weakness, pain and stiffness which all limit him functionally. Pt would continue to benefit from skilled services to address remaining deficits. "     Long Term Goal Status: continue per initial plan of care.  Reasons for Recertification of Therapy:   update plan of care     GOALS  Short Term Goals to be met in 4 weeks: (2/2/2023)  1) Initiate Hep -met, ongoing  2) Pt will increase Left shoulder PROM by 10 degrees grossly for improved performance with overhead activities of daily living's -met  3) Pt will report 6/10 pain in (Left)shoulder at worst -met  4) Pt will progress to active assisted range of motion exercises -met  5) Patient will be able to achieve less than or equal to 45% on FOTO shoulder survey demonstrating overall improved functional ability with upper extremity. -met     Long Term Goals to be met by discharge:  1) Independent with home exercise program -Not met, progressing  2) Pt will demonstrate (Left) shoulder AROM WFL grossly for [place] with activities of daily living's -Not met, progressing  3) Pt will demonstrate (Left) shoulder MMT WFL grossly for [place] with functional activities -Not met, progressing  4) Independent and pain free with ADL's and IADL's -Not met, progressing  5) Patient will be able to achieve less than or equal to 25% on FOTO shoulder survey demonstrating overall improved functional ability with upper extremity. -Not met, progressing    PLAN     Updated Certification Period: 3/2/2023 to 5/26/2023   Recommended Treatment Plan: 2 times per week for 12 weeks:  Manual Therapy, Moist Heat/ Ice, Neuromuscular Re-ed, Paraffin, Patient Education, Self Care, Therapeutic Activities, and Therapeutic Exercise  Other Recommendations: -    LEO Cardozo    I CERTIFY THE NEED FOR THESE SERVICES FURNISHED UNDER THIS PLAN OF TREATMENT AND WHILE UNDER MY CARE  Physician's comments:      Physician's Signature: ___________________________________________________

## 2023-03-06 NOTE — PROGRESS NOTES
"  Occupational Therapy Treatment Note     Date: 3/7/2023  Name: Jesse Hernández  Clinic Number: [id number]    Therapy Diagnosis:   Encounter Diagnoses   Name Primary?    Pain of left upper extremity Yes    Weakness     Stiffness due to immobility      Physician: Gilbert Jorgensen Jr., MD  Physician Orders: OT evaluate and treat  Medical Diagnosis: Z98.890 (ICD-10-CM) - S/P arthroscopy of left shoulder  Evaluation Date: 1/3/2023; DOS:12/6/2022  Insurance Authorization period Expiration: 12/31/2023  Plan of Care Expiration Period: 5/26/2023  Next MD appointment: 3/10/2023     Visit # / Visits Authorized: 17(+1)/ 50 FOTO:55%  Time In:11:50  Time Out:12:45  Total Billable Time: 55 minutes     Precautions: Standard    Subjective     Pt reports: "I've been trying to use my shoulder a lot more, not heavy lifting just reaching."  he was compliant with home exercise program given last session.   Response to previous treatment:same pain  Functional change: tolerated progression of treatment well, range of motion steadily improving    Pain: 2/10  Location: left shoulder    Objective     Objective Measures updated at progress report unless specified.   Range of Motion/Strength:   Shoulder Left      PROM AROM   Flexion 145(+5) 125(=)   Extension NT 55(-10)   Abduction 145(+5) 95(=)   HorizAdduction NT 20(=)   Internal rotation 80(=) PSIS(=)   ER at 90° abd 60(=) 70(=)   ER at 0° abd 60(=) 50(=)   NT=Not tested  ROM Comments: Pain at end range  Treatment   Pt 12 (+)weeks 0 days post op     Jesse received the following manual therapy techniques for 10 minutes:   -consisting of patient supine for Left biceps and upper trapezius soft tissue mobilization, PROM with endrange stretching, glenohumeral joint inferior anterior posterior glides grade I-II, scapular mobilization, gentle shoulder oscillations    Jesse received therapeutic exercises for 45 minutes including:  Exercises    Scifit UBE forward/reverse Level 3  3minutes each direction " "  PROM (Left) Shoulder Flexion/Abduction/Internal rotation/External Rotation 10x     Supine dowel flexion/serratus punches/chest press 4#  2/15   Shoulder stabilization various planes Therapist provided resistance 30 seconds each   Sidelying abduction/external rotation  3#  2/15   Supine pectoralis stretch 3/30"   Prone flexion/abduction 2/15   Theraband extension pulls Green  2/15   Theraband rows Green  2/15   Theraband external rotation walkout /Horizontal abduction Green/Red  2/15   Pulleys  3minutes   Wall slides  ball  2/15     Home Exercises and Education Provided     Education provided:   - Progress towards goals     Written Home Exercises Provided: Patient instructed to cont prior HEP.  Exercises were reviewed and Jesse was able to demonstrate them prior to the end of the session.  Jesse demonstrated good  understanding of the HEP provided.     See EMR under Patient Instructions for exercises provided prior visit.   Assessment   Pt with good participation this date. Pain report remains low in sessions. Pt continues with end range pain and stiffness however responds well to passive range of motion, manual therapy and progress low load stretching. Pt able to increase weight with exercises demonstrating all with good technique and without c/o. Pt motivated.   Jesse is progressing well towards his goals and there are no updates to goals at this time. Pt prognosis is Good.     Pt will continue to benefit from skilled outpatient occupational therapy to address the deficits listed in the problem list on initial evaluation provide pt/family education and to maximize pt's level of independence in the home and community environment.     Anticipated barriers to occupational therapy: none    Pt's spiritual, cultural and educational needs considered and pt agreeable to plan of care and goals.    Goals:  Long Term Goals to be met by discharge:  1) Independent with home exercise program -Not met, progressing  2) Pt will " demonstrate (Left) shoulder AROM WFL grossly for [place] with activities of daily living's -Not met, progressing  3) Pt will demonstrate (Left) shoulder MMT WFL grossly for [place] with functional activities -Not met, progressing  4) Independent and pain free with ADL's and IADL's -Not met, progressing  5) Patient will be able to achieve less than or equal to 25% on FOTO shoulder survey demonstrating overall improved functional ability with upper extremity. -Not met, progressing       Plan   Continue with OT POC  Updates/Grading for next session:progress as able      LEO Cardozo

## 2023-03-07 ENCOUNTER — CLINICAL SUPPORT (OUTPATIENT)
Dept: REHABILITATION | Facility: HOSPITAL | Age: 48
End: 2023-03-07
Payer: COMMERCIAL

## 2023-03-07 DIAGNOSIS — M79.602 PAIN OF LEFT UPPER EXTREMITY: Primary | ICD-10-CM

## 2023-03-07 DIAGNOSIS — Z74.09 STIFFNESS DUE TO IMMOBILITY: ICD-10-CM

## 2023-03-07 DIAGNOSIS — M25.60 STIFFNESS DUE TO IMMOBILITY: ICD-10-CM

## 2023-03-07 DIAGNOSIS — R53.1 WEAKNESS: ICD-10-CM

## 2023-03-07 PROCEDURE — 97140 MANUAL THERAPY 1/> REGIONS: CPT | Mod: PN

## 2023-03-07 PROCEDURE — 97110 THERAPEUTIC EXERCISES: CPT | Mod: PN

## 2023-03-08 ENCOUNTER — PATIENT MESSAGE (OUTPATIENT)
Dept: PSYCHIATRY | Facility: CLINIC | Age: 48
End: 2023-03-08
Payer: COMMERCIAL

## 2023-03-08 NOTE — PROGRESS NOTES
"  Occupational Therapy Treatment Note     Date: 3/9/2023  Name: Jesse Hernández  Clinic Number: [id number]    Therapy Diagnosis:   Encounter Diagnoses   Name Primary?    Pain of left upper extremity Yes    Weakness     Stiffness due to immobility      Physician: Gilbert Jorgensen Jr., MD  Physician Orders: OT evaluate and treat  Medical Diagnosis: Z98.890 (ICD-10-CM) - S/P arthroscopy of left shoulder  Evaluation Date: 1/3/2023; DOS:12/6/2022  Insurance Authorization period Expiration: 12/31/2023  Plan of Care Expiration Period: 5/26/2023  Next MD appointment: 3/10/2023     Visit # / Visits Authorized: 18(+1)/ 50 FOTO:55%  Time In:12:00  Time Out:12:55  Total Billable Time: 55 minutes     Precautions: Standard    Subjective     Pt reports: "I'm feeling really good today, probably the best I've felt through this whole thing."  he was compliant with home exercise program given last session.   Response to previous treatment: decreased pain  Functional change: tolerated progression of treatment well, range of motion steadily improving    Pain: 1/10  Location: left shoulder    Objective     Objective Measures updated at progress report unless specified.   Range of Motion/Strength:   Shoulder Left      PROM AROM   Flexion 145(+5) 125(=)   Extension NT 55(-10)   Abduction 145(+5) 95(=)   HorizAdduction NT 20(=)   Internal rotation 80(=) PSIS(=)   ER at 90° abd 60(=) 70(=)   ER at 0° abd 60(=) 50(=)   NT=Not tested  ROM Comments: Pain at end range  Treatment   Pt 12 (+)weeks 0 days post op     Jesse received the following manual therapy techniques for 10 minutes:   -consisting of patient supine for Left biceps and upper trapezius soft tissue mobilization, PROM with endrange stretching, glenohumeral joint inferior anterior posterior glides grade I-II, scapular mobilization, gentle shoulder oscillations    Jesse received therapeutic exercises for 45 minutes including:  Exercises    Scifit UBE forward/reverse Level 3  3minutes each " "direction   PROM (Left) Shoulder Flexion/Abduction/Internal rotation/External Rotation 10x     Supine dowel flexion/serratus punches/chest press 4#  2/15   Shoulder stabilization various planes 3# weight   Sidelying abduction/external rotation  3#  2/15   Supine pectoralis stretch 3/30"   Prone flexion/abduction 2/15   Sleeper stretch 3/30"   Theraband extension pulls Green  2/15   Theraband rows Green  2/15   Theraband external rotation walkout /Horizontal abduction Green  2/15   Pulleys  3minutes   Wall slides  ball  2/15   Internal rotation pulley stretch 3/30"     Home Exercises and Education Provided     Education provided:   - Progress towards goals     Written Home Exercises Provided: Patient instructed to cont prior HEP.  Exercises were reviewed and Jesse was able to demonstrate them prior to the end of the session.  Jesse demonstrated good  understanding of the HEP provided.     See EMR under Patient Instructions for exercises provided prior visit.   Assessment   Pt with good participation this date. Pain report decreased from previous session and remains low. Pt continues with end range pain and stiffness most significant in posterior capsule however responds well to passive range of motion, manual therapy and progress low load stretching. Good endurance with exercises.  Pt motivated.   Jesse is progressing well towards his goals and there are no updates to goals at this time. Pt prognosis is Good.     Pt will continue to benefit from skilled outpatient occupational therapy to address the deficits listed in the problem list on initial evaluation provide pt/family education and to maximize pt's level of independence in the home and community environment.     Anticipated barriers to occupational therapy: none    Pt's spiritual, cultural and educational needs considered and pt agreeable to plan of care and goals.    Goals:  Long Term Goals to be met by discharge:  1) Independent with home exercise program -Not " met, progressing  2) Pt will demonstrate (Left) shoulder AROM WFL grossly for [place] with activities of daily living's -Not met, progressing  3) Pt will demonstrate (Left) shoulder MMT WFL grossly for [place] with functional activities -Not met, progressing  4) Independent and pain free with ADL's and IADL's -Not met, progressing  5) Patient will be able to achieve less than or equal to 25% on FOTO shoulder survey demonstrating overall improved functional ability with upper extremity. -Not met, progressing       Plan   Continue with OT POC  Updates/Grading for next session:progress as able      LEO Cardozo

## 2023-03-09 ENCOUNTER — CLINICAL SUPPORT (OUTPATIENT)
Dept: REHABILITATION | Facility: HOSPITAL | Age: 48
End: 2023-03-09
Payer: COMMERCIAL

## 2023-03-09 DIAGNOSIS — M79.602 PAIN OF LEFT UPPER EXTREMITY: Primary | ICD-10-CM

## 2023-03-09 DIAGNOSIS — Z74.09 STIFFNESS DUE TO IMMOBILITY: ICD-10-CM

## 2023-03-09 DIAGNOSIS — M25.60 STIFFNESS DUE TO IMMOBILITY: ICD-10-CM

## 2023-03-09 DIAGNOSIS — R53.1 WEAKNESS: ICD-10-CM

## 2023-03-09 PROCEDURE — 97110 THERAPEUTIC EXERCISES: CPT | Mod: PN

## 2023-03-09 PROCEDURE — 97140 MANUAL THERAPY 1/> REGIONS: CPT | Mod: PN

## 2023-03-10 ENCOUNTER — OFFICE VISIT (OUTPATIENT)
Dept: INTERNAL MEDICINE | Facility: CLINIC | Age: 48
End: 2023-03-10
Payer: COMMERCIAL

## 2023-03-10 VITALS
WEIGHT: 248 LBS | HEART RATE: 86 BPM | HEIGHT: 69 IN | DIASTOLIC BLOOD PRESSURE: 80 MMHG | OXYGEN SATURATION: 98 % | SYSTOLIC BLOOD PRESSURE: 118 MMHG | BODY MASS INDEX: 36.73 KG/M2

## 2023-03-10 DIAGNOSIS — K76.0 FATTY LIVER: ICD-10-CM

## 2023-03-10 DIAGNOSIS — G47.33 OSA (OBSTRUCTIVE SLEEP APNEA): ICD-10-CM

## 2023-03-10 DIAGNOSIS — Z00.00 PREVENTATIVE HEALTH CARE: ICD-10-CM

## 2023-03-10 DIAGNOSIS — R91.1 PULMONARY NODULE: ICD-10-CM

## 2023-03-10 DIAGNOSIS — E78.5 DYSLIPIDEMIA: Primary | ICD-10-CM

## 2023-03-10 DIAGNOSIS — I10 PRIMARY HYPERTENSION: ICD-10-CM

## 2023-03-10 DIAGNOSIS — E03.9 HYPOTHYROIDISM, UNSPECIFIED TYPE: ICD-10-CM

## 2023-03-10 PROCEDURE — 3074F PR MOST RECENT SYSTOLIC BLOOD PRESSURE < 130 MM HG: ICD-10-PCS | Mod: CPTII,S$GLB,, | Performed by: INTERNAL MEDICINE

## 2023-03-10 PROCEDURE — 3074F SYST BP LT 130 MM HG: CPT | Mod: CPTII,S$GLB,, | Performed by: INTERNAL MEDICINE

## 2023-03-10 PROCEDURE — 99396 PR PREVENTIVE VISIT,EST,40-64: ICD-10-PCS | Mod: S$GLB,,, | Performed by: INTERNAL MEDICINE

## 2023-03-10 PROCEDURE — 3008F PR BODY MASS INDEX (BMI) DOCUMENTED: ICD-10-PCS | Mod: CPTII,S$GLB,, | Performed by: INTERNAL MEDICINE

## 2023-03-10 PROCEDURE — 3079F DIAST BP 80-89 MM HG: CPT | Mod: CPTII,S$GLB,, | Performed by: INTERNAL MEDICINE

## 2023-03-10 PROCEDURE — 3079F PR MOST RECENT DIASTOLIC BLOOD PRESSURE 80-89 MM HG: ICD-10-PCS | Mod: CPTII,S$GLB,, | Performed by: INTERNAL MEDICINE

## 2023-03-10 PROCEDURE — 1160F RVW MEDS BY RX/DR IN RCRD: CPT | Mod: CPTII,S$GLB,, | Performed by: INTERNAL MEDICINE

## 2023-03-10 PROCEDURE — 99396 PREV VISIT EST AGE 40-64: CPT | Mod: S$GLB,,, | Performed by: INTERNAL MEDICINE

## 2023-03-10 PROCEDURE — 1159F MED LIST DOCD IN RCRD: CPT | Mod: CPTII,S$GLB,, | Performed by: INTERNAL MEDICINE

## 2023-03-10 PROCEDURE — 1160F PR REVIEW ALL MEDS BY PRESCRIBER/CLIN PHARMACIST DOCUMENTED: ICD-10-PCS | Mod: CPTII,S$GLB,, | Performed by: INTERNAL MEDICINE

## 2023-03-10 PROCEDURE — 1159F PR MEDICATION LIST DOCUMENTED IN MEDICAL RECORD: ICD-10-PCS | Mod: CPTII,S$GLB,, | Performed by: INTERNAL MEDICINE

## 2023-03-10 PROCEDURE — 3008F BODY MASS INDEX DOCD: CPT | Mod: CPTII,S$GLB,, | Performed by: INTERNAL MEDICINE

## 2023-03-10 PROCEDURE — 99999 PR PBB SHADOW E&M-EST. PATIENT-LVL III: CPT | Mod: PBBFAC,,, | Performed by: INTERNAL MEDICINE

## 2023-03-10 PROCEDURE — 99999 PR PBB SHADOW E&M-EST. PATIENT-LVL III: ICD-10-PCS | Mod: PBBFAC,,, | Performed by: INTERNAL MEDICINE

## 2023-03-13 ENCOUNTER — CLINICAL SUPPORT (OUTPATIENT)
Dept: REHABILITATION | Facility: HOSPITAL | Age: 48
End: 2023-03-13
Payer: COMMERCIAL

## 2023-03-13 DIAGNOSIS — M25.60 STIFFNESS DUE TO IMMOBILITY: ICD-10-CM

## 2023-03-13 DIAGNOSIS — Z74.09 STIFFNESS DUE TO IMMOBILITY: ICD-10-CM

## 2023-03-13 DIAGNOSIS — R53.1 WEAKNESS: ICD-10-CM

## 2023-03-13 DIAGNOSIS — M79.602 PAIN OF LEFT UPPER EXTREMITY: Primary | ICD-10-CM

## 2023-03-13 PROCEDURE — 97140 MANUAL THERAPY 1/> REGIONS: CPT | Mod: PN

## 2023-03-13 PROCEDURE — 97110 THERAPEUTIC EXERCISES: CPT | Mod: PN

## 2023-03-13 NOTE — PROGRESS NOTES
"  Occupational Therapy Treatment Note     Date: 3/13/2023  Name: Jesse Hernández  Clinic Number: [id number]    Therapy Diagnosis:   Encounter Diagnoses   Name Primary?    Pain of left upper extremity Yes    Weakness     Stiffness due to immobility      Physician: Gilbert Jorgensen Jr., MD  Physician Orders: OT evaluate and treat  Medical Diagnosis: Z98.890 (ICD-10-CM) - S/P arthroscopy of left shoulder  Evaluation Date: 1/3/2023; DOS:12/6/2022  Insurance Authorization period Expiration: 12/31/2023  Plan of Care Expiration Period: 5/26/2023  Next MD appointment: 3/10/2023     Visit # / Visits Authorized: 19(+1)/ 50 FOTO:55%  Time In:12:00  Time Out:12:55  Total Billable Time: 55 minutes     Precautions: Standard    Subjective     Pt reports: "I feel good today, not as good as last week but better than the week before."  he was compliant with home exercise program given last session.   Response to previous treatment: increased pain, however low  Functional change: tolerated progression of treatment well, range of motion steadily improving    Pain: 3/10  Location: left shoulder    Objective     Objective Measures updated at progress report unless specified.   Range of Motion/Strength:   Shoulder Left      PROM AROM   Flexion 145(+5) 125(=)   Extension NT 55(-10)   Abduction 145(+5) 95(=)   HorizAdduction NT 20(=)   Internal rotation 80(=) PSIS(=)   ER at 90° abd 60(=) 70(=)   ER at 0° abd 60(=) 50(=)   NT=Not tested  ROM Comments: Pain at end range  Treatment   Pt 12 (+)weeks 0 days post op     Jesse received the following manual therapy techniques for 10 minutes:   -consisting of patient supine for Left biceps and upper trapezius soft tissue mobilization, PROM with endrange stretching, glenohumeral joint inferior anterior posterior glides grade I-II, scapular mobilization, gentle shoulder oscillations    Jesse received therapeutic exercises for 45 minutes including:  Exercises    Scifit UBE forward/reverse Level 3  3minutes " "each direction   PROM (Left) Shoulder Flexion/Abduction/Internal rotation/External Rotation 10x     Supine dowel flexion/serratus punches/chest press 4#  2/15   Shoulder stabilization various planes 3# weight   Sidelying abduction/external rotation  3#  2/15   Supine pectoralis stretch 3/30"   Prone flexion/abduction 1#  2/15   Sleeper stretch 3/30"   Theraband extension pulls Green  2/15   Theraband rows Green  2/15   Theraband external rotation walkout /Horizontal abduction Green  2/15   Pulleys  3minutes   Wall slides  ball  2/15   Internal rotation pulley stretch 3/30"     Home Exercises and Education Provided     Education provided:   - Progress towards goals     Written Home Exercises Provided: Patient instructed to cont prior HEP.  Exercises were reviewed and Jesse was able to demonstrate them prior to the end of the session.  Jesse demonstrated good  understanding of the HEP provided.     See EMR under Patient Instructions for exercises provided prior visit.   Assessment   Pt with good participation this date. Pain report increased from previous session however remains low. Pt continues with end range pain and stiffness most significant in posterior capsule however responds well to passive range of motion, manual therapy and progress low load stretching. Good endurance with exercises.  Pt motivated.   Jesse is progressing well towards his goals and there are no updates to goals at this time. Pt prognosis is Good.     Pt will continue to benefit from skilled outpatient occupational therapy to address the deficits listed in the problem list on initial evaluation provide pt/family education and to maximize pt's level of independence in the home and community environment.     Anticipated barriers to occupational therapy: none    Pt's spiritual, cultural and educational needs considered and pt agreeable to plan of care and goals.    Goals:  Long Term Goals to be met by discharge:  1) Independent with home exercise " program -Not met, progressing  2) Pt will demonstrate (Left) shoulder AROM WFL grossly for [place] with activities of daily living's -Not met, progressing  3) Pt will demonstrate (Left) shoulder MMT WFL grossly for [place] with functional activities -Not met, progressing  4) Independent and pain free with ADL's and IADL's -Not met, progressing  5) Patient will be able to achieve less than or equal to 25% on FOTO shoulder survey demonstrating overall improved functional ability with upper extremity. -Not met, progressing       Plan   Continue with OT POC  Updates/Grading for next session:progress as able      LEO Cardozo

## 2023-03-13 NOTE — PROGRESS NOTES
Subjective:       Patient ID: Jesse Hernández is a 48 y.o. male.    Chief Complaint:  Physical    HPI 48-year-old male presents to clinic today for annual physical recently underwent shoulder surgery in December by orthopedic specialist Dr. Jorgensen he is currently doing physical therapy.  Here for follow-up also of hypertension dyslipidemia hypothyroidism.  Review of Systems    Otherwise negative  Objective:      Physical Exam  General: Well-appearing, well-nourished.  No distress  HEENT: conjunctivae are normal.  Pupils are equal and reative to light.  TM's are clear and intact bilaterally.  Hearing is grossly normal.  Nasopharynx is clear.  Oropharynx is clear.  Neck: Supple.  No thyroid megaly.  No bruits.  Lymph: No cervical or supraclavicular adenopathy.  Heart: Regular rate and rhythm, without murmur, rub or gallop.  Lungs: Clear to auscultation; respiratory effort normal.  Abdomen: Soft, nontender, nondistended.  Normoactive bowel sounds.  No hepatomegaly.  No masses.  Extremities: Good distal pulses.  No edema.  Psych: Oriented to time person place.  Judgment and insight seem unimpaired.  Mood and affect are appropriate.  Assessment:       Problem List Items Addressed This Visit       Fatty liver    Pulmonary nodule    KAILEE (obstructive sleep apnea)    Relevant Orders    Ambulatory referral/consult to Sleep Disorders    Hypertension    Dyslipidemia - Primary    Hypothyroidism     Other Visit Diagnoses       Preventative health care        Relevant Orders    Comprehensive Metabolic Panel    Lipid Panel    Hemoglobin A1C    Hepatitis C Antibody    HIV 1/2 Ag/Ab (4th Gen)    TSH              Plan:       Diagnoses and all orders for this visit:    Dyslipidemia  Continue current medication regimen check lipids  Primary hypertension  Controlled.  Continue current medical regimen.  Prescription refills addressed.  Followup advised. See after visit summary.  Hypothyroidism, unspecified type  Controlled.  Continue  current medical regimen.  Prescription refills addressed.  Followup advised. See after visit summary.  Pulmonary nodule  Updated problem list stable with previous surveillance.  Fatty liver  Continued efforts at weight loss  KAILEE (obstructive sleep apnea)  -     Ambulatory referral/consult to Sleep Disorders; Future    Preventative health care  -     Comprehensive Metabolic Panel; Future  -     Lipid Panel; Future  -     Hemoglobin A1C; Standing  -     Hepatitis C Antibody; Future  -     HIV 1/2 Ag/Ab (4th Gen); Future  -     TSH; Future

## 2023-03-15 NOTE — PROGRESS NOTES
"  Occupational Therapy Treatment Note     Date: 3/16/2023  Name: Jesse Hernández  Clinic Number: [id number]    Therapy Diagnosis:   Encounter Diagnoses   Name Primary?    Pain of left upper extremity Yes    Weakness     Stiffness due to immobility      Physician: Gilbert Jorgensen Jr., MD  Physician Orders: OT evaluate and treat  Medical Diagnosis: Z98.890 (ICD-10-CM) - S/P arthroscopy of left shoulder  Evaluation Date: 1/3/2023; DOS:12/6/2022  Insurance Authorization period Expiration: 12/31/2023  Plan of Care Expiration Period: 5/26/2023  Next MD appointment: 3/10/2023     Visit # / Visits Authorized: 20(+1)/ 50 FOTO:55%  Time In:2:00  Time Out:2:55  Total Billable Time: 55 minutes     Precautions: Standard    Subjective     Pt reports: "I feel a little stiff today but no pain."  he was compliant with home exercise program given last session.   Response to previous treatment: same pain  Functional change: tolerated progression of treatment well, range of motion steadily improving    Pain: 3/10  Location: left shoulder    Objective     Objective Measures updated at progress report unless specified.   Range of Motion/Strength:   Shoulder Left      PROM AROM   Flexion 145(=) 125(=)   Extension NT 60(+5)   Abduction 150(+5) 115(+20)   HorizAdduction NT 25(+5)   Internal rotation 80(=) PSIS(=)   ER at 90° abd 60(=) 85(+15)   ER at 0° abd 65(+5) 50(=)   NT=Not tested  ROM Comments: Pain at end range  Treatment   Pt 12 (+)weeks 0 days post op     Jesse received the following manual therapy techniques for 10 minutes:   -consisting of patient supine for Left biceps and upper trapezius soft tissue mobilization, PROM with endrange stretching, glenohumeral joint inferior anterior posterior glides grade I-II, scapular mobilization, gentle shoulder oscillations    Jesse received therapeutic exercises for 45 minutes including:  Exercises    Scifit UBE forward/reverse Level 3  3minutes each direction   PROM (Left) Shoulder " "Flexion/Abduction/Internal rotation/External Rotation 10x     Supine dowel flexion/serratus punches/chest press 5#  2/15   Shoulder stabilization various planes 3# weight   Sidelying abduction/external rotation/gator 3#  2/15   Supine pectoralis stretch 3/30"   Theraband extension pulls Green  2/15   Theraband rows Green  2/15   Theraband external rotation walkout /Horizontal abduction Green  2/15   Pulleys  3minutes   Wall slides  ball  2/15   Corner pectoralis stretch 3/30"     Home Exercises and Education Provided     Education provided:   - Progress towards goals     Written Home Exercises Provided: Patient instructed to cont prior HEP.  Exercises were reviewed and Jesse was able to demonstrate them prior to the end of the session.  Jesse demonstrated good  understanding of the HEP provided.     See EMR under Patient Instructions for exercises provided prior visit.   Assessment   Pt with good participation this date. Pain report remains the same as previous session and is low. Pt continues with end range pain and stiffness most significant in posterior capsule however responds well to passive range of motion, manual therapy and progress low load stretching. Good endurance with exercises, able to increase weight without c/o.  Pt's range of motion steadily improving. Pt motivated.   Jesse is progressing well towards his goals and there are no updates to goals at this time. Pt prognosis is Good.     Pt will continue to benefit from skilled outpatient occupational therapy to address the deficits listed in the problem list on initial evaluation provide pt/family education and to maximize pt's level of independence in the home and community environment.     Anticipated barriers to occupational therapy: none    Pt's spiritual, cultural and educational needs considered and pt agreeable to plan of care and goals.    Goals:  Long Term Goals to be met by discharge:  1) Independent with home exercise program -Not met, " progressing  2) Pt will demonstrate (Left) shoulder AROM WFL grossly for [place] with activities of daily living's -Not met, progressing  3) Pt will demonstrate (Left) shoulder MMT WFL grossly for [place] with functional activities -Not met, progressing  4) Independent and pain free with ADL's and IADL's -Not met, progressing  5) Patient will be able to achieve less than or equal to 25% on FOTO shoulder survey demonstrating overall improved functional ability with upper extremity. -Not met, progressing       Plan   Continue with OT POC  Updates/Grading for next session:progress as able      LEO Cardozo

## 2023-03-16 ENCOUNTER — CLINICAL SUPPORT (OUTPATIENT)
Dept: REHABILITATION | Facility: HOSPITAL | Age: 48
End: 2023-03-16
Payer: COMMERCIAL

## 2023-03-16 ENCOUNTER — TELEPHONE (OUTPATIENT)
Dept: ADMINISTRATIVE | Facility: OTHER | Age: 48
End: 2023-03-16
Payer: COMMERCIAL

## 2023-03-16 DIAGNOSIS — M25.60 STIFFNESS DUE TO IMMOBILITY: ICD-10-CM

## 2023-03-16 DIAGNOSIS — R53.1 WEAKNESS: ICD-10-CM

## 2023-03-16 DIAGNOSIS — Z74.09 STIFFNESS DUE TO IMMOBILITY: ICD-10-CM

## 2023-03-16 DIAGNOSIS — M79.602 PAIN OF LEFT UPPER EXTREMITY: Primary | ICD-10-CM

## 2023-03-16 PROCEDURE — 97110 THERAPEUTIC EXERCISES: CPT | Mod: PN

## 2023-03-16 PROCEDURE — 97140 MANUAL THERAPY 1/> REGIONS: CPT | Mod: PN

## 2023-03-17 ENCOUNTER — TELEPHONE (OUTPATIENT)
Dept: ADMINISTRATIVE | Facility: OTHER | Age: 48
End: 2023-03-17
Payer: COMMERCIAL

## 2023-03-20 NOTE — PROGRESS NOTES
"  Occupational Therapy Treatment Note     Date: 3/21/2023  Name: Jesse Hernández  Clinic Number: [id number]    Therapy Diagnosis:   Encounter Diagnoses   Name Primary?    Pain of left upper extremity Yes    Weakness     Stiffness due to immobility      Physician: Gilbert Jorgensen Jr., MD  Physician Orders: OT evaluate and treat  Medical Diagnosis: Z98.890 (ICD-10-CM) - S/P arthroscopy of left shoulder  Evaluation Date: 1/3/2023; DOS:12/6/2022  Insurance Authorization period Expiration: 12/31/2023  Plan of Care Expiration Period: 5/26/2023  Next MD appointment: 4/7/2023     Visit # / Visits Authorized: 21(+1)/ 50 FOTO:55%  Time In:11:15  Time Out:12:05  Total Billable Time: 50 minutes     Precautions: Standard    Subjective     Pt reports: "I was a little more sore than normal after last time."  he was compliant with home exercise program given last session.   Response to previous treatment: increased pain  Functional change: tolerated progression of treatment well, range of motion steadily improving    Pain: 4/10  Location: left shoulder    Objective     Objective Measures updated at progress report unless specified.   Range of Motion/Strength:   Shoulder Left      PROM AROM   Flexion 150(+5) 135(+10)   Extension NT 60(=)   Abduction 155(+5) 125(+10)   HorizAdduction NT 30(+5)   Internal rotation 80(=) PSIS(=)   ER at 90° abd 70(+10) 90(+5)   ER at 0° abd 70(+5) 55(+5)   NT=Not tested  ROM Comments: Pain at end range  Treatment   Pt 12 (+)weeks 0 days post op     Jesse received the following manual therapy techniques for 10 minutes:   -consisting of patient supine for Left biceps and upper trapezius soft tissue mobilization, PROM with endrange stretching, glenohumeral joint inferior anterior posterior glides grade I-II, scapular mobilization, gentle shoulder oscillations    Jesse received therapeutic exercises for 40 minutes including:  Exercises    Scifit UBE forward/reverse Level 3  3minutes each direction   PROM (Left) " "Shoulder Flexion/Abduction/Internal rotation/External Rotation 10x     Supine dowel flexion/serratus punches 5#  2/15   Supine external rotation  3# weight   Sidelying abduction/external rotation/gator 3#  2/15   Supine butterfly stretch 3/30"   Theraband extension pulls Green  2/15   Theraband rows Green  2/15   Theraband external rotation walkout /Horizontal abduction Green  2/15   Pulleys  3minutes   Wall slides  ball  2/15   Corner pectoralis stretch 3/30"     Home Exercises and Education Provided     Education provided:   - Progress towards goals     Written Home Exercises Provided: Patient instructed to cont prior HEP.  Exercises were reviewed and Jesse was able to demonstrate them prior to the end of the session.  Jesse demonstrated good  understanding of the HEP provided.     See EMR under Patient Instructions for exercises provided prior visit.   Assessment   Pt with good participation this date. Pain report slightly higher than previous session however did not limit him with exercises. Pt continues with end range pain and stiffness most significant in posterior capsule however responds well to passive range of motion, manual therapy and progress low load stretching. Good endurance with exercises, tolerated progression of treatment well.  Pt's range of motion steadily improving. Pt motivated.   Jesse is progressing well towards his goals and there are no updates to goals at this time. Pt prognosis is Good.     Pt will continue to benefit from skilled outpatient occupational therapy to address the deficits listed in the problem list on initial evaluation provide pt/family education and to maximize pt's level of independence in the home and community environment.     Anticipated barriers to occupational therapy: none    Pt's spiritual, cultural and educational needs considered and pt agreeable to plan of care and goals.    Goals:  Long Term Goals to be met by discharge:  1) Independent with home exercise program " -Not met, progressing  2) Pt will demonstrate (Left) shoulder AROM WFL grossly for [place] with activities of daily living's -Not met, progressing  3) Pt will demonstrate (Left) shoulder MMT WFL grossly for [place] with functional activities -Not met, progressing  4) Independent and pain free with ADL's and IADL's -Not met, progressing  5) Patient will be able to achieve less than or equal to 25% on FOTO shoulder survey demonstrating overall improved functional ability with upper extremity. -Not met, progressing       Plan   Continue with OT POC  Updates/Grading for next session:progress as able      LEO Cardozo

## 2023-03-21 ENCOUNTER — CLINICAL SUPPORT (OUTPATIENT)
Dept: REHABILITATION | Facility: HOSPITAL | Age: 48
End: 2023-03-21
Payer: COMMERCIAL

## 2023-03-21 DIAGNOSIS — M79.602 PAIN OF LEFT UPPER EXTREMITY: Primary | ICD-10-CM

## 2023-03-21 DIAGNOSIS — R53.1 WEAKNESS: ICD-10-CM

## 2023-03-21 DIAGNOSIS — Z74.09 STIFFNESS DUE TO IMMOBILITY: ICD-10-CM

## 2023-03-21 DIAGNOSIS — M25.60 STIFFNESS DUE TO IMMOBILITY: ICD-10-CM

## 2023-03-21 PROCEDURE — 97140 MANUAL THERAPY 1/> REGIONS: CPT | Mod: PN

## 2023-03-21 PROCEDURE — 97110 THERAPEUTIC EXERCISES: CPT | Mod: PN

## 2023-03-27 NOTE — PROGRESS NOTES
"  Occupational Therapy Treatment Note     Date: 3/28/2023  Name: Jesse Hernández  Clinic Number: [id number]    Therapy Diagnosis:   Encounter Diagnoses   Name Primary?    Pain of left upper extremity Yes    Weakness     Stiffness due to immobility      Physician: Gilbert Jorgensen Jr., MD  Physician Orders: OT evaluate and treat  Medical Diagnosis: Z98.890 (ICD-10-CM) - S/P arthroscopy of left shoulder  Evaluation Date: 1/3/2023; DOS:12/6/2022  Insurance Authorization period Expiration: 12/31/2023  Plan of Care Expiration Period: 5/26/2023  Next MD appointment: 4/7/2023     Visit # / Visits Authorized: 22(+1)/ 50 FOTO:55%  Time In:2:10  Time Out:3:00  Total Billable Time: 50 minutes     Precautions: Standard    Subjective     Pt reports: "I was really sore after last time, the doctor told me to take a day off of therapy, I feel much better now."  he was compliant with home exercise program given last session.   Response to previous treatment: decreased pain  Functional change: tolerated progression of treatment well, range of motion steadily improving    Pain: 1/10  Location: left shoulder    Objective     Objective Measures updated at progress report unless specified.   Range of Motion/Strength:   Shoulder Left      PROM AROM   Flexion 150(=) 140(+)   Extension NT 60(=)   Abduction 155(=) 135(+10)   HorizAdduction NT 30(=)   Internal rotation 80(=) PSIS(=)   ER at 90° abd 80(+10) 90(=)   ER at 0° abd 80(+10) 65(+10)   NT=Not tested  ROM Comments: Pain at end range  Treatment   Pt 12 (+)weeks 0 days post op     Jesse received the following manual therapy techniques for 10 minutes:   -consisting of patient supine for Left biceps and upper trapezius soft tissue mobilization, PROM with endrange stretching, glenohumeral joint inferior anterior posterior glides grade I-II, scapular mobilization, gentle shoulder oscillations    Jesse received therapeutic exercises for 40 minutes including:  Exercises    Scifit UBE forward/reverse " "Level 3  3minutes each direction   PROM (Left) Shoulder Flexion/Abduction/Internal rotation/External Rotation 10x     Supine dowel flexion/serratus punches 6#  2/15   Supine external rotation  3# weight   Sidelying abduction/external rotation/gator 3#  2/15   Supine butterfly stretch 3/30"   Theraband extension pulls Green  2/15   Theraband rows Green  2/15   Theraband external rotation walkout /Horizontal abduction Green  2/15   Pulleys  3minutes   Wall slides  ball  2/15   Corner pectoralis stretch 3/30"     Home Exercises and Education Provided     Education provided:   - Progress towards goals     Written Home Exercises Provided: Patient instructed to cont prior HEP.  Exercises were reviewed and Jesse was able to demonstrate them prior to the end of the session.  Jesse demonstrated good  understanding of the HEP provided.     See EMR under Patient Instructions for exercises provided prior visit.   Assessment   Pt with good participation this date. Pain report decreased from previous session and remains low. Pt continues with end range pain and stiffness most significant in posterior capsule however responds well to passive range of motion, manual therapy and progress low load stretching. Range of motion steadily improving. Good endurance with exercises, reporting increased ease with weight and resistance this session as compared to last.  Pt motivated.   Jesse is progressing well towards his goals and there are no updates to goals at this time. Pt prognosis is Good.     Pt will continue to benefit from skilled outpatient occupational therapy to address the deficits listed in the problem list on initial evaluation provide pt/family education and to maximize pt's level of independence in the home and community environment.     Anticipated barriers to occupational therapy: none    Pt's spiritual, cultural and educational needs considered and pt agreeable to plan of care and goals.    Goals:  Long Term Goals to be met " by discharge:  1) Independent with home exercise program -Not met, progressing  2) Pt will demonstrate (Left) shoulder AROM WFL grossly for [place] with activities of daily living's -Not met, progressing  3) Pt will demonstrate (Left) shoulder MMT WFL grossly for [place] with functional activities -Not met, progressing  4) Independent and pain free with ADL's and IADL's -Not met, progressing  5) Patient will be able to achieve less than or equal to 25% on FOTO shoulder survey demonstrating overall improved functional ability with upper extremity. -Not met, progressing       Plan   Continue with OT POC  Updates/Grading for next session:progress as able      LEO Cardozo

## 2023-03-28 ENCOUNTER — CLINICAL SUPPORT (OUTPATIENT)
Dept: REHABILITATION | Facility: HOSPITAL | Age: 48
End: 2023-03-28
Payer: COMMERCIAL

## 2023-03-28 DIAGNOSIS — Z74.09 STIFFNESS DUE TO IMMOBILITY: ICD-10-CM

## 2023-03-28 DIAGNOSIS — M79.602 PAIN OF LEFT UPPER EXTREMITY: Primary | ICD-10-CM

## 2023-03-28 DIAGNOSIS — M25.60 STIFFNESS DUE TO IMMOBILITY: ICD-10-CM

## 2023-03-28 DIAGNOSIS — R53.1 WEAKNESS: ICD-10-CM

## 2023-03-28 PROCEDURE — 97140 MANUAL THERAPY 1/> REGIONS: CPT | Mod: PN

## 2023-03-28 PROCEDURE — 97110 THERAPEUTIC EXERCISES: CPT | Mod: PN

## 2023-03-30 ENCOUNTER — CLINICAL SUPPORT (OUTPATIENT)
Dept: REHABILITATION | Facility: HOSPITAL | Age: 48
End: 2023-03-30
Payer: COMMERCIAL

## 2023-03-30 DIAGNOSIS — R53.1 WEAKNESS: ICD-10-CM

## 2023-03-30 DIAGNOSIS — Z74.09 STIFFNESS DUE TO IMMOBILITY: ICD-10-CM

## 2023-03-30 DIAGNOSIS — M25.60 STIFFNESS DUE TO IMMOBILITY: ICD-10-CM

## 2023-03-30 DIAGNOSIS — M79.602 PAIN OF LEFT UPPER EXTREMITY: Primary | ICD-10-CM

## 2023-03-30 PROCEDURE — 97110 THERAPEUTIC EXERCISES: CPT | Mod: PN

## 2023-03-30 PROCEDURE — 97140 MANUAL THERAPY 1/> REGIONS: CPT | Mod: PN

## 2023-03-30 NOTE — PROGRESS NOTES
"  Occupational Therapy Treatment Note     Date: 3/30/2023  Name: Jesse Hernández  Clinic Number: [id number]    Therapy Diagnosis:   Encounter Diagnoses   Name Primary?    Pain of left upper extremity Yes    Weakness     Stiffness due to immobility        Physician: Gilbert Jorgensen Jr., MD  Physician Orders: OT evaluate and treat  Medical Diagnosis: Z98.890 (ICD-10-CM) - S/P arthroscopy of left shoulder  Evaluation Date: 1/3/2023; DOS:12/6/2022  Insurance Authorization period Expiration: 12/31/2023  Plan of Care Expiration Period: 5/26/2023  Next MD appointment: 4/7/2023     Visit # / Visits Authorized: 23(+1)/ 50 FOTO:55%  Time In:12:45  Time Out:1:35  Total Billable Time: 50 minutes     Precautions: Standard    Subjective     Pt reports: "I am feeling much batter after last session than last week."  he was compliant with home exercise program given last session.   Response to previous treatment: low pain  Functional change: tolerated progression of treatment well, range of motion steadily improving    Pain: 1-2/10  Location: left shoulder    Objective     Objective Measures updated at progress report unless specified.   Range of Motion/Strength:   Shoulder Left      PROM AROM   Flexion 150(=) 140(+)   Extension NT 60(=)   Abduction 155(=) 135(+10)   HorizAdduction NT 30(=)   Internal rotation 80(=) PSIS(=)   ER at 90° abd 80(+10) 90(=)   ER at 0° abd 80(+10) 65(+10)   NT=Not tested  ROM Comments: Pain at end range  Treatment   Pt 12 (+)weeks 0 days post op     Jesse received the following manual therapy techniques for 10 minutes:   -consisting of patient supine for Left biceps and upper trapezius soft tissue mobilization, PROM with endrange stretching, glenohumeral joint inferior anterior posterior glides grade I-II, scapular mobilization, gentle shoulder oscillations    Jesse received therapeutic exercises for 40 minutes including:  Exercises    Scifit UBE forward/reverse Level 3  3minutes each direction   PROM (Left) " "Shoulder Flexion/Abduction/Internal rotation/External Rotation 10x     Supine dowel flexion/serratus punches 7#  2/15   Supine external rotation  4# weight   Sidelying abduction/external rotation/gator 4#  2/15   Supine butterfly stretch 3/30"   Theraband extension pulls Blue  2/15   Theraband rows Blue  2/15   Theraband external rotation walkout /Horizontal abduction Blue  2/15   Pulleys  3minutes   Wall slides  ball  2/15   Corner pectoralis stretch 3/30"   Internal rotation pulley stretch 3/30"     Home Exercises and Education Provided     Education provided:   - Progress towards goals     Written Home Exercises Provided: Patient instructed to cont prior HEP.  Exercises were reviewed and Jesse was able to demonstrate them prior to the end of the session.  Jesse demonstrated good  understanding of the HEP provided.     See EMR under Patient Instructions for exercises provided prior visit.   Assessment   Pt with good participation this date. Pain report remains low. Pt continues with end range pain and stiffness most significant in posterior capsule however responds well to passive range of motion, manual therapy and progress low load stretching. Good endurance with exercises, reporting increased ease with weight and resistance this session as compared to last.  Able to increase weight and resistance with exercises performing all with good technique and without c/o. Pt motivated.   Jesse is progressing well towards his goals and there are no updates to goals at this time. Pt prognosis is Good.     Pt will continue to benefit from skilled outpatient occupational therapy to address the deficits listed in the problem list on initial evaluation provide pt/family education and to maximize pt's level of independence in the home and community environment.     Anticipated barriers to occupational therapy: none    Pt's spiritual, cultural and educational needs considered and pt agreeable to plan of care and " goals.    Goals:  Long Term Goals to be met by discharge:  1) Independent with home exercise program -Not met, progressing  2) Pt will demonstrate (Left) shoulder AROM WFL grossly for [place] with activities of daily living's -Not met, progressing  3) Pt will demonstrate (Left) shoulder MMT WFL grossly for [place] with functional activities -Not met, progressing  4) Independent and pain free with ADL's and IADL's -Not met, progressing  5) Patient will be able to achieve less than or equal to 25% on FOTO shoulder survey demonstrating overall improved functional ability with upper extremity. -Not met, progressing       Plan   Continue with OT POC  Updates/Grading for next session:progress as able      LEO Cardozo

## 2023-04-03 NOTE — PROGRESS NOTES
"  Occupational Therapy Treatment Note     Date: 4/4/2023  Name: Jesse Hernández  Clinic Number: [id number]    Therapy Diagnosis:   Encounter Diagnoses   Name Primary?    Pain of left upper extremity Yes    Weakness     Stiffness due to immobility      Physician: Gilbert Jorgensen Jr., MD  Physician Orders: OT evaluate and treat  Medical Diagnosis: Z98.890 (ICD-10-CM) - S/P arthroscopy of left shoulder  Evaluation Date: 1/3/2023; DOS:12/6/2022  Insurance Authorization period Expiration: 12/31/2023  Plan of Care Expiration Period: 5/26/2023  Next MD appointment: 4/7/2023     Visit # / Visits Authorized: 24(+1)/ 50 FOTO:55%  Time In:1:20  Time Out:2:10  Total Billable Time: 50 minutes     Precautions: Standard    Subjective     Pt reports: "I practiced a few golf swings over the weekend."  he was compliant with home exercise program given last session.   Response to previous treatment: low pain  Functional change: tolerated progression of treatment well, range of motion steadily improving    Pain: 1/10  Location: left shoulder    Objective     Objective Measures updated at progress report unless specified.   Range of Motion/Strength:   Shoulder Left      PROM AROM   Flexion 160(+10) 140(+)   Extension NT 60(=)   Abduction 165(+10) 135(+10)   HorizAdduction NT 30(=)   Internal rotation 85(+5) PSIS(=)   ER at 90° abd 90(+10) 90(=)   ER at 0° abd 80(=) 65(+10)   NT=Not tested  ROM Comments: Pain at end range  Treatment   Pt 12 (+)weeks 0 days post op     Jesse received the following manual therapy techniques for 10 minutes:   -consisting of patient supine for Left biceps and upper trapezius soft tissue mobilization, PROM with endrange stretching, glenohumeral joint inferior anterior posterior glides grade I-II, scapular mobilization, gentle shoulder oscillations    Jesse received therapeutic exercises for 40 minutes including:  Exercises    Scifit UBE forward/reverse Level 3  3minutes each direction   PROM (Left) Shoulder " "Flexion/Abduction/Internal rotation/External Rotation 10x     Supine dowel flexion/serratus punches 8#  2/15   Supine external rotation  4# weight   Sidelying abduction/external rotation/gator 4#  2/15   Supine butterfly stretch 3/30"   Theraband extension pulls Blue  2/15   Theraband rows Blue  2/15   Theraband external rotation walkout /Horizontal abduction Blue  2/15   Pulleys  3minutes   Wall slides  ball  2/15   Corner pectoralis stretch 3/30"   Internal rotation pulley stretch 3/30"     Home Exercises and Education Provided     Education provided:   - Progress towards goals     Written Home Exercises Provided: Patient instructed to cont prior HEP.  Exercises were reviewed and Jesse was able to demonstrate them prior to the end of the session.  Jesse demonstrated good  understanding of the HEP provided.     See EMR under Patient Instructions for exercises provided prior visit.   Assessment   Pt with good participation this date. Pain report remains low and continues to decrease. Pt continues with end range pain and stiffness most significant in posterior capsule however responds well to passive range of motion, manual therapy and progress low load stretching and is overall improving. Good endurance with exercises, reporting increased ease with weight and resistance each session.  Pt motivated.   Jesse is progressing well towards his goals and there are no updates to goals at this time. Pt prognosis is Good.     Pt will continue to benefit from skilled outpatient occupational therapy to address the deficits listed in the problem list on initial evaluation provide pt/family education and to maximize pt's level of independence in the home and community environment.     Anticipated barriers to occupational therapy: none    Pt's spiritual, cultural and educational needs considered and pt agreeable to plan of care and goals.    Goals:  Long Term Goals to be met by discharge:  1) Independent with home exercise program " -Not met, progressing  2) Pt will demonstrate (Left) shoulder AROM WFL grossly for [place] with activities of daily living's -Not met, progressing  3) Pt will demonstrate (Left) shoulder MMT WFL grossly for [place] with functional activities -Not met, progressing  4) Independent and pain free with ADL's and IADL's -Not met, progressing  5) Patient will be able to achieve less than or equal to 25% on FOTO shoulder survey demonstrating overall improved functional ability with upper extremity. -Not met, progressing       Plan   Continue with OT POC  Updates/Grading for next session:progress as able      LEO Cardozo

## 2023-04-04 ENCOUNTER — CLINICAL SUPPORT (OUTPATIENT)
Dept: REHABILITATION | Facility: HOSPITAL | Age: 48
End: 2023-04-04
Payer: COMMERCIAL

## 2023-04-04 DIAGNOSIS — M79.602 PAIN OF LEFT UPPER EXTREMITY: Primary | ICD-10-CM

## 2023-04-04 DIAGNOSIS — Z74.09 STIFFNESS DUE TO IMMOBILITY: ICD-10-CM

## 2023-04-04 DIAGNOSIS — R53.1 WEAKNESS: ICD-10-CM

## 2023-04-04 DIAGNOSIS — M25.60 STIFFNESS DUE TO IMMOBILITY: ICD-10-CM

## 2023-04-04 PROCEDURE — 97140 MANUAL THERAPY 1/> REGIONS: CPT | Mod: PN

## 2023-04-04 PROCEDURE — 97110 THERAPEUTIC EXERCISES: CPT | Mod: PN

## 2023-04-05 NOTE — PROGRESS NOTES
"  Occupational Therapy Treatment Note     Date: 4/6/2023  Name: Jesse Hernández  Clinic Number: [id number]    Therapy Diagnosis:   Encounter Diagnoses   Name Primary?    Pain of left upper extremity Yes    Weakness     Stiffness due to immobility      Physician: Gilbert Jorgensen Jr., MD  Physician Orders: OT evaluate and treat  Medical Diagnosis: Z98.890 (ICD-10-CM) - S/P arthroscopy of left shoulder  Evaluation Date: 1/3/2023; DOS:12/6/2022  Insurance Authorization period Expiration: 12/31/2023  Plan of Care Expiration Period: 5/26/2023  Next MD appointment: 4/7/2023     Visit # / Visits Authorized: 25(+1)/ 50 FOTO:55%  Time In:12:45  Time Out:1:35  Total Billable Time: 50 minutes     Precautions: Standard    Subjective     Pt reports: "I'm pretty sore today but no pain."  he was compliant with home exercise program given last session.   Response to previous treatment: low pain  Functional change: tolerated progression of treatment well, range of motion steadily improving    Pain: 2/10  Location: left shoulder    Objective     Objective Measures updated at progress report unless specified.   Range of Motion/Strength:   Shoulder Left      PROM AROM   Flexion 160(+10) 140(+)   Extension NT 60(=)   Abduction 165(+10) 135(+10)   HorizAdduction NT 30(=)   Internal rotation 85(+5) PSIS(=)   ER at 90° abd 90(+10) 90(=)   ER at 0° abd 80(=) 65(+10)   NT=Not tested  ROM Comments: Pain at end range  Treatment   Pt 12 (+)weeks 0 days post op     Jesse received the following manual therapy techniques for 10 minutes:   -consisting of patient supine for Left biceps and upper trapezius soft tissue mobilization, PROM with endrange stretching, glenohumeral joint inferior anterior posterior glides grade I-II, scapular mobilization, gentle shoulder oscillations    Jesse received therapeutic exercises for 40 minutes including:  Exercises    Scifit UBE forward/reverse Level 3  3minutes each direction   PROM (Left) Shoulder " "Flexion/Abduction/Internal rotation/External Rotation 10x     Supine dowel flexion/serratus punches 8#  2/15   Supine external rotation  4# weight   Sidelying abduction/external rotation/gator 4#  2/15   Supine butterfly stretch 3/30"   Theraband extension pulls Blue  2/15   Tband PNF D1-D2 Blue/yellow  2/15   Theraband scaption holds Red  10 second hold 5x   Theraband rows Blue  2/15   Theraband external rotation walkout /Horizontal abduction 120 abduction Blue/yellow  2/15   Pulleys  3minutes   Corner pectoralis stretch 3/30"   Internal rotation pulley stretch/posterior capsule stretch 3/30"     Home Exercises and Education Provided     Education provided:   - Progress towards goals     Written Home Exercises Provided: Patient instructed to cont prior HEP.  Exercises were reviewed and Jesse was able to demonstrate them prior to the end of the session.  Jesse demonstrated good  understanding of the HEP provided.     See EMR under Patient Instructions for exercises provided prior visit.   Assessment   Pt with good participation this date. Pain report remains low in sessions. Pt continues with end range pain and stiffness most significant in posterior capsule however responds well to passive range of motion, manual therapy and progress low load stretching and is overall improving. Good endurance with exercises, tolerated progression of treatment well. Pt motivated.   Jesse is progressing well towards his goals and there are no updates to goals at this time. Pt prognosis is Good.     Pt will continue to benefit from skilled outpatient occupational therapy to address the deficits listed in the problem list on initial evaluation provide pt/family education and to maximize pt's level of independence in the home and community environment.     Anticipated barriers to occupational therapy: none    Pt's spiritual, cultural and educational needs considered and pt agreeable to plan of care and goals.    Goals:  Long Term Goals to " be met by discharge:  1) Independent with home exercise program -Not met, progressing  2) Pt will demonstrate (Left) shoulder AROM WFL grossly for [place] with activities of daily living's -Not met, progressing  3) Pt will demonstrate (Left) shoulder MMT WFL grossly for [place] with functional activities -Not met, progressing  4) Independent and pain free with ADL's and IADL's -Not met, progressing  5) Patient will be able to achieve less than or equal to 25% on FOTO shoulder survey demonstrating overall improved functional ability with upper extremity. -Not met, progressing       Plan   Continue with OT POC  Updates/Grading for next session:progress as able      LEO Cardozo

## 2023-04-06 ENCOUNTER — CLINICAL SUPPORT (OUTPATIENT)
Dept: REHABILITATION | Facility: HOSPITAL | Age: 48
End: 2023-04-06
Payer: COMMERCIAL

## 2023-04-06 DIAGNOSIS — Z74.09 STIFFNESS DUE TO IMMOBILITY: ICD-10-CM

## 2023-04-06 DIAGNOSIS — R53.1 WEAKNESS: ICD-10-CM

## 2023-04-06 DIAGNOSIS — M79.602 PAIN OF LEFT UPPER EXTREMITY: Primary | ICD-10-CM

## 2023-04-06 DIAGNOSIS — M25.60 STIFFNESS DUE TO IMMOBILITY: ICD-10-CM

## 2023-04-06 PROCEDURE — 97140 MANUAL THERAPY 1/> REGIONS: CPT | Mod: PN

## 2023-04-06 PROCEDURE — 97110 THERAPEUTIC EXERCISES: CPT | Mod: PN

## 2023-04-12 NOTE — PROGRESS NOTES
"  Occupational Therapy Treatment Note     Date: 4/13/2023  Name: Jesse Hernández  Clinic Number: [id number]    Therapy Diagnosis:   Encounter Diagnoses   Name Primary?    Pain of left upper extremity Yes    Weakness     Stiffness due to immobility      Physician: Gilbert Jorgensen Jr., MD  Physician Orders: OT evaluate and treat  Medical Diagnosis: Z98.890 (ICD-10-CM) - S/P arthroscopy of left shoulder  Evaluation Date: 1/3/2023; DOS:12/6/2022  Insurance Authorization period Expiration: 12/31/2023  Plan of Care Expiration Period: 5/26/2023  Next MD appointment: May     Visit # / Visits Authorized: 26(+1)/ 50 FOTO:55%  Time In:11:00  Time Out:12:00  Total Billable Time: 60 minutes     Precautions: Standard    Subjective     Pt reports: "I felt good after last time, I'm just a little stiff today."  he was compliant with home exercise program given last session.   Response to previous treatment: low pain  Functional change: tolerated progression of treatment well, range of motion steadily improving    Pain: 2/10  Location: left shoulder    Objective     Objective Measures updated at progress report unless specified.   Range of Motion/Strength:   Shoulder Left      PROM AROM   Flexion 160(=) 140(+)   Extension NT 60(=)   Abduction 165(=) 135(+10)   HorizAdduction NT 30(=)   Internal rotation 85(=) PSIS(=)   ER at 90° abd 90(=) 90(=)   ER at 0° abd 80(=) 65(+10)   NT=Not tested  ROM Comments: Pain at end range  Treatment   Pt 12 (+)weeks 0 days post op     Jesse received the following manual therapy techniques for 10 minutes:   -consisting of patient supine for Left biceps and upper trapezius soft tissue mobilization, PROM with endrange stretching, glenohumeral joint inferior anterior posterior glides grade I-II, scapular mobilization, gentle shoulder oscillations    Jesse received therapeutic exercises for 50 minutes including:  Exercises    Scifit UBE forward/reverse Level 3  3minutes each direction   PROM (Left) Shoulder " "Flexion/Abduction/Internal rotation/External Rotation 10x     Supine dowel flexion/serratus punches 5#  2/15   Supine external rotation  5# weight   Sidelying abduction/external rotation/gator 5#  2/15   Prone y's, w's 2/15   CC extension pulls Plate 10  2/15   CC PNF D1-D2 Plate 10 high to low/plate 7 low to high  2/15   CC scaption holds PLate 3  5 second hold 10x   CC rows Plate 13  2/15   CC  external rotation walkout  Plate 3  2/15       Corner pectoralis stretch 3/30"   Internal rotation pulley stretch/posterior capsule stretch 3/30"     Home Exercises and Education Provided     Education provided:   - Progress towards goals     Written Home Exercises Provided: Patient instructed to cont prior HEP.  Exercises were reviewed and Jesse was able to demonstrate them prior to the end of the session.  Jesse demonstrated good  understanding of the HEP provided.     See EMR under Patient Instructions for exercises provided prior visit.   Assessment   Pt with good participation this date. Pain report remains low in sessions. Pt continues with end range pain and stiffness most significant in posterior capsule however responds well to passive range of motion, manual therapy and progress low load stretching and is overall improving. Good endurance with exercises, tolerated progression of treatment well. He was able to progress to CC strengthening today performing all with good technique.  Pt motivated.   Jesse is progressing well towards his goals and there are no updates to goals at this time. Pt prognosis is Good.     Pt will continue to benefit from skilled outpatient occupational therapy to address the deficits listed in the problem list on initial evaluation provide pt/family education and to maximize pt's level of independence in the home and community environment.     Anticipated barriers to occupational therapy: none    Pt's spiritual, cultural and educational needs considered and pt agreeable to plan of care and " goals.    Goals:  Long Term Goals to be met by discharge:  1) Independent with home exercise program -Not met, progressing  2) Pt will demonstrate (Left) shoulder AROM WFL grossly for [place] with activities of daily living's -Not met, progressing  3) Pt will demonstrate (Left) shoulder MMT WFL grossly for [place] with functional activities -Not met, progressing  4) Independent and pain free with ADL's and IADL's -Not met, progressing  5) Patient will be able to achieve less than or equal to 25% on FOTO shoulder survey demonstrating overall improved functional ability with upper extremity. -Not met, progressing       Plan   Continue with OT POC  Updates/Grading for next session:progress as able      LEO Cardozo

## 2023-04-13 ENCOUNTER — CLINICAL SUPPORT (OUTPATIENT)
Dept: REHABILITATION | Facility: HOSPITAL | Age: 48
End: 2023-04-13
Payer: COMMERCIAL

## 2023-04-13 DIAGNOSIS — M25.60 STIFFNESS DUE TO IMMOBILITY: ICD-10-CM

## 2023-04-13 DIAGNOSIS — R53.1 WEAKNESS: ICD-10-CM

## 2023-04-13 DIAGNOSIS — Z74.09 STIFFNESS DUE TO IMMOBILITY: ICD-10-CM

## 2023-04-13 DIAGNOSIS — M79.602 PAIN OF LEFT UPPER EXTREMITY: Primary | ICD-10-CM

## 2023-04-13 PROCEDURE — 97140 MANUAL THERAPY 1/> REGIONS: CPT | Mod: PN

## 2023-04-13 PROCEDURE — 97110 THERAPEUTIC EXERCISES: CPT | Mod: PN

## 2023-04-18 ENCOUNTER — CLINICAL SUPPORT (OUTPATIENT)
Dept: REHABILITATION | Facility: HOSPITAL | Age: 48
End: 2023-04-18
Payer: COMMERCIAL

## 2023-04-18 DIAGNOSIS — R53.1 WEAKNESS: ICD-10-CM

## 2023-04-18 DIAGNOSIS — M79.602 PAIN OF LEFT UPPER EXTREMITY: Primary | ICD-10-CM

## 2023-04-18 DIAGNOSIS — M25.60 STIFFNESS DUE TO IMMOBILITY: ICD-10-CM

## 2023-04-18 DIAGNOSIS — Z74.09 STIFFNESS DUE TO IMMOBILITY: ICD-10-CM

## 2023-04-18 PROCEDURE — 97110 THERAPEUTIC EXERCISES: CPT | Mod: PN

## 2023-04-18 PROCEDURE — 97140 MANUAL THERAPY 1/> REGIONS: CPT | Mod: PN

## 2023-04-18 NOTE — PROGRESS NOTES
"  Occupational Therapy Treatment Note     Date: 4/18/2023  Name: Jesse Hernández  Clinic Number: [id number]    Therapy Diagnosis:   Encounter Diagnoses   Name Primary?    Pain of left upper extremity Yes    Weakness     Stiffness due to immobility      Physician: Gilbert Jorgensen Jr., MD  Physician Orders: OT evaluate and treat  Medical Diagnosis: Z98.890 (ICD-10-CM) - S/P arthroscopy of left shoulder  Evaluation Date: 1/3/2023; DOS:12/6/2022  Insurance Authorization period Expiration: 12/31/2023  Plan of Care Expiration Period: 5/26/2023  Next MD appointment: May     Visit # / Visits Authorized: 27(+1)/ 50 FOTO:55%  Time In:11:10  Time Out:12:10  Total Billable Time: 60 minutes     Precautions: Standard    Subjective     Pt reports: "I felt good after last time, just sore like I worked out."  he was compliant with home exercise program given last session.   Response to previous treatment: low pain  Functional change: tolerated progression of treatment well, range of motion steadily improving    Pain: 1/10  Location: left shoulder    Objective     Objective Measures updated at progress report unless specified.   Range of Motion/Strength:   Shoulder Left      PROM AROM   Flexion 160(=) 140(+)   Extension NT 60(=)   Abduction 165(=) 135(+10)   HorizAdduction NT 30(=)   Internal rotation 85(=) PSIS(=)   ER at 90° abd 90(=) 90(=)   ER at 0° abd 80(=) 65(+10)   NT=Not tested  ROM Comments: Pain at end range  Treatment   Pt 12 (+)weeks 0 days post op     Jesse received the following manual therapy techniques for 10 minutes:   -consisting of patient supine for Left biceps and upper trapezius soft tissue mobilization, PROM with endrange stretching, glenohumeral joint inferior anterior posterior glides grade I-II, scapular mobilization, gentle shoulder oscillations    Jesse received therapeutic exercises for 50 minutes including:  Exercises    Scifit UBE forward/reverse Level 3  3minutes each direction   PROM (Left) Shoulder " "Flexion/Abduction/Internal rotation/External Rotation 10x     Supine flexion/serratus punches 5#  2/15   Supine external rotation  5# weight   Sidelying abduction/external rotation/gator 5#  2/15   Prone y's, w's 2/15   CC extension pulls Plate 10  2/15   CC PNF D1-D2 Plate 10 high to low/plate 7 low to high  2/15   CC scaption holds Plate 3  5 second hold 10x   CC rows Plate 13  2/15   CC  external rotation walkout  Plate 3  2/15       Corner pectoralis stretch 3/30"   Internal rotation pulley stretch/posterior capsule stretch 3/30"     Home Exercises and Education Provided     Education provided:   - Progress towards goals     Written Home Exercises Provided: Patient instructed to cont prior HEP.  Exercises were reviewed and Jesse was able to demonstrate them prior to the end of the session.  Jesse demonstrated good  understanding of the HEP provided.     See EMR under Patient Instructions for exercises provided prior visit.   Assessment   Pt with good participation this date. Pain report remains low in sessions. Pt continues with end range pain and stiffness most significant in posterior capsule however responds well to passive range of motion, manual therapy and progress low load stretching and is overall improving. Good endurance with exercises, tolerated progression of treatment well. Pt motivated.   Jesse is progressing well towards his goals and there are no updates to goals at this time. Pt prognosis is Good.     Pt will continue to benefit from skilled outpatient occupational therapy to address the deficits listed in the problem list on initial evaluation provide pt/family education and to maximize pt's level of independence in the home and community environment.     Anticipated barriers to occupational therapy: none    Pt's spiritual, cultural and educational needs considered and pt agreeable to plan of care and goals.    Goals:  Long Term Goals to be met by discharge:  1) Independent with home exercise " program -Not met, progressing  2) Pt will demonstrate (Left) shoulder AROM WFL grossly for [place] with activities of daily living's -Not met, progressing  3) Pt will demonstrate (Left) shoulder MMT WFL grossly for [place] with functional activities -Not met, progressing  4) Independent and pain free with ADL's and IADL's -Not met, progressing  5) Patient will be able to achieve less than or equal to 25% on FOTO shoulder survey demonstrating overall improved functional ability with upper extremity. -Not met, progressing       Plan   Continue with OT POC  Updates/Grading for next session:progress as able      LEO Cardozo

## 2023-04-19 NOTE — PROGRESS NOTES
"  Occupational Therapy Treatment Note     Date: 4/20/2023  Name: Jesse Hernández  Clinic Number: [id number]    Therapy Diagnosis:   Encounter Diagnoses   Name Primary?    Pain of left upper extremity Yes    Weakness     Stiffness due to immobility      Physician: Gilbert Jorgensen Jr., MD  Physician Orders: OT evaluate and treat  Medical Diagnosis: Z98.890 (ICD-10-CM) - S/P arthroscopy of left shoulder  Evaluation Date: 1/3/2023; DOS:12/6/2022  Insurance Authorization period Expiration: 12/31/2023  Plan of Care Expiration Period: 5/26/2023  Next MD appointment: May     Visit # / Visits Authorized: 28(+1)/ 50 FOTO:55%  Time In:12:45  Time Out:1:35  Total Billable Time: 50 minutes     Precautions: Standard    Subjective     Pt reports: "I'm sore today, I was swinging my golf clubs last night."  he was compliant with home exercise program given last session.   Response to previous treatment: increased pain  Functional change: tolerated progression of treatment well, range of motion steadily improving    Pain: 4/10  Location: left shoulder    Objective     Objective Measures updated at progress report unless specified.   Range of Motion/Strength:   Shoulder Left      PROM AROM   Flexion 160(=) 140(=)   Extension NT 60(=)   Abduction 165(=) 140(+5)   HorizAdduction NT 30(=)   Internal rotation 85(=) S2(+PSIS)   ER at 90° abd 90(=) 90(=)   ER at 0° abd 80(=) 70(+5)   NT=Not tested  ROM Comments: Pain at end range  Treatment   Pt 12 (+)weeks 2 days post op     Jesse received the following manual therapy techniques for 10 minutes:   -consisting of patient supine for Left biceps and upper trapezius soft tissue mobilization, PROM with endrange stretching, glenohumeral joint inferior anterior posterior glides grade I-II, scapular mobilization, gentle shoulder oscillations    Jesse received therapeutic exercises for 40 minutes including:  Exercises    Scifit UBE forward/reverse Level 3  3minutes each direction   PROM (Left) Shoulder " "Flexion/Abduction/Internal rotation/External Rotation 10x     Supine flexion/serratus punches 5#  2/15   Supine external rotation  5# weight   Sidelying abduction/external rotation/gator 5#  2/15   Prone y's, w's 2/15   CC extension pulls Plate 10  2/15   CC PNF D1-D2 Plate 10 high to low/plate 7 low to high  2/15   CC scaption holds Plate 3  5 second hold 10x   CC rows Plate 13  2/15   CC  external rotation walkout  Plate 3  2/15       Corner pectoralis stretch 3/30"   Internal rotation pulley stretch/posterior capsule stretch 3/30"     Home Exercises and Education Provided     Education provided:   - Progress towards goals     Written Home Exercises Provided: Patient instructed to cont prior HEP.  Exercises were reviewed and Jesse was able to demonstrate them prior to the end of the session.  Jesse demonstrated good  understanding of the HEP provided.     See EMR under Patient Instructions for exercises provided prior visit.   Assessment   Pt with good participation this date. Pain increased this date due to increased activity over the past few days however did not limit him in session and remains low. Pt continues with end range pain and stiffness most significant in posterior capsule however responds well to passive range of motion, manual therapy and progressive low load stretching. Range of motion steadily improving. Good endurance with exercises. Pt motivated.   Jesse is progressing well towards his goals and there are no updates to goals at this time. Pt prognosis is Good.     Pt will continue to benefit from skilled outpatient occupational therapy to address the deficits listed in the problem list on initial evaluation provide pt/family education and to maximize pt's level of independence in the home and community environment.     Anticipated barriers to occupational therapy: none    Pt's spiritual, cultural and educational needs considered and pt agreeable to plan of care and goals.    Goals:  Long Term Goals " to be met by discharge:  1) Independent with home exercise program -Not met, progressing  2) Pt will demonstrate (Left) shoulder AROM WFL grossly for [place] with activities of daily living's -Not met, progressing  3) Pt will demonstrate (Left) shoulder MMT WFL grossly for [place] with functional activities -Not met, progressing  4) Independent and pain free with ADL's and IADL's -Not met, progressing  5) Patient will be able to achieve less than or equal to 25% on FOTO shoulder survey demonstrating overall improved functional ability with upper extremity. -Not met, progressing       Plan   Continue with OT POC  Updates/Grading for next session:progress as able      LEO Cardozo

## 2023-04-20 ENCOUNTER — CLINICAL SUPPORT (OUTPATIENT)
Dept: REHABILITATION | Facility: HOSPITAL | Age: 48
End: 2023-04-20
Payer: COMMERCIAL

## 2023-04-20 DIAGNOSIS — Z74.09 STIFFNESS DUE TO IMMOBILITY: ICD-10-CM

## 2023-04-20 DIAGNOSIS — R53.1 WEAKNESS: ICD-10-CM

## 2023-04-20 DIAGNOSIS — M25.60 STIFFNESS DUE TO IMMOBILITY: ICD-10-CM

## 2023-04-20 DIAGNOSIS — M79.602 PAIN OF LEFT UPPER EXTREMITY: Primary | ICD-10-CM

## 2023-04-20 PROCEDURE — 97110 THERAPEUTIC EXERCISES: CPT | Mod: PN

## 2023-04-20 PROCEDURE — 97140 MANUAL THERAPY 1/> REGIONS: CPT | Mod: PN

## 2023-05-01 NOTE — PROGRESS NOTES
"  Occupational Therapy Treatment Note     Date: 5/2/2023  Name: Jesse Hernández  Clinic Number: [id number]    Therapy Diagnosis:   Encounter Diagnoses   Name Primary?    Pain of left upper extremity Yes    Weakness     Stiffness due to immobility      Physician: No ref. provider found  Physician Orders: OT evaluate and treat  Medical Diagnosis: Z98.890 (ICD-10-CM) - S/P arthroscopy of left shoulder  Evaluation Date: 1/3/2023; DOS:12/6/2022  Insurance Authorization period Expiration: 12/31/2023  Plan of Care Expiration Period: 5/26/2023  Next MD appointment: May     Visit # / Visits Authorized: 29(+1)/ 50 FOTO:55%  Time In:1:50  Time Out:2:50  Total Billable Time: 60 minutes     Precautions: Standard    Subjective     Pt reports: "I was able to reach behind my back up higher yesterday."  he was compliant with home exercise program given last session.   Response to previous treatment: decreased pain  Functional change: tolerated progression of treatment well, range of motion steadily improving, improved functional use    Pain: 1/10  Location: left shoulder    Objective     Objective Measures updated at progress report unless specified.   Range of Motion/Strength:   Shoulder Left      PROM AROM   Flexion 160(=) 145(+5)   Extension NT 60(=)   Abduction 165(=) 150(+10)   HorizAdduction NT 30(=)   Internal rotation 85(=) L4(+S2)   ER at 90° abd 90(=) 90(=)   ER at 0° abd 80(=) 80(+10)   NT=Not tested  ROM Comments: Pain at end range  Treatment   Pt 12 (+)weeks post op     Jesse received the following manual therapy techniques for 10 minutes:   -consisting of patient supine for Left biceps and upper trapezius soft tissue mobilization, PROM with endrange stretching, glenohumeral joint inferior anterior posterior glides grade I-II, scapular mobilization, gentle shoulder oscillations    Jesse received therapeutic exercises for 50 minutes including:  Exercises    Scifit UBE forward/reverse Level 3  3minutes each direction   PROM " "(Left) Shoulder Flexion/Abduction/Internal rotation/External Rotation 10x     Supine flexion/serratus punches 5#  2/15   Supine external rotation  5# weight   Sidelying abduction/external rotation/gator 5#  2/15   Prone y's, w's 2/15   CC extension pulls Plate 10  2/15   CC PNF D1-D2 Plate 10 high to low/plate 7 low to high  2/15   CC scaption holds Plate 3  5 second hold 10x   CC rows Plate 13  2/15   CC  external rotation walkout  Plate 3  2/15       Corner pectoralis stretch 3/30"   Internal rotation pulley stretch/posterior capsule stretch 3/30"     Home Exercises and Education Provided     Education provided:   - Progress towards goals     Written Home Exercises Provided: Patient instructed to cont prior HEP.  Exercises were reviewed and Jesse was able to demonstrate them prior to the end of the session.  Jesse demonstrated good  understanding of the HEP provided.     See EMR under Patient Instructions for exercises provided prior visit.   Assessment   Pt with good participation this date. Pain decreased this date with pt reporting he has been using his shoulder more with functional activity. Pt continues with end range pain and stiffness most significant in posterior capsule however responds well to passive range of motion, manual therapy and progressive low load stretching. Range of motion steadily improving. Good endurance with exercises. Pt with an improved FOTO score.   Jesse is progressing well towards his goals and there are no updates to goals at this time. Pt prognosis is Good.     Pt will continue to benefit from skilled outpatient occupational therapy to address the deficits listed in the problem list on initial evaluation provide pt/family education and to maximize pt's level of independence in the home and community environment.     Anticipated barriers to occupational therapy: none    Pt's spiritual, cultural and educational needs considered and pt agreeable to plan of care and goals.    Goals:  Long " Term Goals to be met by discharge:  1) Independent with home exercise program -Not met, progressing  2) Pt will demonstrate (Left) shoulder AROM WFL grossly for [place] with activities of daily living's -Not met, progressing  3) Pt will demonstrate (Left) shoulder MMT WFL grossly for [place] with functional activities -Not met, progressing  4) Independent and pain free with ADL's and IADL's -Not met, progressing  5) Patient will be able to achieve less than or equal to 25% on FOTO shoulder survey demonstrating overall improved functional ability with upper extremity. -Not met, progressing       Plan   Continue with OT POC  Updates/Grading for next session:progress as able, plan to drop down to 1x a week and then anticipate discharge to home exercise program at the end of the month      LEO Cardozo

## 2023-05-02 ENCOUNTER — CLINICAL SUPPORT (OUTPATIENT)
Dept: REHABILITATION | Facility: HOSPITAL | Age: 48
End: 2023-05-02
Payer: COMMERCIAL

## 2023-05-02 DIAGNOSIS — R53.1 WEAKNESS: ICD-10-CM

## 2023-05-02 DIAGNOSIS — Z74.09 STIFFNESS DUE TO IMMOBILITY: ICD-10-CM

## 2023-05-02 DIAGNOSIS — M25.60 STIFFNESS DUE TO IMMOBILITY: ICD-10-CM

## 2023-05-02 DIAGNOSIS — M79.602 PAIN OF LEFT UPPER EXTREMITY: Primary | ICD-10-CM

## 2023-05-02 PROCEDURE — 97110 THERAPEUTIC EXERCISES: CPT | Mod: PN

## 2023-05-02 PROCEDURE — 97140 MANUAL THERAPY 1/> REGIONS: CPT | Mod: PN

## 2023-05-04 ENCOUNTER — PATIENT MESSAGE (OUTPATIENT)
Dept: INTERNAL MEDICINE | Facility: CLINIC | Age: 48
End: 2023-05-04
Payer: COMMERCIAL

## 2023-05-04 ENCOUNTER — OFFICE VISIT (OUTPATIENT)
Dept: PSYCHIATRY | Facility: CLINIC | Age: 48
End: 2023-05-04
Payer: COMMERCIAL

## 2023-05-04 DIAGNOSIS — K21.9 GASTROESOPHAGEAL REFLUX DISEASE, UNSPECIFIED WHETHER ESOPHAGITIS PRESENT: Primary | ICD-10-CM

## 2023-05-04 DIAGNOSIS — F33.40 RECURRENT MAJOR DEPRESSIVE DISORDER, IN REMISSION: ICD-10-CM

## 2023-05-04 DIAGNOSIS — F41.1 GAD (GENERALIZED ANXIETY DISORDER): Primary | ICD-10-CM

## 2023-05-04 PROCEDURE — 99214 OFFICE O/P EST MOD 30 MIN: CPT | Mod: 95,,, | Performed by: NURSE PRACTITIONER

## 2023-05-04 PROCEDURE — 99214 PR OFFICE/OUTPT VISIT, EST, LEVL IV, 30-39 MIN: ICD-10-PCS | Mod: 95,,, | Performed by: NURSE PRACTITIONER

## 2023-05-04 RX ORDER — DULOXETIN HYDROCHLORIDE 60 MG/1
60 CAPSULE, DELAYED RELEASE ORAL DAILY
Qty: 90 CAPSULE | Refills: 1 | Status: SHIPPED | OUTPATIENT
Start: 2023-05-04 | End: 2023-05-04 | Stop reason: SDUPTHER

## 2023-05-04 RX ORDER — DULOXETIN HYDROCHLORIDE 20 MG/1
CAPSULE, DELAYED RELEASE ORAL
Qty: 90 CAPSULE | Refills: 1 | Status: SHIPPED | OUTPATIENT
Start: 2023-05-04 | End: 2023-11-06 | Stop reason: SDUPTHER

## 2023-05-04 RX ORDER — DULOXETIN HYDROCHLORIDE 60 MG/1
60 CAPSULE, DELAYED RELEASE ORAL DAILY
Qty: 90 CAPSULE | Refills: 1 | Status: SHIPPED | OUTPATIENT
Start: 2023-05-04 | End: 2023-06-07 | Stop reason: SDUPTHER

## 2023-05-04 RX ORDER — LAMOTRIGINE 150 MG/1
TABLET ORAL
Qty: 180 TABLET | Refills: 2 | Status: SHIPPED | OUTPATIENT
Start: 2023-05-04 | End: 2024-03-05 | Stop reason: SDUPTHER

## 2023-05-04 NOTE — PROGRESS NOTES
"Outpatient Psychiatry Follow-Up Visit (MD/NP)    5/4/2023   The patient location is: [place]  The chief complaint leading to consultation is: Depression and anxiety    Visit type: audiovisual    Face to Face time with patient: 26 minutes  30 minutes of total time spent on the encounter, which includes face to face time and non-face to face time preparing to see the patient (eg, review of tests), Obtaining and/or reviewing separately obtained history, Documenting clinical information in the electronic or other health record, Independently interpreting results (not separately reported) and communicating results to the patient/family/caregiver, or Care coordination (not separately reported).         Each patient to whom he or she provides medical services by telemedicine is:  (1) informed of the relationship between the physician and patient and the respective role of any other health care provider with respect to management of the patient; and (2) notified that he or she may decline to receive medical services by telemedicine and may withdraw from such care at any time.    Notes:       Clinical Status of Patient:  Outpatient (Ambulatory)    Chief Complaint:  Jeses Hernández is a 48 y.o. male who presents today for follow-up of depression and anxiety.  Met with patient.      Interval History and Content of Current Session:  Interim Events/Subjective Report/Content of Current Session:   Patient arrived on time for his scheduled appointment. 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 He sated he is stressed financially due to recovering from his surgeries, had 2 hernia surgeries and shoulder surgery. He stated he would like to close his company once he is healed and look for a job.     He described mood as "okay but anxious"and his affect was appropriate. He stated he has been feeling more anxious about his fiances.     He reported managed depression and mood but not his anxiety " with his current medication regimen of duloxetine 60 mg po daily and Lamictal 300 mg po daily. He stated he gets irritable at times when he feels anxious. He reported increased heart burn and will be seeing a gastrointestinal specialist. He denied SI/HI/AH/VH and no delusion noted or reported. Patient denied symptoms of debby or hypomania.     He reported occasional alcohol use and denied any type of illicit drug use. He reported overall okay sleep but it fluctuates at times due to worry and anxiety and he still uses his CPAP. He reported medication compliance and denied any side or adverse effects to his current medication of Lamictal 300 mg daily and duloxetine 60 mg daily.     He rated his anxiety at 8/10 and depression 0/10 with 10/10 being the most severe. He agreed to increase Cymbalta to 80 mg daily to improve his anxiety and want to keep Lamictal 300 mg daily. Provided supportive therapy.       Psychiatric Review Of Systems - Is patient experiencing or having changes in:  sleep: yes fluctuates. Uses his CPAP machine  appetite: no  weight: yes gained due to decreased activity  energy/anergy: tired at times  interest/pleasure/anhedonia: no  somatic symptoms: no  anxiety/panic: yes increased  guilty/hopelessness: no  concentration: no  S.I.B.s/risky behavior: no  Irritability: yes at times when he fees anxious  Racing thoughts: no  Impulsive behaviors: no  Paranoia:no  AVH:no,   Suicidal thoughts/plan/intent: no  SE: no   ETOH: no  Drugs: no    Psychotherapy:  Target symptoms: anxiety , mood/depression  Why chosen therapy is appropriate versus another modality: relevant to diagnosis, patient responds to this modality, evidence based practice  Outcome monitoring methods: self-report, observation  Therapeutic intervention type: insight oriented psychotherapy, supportive psychotherapy  Topics discussed/themes: building skills sets for symptom management, symptom recognition  The patient's response to the  "intervention is accepting, motivated. The patient's progress toward treatment goals is good.   Duration of intervention: 10 minutes.    Review of Systems   PSYCHIATRIC: Pertinant items are noted in the narrative.  CONSTITUTIONAL: No weight gain or loss.   MUSCULOSKELETAL: No pain or stiffness of the joints.  NEUROLOGIC: No weakness, sensory changes, seizures, confusion, memory loss, tremor or other abnormal movements.  ENDOCRINE: No polydipsia or polyuria.  INTEGUMENTARY: No rashes or lacerations.  EYES: No exophthalmos, jaundice or blindness.  ENT: No dizziness, tinnitus or hearing loss.  RESPIRATORY: No shortness of breath.  CARDIOVASCULAR: No tachycardia or chest pain.  GASTROINTESTINAL: No nausea, vomiting, pain, constipation or diarrhea.  GENITOURINARY: No frequency, dysuria or sexual dysfunction.  HEMATOLOGIC/LYMPHATIC: No excessive bleeding, prolonged or excessive bleeding after dental extraction/injury.  ALLERGIC/IMMUNOLOGIC: No allergic response to materials, foods or animals at this time.    Past Medical, Family and Social History: The patient's past medical, family and social history have been reviewed and updated as appropriate within the electronic medical record - see encounter notes.    Compliance: yes    Side effects: None    Risk Parameters:  Patient reports no suicidal ideation  Patient reports no homicidal ideation  Patient reports no self-injurious behavior  Patient reports no violent behavior    Exam (detailed: at least 9 elements; comprehensive: all 15 elements)   Constitutional  Vitals:  Most recent vital signs, dated greater than 90 days prior to this appointment, were reviewed.   There were no vitals filed for this visit.     General:  unremarkable, age appropriate     Musculoskeletal  Muscle Strength/Tone:  no dystonia, no tremor, no tic   Gait & Station:  non-ataxic     Psychiatric  Speech:  no latency; no press   Mood & Affect:  "Okay but anxious"  appropriate   Thought Process:  normal " and logical   Associations:  intact   Thought Content:  normal, no suicidality, no homicidality, delusions, or paranoia   Insight:  intact   Judgement: behavior is adequate to circumstances   Orientation:  grossly intact   Memory: intact for content of interview   Language: grossly intact, able to name, able to repeat   Attention Span & Concentration:  able to focus   Fund of Knowledge:  intact and appropriate to age and level of education     Assessment and Diagnosis   Status/Progress: Based on the examination today, the patient's problem(s) is/are improved.  New problems have not been presented today.   Co-morbidities, Diagnostic uncertainty and Lack of compliance are not complicating management of the primary condition.  There are no active rule-out diagnoses for this patient at this time.     General Impression:  Doing okay and stable but c/o increased anxiety and wants to increased duloxetine to 80 mg daily and will continue Lamictal 300 mg daily.         ICD-10-CM ICD-9-CM   1. ALISON (generalized anxiety disorder)  F41.1 300.02   2. Recurrent major depressive disorder, in remission  F33.40 296.35         Intervention/Counseling/Treatment Plan   Medication Management: Continue current medications. The risks and benefits of medication were discussed with the patient.  Labs, Diagnostic Studies: reviewed all recent labs are reviewed all recent vitals  Increase duloxetine to 80 mg po daily for MDD and ALISON  Continue Lamictal 300 mg po daily (take 2 tabs of 150 mg in the morning) for mood stabilization. Warned against SJS and he agreed to continue taking.   -Discussed informed consent, diagnosis, risks and benefits of proposed treatment above vs alternative treatments vs no treatment, and potential side effects of these treatments. The patient expresses understanding of the above and displays the capacity to agree with this treatment given said understanding. Patient also agrees that, currently, the benefits outweigh  the risks and would like to pursue treatment at this time. Answered all questions and discussed follow up. Encouraged patient to contact us with any questions or concerns.   -Encouraged Patient to keep future appointments.  -Take medications as prescribed   -Patient to present to Emergency Department for thoughts to harm herself or others      Return to Clinic: 1 month or earlier as needed      VERO RuelasP-BC

## 2023-05-09 ENCOUNTER — CLINICAL SUPPORT (OUTPATIENT)
Dept: REHABILITATION | Facility: HOSPITAL | Age: 48
End: 2023-05-09
Payer: COMMERCIAL

## 2023-05-09 ENCOUNTER — PATIENT MESSAGE (OUTPATIENT)
Dept: REHABILITATION | Facility: HOSPITAL | Age: 48
End: 2023-05-09

## 2023-05-09 DIAGNOSIS — M25.60 STIFFNESS DUE TO IMMOBILITY: ICD-10-CM

## 2023-05-09 DIAGNOSIS — M79.602 PAIN OF LEFT UPPER EXTREMITY: Primary | ICD-10-CM

## 2023-05-09 DIAGNOSIS — R53.1 WEAKNESS: ICD-10-CM

## 2023-05-09 DIAGNOSIS — Z74.09 STIFFNESS DUE TO IMMOBILITY: ICD-10-CM

## 2023-05-09 PROCEDURE — 97140 MANUAL THERAPY 1/> REGIONS: CPT | Mod: PN

## 2023-05-09 PROCEDURE — 97110 THERAPEUTIC EXERCISES: CPT | Mod: PN

## 2023-05-09 NOTE — PROGRESS NOTES
"  Occupational Therapy Treatment Note     Date: 5/9/2023  Name: Jesse Hernández  Clinic Number: [id number]    Therapy Diagnosis:   Encounter Diagnoses   Name Primary?    Pain of left upper extremity Yes    Weakness     Stiffness due to immobility      Physician: Gilbert Jorgensen Jr., MD  Physician Orders: OT evaluate and treat  Medical Diagnosis: Z98.890 (ICD-10-CM) - S/P arthroscopy of left shoulder  Evaluation Date: 1/3/2023; DOS:12/6/2022  Insurance Authorization period Expiration: 12/31/2023  Plan of Care Expiration Period: 5/26/2023  Next MD appointment: May     Visit # / Visits Authorized: 30(+1)/ 50 FOTO:55%  Time In:10:00  Time Out:10:45  Total Billable Time: 45 minutes     Precautions: Standard    Subjective     Pt reports: "I woke up with like a 3 or a 4 pain now it's a 1."  he was compliant with home exercise program given last session.   Response to previous treatment: low pain  Functional change: tolerated progression of treatment well, range of motion steadily improving, improved functional use    Pain: 1/10  Location: left shoulder    Objective     Objective Measures updated at progress report unless specified.   Range of Motion/Strength:   Shoulder Left      PROM AROM   Flexion 160(=) 145(+5)   Extension NT 60(=)   Abduction 165(=) 150(+10)   HorizAdduction NT 30(=)   Internal rotation 85(=) L4(+S2)   ER at 90° abd 90(=) 90(=)   ER at 0° abd 80(=) 80(+10)   NT=Not tested  ROM Comments: Pain at end range  Treatment   Pt 12 (+)weeks post op     Jesse received the following manual therapy techniques for 10 minutes:   -consisting of patient supine for Left biceps and upper trapezius soft tissue mobilization, PROM with endrange stretching, glenohumeral joint inferior anterior posterior glides grade I-II, scapular mobilization, gentle shoulder oscillations    Jesse received therapeutic exercises for 35 minutes including:  Exercises    Scifit UBE forward/reverse Level 5 3minutes each direction   PROM (Left) " "Shoulder Flexion/Abduction/Internal rotation/External Rotation 10x     Supine flexion/serratus punches 6#  2/15   Supine external rotation  5# weight   Sidelying abduction/external rotation/gator 5#  2/15   Prone y's, w's 2/15   CC extension pulls Plate 17  2/15   CC PNF D1-D2 Plate 10 high to low/plate 7 low to high  2/15   CC scaption holds Plate 3  5 second hold 10x   CC rows Plate 13  2/15   CC  external rotation walkout  Plate 3  2/15       Corner pectoralis stretch 3/30"   Internal rotation pulley stretch/posterior capsule stretch 3/30"     Home Exercises and Education Provided     Education provided:   - Progress towards goals     Written Home Exercises Provided: Patient instructed to cont prior HEP.  Exercises were reviewed and Jesse was able to demonstrate them prior to the end of the session.  Jesse demonstrated good  understanding of the HEP provided.     See EMR under Patient Instructions for exercises provided prior visit.   Assessment   Pt with good participation this date. Pain remains low in sessions. Pt continues with end range pain and stiffness most significant in posterior capsule however responds well to passive range of motion, manual therapy and progressive low load stretching. Range of motion steadily improving. Good endurance with exercises.   Jesse is progressing well towards his goals and there are no updates to goals at this time. Pt prognosis is Good.     Pt will continue to benefit from skilled outpatient occupational therapy to address the deficits listed in the problem list on initial evaluation provide pt/family education and to maximize pt's level of independence in the home and community environment.     Anticipated barriers to occupational therapy: none    Pt's spiritual, cultural and educational needs considered and pt agreeable to plan of care and goals.    Goals:  Long Term Goals to be met by discharge:  1) Independent with home exercise program -Not met, progressing  2) Pt will " demonstrate (Left) shoulder AROM WFL grossly for [place] with activities of daily living's -Not met, progressing  3) Pt will demonstrate (Left) shoulder MMT WFL grossly for [place] with functional activities -Not met, progressing  4) Independent and pain free with ADL's and IADL's -Not met, progressing  5) Patient will be able to achieve less than or equal to 25% on FOTO shoulder survey demonstrating overall improved functional ability with upper extremity. -Not met, progressing       Plan   Continue with OT POC  Updates/Grading for next session:progress as able, plan to drop down to 1x a week and then anticipate discharge to home exercise program at the end of the month      LEO Cardozo

## 2023-05-15 NOTE — PROGRESS NOTES
"  Occupational Therapy Treatment Note     Date: 5/16/2023  Name: Jesse Hernández  Clinic Number: [id number]    Therapy Diagnosis:   Encounter Diagnoses   Name Primary?    Pain of left upper extremity Yes    Weakness     Stiffness due to immobility      Physician: Gilbert Jorgensen Jr., MD  Physician Orders: OT evaluate and treat  Medical Diagnosis: Z98.890 (ICD-10-CM) - S/P arthroscopy of left shoulder  Evaluation Date: 1/3/2023; DOS:12/6/2022  Insurance Authorization period Expiration: 12/31/2023  Plan of Care Expiration Period: 5/26/2023  Next MD appointment: May 22     Visit # / Visits Authorized: 31(+1)/ 50 FOTO:55%  Time In:2:00  Time Out: 2:48  Total Billable Time: 48 minutes     Precautions: Standard    Subjective     Pt reports: "I used this arm a lot on Sunday 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   Response to previous treatment: pain free  Functional change: tolerated progression of treatment well, range of motion steadily improving, improved functional use    Pain: 0/10  Location: left shoulder    Objective     Objective Measures updated at progress report unless specified.   Range of Motion/Strength:   Shoulder Left       PROM AROM MMT   Flexion 160(=) 145(=) 4/5   Extension NT 60(=) 4/5   Abduction 165(=) 150(=) 4/5   HorizAdduction NT 30(=) 4/5   Internal rotation 85(=) L4(=) 4/5   ER at 90° abd 90(=) 90(=) 4/5   ER at 0° abd 80(=) 80(=) 4/5   NT=Not tested  ROM Comments: Pain at end range  Treatment   Pt 12 (+)weeks post op     Jesse received the following manual therapy techniques for 10 minutes:   -consisting of patient supine for Left biceps and upper trapezius soft tissue mobilization, PROM with endrange stretching, glenohumeral joint inferior anterior posterior glides grade I-II, scapular mobilization, gentle shoulder oscillations    Jesse received therapeutic exercises for 38 minutes " "including:  Exercises    Scifit UBE forward/reverse Level 5 3minutes each direction   PROM (Left) Shoulder Flexion/Abduction/Internal rotation/External Rotation 10x     Supine flexion/serratus punches 6#  2/15   Supine external rotation  6# weight   Sidelying abduction/external rotation/gator 5#  2/15   Prone y's, w's 2/15   CC extension pulls Plate 17  2/15   CC PNF D1-D2 Plate 10 high to low/plate 7 low to high  2/15   CC scaption holds Plate 3  5 second hold 10x   CC rows Plate 17  2/15   CC  external rotation walkout  Plate 3  2/15       Corner pectoralis stretch 3/30"   Internal rotation pulley stretch/posterior capsule stretch 3/30"     Home Exercises and Education Provided     Education provided:   - Progress towards goals     Written Home Exercises Provided: Patient instructed to cont prior HEP.  Exercises were reviewed and Jesse was able to demonstrate them prior to the end of the session.  Jesse demonstrated good  understanding of the HEP provided.     See EMR under Patient Instructions for exercises provided prior visit.   Assessment   Pt with good participation this date. Pain free in today's session. Pt continues with end range pain and stiffness most significant in posterior capsule however responds well to passive range of motion, manual therapy and progressive low load stretching. Range of motion and strength steadily improving. Good endurance with exercises. Pt to see MD on Monday plan to progress as indicated by MD.   Jesse is progressing well towards his goals and there are no updates to goals at this time. Pt prognosis is Good.     Pt will continue to benefit from skilled outpatient occupational therapy to address the deficits listed in the problem list on initial evaluation provide pt/family education and to maximize pt's level of independence in the home and community environment.     Anticipated barriers to occupational therapy: none    Pt's spiritual, cultural and educational needs considered " and pt agreeable to plan of care and goals.    Goals:  Long Term Goals to be met by discharge:  1) Independent with home exercise program -Not met, progressing  2) Pt will demonstrate (Left) shoulder AROM WFL grossly for [place] with activities of daily living's -Not met, progressing  3) Pt will demonstrate (Left) shoulder MMT WFL grossly for [place] with functional activities -Not met, progressing  4) Independent and pain free with ADL's and IADL's -Not met, progressing  5) Patient will be able to achieve less than or equal to 25% on FOTO shoulder survey demonstrating overall improved functional ability with upper extremity. -Not met, progressing       Plan   Continue with OT POC  Updates/Grading for next session:progress as able, plan to drop down to 1x a week and then anticipate discharge to home exercise program at the end of the month      LEO Cardzoo

## 2023-05-16 ENCOUNTER — PATIENT MESSAGE (OUTPATIENT)
Dept: REHABILITATION | Facility: HOSPITAL | Age: 48
End: 2023-05-16

## 2023-05-16 ENCOUNTER — CLINICAL SUPPORT (OUTPATIENT)
Dept: REHABILITATION | Facility: HOSPITAL | Age: 48
End: 2023-05-16
Payer: COMMERCIAL

## 2023-05-16 DIAGNOSIS — M79.602 PAIN OF LEFT UPPER EXTREMITY: Primary | ICD-10-CM

## 2023-05-16 DIAGNOSIS — R53.1 WEAKNESS: ICD-10-CM

## 2023-05-16 DIAGNOSIS — M25.60 STIFFNESS DUE TO IMMOBILITY: ICD-10-CM

## 2023-05-16 DIAGNOSIS — Z74.09 STIFFNESS DUE TO IMMOBILITY: ICD-10-CM

## 2023-05-16 PROCEDURE — 97110 THERAPEUTIC EXERCISES: CPT | Mod: PN

## 2023-05-16 PROCEDURE — 97140 MANUAL THERAPY 1/> REGIONS: CPT | Mod: PN

## 2023-05-22 NOTE — PROGRESS NOTES
"  Occupational Therapy Treatment Note/Discharge Summary     Date: 5/23/2023  Name: Jesse Hernández  Clinic Number: [id number]    Therapy Diagnosis:   Encounter Diagnoses   Name Primary?    Pain of left upper extremity Yes    Weakness     Stiffness due to immobility      Physician: Gilbert Jorgensen Jr., MD  Physician Orders: OT evaluate and treat  Medical Diagnosis: Z98.890 (ICD-10-CM) - S/P arthroscopy of left shoulder  Evaluation Date: 1/3/2023; DOS:12/6/2022  Insurance Authorization period Expiration: 12/31/2023  Plan of Care Expiration Period: 5/26/2023  Next MD appointment: none scheduled     Visit # / Visits Authorized: 32(+1)/ 50 FOTO:19%  Time In:1:55  Time Out: 2:43  Total Billable Time: 48 minutes     Precautions: Standard    Subjective     Pt reports: "I walked out of my doctor's office yesterday, I was waiting for over an hour."  he was compliant with home exercise program given last session.   Response to previous treatment: slight pain  Functional change: range of motion and strength within normal limits grossly    Pain: 2/10  Location: left shoulder    Objective     Objective Measures updated at progress report unless specified.   Range of Motion/Strength:   Shoulder Left       PROM AROM MMT   Flexion 160(=) 150(+5) 5/5   Extension NT 60(=) 5/5   Abduction 165(=) 150(=) 5/5   HorizAdduction NT 30(=) 5/5   Internal rotation 85(=) L4(=) 5/5   ER at 90° abd 90(=) 90(=) 5/5   ER at 0° abd 80(=) 80(=) 5/5   NT=Not tested  ROM Comments: Pain at end range  Treatment   Pt 12 (+)weeks post op     Jesse received the following manual therapy techniques for 10 minutes:   -consisting of patient supine for Left biceps and upper trapezius soft tissue mobilization, PROM with endrange stretching, glenohumeral joint inferior anterior posterior glides grade I-II, scapular mobilization, gentle shoulder oscillations    Jesse received therapeutic exercises for 38 minutes including:  Exercises    Scifit UBE forward/reverse Level 5 " "3minutes each direction   PROM (Left) Shoulder Flexion/Abduction/Internal rotation/External Rotation 10x     Supine flexion/serratus punches 6#  2/15   Supine external rotation  6# weight   Sidelying abduction/external rotation/gator 6#  2/15   Prone y's, w's 2/15   CC extension pulls Plate 17  2/15   CC PNF D1-D2 Plate 10 high to low/plate 7 low to high  2/15   CC scaption holds Plate 3  5 second hold 10x   CC rows Plate 17  2/15   CC  external rotation walkout  Plate 3  2/15       Corner pectoralis stretch 3/30"   Internal rotation pulley stretch/posterior capsule stretch 3/30"     Home Exercises and Education Provided     Education provided:   - Progress towards goals     Written Home Exercises Provided: Patient instructed to cont prior HEP.  Exercises were reviewed and Jesse was able to demonstrate them prior to the end of the session.  Jesse demonstrated good  understanding of the HEP provided.     See EMR under Patient Instructions for exercises provided prior visit.   Assessment   Pt has been attending OT x 4 months for treatment left shoulder stiffness, pain, weakness s/p RCR. Pt has been motivated and pleasant throughout course of care. Pt has range of motion and strength now within normal limits grossly. He is Independent and pain free with home exercise program, activities of daily living's, and IADL's. He has an improved FOTO score indicating increased functional use left upper extremity with self care tasks. Pt has met all goals. He no longer requires skilled services at this time. Plan to discharge to home exercise program.     Goals:  Long Term Goals to be met by discharge:  1) Independent with home exercise program -met  2) Pt will demonstrate (Left) shoulder AROM WFL grossly for Benedict with activities of daily living's -met  3) Pt will demonstrate (Left) shoulder MMT WFL grossly for Benedict with functional activities -Met  4) Independent and pain free with ADL's and IADL's -Met  5) Patient " will be able to achieve less than or equal to 25% on FOTO shoulder survey demonstrating overall improved functional ability with upper extremity. -Met    Plan   Discharge from OT     LEO Cardozo

## 2023-05-23 ENCOUNTER — CLINICAL SUPPORT (OUTPATIENT)
Dept: REHABILITATION | Facility: HOSPITAL | Age: 48
End: 2023-05-23
Payer: COMMERCIAL

## 2023-05-23 DIAGNOSIS — R53.1 WEAKNESS: ICD-10-CM

## 2023-05-23 DIAGNOSIS — Z74.09 STIFFNESS DUE TO IMMOBILITY: ICD-10-CM

## 2023-05-23 DIAGNOSIS — M25.60 STIFFNESS DUE TO IMMOBILITY: ICD-10-CM

## 2023-05-23 DIAGNOSIS — M79.602 PAIN OF LEFT UPPER EXTREMITY: Primary | ICD-10-CM

## 2023-05-23 PROCEDURE — 97110 THERAPEUTIC EXERCISES: CPT | Mod: PN

## 2023-05-23 PROCEDURE — 97140 MANUAL THERAPY 1/> REGIONS: CPT | Mod: PN

## 2023-05-24 ENCOUNTER — OFFICE VISIT (OUTPATIENT)
Dept: GASTROENTEROLOGY | Facility: CLINIC | Age: 48
End: 2023-05-24
Payer: COMMERCIAL

## 2023-05-24 VITALS — BODY MASS INDEX: 36.77 KG/M2 | WEIGHT: 248.25 LBS | HEIGHT: 69 IN

## 2023-05-24 DIAGNOSIS — R10.13 EPIGASTRIC PAIN: Primary | ICD-10-CM

## 2023-05-24 DIAGNOSIS — K21.9 GASTROESOPHAGEAL REFLUX DISEASE, UNSPECIFIED WHETHER ESOPHAGITIS PRESENT: ICD-10-CM

## 2023-05-24 PROCEDURE — 99214 OFFICE O/P EST MOD 30 MIN: CPT | Mod: S$GLB,,, | Performed by: NURSE PRACTITIONER

## 2023-05-24 PROCEDURE — 99214 PR OFFICE/OUTPT VISIT, EST, LEVL IV, 30-39 MIN: ICD-10-PCS | Mod: S$GLB,,, | Performed by: NURSE PRACTITIONER

## 2023-05-24 PROCEDURE — 3008F PR BODY MASS INDEX (BMI) DOCUMENTED: ICD-10-PCS | Mod: CPTII,S$GLB,, | Performed by: NURSE PRACTITIONER

## 2023-05-24 PROCEDURE — 99999 PR PBB SHADOW E&M-EST. PATIENT-LVL IV: CPT | Mod: PBBFAC,,, | Performed by: NURSE PRACTITIONER

## 2023-05-24 PROCEDURE — 1159F PR MEDICATION LIST DOCUMENTED IN MEDICAL RECORD: ICD-10-PCS | Mod: CPTII,S$GLB,, | Performed by: NURSE PRACTITIONER

## 2023-05-24 PROCEDURE — 99999 PR PBB SHADOW E&M-EST. PATIENT-LVL IV: ICD-10-PCS | Mod: PBBFAC,,, | Performed by: NURSE PRACTITIONER

## 2023-05-24 PROCEDURE — 3008F BODY MASS INDEX DOCD: CPT | Mod: CPTII,S$GLB,, | Performed by: NURSE PRACTITIONER

## 2023-05-24 PROCEDURE — 1159F MED LIST DOCD IN RCRD: CPT | Mod: CPTII,S$GLB,, | Performed by: NURSE PRACTITIONER

## 2023-05-24 RX ORDER — SUCRALFATE 1 G/1
1 TABLET ORAL 3 TIMES DAILY
Qty: 90 TABLET | Refills: 1 | Status: SHIPPED | OUTPATIENT
Start: 2023-05-24 | End: 2023-07-04

## 2023-05-24 RX ORDER — PANTOPRAZOLE SODIUM 40 MG/1
40 TABLET, DELAYED RELEASE ORAL DAILY
Qty: 30 TABLET | Refills: 11 | Status: SHIPPED | OUTPATIENT
Start: 2023-05-24 | End: 2024-03-05 | Stop reason: SDUPTHER

## 2023-05-24 NOTE — PATIENT INSTRUCTIONS
EGD Instructions    [facility] [address]  Clinic Office [phone]  Endoscopy Lab [phone]    You are scheduled for an EGD with Dr. Carcamo  on  5/30/23 at [facility] in [place].    Check in at the Hospital -1st floor, Information desk.   Call [phone] to reschedule.    You cannot have anything to eat or drink after Midnight. You can brush your teeth with a sip of water.     An adult friend/family member must come with you to drive you home.  You cannot drive, take a taxi, Uber/Lyft or bus to leave the Endoscopy Center alone.  If you do not have someone to drive you home, your test will be cancelled.     Please follow the directions of your doctor if you take any pills that thin your blood. If you take these meds: Aggrenox, Brilinta, Effient, Eliquis, Lovenox, Plavix, Pletal, Pradaxa, Ticilid, Xarelto or Coumadin, let the doctor's office know.    DON'T: On the morning of the test do not take insulin or pills for diabetes.     DO: On the morning of the test, do take any pills for blood pressure, heart, anti-rejection and or seizures with a small sip of water. Bring any inhalers with you.    Leave all valuables and jewelry at home. You will be at the hospital for 2-4 hours.    Call the Endoscopy department at [phone] with any questions about your procedure.    Thank you for choosing [facility].

## 2023-05-24 NOTE — PROGRESS NOTES
GASTROENTEROLOGY CLINIC NOTE    Chief Complaint: The primary encounter diagnosis was Epigastric pain. A diagnosis of Gastroesophageal reflux disease, unspecified whether esophagitis present was also pertinent to this visit.  Referring provider/PCP: Roxanne Wolfe MD    HPI:  Jesse Hernández is a 48 y.o. male who is a new patient to me without a significant GI PMH.  He is here today to establish care for hematochezia and colon cancer screening.  This is a new problem that began one day ago.  Patient reports he had a bowel movement last night with bright red blood noted in stool and in toilet bowl.  This prompted him to seek care at the emergency room.  CRUZ was performed and according to ER physician's note, faint amount of dried red blood noted with exam; no hemorrhoids or fissures noted.  Labs were also obtained and are stable.  Patient reports he had 3 bowel movements this morning.  First bowel movement was stool and he did not notice blood in his stool but did notice it with wiping.  This was followed by two episodes of feeling urge to have a bowel movement and passing of bright red blood without stool.  Denies constipation prior to start of symptoms.  No straining with bowel movements or abdominal pain.  No changes in his diet but does report eating boiled seafood yesterday prior to start of symptoms.     Of note, he is taking Mobic daily which was started a week ago.     Interval Note 5/24/2023  Jesse Hernández who is known to me presents to clinic today for heartbutn.  Ongoing for about 1& 1/2 years.   Experiencing daily epigastric and upper abdominal burning with nausea. No vomiting  Eating helps abdominal pain  He has tried Nexium without relief. Pepcid provides some temporary relief.     When do symptoms occur: random, eating helps symptoms  Better or worse with lying down, sitting, or standing: sitting and bending     Typical Symptoms:  Pyrosis - yes with nausea and frequent  belching  Regurgitation/Water Brash - no  Upright/Nocturnal symptoms - occasionally  Dysphagia/Odynophagia - occasionally will have dysphagia  PPI usage - Nexium, TUMS, Pepcid Complete (helps temporarily)    NSAID usage: no  Anticoagulation or Antiplatelet: No    Prior Upper Endoscopy: No  Prior Colonoscopy: 4/2022 with Dr. Carcamo  Impression:            - Non-bleeding internal hemorrhoids.                          - Diverticulosis in the sigmoid colon.                          - The examination was otherwise normal on direct                          and retroflexion views.                          - No specimens collected.     Recommendation:        - Discharge patient to home.                          - Patient has a contact number available for                          emergencies. The signs and symptoms of potential                          delayed complications were discussed with the                          patient. Return to normal activities tomorrow.                          Written discharge instructions were provided to                          the patient.                          - Resume previous diet.                          - Continue present medications.                          - Repeat colonoscopy in 10 years for screening                          purposes.                          - Use preparation H suppository nightly for 10                          days, then as needed. Use metamucil daily     Family h/o Colon Cancer: No  Family h/o Crohn's Disease or Ulcerative Colitis: No  Family h/o Celiac Sprue: No  Abdominal Surgeries: 2019 umbilical hernia repair; 2021 inguinal hernia repair    Review of Systems   Constitutional:  Negative for weight loss.   HENT:  Negative for sore throat.    Eyes:  Negative for blurred vision.   Respiratory:  Negative for cough.    Cardiovascular:  Negative for chest pain.   Gastrointestinal:  Positive for heartburn and nausea. Negative for abdominal pain, blood in  stool, constipation, diarrhea, melena and vomiting.   Genitourinary:  Negative for dysuria.   Musculoskeletal:  Negative for myalgias.   Skin:  Negative for rash.   Neurological:  Negative for headaches.   Endo/Heme/Allergies:  Negative for environmental allergies.   Psychiatric/Behavioral:  Negative for suicidal ideas. The patient is not nervous/anxious.      Past Medical History: has a past medical history of Hypertension and KAILEE (obstructive sleep apnea).    Past Surgical History: has a past surgical history that includes Back surgery; Spine surgery; Umbilical hernia repair (N/A, 11/25/2019); Hernia repair; Patent ductus arterious ligation; Robot-assisted laparoscopic repair of inguinal hernia (Left, 06/28/2021); Colonoscopy (N/A, 04/22/2022); and Tonsillectomy (When child).    Family History:family history includes COPD in his maternal aunt; Cancer in his mother; Dementia in his mother; Diabetes in his paternal grandmother; Drug abuse in his brother; Early death in his brother; Hearing loss in his father.    Allergies: Review of patient's allergies indicates:  No Known Allergies    Social History: reports that he has been smoking cigarettes. He has quit using smokeless tobacco. He reports current alcohol use of about 7.0 standard drinks per week. He reports that he does not use drugs.    Home medications:   Current Outpatient Medications on File Prior to Visit   Medication Sig Dispense Refill    DULoxetine (CYMBALTA) 20 MG capsule Take one tab of duloxetine 20 mg daily and duloxetine 60 mg daily (total 80 mg daily) 90 capsule 1    DULoxetine (CYMBALTA) 60 MG capsule Take 1 capsule (60 mg total) by mouth once daily. 90 capsule 1    fenofibrate 160 MG Tab TAKE 1 TABLET(160 MG) BY MOUTH EVERY DAY 90 tablet 1    lamoTRIgine (LAMICTAL) 150 MG Tab Take 2 tabs daily 180 tablet 2    levothyroxine (SYNTHROID) 75 MCG tablet Take 1 tablet (75 mcg total) by mouth before breakfast. 90 tablet 0    metoprolol succinate  "(TOPROL-XL) 100 MG 24 hr tablet TAKE 1 TABLET(100 MG) BY MOUTH EVERY DAY 90 tablet 3    [DISCONTINUED] loratadine (CLARITIN) 10 mg tablet Take 1 tablet (10 mg total) by mouth once daily. 30 tablet 2     No current facility-administered medications on file prior to visit.       Vital signs:  Ht 5' 9" (1.753 m)   Wt 112.6 kg (248 lb 3.8 oz)   BMI 36.66 kg/m²     Physical Exam  Vitals reviewed.   Constitutional:       General: He is not in acute distress.     Appearance: Normal appearance. He is not ill-appearing.   HENT:      Head: Normocephalic.   Cardiovascular:      Rate and Rhythm: Normal rate and regular rhythm.      Heart sounds: Normal heart sounds. No murmur heard.  Pulmonary:      Effort: Pulmonary effort is normal. No respiratory distress.      Breath sounds: Normal breath sounds.   Chest:      Chest wall: No tenderness.   Abdominal:      General: Bowel sounds are normal. There is no distension.      Palpations: Abdomen is soft.      Tenderness: There is no abdominal tenderness. Negative signs include Taveras's sign.      Hernia: No hernia is present.   Skin:     General: Skin is warm.   Neurological:      Mental Status: He is alert and oriented to person, place, and time.   Psychiatric:         Mood and Affect: Mood normal.         Behavior: Behavior normal.       Routine labs:  Lab Results   Component Value Date    WBC 7.97 12/01/2022    HGB 15.9 12/01/2022    HCT 49.6 12/01/2022    MCV 90 12/01/2022     12/01/2022     Lab Results   Component Value Date    INR 1.0 12/25/2019     No results found for: IRON, FERRITIN, TIBC, FESATURATED  Lab Results   Component Value Date     12/01/2022    K 4.4 12/01/2022     12/01/2022    CO2 26 12/01/2022    BUN 16 12/01/2022    CREATININE 1.0 12/01/2022     Lab Results   Component Value Date    ALBUMIN 3.9 12/01/2022    ALT 21 12/01/2022    AST 17 12/01/2022    ALKPHOS 50 (L) 12/01/2022    BILITOT 0.4 12/01/2022     No results found for: GLUCOSE  Lab " Results   Component Value Date    TSH 3.600 12/01/2022     Lab Results   Component Value Date    CALCIUM 10.1 12/01/2022       Imaging:    I have reviewed prior labs, imaging, and notes.      Assessment:  1. Epigastric pain    2. Gastroesophageal reflux disease, unspecified whether esophagitis present      Heartburn accompanied by epigastric and LUQ abdominal pain. Temporary improvement with Pepcid; no relief with Nexium  Pain improves with eating. No melena, vomiting, unexplained weight loss.   Negative H.pylori IgG 2015    Plan:  Orders Placed This Encounter    pantoprazole (PROTONIX) 40 MG tablet    sucralfate (CARAFATE) 1 gram tablet    Case Request Endoscopy: EGD (ESOPHAGOGASTRODUODENOSCOPY)     EGD.   Protonix 40mg daily.  Carafate 1g TID  Continue Pepcid PRN     Plan of care discussed with patient who is in agreement and verbalized understanding.     I have explained the planned procedures to the patient.The risks, benefits and alternatives of the procedure were also explained in detail. Patient verbalized understanding, all questions were answered. The patient agrees to proceed as planned    Follow Up: As Needed Pending Above Workup          Vika Miner, BEKA,FNP-BC  [facility]

## 2023-05-25 ENCOUNTER — TELEPHONE (OUTPATIENT)
Dept: ENDOSCOPY | Facility: HOSPITAL | Age: 48
End: 2023-05-25
Payer: COMMERCIAL

## 2023-05-25 NOTE — TELEPHONE ENCOUNTER
Spoke with patient about arrival time @ 1330.   EGD    NPO status reviewed: Patient must have nothing to eat after midnight.  Pt may have CLEAR liquids ONLY until completely NPO 3 hrs @ 1030.  Medications: Do not take Insulin or oral diabetic medications the day of the procedure.  Take as prescribed: heart, seizure and blood pressure medication in the morning with a sip of water (less than an ounce).  Take any breathing medications and bring inhalers to hospital with you Leave all valuables and jewelry at home.     Wear comfortable clothes to procedure to change into hospital gown You cannot drive for 24 hours after your procedure because you will receive sedation for your procedure to make you comfortable.  A ride must be provided at discharge.

## 2023-05-30 ENCOUNTER — ANESTHESIA EVENT (OUTPATIENT)
Dept: ENDOSCOPY | Facility: HOSPITAL | Age: 48
End: 2023-05-30
Payer: COMMERCIAL

## 2023-05-30 ENCOUNTER — ANESTHESIA (OUTPATIENT)
Dept: ENDOSCOPY | Facility: HOSPITAL | Age: 48
End: 2023-05-30
Payer: COMMERCIAL

## 2023-05-30 ENCOUNTER — HOSPITAL ENCOUNTER (OUTPATIENT)
Facility: HOSPITAL | Age: 48
Discharge: HOME | End: 2023-05-30
Attending: INTERNAL MEDICINE | Admitting: INTERNAL MEDICINE
Payer: COMMERCIAL

## 2023-05-30 VITALS
HEART RATE: 54 BPM | HEIGHT: 69 IN | BODY MASS INDEX: 36.73 KG/M2 | OXYGEN SATURATION: 100 % | DIASTOLIC BLOOD PRESSURE: 75 MMHG | TEMPERATURE: 98 F | RESPIRATION RATE: 18 BRPM | WEIGHT: 248 LBS | SYSTOLIC BLOOD PRESSURE: 110 MMHG

## 2023-05-30 DIAGNOSIS — R10.13 EPIGASTRIC PAIN: ICD-10-CM

## 2023-05-30 PROCEDURE — 27201012 HC FORCEPS, HOT/COLD, DISP: Performed by: INTERNAL MEDICINE

## 2023-05-30 PROCEDURE — D9220A PRA ANESTHESIA: Mod: CRNA,,, | Performed by: NURSE ANESTHETIST, CERTIFIED REGISTERED

## 2023-05-30 PROCEDURE — 43239 EGD BIOPSY SINGLE/MULTIPLE: CPT | Mod: ,,, | Performed by: INTERNAL MEDICINE

## 2023-05-30 PROCEDURE — D9220A PRA ANESTHESIA: ICD-10-PCS | Mod: CRNA,,, | Performed by: NURSE ANESTHETIST, CERTIFIED REGISTERED

## 2023-05-30 PROCEDURE — 63600175 PHARM REV CODE 636 W HCPCS: Performed by: NURSE ANESTHETIST, CERTIFIED REGISTERED

## 2023-05-30 PROCEDURE — 37000009 HC ANESTHESIA EA ADD 15 MINS: Performed by: INTERNAL MEDICINE

## 2023-05-30 PROCEDURE — 43239 PR EGD, FLEX, W/BIOPSY, SGL/MULTI: ICD-10-PCS | Mod: ,,, | Performed by: INTERNAL MEDICINE

## 2023-05-30 PROCEDURE — 25000003 PHARM REV CODE 250: Performed by: INTERNAL MEDICINE

## 2023-05-30 PROCEDURE — D9220A PRA ANESTHESIA: Mod: ANES,,, | Performed by: ANESTHESIOLOGY

## 2023-05-30 PROCEDURE — 94640 AIRWAY INHALATION TREATMENT: CPT

## 2023-05-30 PROCEDURE — 88305 TISSUE EXAM BY PATHOLOGIST: CPT | Mod: 59 | Performed by: STUDENT IN AN ORGANIZED HEALTH CARE EDUCATION/TRAINING PROGRAM

## 2023-05-30 PROCEDURE — 88305 TISSUE EXAM BY PATHOLOGIST: CPT | Mod: 26,,, | Performed by: STUDENT IN AN ORGANIZED HEALTH CARE EDUCATION/TRAINING PROGRAM

## 2023-05-30 PROCEDURE — 88305 TISSUE EXAM BY PATHOLOGIST: ICD-10-PCS | Mod: 26,,, | Performed by: STUDENT IN AN ORGANIZED HEALTH CARE EDUCATION/TRAINING PROGRAM

## 2023-05-30 PROCEDURE — 43239 EGD BIOPSY SINGLE/MULTIPLE: CPT | Performed by: INTERNAL MEDICINE

## 2023-05-30 PROCEDURE — 25000242 PHARM REV CODE 250 ALT 637 W/ HCPCS

## 2023-05-30 PROCEDURE — 25000003 PHARM REV CODE 250: Performed by: NURSE ANESTHETIST, CERTIFIED REGISTERED

## 2023-05-30 PROCEDURE — 37000008 HC ANESTHESIA 1ST 15 MINUTES: Performed by: INTERNAL MEDICINE

## 2023-05-30 PROCEDURE — D9220A PRA ANESTHESIA: ICD-10-PCS | Mod: ANES,,, | Performed by: ANESTHESIOLOGY

## 2023-05-30 RX ORDER — SODIUM CHLORIDE 0.9 % (FLUSH) 0.9 %
10 SYRINGE (ML) INJECTION
Status: DISCONTINUED | OUTPATIENT
Start: 2023-05-30 | End: 2023-05-30 | Stop reason: HOSPADM

## 2023-05-30 RX ORDER — PROPOFOL 10 MG/ML
VIAL (ML) INTRAVENOUS
Status: DISCONTINUED | OUTPATIENT
Start: 2023-05-30 | End: 2023-05-30

## 2023-05-30 RX ORDER — PROPOFOL 10 MG/ML
VIAL (ML) INTRAVENOUS CONTINUOUS PRN
Status: DISCONTINUED | OUTPATIENT
Start: 2023-05-30 | End: 2023-05-30

## 2023-05-30 RX ORDER — SODIUM CHLORIDE 9 MG/ML
INJECTION, SOLUTION INTRAVENOUS CONTINUOUS
Status: DISCONTINUED | OUTPATIENT
Start: 2023-05-30 | End: 2023-05-30 | Stop reason: HOSPADM

## 2023-05-30 RX ORDER — ALBUTEROL SULFATE 2.5 MG/.5ML
SOLUTION RESPIRATORY (INHALATION)
Status: COMPLETED
Start: 2023-05-30 | End: 2023-05-30

## 2023-05-30 RX ORDER — LIDOCAINE HYDROCHLORIDE 20 MG/ML
INJECTION INTRAVENOUS
Status: DISCONTINUED | OUTPATIENT
Start: 2023-05-30 | End: 2023-05-30

## 2023-05-30 RX ADMIN — PROPOFOL 100 MG: 10 INJECTION, EMULSION INTRAVENOUS at 03:05

## 2023-05-30 RX ADMIN — PROPOFOL 200 MCG/KG/MIN: 10 INJECTION, EMULSION INTRAVENOUS at 03:05

## 2023-05-30 RX ADMIN — ALBUTEROL SULFATE 2.5 MG: 2.5 SOLUTION RESPIRATORY (INHALATION) at 04:05

## 2023-05-30 RX ADMIN — SODIUM CHLORIDE: 0.9 INJECTION, SOLUTION INTRAVENOUS at 02:05

## 2023-05-30 RX ADMIN — GLYCOPYRROLATE 0.2 MG: 0.2 INJECTION, SOLUTION INTRAMUSCULAR; INTRAVITREAL at 03:05

## 2023-05-30 RX ADMIN — LIDOCAINE HYDROCHLORIDE 100 MG: 20 INJECTION, SOLUTION INTRAVENOUS at 03:05

## 2023-05-30 NOTE — TRANSFER OF CARE
"Anesthesia Transfer of Care Note    Patient: Jesse Hernández    Procedure(s) Performed: Procedure(s) (LRB):  EGD (ESOPHAGOGASTRODUODENOSCOPY) (N/A)    Patient location: GI    Anesthesia Type: general and other: gen natural airway    Transport from OR: Transported from OR on room air with adequate spontaneous ventilation    Post pain: adequate analgesia    Post assessment: no apparent anesthetic complications and tolerated procedure well    Post vital signs: stable    Level of consciousness: awake, alert and oriented    Nausea/Vomiting: no nausea/vomiting    Complications: none    Transfer of care protocol was followed      Last vitals:   Visit Vitals  BP (!) 115/50   Pulse 70   Temp 36.8 °C (98.2 °F) (Temporal)   Resp 18   Ht 5' 9" (1.753 m)   Wt 112.5 kg (248 lb)   SpO2 (!) 94%   BMI 36.62 kg/m²     "

## 2023-05-30 NOTE — PROVATION PATIENT INSTRUCTIONS
Discharge Summary/Instructions after an Endoscopic Procedure  Patient Name: Jesse Hernández  Patient MRN: [id number]  Patient YOB: 1975  Tuesday, May 30, 2023  Lamonte Carcamo MD  Dear patient,  As a result of recent federal legislation (The Federal Cures Act), you may   receive lab or pathology results from your procedure in your [facility]   account before your physician is able to contact you. Your physician or   their representative will relay the results to you with their   recommendations at their soonest availability.  Thank you,  Your health is very important to us during the Covid Crisis. Following your   procedure today, you will receive a daily text for 2 weeks asking about   signs or symptoms of Covid 19.  Please respond to this text when you   receive it so we can follow up and keep you as safe as possible.   RESTRICTIONS:  During your procedure today, you received medications for sedation.  These   medications may affect your judgment, balance and coordination.  Therefore,   for 24 hours, you have the following restrictions:   - DO NOT drive a car, operate machinery, make legal/financial decisions,   sign important papers or drink alcohol.    ACTIVITY:  Today: no heavy lifting, straining or running due to procedural   sedation/anesthesia.  The following day: return to full activity including work.  DIET:  Eat and drink normally unless instructed otherwise.     TREATMENT FOR COMMON SIDE EFFECTS:  - Mild abdominal pain, nausea, belching, bloating or excessive gas:  rest,   eat lightly and use a heating pad.  - Sore Throat: treat with throat lozenges and/or gargle with warm salt   water.  - Because air was used during the procedure, expelling large amounts of air   from your rectum or belching is normal.  - If a bowel prep was taken, you may not have a bowel movement for 1-3 days.    This is normal.  SYMPTOMS TO WATCH FOR AND REPORT TO YOUR PHYSICIAN:  1. Abdominal pain or bloating, other than gas  cramps.  2. Chest pain.  3. Back pain.  4. Signs of infection such as: chills or fever occurring within 24 hours   after the procedure.  5. Rectal bleeding, which would show as bright red, maroon, or black stools.   (A tablespoon of blood from the rectum is not serious, especially if   hemorrhoids are present.)  6. Vomiting.  7. Weakness or dizziness.  GO DIRECTLY TO THE NEAREST EMERGENCY ROOM IF YOU HAVE ANY OF THE FOLLOWING:      Difficulty breathing              Chills and/or fever over 101 F   Persistent vomiting and/or vomiting blood   Severe abdominal pain   Severe chest pain   Black, tarry stools   Bleeding- more than one tablespoon   Any other symptom or condition that you feel may need urgent attention  Your doctor recommends these additional instructions:  If any biopsies were taken, your doctors clinic will contact you in 1 to 2   weeks with any results.  - Discharge patient to home.   - Patient has a contact number available for emergencies.  The signs and   symptoms of potential delayed complications were discussed with the   patient.  Return to normal activities tomorrow.  Written discharge   instructions were provided to the patient.   - Resume previous diet.   - Continue present medications.   - Await pathology results.   - Return to nurse practitioner PRN.  For questions, problems or results please call your physician - Lamonte Carcamo MD.  EMERGENCY PHONE NUMBER: [phone],  LAB RESULTS: [phone]  IF A COMPLICATION OR EMERGENCY SITUATION ARISES AND YOU ARE UNABLE TO REACH   YOUR PHYSICIAN - GO DIRECTLY TO THE EMERGENCY ROOM.  Lamonte Carcamo MD  5/30/2023 3:41:45 PM  This report has been verified and signed electronically.  Dear patient,  As a result of recent federal legislation (The Federal Cures Act), you may   receive lab or pathology results from your procedure in your [facility]   account before your physician is able to contact you. Your physician or   their representative will  relay the results to you with their   recommendations at their soonest availability.  Thank you,  PROVATION

## 2023-05-30 NOTE — PLAN OF CARE
Pt is ok to discharge since feeling better after treatment per Dr. Waters.  Awaiting Dr. Carcamo to speak to patient before discharging him.

## 2023-05-30 NOTE — PLAN OF CARE
"Pt coughing, complains of inability to "clear my chest". Expiratory wheezing noted. Dr Larios notified. Respiratory treatment ordered.   "

## 2023-05-30 NOTE — ANESTHESIA PREPROCEDURE EVALUATION
05/30/2023  Jesse Hernández is a 48 y.o., male here for EGD    Past Medical History:   Diagnosis Date    Hypertension     KAILEE (obstructive sleep apnea)      Past Surgical History:   Procedure Laterality Date    BACK SURGERY      COLONOSCOPY N/A 04/22/2022    Procedure: COLONOSCOPY miralax prep;  Surgeon: Lamonte Carcamo MD;  Location: [facility] ENDO;  Service: Endoscopy;  Laterality: N/A;  Patient needs a rapid covid test.    HERNIA REPAIR      PATENT DUCTUS ARTERIOUS LIGATION      infancy    ROBOT-ASSISTED LAPAROSCOPIC REPAIR OF INGUINAL HERNIA Left 06/28/2021    Procedure: ROBOTIC REPAIR, HERNIA, INGUINAL;  Surgeon: Patrick Rehman Jr., MD;  Location: [facility] OR;  Service: General;  Laterality: Left;  Left vs bilateral    SPINE SURGERY      TONSILLECTOMY  When child    UMBILICAL HERNIA REPAIR N/A 11/25/2019    Procedure: REPAIR, HERNIA, UMBILICAL, AGE 5 YEARS OR OLDER - open with mesh;  Surgeon: Patrick Rehman Jr., MD;  Location: [facility] OR;  Service: General;  Laterality: N/A;       Pre-op Assessment    I have reviewed the Patient Summary Reports.     I have reviewed the Nursing Notes. I have reviewed the NPO Status.      Review of Systems  Anesthesia Hx:  History of prior surgery of interest to airway management or planning:  Denies Personal Hx of Anesthesia complications.   Cardiovascular:   Hypertension    Pulmonary:   Sleep Apnea    Hepatic/GI:   Liver Disease,    Endocrine:   Hypothyroidism  Obesity / BMI > 30  Psych:   Psychiatric History          Physical Exam  General: Well nourished    Airway:  Mallampati: II   Mouth Opening: Normal  Tongue: Normal  Neck ROM: Normal ROM        Anesthesia Plan  Type of Anesthesia, risks & benefits discussed:    Anesthesia Type: Gen Natural Airway  Informed Consent: Informed consent signed with the Patient and all parties understand the risks and agree  with anesthesia plan.  All questions answered.   ASA Score: 2    Ready For Surgery From Anesthesia Perspective.     .

## 2023-05-31 NOTE — ANESTHESIA POSTPROCEDURE EVALUATION
Anesthesia Post Evaluation    Patient: Jesse Hernández    Procedure(s) Performed: Procedure(s) (LRB):  EGD (ESOPHAGOGASTRODUODENOSCOPY) (N/A)    Final Anesthesia Type: general      Patient location during evaluation: PACU  Patient participation: Yes- Able to Participate  Level of consciousness: awake and alert  Post-procedure vital signs: reviewed and stable  Pain management: adequate  Airway patency: patent    PONV status at discharge: No PONV  Anesthetic complications: no      Cardiovascular status: stable  Respiratory status: spontaneous ventilation  Hydration status: euvolemic  Follow-up not needed.          Vitals Value Taken Time   /75 05/30/23 1631   Temp 36.8 °C (98.2 °F) 05/30/23 1545   Pulse 54 05/30/23 1631   Resp 18 05/30/23 1620   SpO2 100 % 05/30/23 1631         Event Time   Out of Recovery 16:45:29         Pain/Alex Score: Alex Score: 10 (5/30/2023  4:18 PM)

## 2023-06-06 LAB
FINAL PATHOLOGIC DIAGNOSIS: NORMAL
GROSS: NORMAL
Lab: NORMAL

## 2023-06-07 DIAGNOSIS — F41.1 GAD (GENERALIZED ANXIETY DISORDER): ICD-10-CM

## 2023-06-07 RX ORDER — DULOXETIN HYDROCHLORIDE 60 MG/1
60 CAPSULE, DELAYED RELEASE ORAL DAILY
Qty: 90 CAPSULE | Refills: 0 | Status: SHIPPED | OUTPATIENT
Start: 2023-06-07 | End: 2023-12-04 | Stop reason: SDUPTHER

## 2023-06-30 DIAGNOSIS — R10.13 EPIGASTRIC PAIN: ICD-10-CM

## 2023-06-30 DIAGNOSIS — K21.9 GASTROESOPHAGEAL REFLUX DISEASE, UNSPECIFIED WHETHER ESOPHAGITIS PRESENT: ICD-10-CM

## 2023-07-04 RX ORDER — SUCRALFATE 1 G/1
TABLET ORAL
Qty: 90 TABLET | Refills: 1 | Status: SHIPPED | OUTPATIENT
Start: 2023-07-04
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
## 2023-09-05 ENCOUNTER — PATIENT MESSAGE (OUTPATIENT)
Dept: INTERNAL MEDICINE | Facility: CLINIC | Age: 48
End: 2023-09-05
Payer: COMMERCIAL

## 2023-09-07 ENCOUNTER — LAB VISIT (OUTPATIENT)
Dept: LAB | Facility: HOSPITAL | Age: 48
End: 2023-09-07
Attending: INTERNAL MEDICINE
Payer: COMMERCIAL

## 2023-09-07 DIAGNOSIS — Z00.00 PREVENTATIVE HEALTH CARE: ICD-10-CM

## 2023-09-07 LAB
ALBUMIN SERPL BCP-MCNC: 3.8 G/DL (ref 3.5–5.2)
ALP SERPL-CCNC: 48 U/L (ref 55–135)
ALT SERPL W/O P-5'-P-CCNC: 39 U/L (ref 10–44)
ANION GAP SERPL CALC-SCNC: 12 MMOL/L (ref 8–16)
AST SERPL-CCNC: 25 U/L (ref 10–40)
BILIRUB SERPL-MCNC: 0.3 MG/DL (ref 0.1–1)
BUN SERPL-MCNC: 19 MG/DL (ref 6–20)
CALCIUM SERPL-MCNC: 9.1 MG/DL (ref 8.7–10.5)
CHLORIDE SERPL-SCNC: 106 MMOL/L (ref 95–110)
CHOLEST SERPL-MCNC: 241 MG/DL (ref 120–199)
CHOLEST/HDLC SERPL: 5.9 {RATIO} (ref 2–5)
CO2 SERPL-SCNC: 21 MMOL/L (ref 23–29)
CREAT SERPL-MCNC: 1.2 MG/DL (ref 0.5–1.4)
EST. GFR  (NO RACE VARIABLE): >60 ML/MIN/1.73 M^2
ESTIMATED AVG GLUCOSE: 108 MG/DL (ref 68–131)
GLUCOSE SERPL-MCNC: 104 MG/DL (ref 70–110)
HBA1C MFR BLD: 5.4 % (ref 4–5.6)
HCV AB SERPL QL IA: NORMAL
HDLC SERPL-MCNC: 41 MG/DL (ref 40–75)
HDLC SERPL: 17 % (ref 20–50)
HIV 1+2 AB+HIV1 P24 AG SERPL QL IA: NORMAL
LDLC SERPL CALC-MCNC: 151.2 MG/DL (ref 63–159)
NONHDLC SERPL-MCNC: 200 MG/DL
POTASSIUM SERPL-SCNC: 4.6 MMOL/L (ref 3.5–5.1)
PROT SERPL-MCNC: 6.5 G/DL (ref 6–8.4)
SODIUM SERPL-SCNC: 139 MMOL/L (ref 136–145)
TRIGL SERPL-MCNC: 244 MG/DL (ref 30–150)
TSH SERPL DL<=0.005 MIU/L-ACNC: 3.32 UIU/ML (ref 0.4–4)

## 2023-09-07 PROCEDURE — 36415 COLL VENOUS BLD VENIPUNCTURE: CPT | Mod: PO | Performed by: INTERNAL MEDICINE

## 2023-09-07 PROCEDURE — 87389 HIV-1 AG W/HIV-1&-2 AB AG IA: CPT | Performed by: INTERNAL MEDICINE

## 2023-09-07 PROCEDURE — 80061 LIPID PANEL: CPT | Performed by: INTERNAL MEDICINE

## 2023-09-07 PROCEDURE — 83036 HEMOGLOBIN GLYCOSYLATED A1C: CPT | Performed by: INTERNAL MEDICINE

## 2023-09-07 PROCEDURE — 86803 HEPATITIS C AB TEST: CPT | Performed by: INTERNAL MEDICINE

## 2023-09-07 PROCEDURE — 80053 COMPREHEN METABOLIC PANEL: CPT | Performed by: INTERNAL MEDICINE

## 2023-09-07 PROCEDURE — 84443 ASSAY THYROID STIM HORMONE: CPT | Performed by: INTERNAL MEDICINE

## 2023-09-11 ENCOUNTER — OFFICE VISIT (OUTPATIENT)
Dept: INTERNAL MEDICINE | Facility: CLINIC | Age: 48
End: 2023-09-11
Payer: COMMERCIAL

## 2023-09-11 VITALS — HEIGHT: 69 IN | WEIGHT: 258 LBS | BODY MASS INDEX: 38.21 KG/M2

## 2023-09-11 DIAGNOSIS — K76.0 FATTY LIVER: ICD-10-CM

## 2023-09-11 DIAGNOSIS — I10 PRIMARY HYPERTENSION: Primary | ICD-10-CM

## 2023-09-11 DIAGNOSIS — E78.1 HYPERTRIGLYCERIDEMIA: ICD-10-CM

## 2023-09-11 DIAGNOSIS — E66.9 OBESITY, CLASS II, BMI 35-39.9: ICD-10-CM

## 2023-09-11 DIAGNOSIS — Z00.00 PREVENTATIVE HEALTH CARE: ICD-10-CM

## 2023-09-11 DIAGNOSIS — E78.5 DYSLIPIDEMIA: ICD-10-CM

## 2023-09-11 DIAGNOSIS — I10 HYPERTENSION, UNSPECIFIED TYPE: ICD-10-CM

## 2023-09-11 DIAGNOSIS — E03.9 HYPOTHYROIDISM, UNSPECIFIED TYPE: ICD-10-CM

## 2023-09-11 PROBLEM — M25.60 STIFFNESS DUE TO IMMOBILITY: Status: RESOLVED | Noted: 2023-01-03 | Resolved: 2023-09-11

## 2023-09-11 PROBLEM — Z74.09 STIFFNESS DUE TO IMMOBILITY: Status: RESOLVED | Noted: 2023-01-03 | Resolved: 2023-09-11

## 2023-09-11 PROBLEM — M79.602 PAIN OF LEFT UPPER EXTREMITY: Status: RESOLVED | Noted: 2023-01-03 | Resolved: 2023-09-11

## 2023-09-11 PROBLEM — R53.1 WEAKNESS: Status: RESOLVED | Noted: 2023-01-03 | Resolved: 2023-09-11

## 2023-09-11 PROCEDURE — 99214 PR OFFICE/OUTPT VISIT, EST, LEVL IV, 30-39 MIN: ICD-10-PCS | Mod: 95,,, | Performed by: INTERNAL MEDICINE

## 2023-09-11 PROCEDURE — 3044F PR MOST RECENT HEMOGLOBIN A1C LEVEL <7.0%: ICD-10-PCS | Mod: CPTII,95,, | Performed by: INTERNAL MEDICINE

## 2023-09-11 PROCEDURE — 99214 OFFICE O/P EST MOD 30 MIN: CPT | Mod: 95,,, | Performed by: INTERNAL MEDICINE

## 2023-09-11 PROCEDURE — 1160F RVW MEDS BY RX/DR IN RCRD: CPT | Mod: CPTII,95,, | Performed by: INTERNAL MEDICINE

## 2023-09-11 PROCEDURE — 3008F BODY MASS INDEX DOCD: CPT | Mod: CPTII,95,, | Performed by: INTERNAL MEDICINE

## 2023-09-11 PROCEDURE — 1159F MED LIST DOCD IN RCRD: CPT | Mod: CPTII,95,, | Performed by: INTERNAL MEDICINE

## 2023-09-11 PROCEDURE — 1159F PR MEDICATION LIST DOCUMENTED IN MEDICAL RECORD: ICD-10-PCS | Mod: CPTII,95,, | Performed by: INTERNAL MEDICINE

## 2023-09-11 PROCEDURE — 3044F HG A1C LEVEL LT 7.0%: CPT | Mod: CPTII,95,, | Performed by: INTERNAL MEDICINE

## 2023-09-11 PROCEDURE — 3008F PR BODY MASS INDEX (BMI) DOCUMENTED: ICD-10-PCS | Mod: CPTII,95,, | Performed by: INTERNAL MEDICINE

## 2023-09-11 PROCEDURE — 1160F PR REVIEW ALL MEDS BY PRESCRIBER/CLIN PHARMACIST DOCUMENTED: ICD-10-PCS | Mod: CPTII,95,, | Performed by: INTERNAL MEDICINE

## 2023-09-11 RX ORDER — LATANOPROST 50 UG/ML
1 SOLUTION/ DROPS OPHTHALMIC
COMMUNITY
Start: 2023-08-25

## 2023-09-11 RX ORDER — FENOFIBRATE 160 MG/1
160 TABLET ORAL DAILY
Qty: 90 TABLET | Refills: 3 | Status: SHIPPED | OUTPATIENT
Start: 2023-09-11

## 2023-09-11 RX ORDER — LEVOTHYROXINE SODIUM 75 UG/1
75 TABLET ORAL
Qty: 90 TABLET | Refills: 3 | Status: SHIPPED | OUTPATIENT
Start: 2023-09-11 | End: 2023-10-04

## 2023-09-11 RX ORDER — METOPROLOL SUCCINATE 100 MG/1
100 TABLET, EXTENDED RELEASE ORAL DAILY
Qty: 90 TABLET | Refills: 3 | Status: SHIPPED | OUTPATIENT
Start: 2023-09-11

## 2023-09-11 NOTE — PROGRESS NOTES
Subjective     Patient ID: Jesse Hernández is a 48 y.o. male.    Chief Complaint: Hypertension    HPI 48-year-old male virtual video visit follow-up hypertension hypothyroidism dyslipidemia obesity patient has gained weight he is feeling uncomfortable with his increased weight gain.  History of also fatty liver.  He does report he feels like he is somewhat active not on any particular diet  Review of Systems  Otherwise negative     Objective     Physical Exam  General: Well-appearing, well-nourished.  No distress  HEENT: conjunctivae are normal.   Hearing is grossly normal.  Neck: Supple.  Visually No thyroid megaly.   Lungs:  respiratory effort normal.    Psych: Oriented to time person place.  Judgment and insight seem unimpaired.  Mood and affect are appropriate.     Assessment and Plan     1. Primary hypertension  Controlled.  Continue current medical regimen.  Prescription refills addressed.  Followup advised. See after visit summary.  2. Hypertriglyceridemia  -     fenofibrate 160 MG Tab; Take 1 tablet (160 mg total) by mouth once daily.  Dispense: 90 tablet; Refill: 3  Recommend compliance and weight loss counseled on eating healthy low sugar low starch  3. Hypothyroidism, unspecified type  -     levothyroxine (SYNTHROID) 75 MCG tablet; Take 1 tablet (75 mcg total) by mouth before breakfast.  Dispense: 90 tablet; Refill: 3  Controlled.  Continue current medical regimen.  Prescription refills addressed.  Followup advised. See after visit summary.  4. Obesity, Class II, BMI 35-39.9  -     Ambulatory referral/consult to Bariatric Medicine; Future; Expected date: 09/18/2023  See above  5. Fatty liver  Encouraged recommend weight loss  6. Dyslipidemia  See above  7. Preventative health care  -     CBC Auto Differential; Future  -     Comprehensive Metabolic Panel; Future  -     TSH; Future; Expected date: 09/11/2023  -     Lipid Panel; Future; Expected date: 09/11/2023  -     PSA, Screening; Future; Expected date:  09/11/2023  -     Hemoglobin A1C; Standing; Expected date: 09/11/2023  Scheduled  8. Hypertension, unspecified type  -     metoprolol succinate (TOPROL-XL) 100 MG 24 hr tablet; Take 1 tablet (100 mg total) by mouth once daily.  Dispense: 90 tablet; Refill: 3    The patient location is:  [facility]  The chief complaint leading to consultation is:  Hypertension hypothyroidism dyslipidemia    Visit type: audiovisual    Face to Face time with patient:  20  Twenty minutes of total time spent on the encounter, which includes face to face time and non-face to face time preparing to see the patient (eg, review of tests), Obtaining and/or reviewing separately obtained history, Documenting clinical information in the electronic or other health record, Independently interpreting results (not separately reported) and communicating results to the patient/family/caregiver, or Care coordination (not separately reported).         Each patient to whom he or she provides medical services by telemedicine is:  (1) informed of the relationship between the physician and patient and the respective role of any other health care provider with respect to management of the patient; and (2) notified that he or she may decline to receive medical services by telemedicine and may withdraw from such care at any time.    Notes:       Jesse was seen today for hypertension.    Diagnoses and all orders for this visit:    Primary hypertension    Hypertriglyceridemia  -     fenofibrate 160 MG Tab; Take 1 tablet (160 mg total) by mouth once daily.    Hypothyroidism, unspecified type  -     levothyroxine (SYNTHROID) 75 MCG tablet; Take 1 tablet (75 mcg total) by mouth before breakfast.    Obesity, Class II, BMI 35-39.9  -     Ambulatory referral/consult to Bariatric Medicine; Future    Fatty liver    Dyslipidemia    Preventative health care  -     CBC Auto Differential; Future  -     Comprehensive Metabolic Panel; Future  -     TSH; Future  -     Lipid Panel;  Future  -     PSA, Screening; Future  -     Hemoglobin A1C; Standing    Hypertension, unspecified type  -     metoprolol succinate (TOPROL-XL) 100 MG 24 hr tablet; Take 1 tablet (100 mg total) by mouth once daily.              Follow up in about 6 months (around 3/11/2024) for physical cbc tsh cmp lipids +/- Psa, HEMOGLOBIN A1C.

## 2023-09-13 ENCOUNTER — PATIENT MESSAGE (OUTPATIENT)
Dept: PSYCHIATRY | Facility: CLINIC | Age: 48
End: 2023-09-13
Payer: COMMERCIAL

## 2023-09-13 ENCOUNTER — PATIENT MESSAGE (OUTPATIENT)
Dept: INTERNAL MEDICINE | Facility: CLINIC | Age: 48
End: 2023-09-13
Payer: COMMERCIAL

## 2023-09-15 ENCOUNTER — TELEPHONE (OUTPATIENT)
Dept: SURGERY | Facility: CLINIC | Age: 48
End: 2023-09-15
Payer: COMMERCIAL

## 2023-09-24 ENCOUNTER — PATIENT MESSAGE (OUTPATIENT)
Dept: PSYCHIATRY | Facility: CLINIC | Age: 48
End: 2023-09-24
Payer: COMMERCIAL

## 2023-10-04 DIAGNOSIS — E03.9 HYPOTHYROIDISM, UNSPECIFIED TYPE: ICD-10-CM

## 2023-10-04 RX ORDER — LEVOTHYROXINE SODIUM 75 UG/1
75 TABLET ORAL
Qty: 90 TABLET | Refills: 3 | Status: SHIPPED | OUTPATIENT
Start: 2023-10-04

## 2023-10-05 NOTE — TELEPHONE ENCOUNTER
No care due was identified.  [facility] Embedded Care Due Messages. Reference number: [id number].   10/04/2023 8:38:43 PM CDT

## 2023-10-05 NOTE — TELEPHONE ENCOUNTER
Refill Decision Note   Jesse Betty  is requesting a refill authorization.  Brief Assessment and Rationale for Refill:  Approve     Medication Therapy Plan:         Comments:     Note composed:8:50 PM 10/04/2023

## 2023-10-28 ENCOUNTER — TELEPHONE (OUTPATIENT)
Dept: URGENT CARE | Facility: CLINIC | Age: 48
End: 2023-10-28
Payer: COMMERCIAL

## 2023-10-28 ENCOUNTER — OFFICE VISIT (OUTPATIENT)
Dept: URGENT CARE | Facility: CLINIC | Age: 48
End: 2023-10-28
Payer: COMMERCIAL

## 2023-10-28 VITALS
TEMPERATURE: 99 F | HEIGHT: 69 IN | HEART RATE: 66 BPM | SYSTOLIC BLOOD PRESSURE: 155 MMHG | OXYGEN SATURATION: 96 % | DIASTOLIC BLOOD PRESSURE: 97 MMHG | BODY MASS INDEX: 38.2 KG/M2 | RESPIRATION RATE: 20 BRPM | WEIGHT: 257.94 LBS

## 2023-10-28 DIAGNOSIS — S61.432A PUNCTURE WOUND OF LEFT PALM, INITIAL ENCOUNTER: Primary | ICD-10-CM

## 2023-10-28 PROCEDURE — 73130 X-RAY EXAM OF HAND: CPT | Mod: FY,LT,S$GLB, | Performed by: RADIOLOGY

## 2023-10-28 PROCEDURE — 99214 PR OFFICE/OUTPT VISIT, EST, LEVL IV, 30-39 MIN: ICD-10-PCS | Mod: S$GLB,,, | Performed by: NURSE PRACTITIONER

## 2023-10-28 PROCEDURE — 73130 XR HAND COMPLETE 3 VIEW LEFT: ICD-10-PCS | Mod: FY,LT,S$GLB, | Performed by: RADIOLOGY

## 2023-10-28 PROCEDURE — 99214 OFFICE O/P EST MOD 30 MIN: CPT | Mod: S$GLB,,, | Performed by: NURSE PRACTITIONER

## 2023-10-28 RX ORDER — MUPIROCIN 20 MG/G
OINTMENT TOPICAL 2 TIMES DAILY
Qty: 30 G | Refills: 0 | Status: SHIPPED | OUTPATIENT
Start: 2023-10-28 | End: 2023-11-02

## 2023-10-28 RX ORDER — ACETAMINOPHEN 500 MG
1000 TABLET ORAL
Status: COMPLETED | OUTPATIENT
Start: 2023-10-28 | End: 2023-10-28

## 2023-10-28 RX ORDER — CEPHALEXIN 500 MG/1
500 CAPSULE ORAL EVERY 12 HOURS
Qty: 10 CAPSULE | Refills: 0 | Status: SHIPPED | OUTPATIENT
Start: 2023-10-28 | End: 2023-11-02

## 2023-10-28 RX ADMIN — Medication 1000 MG: at 11:10

## 2023-10-28 NOTE — TELEPHONE ENCOUNTER
X-Ray Hand 3 view Left    Result Date: 10/28/2023  EXAMINATION: XR HAND COMPLETE 3 VIEW LEFT CLINICAL HISTORY: Puncture wound without foreign body of left hand, initial encounter TECHNIQUE: XR HAND COMPLETE 3 VIEW LEFT COMPARISON: None FINDINGS: No bone, joint, or soft tissue abnormality is seen.     See above Electronically signed by: Mendel Hernandez Jr Date:    10/28/2023 Time:    12:49      Discussed x ray results.

## 2023-10-28 NOTE — PROGRESS NOTES
"Subjective:      Patient ID: Jesse Hernández is a 48 y.o. male.    Vitals:  height is 5' 9" (1.753 m) and weight is 117 kg (257 lb 15 oz). His oral temperature is 98.6 °F (37 °C). His blood pressure is 155/97 (abnormal) and his pulse is 66. His respiration is 20 and oxygen saturation is 96%.     Chief Complaint: Laceration    Pt present to clinic with laceration on left hand happened today. Stated was trying to push the screw in and it got hit on his hand.  Provider note below:  This is a 48 y.o. male who presents today with a chief complaint of left hand injury, patient reports he was trying to push the screw but it was on drill machine and he did not realize that he hit his hand and was kept going and the drill hit the bone, tetanus up-to-date 2022, bleeding controlled, denies fever, body aches or chills, denies cough, wheezing or shortness of breath, denies nausea, vomiting, diarrhea or abdominal pain, denies chest pain or dizziness positional lightheadedness, denies sore throat or trouble swallowing, denies loss of taste or smell, or any other symptoms        Laceration   The incident occurred less than 1 hour ago. The laceration is located on the Left hand. The laceration is 1 cm in size. The laceration mechanism is unknown.The pain is at a severity of 7/10. The pain is moderate. The pain has been Constant since onset. It is unknown if a foreign body is present. His tetanus status is unknown.       Skin:  Positive for laceration. Negative for erythema.      Objective:     Physical Exam   Constitutional: He is oriented to person, place, and time. He appears well-developed.   HENT:   Head: Normocephalic and atraumatic. Head is without abrasion, without contusion and without laceration.   Ears:   Right Ear: External ear normal.   Left Ear: External ear normal.   Nose: Nose normal.   Mouth/Throat: Oropharynx is clear and moist and mucous membranes are normal.   Eyes: Conjunctivae, EOM and lids are normal. Pupils " are equal, round, and reactive to light.   Neck: Trachea normal and phonation normal. Neck supple.   Cardiovascular: Normal rate, regular rhythm and normal heart sounds.   Pulmonary/Chest: Effort normal and breath sounds normal. No stridor. No respiratory distress.   Musculoskeletal: Normal range of motion.         General: Normal range of motion.        Hands:       Comments: Puncture wound to left hand palmar thenar eminence, bleeding controlled, tenderness and swelling noted, no erythema noted  Full ROM intact of all fingers of left hand, Cap refill 2 seconds, left radial pulse 2+, sensation intact       Neurological: He is alert and oriented to person, place, and time.   Skin: Skin is warm, dry, intact and no rash. Capillary refill takes less than 2 seconds. No abrasion, No burn, No bruising, No erythema and No ecchymosis   Psychiatric: His speech is normal and behavior is normal. Judgment and thought content normal.   Nursing note and vitals reviewed.      X-Ray Hand 3 view Left    Result Date: 10/28/2023  EXAMINATION: XR HAND COMPLETE 3 VIEW LEFT CLINICAL HISTORY: Puncture wound without foreign body of left hand, initial encounter TECHNIQUE: XR HAND COMPLETE 3 VIEW LEFT COMPARISON: None FINDINGS: No bone, joint, or soft tissue abnormality is seen.     See above Electronically signed by: Mendel Hernandez Jr Date:    10/28/2023 Time:    12:49       Patient in no acute distress.  Vitals reassuring.  Discussed results/diagnosis/plan in depth with patient in clinic. Strict precautions given to patient to monitor for worsening signs and symptoms. Advised to follow up with primary.All questions answered. Strict ER precautions given. If your symptoms worsens or fail to improve you should go to the Emergency Room. Discharge and follow-up instructions given verbally/printed. Discharge and follow-up instructions discussed with the patient who expressed understanding and willingness to comply with my  recommendations.Patient voiced understanding and in agreement with current treatment plan.     Please be advised this text was dictated with [employer] software and may contain errors due to translation.    Assessment:     1. Puncture wound of left palm, initial encounter        Plan:       Puncture wound of left palm, initial encounter  -     X-Ray Hand 3 view Left; Future; Expected date: 10/28/2023  -     mupirocin (BACTROBAN) 2 % ointment; Apply topically 2 (two) times daily. for 5 days  Dispense: 30 g; Refill: 0  -     cephALEXin (KEFLEX) 500 MG capsule; Take 1 capsule (500 mg total) by mouth every 12 (twelve) hours. for 5 days  Dispense: 10 capsule; Refill: 0    Other orders  -     acetaminophen tablet 1,000 mg          Medical Decision Making:   Clinical Tests:   Radiological Study: Ordered and Reviewed  Urgent Care Management:  Patient in no acute distress.  Vitals reassuring.  On exam, patient is nontoxic appearing and afebrile.  Lungs CTA.  Puncture wound noted to left hand thenar eminence.  Swelling and tenderness noted.  No erythema noted.  No active bleeding noted.  Tetanus up-to-date.  X-ray results discussed with patient in detail.  Wound cleaned in clinic.  Medication prescribed and over-the-counter medication discussed with patient at length.  Proper hydration advised.  I reiterated the importance of further evaluation if no improvement symptoms and follow-up with primary.           Patient Instructions   PLEASE READ YOUR DISCHARGE INSTRUCTIONS ENTIRELY AS IT CONTAINS IMPORTANT INFORMATION.    Please take the antibiotics to completion.   Use the antibiotic ointment to the wound.       Please return or see your primary care doctor for signs of worsening infection (fever, worsening swelling/redness/pain, inability to move your extremity).    Please arrange follow up with your primary medical clinic as soon as possible. You must understand that you've received an Urgent Care treatment only and that you may  be released before all of your medical problems are known or treated. You, the patient, will arrange for follow up as instructed. If your symptoms worsen or fail to improve you should go to the Emergency Room.  WE CANNOT RULE OUT ALL POSSIBLE CAUSES OF YOUR SYMPTOMS IN THE URGENT CARE SETTING PLEASE GO TO THE ER IF YOU FEELS YOUR CONDITION IS WORSENING OR YOU WOULD LIKE EMERGENT EVALUATION.

## 2023-11-06 ENCOUNTER — PATIENT MESSAGE (OUTPATIENT)
Dept: PSYCHIATRY | Facility: CLINIC | Age: 48
End: 2023-11-06
Payer: COMMERCIAL

## 2023-11-06 RX ORDER — DULOXETIN HYDROCHLORIDE 20 MG/1
CAPSULE, DELAYED RELEASE ORAL
Qty: 90 CAPSULE | Refills: 0 | Status: SHIPPED | OUTPATIENT
Start: 2023-11-06 | End: 2023-12-04 | Stop reason: SDUPTHER

## 2023-11-06 RX ORDER — DULOXETIN HYDROCHLORIDE 20 MG/1
CAPSULE, DELAYED RELEASE ORAL
Qty: 90 CAPSULE | Refills: 0 | Status: SHIPPED | OUTPATIENT
Start: 2023-11-06 | End: 2023-11-06 | Stop reason: SDUPTHER

## 2023-11-30 ENCOUNTER — PATIENT MESSAGE (OUTPATIENT)
Dept: INTERNAL MEDICINE | Facility: CLINIC | Age: 48
End: 2023-11-30
Payer: COMMERCIAL

## 2023-11-30 DIAGNOSIS — Z91.89 AT INCREASED RISK FOR EXPOSURE TO INFLUENZA VIRUS: Primary | ICD-10-CM

## 2023-12-01 RX ORDER — OSELTAMIVIR PHOSPHATE 75 MG/1
75 CAPSULE ORAL DAILY
Qty: 7 CAPSULE | Refills: 0 | Status: SHIPPED | OUTPATIENT
Start: 2023-12-01 | End: 2023-12-08

## 2023-12-04 DIAGNOSIS — F41.1 GAD (GENERALIZED ANXIETY DISORDER): ICD-10-CM

## 2023-12-04 RX ORDER — DULOXETIN HYDROCHLORIDE 20 MG/1
CAPSULE, DELAYED RELEASE ORAL
Qty: 90 CAPSULE | Refills: 0 | Status: SHIPPED | OUTPATIENT
Start: 2023-12-04 | End: 2024-02-06

## 2023-12-04 RX ORDER — DULOXETIN HYDROCHLORIDE 60 MG/1
60 CAPSULE, DELAYED RELEASE ORAL DAILY
Qty: 90 CAPSULE | Refills: 0 | Status: SHIPPED | OUTPATIENT
Start: 2023-12-04 | End: 2023-12-04 | Stop reason: SDUPTHER

## 2023-12-04 RX ORDER — DULOXETIN HYDROCHLORIDE 60 MG/1
60 CAPSULE, DELAYED RELEASE ORAL DAILY
Qty: 90 CAPSULE | Refills: 0 | Status: SHIPPED | OUTPATIENT
Start: 2023-12-04 | End: 2024-03-05 | Stop reason: SDUPTHER

## 2024-01-31 NOTE — H&P (VIEW-ONLY)
GASTROENTEROLOGY CLINIC NOTE    Chief Complaint: The primary encounter diagnosis was Epigastric pain. A diagnosis of Gastroesophageal reflux disease, unspecified whether esophagitis present was also pertinent to this visit.  Referring provider/PCP: Roxanne Wolfe MD    HPI:  Jesse Hernández is a 48 y.o. male who is a new patient to me without a significant GI PMH.  He is here today to establish care for hematochezia and colon cancer screening.  This is a new problem that began one day ago.  Patient reports he had a bowel movement last night with bright red blood noted in stool and in toilet bowl.  This prompted him to seek care at the emergency room.  CRUZ was performed and according to ER physician's note, faint amount of dried red blood noted with exam; no hemorrhoids or fissures noted.  Labs were also obtained and are stable.  Patient reports he had 3 bowel movements this morning.  First bowel movement was stool and he did not notice blood in his stool but did notice it with wiping.  This was followed by two episodes of feeling urge to have a bowel movement and passing of bright red blood without stool.  Denies constipation prior to start of symptoms.  No straining with bowel movements or abdominal pain.  No changes in his diet but does report eating boiled seafood yesterday prior to start of symptoms.     Of note, he is taking Mobic daily which was started a week ago.     Interval Note 5/24/2023  Jesse Hernández who is known to me presents to clinic today for heartbutn.  Ongoing for about 1& 1/2 years.   Experiencing daily epigastric and upper abdominal burning with nausea. No vomiting  Eating helps abdominal pain  He has tried Nexium without relief. Pepcid provides some temporary relief.     When do symptoms occur: random, eating helps symptoms  Better or worse with lying down, sitting, or standing: sitting and bending     Typical Symptoms:  Pyrosis - yes with nausea and frequent  belching  Regurgitation/Water Brash - no  Upright/Nocturnal symptoms - occasionally  Dysphagia/Odynophagia - occasionally will have dysphagia  PPI usage - Nexium, TUMS, Pepcid Complete (helps temporarily)    NSAID usage: no  Anticoagulation or Antiplatelet: No    Prior Upper Endoscopy: No  Prior Colonoscopy: 4/2022 with Dr. Carcamo  Impression:            - Non-bleeding internal hemorrhoids.                          - Diverticulosis in the sigmoid colon.                          - The examination was otherwise normal on direct                          and retroflexion views.                          - No specimens collected.     Recommendation:        - Discharge patient to home.                          - Patient has a contact number available for                          emergencies. The signs and symptoms of potential                          delayed complications were discussed with the                          patient. Return to normal activities tomorrow.                          Written discharge instructions were provided to                          the patient.                          - Resume previous diet.                          - Continue present medications.                          - Repeat colonoscopy in 10 years for screening                          purposes.                          - Use preparation H suppository nightly for 10                          days, then as needed. Use metamucil daily     Family h/o Colon Cancer: No  Family h/o Crohn's Disease or Ulcerative Colitis: No  Family h/o Celiac Sprue: No  Abdominal Surgeries: 2019 umbilical hernia repair; 2021 inguinal hernia repair    Review of Systems   Constitutional:  Negative for weight loss.   HENT:  Negative for sore throat.    Eyes:  Negative for blurred vision.   Respiratory:  Negative for cough.    Cardiovascular:  Negative for chest pain.   Gastrointestinal:  Positive for heartburn and nausea. Negative for abdominal pain, blood in  stool, constipation, diarrhea, melena and vomiting.   Genitourinary:  Negative for dysuria.   Musculoskeletal:  Negative for myalgias.   Skin:  Negative for rash.   Neurological:  Negative for headaches.   Endo/Heme/Allergies:  Negative for environmental allergies.   Psychiatric/Behavioral:  Negative for suicidal ideas. The patient is not nervous/anxious.      Past Medical History: has a past medical history of Hypertension and KAILEE (obstructive sleep apnea).    Past Surgical History: has a past surgical history that includes Back surgery; Spine surgery; Umbilical hernia repair (N/A, 11/25/2019); Hernia repair; Patent ductus arterious ligation; Robot-assisted laparoscopic repair of inguinal hernia (Left, 06/28/2021); Colonoscopy (N/A, 04/22/2022); and Tonsillectomy (When child).    Family History:family history includes COPD in his maternal aunt; Cancer in his mother; Dementia in his mother; Diabetes in his paternal grandmother; Drug abuse in his brother; Early death in his brother; Hearing loss in his father.    Allergies: Review of patient's allergies indicates:  No Known Allergies    Social History: reports that he has been smoking cigarettes. He has quit using smokeless tobacco. He reports current alcohol use of about 7.0 standard drinks per week. He reports that he does not use drugs.    Home medications:   Current Outpatient Medications on File Prior to Visit   Medication Sig Dispense Refill    DULoxetine (CYMBALTA) 20 MG capsule Take one tab of duloxetine 20 mg daily and duloxetine 60 mg daily (total 80 mg daily) 90 capsule 1    DULoxetine (CYMBALTA) 60 MG capsule Take 1 capsule (60 mg total) by mouth once daily. 90 capsule 1    fenofibrate 160 MG Tab TAKE 1 TABLET(160 MG) BY MOUTH EVERY DAY 90 tablet 1    lamoTRIgine (LAMICTAL) 150 MG Tab Take 2 tabs daily 180 tablet 2    levothyroxine (SYNTHROID) 75 MCG tablet Take 1 tablet (75 mcg total) by mouth before breakfast. 90 tablet 0    metoprolol succinate  "(TOPROL-XL) 100 MG 24 hr tablet TAKE 1 TABLET(100 MG) BY MOUTH EVERY DAY 90 tablet 3    [DISCONTINUED] loratadine (CLARITIN) 10 mg tablet Take 1 tablet (10 mg total) by mouth once daily. 30 tablet 2     No current facility-administered medications on file prior to visit.       Vital signs:  Ht 5' 9" (1.753 m)   Wt 112.6 kg (248 lb 3.8 oz)   BMI 36.66 kg/m²     Physical Exam  Vitals reviewed.   Constitutional:       General: He is not in acute distress.     Appearance: Normal appearance. He is not ill-appearing.   HENT:      Head: Normocephalic.   Cardiovascular:      Rate and Rhythm: Normal rate and regular rhythm.      Heart sounds: Normal heart sounds. No murmur heard.  Pulmonary:      Effort: Pulmonary effort is normal. No respiratory distress.      Breath sounds: Normal breath sounds.   Chest:      Chest wall: No tenderness.   Abdominal:      General: Bowel sounds are normal. There is no distension.      Palpations: Abdomen is soft.      Tenderness: There is no abdominal tenderness. Negative signs include Taveras's sign.      Hernia: No hernia is present.   Skin:     General: Skin is warm.   Neurological:      Mental Status: He is alert and oriented to person, place, and time.   Psychiatric:         Mood and Affect: Mood normal.         Behavior: Behavior normal.       Routine labs:  Lab Results   Component Value Date    WBC 7.97 12/01/2022    HGB 15.9 12/01/2022    HCT 49.6 12/01/2022    MCV 90 12/01/2022     12/01/2022     Lab Results   Component Value Date    INR 1.0 12/25/2019     No results found for: IRON, FERRITIN, TIBC, FESATURATED  Lab Results   Component Value Date     12/01/2022    K 4.4 12/01/2022     12/01/2022    CO2 26 12/01/2022    BUN 16 12/01/2022    CREATININE 1.0 12/01/2022     Lab Results   Component Value Date    ALBUMIN 3.9 12/01/2022    ALT 21 12/01/2022    AST 17 12/01/2022    ALKPHOS 50 (L) 12/01/2022    BILITOT 0.4 12/01/2022     No results found for: GLUCOSE  Lab " Results   Component Value Date    TSH 3.600 12/01/2022     Lab Results   Component Value Date    CALCIUM 10.1 12/01/2022       Imaging:    I have reviewed prior labs, imaging, and notes.      Assessment:  1. Epigastric pain    2. Gastroesophageal reflux disease, unspecified whether esophagitis present      Heartburn accompanied by epigastric and LUQ abdominal pain. Temporary improvement with Pepcid; no relief with Nexium  Pain improves with eating. No melena, vomiting, unexplained weight loss.   Negative H.pylori IgG 2015    Plan:  Orders Placed This Encounter    pantoprazole (PROTONIX) 40 MG tablet    sucralfate (CARAFATE) 1 gram tablet    Case Request Endoscopy: EGD (ESOPHAGOGASTRODUODENOSCOPY)     EGD.   Protonix 40mg daily.  Carafate 1g TID  Continue Pepcid PRN     Plan of care discussed with patient who is in agreement and verbalized understanding.     I have explained the planned procedures to the patient.The risks, benefits and alternatives of the procedure were also explained in detail. Patient verbalized understanding, all questions were answered. The patient agrees to proceed as planned    Follow Up: As Needed Pending Above Workup          Vika Miner, BEKA,FNP-BC  [facility]         [Annual Wellness Visit] : an annual wellness visit

## 2024-02-06 ENCOUNTER — PATIENT MESSAGE (OUTPATIENT)
Dept: PSYCHIATRY | Facility: CLINIC | Age: 49
End: 2024-02-06
Payer: COMMERCIAL

## 2024-02-06 RX ORDER — DULOXETIN HYDROCHLORIDE 20 MG/1
CAPSULE, DELAYED RELEASE ORAL
Qty: 90 CAPSULE | Refills: 0 | Status: SHIPPED | OUTPATIENT
Start: 2024-02-06 | End: 2024-03-05 | Stop reason: SDUPTHER

## 2024-03-05 ENCOUNTER — PATIENT MESSAGE (OUTPATIENT)
Dept: PSYCHIATRY | Facility: CLINIC | Age: 49
End: 2024-03-05
Payer: COMMERCIAL

## 2024-03-05 DIAGNOSIS — F41.1 GAD (GENERALIZED ANXIETY DISORDER): ICD-10-CM

## 2024-03-05 DIAGNOSIS — R10.13 EPIGASTRIC PAIN: ICD-10-CM

## 2024-03-05 DIAGNOSIS — K21.9 GASTROESOPHAGEAL REFLUX DISEASE, UNSPECIFIED WHETHER ESOPHAGITIS PRESENT: ICD-10-CM

## 2024-03-05 RX ORDER — DULOXETIN HYDROCHLORIDE 20 MG/1
CAPSULE, DELAYED RELEASE ORAL
Qty: 90 CAPSULE | Refills: 0 | Status: SHIPPED | OUTPATIENT
Start: 2024-03-05 | End: 2024-03-05 | Stop reason: SDUPTHER

## 2024-03-05 RX ORDER — LAMOTRIGINE 150 MG/1
TABLET ORAL
Qty: 180 TABLET | Refills: 0 | Status: SHIPPED | OUTPATIENT
Start: 2024-03-05 | End: 2024-06-05

## 2024-03-05 RX ORDER — DULOXETIN HYDROCHLORIDE 60 MG/1
60 CAPSULE, DELAYED RELEASE ORAL DAILY
Qty: 90 CAPSULE | Refills: 0 | Status: SHIPPED | OUTPATIENT
Start: 2024-03-05 | End: 2024-03-05 | Stop reason: SDUPTHER

## 2024-03-05 RX ORDER — DULOXETIN HYDROCHLORIDE 60 MG/1
60 CAPSULE, DELAYED RELEASE ORAL DAILY
Qty: 90 CAPSULE | Refills: 0 | Status: SHIPPED | OUTPATIENT
Start: 2024-03-05 | End: 2024-06-03 | Stop reason: SDUPTHER

## 2024-03-05 RX ORDER — PANTOPRAZOLE SODIUM 40 MG/1
40 TABLET, DELAYED RELEASE ORAL DAILY
Qty: 30 TABLET | Refills: 11 | Status: SHIPPED | OUTPATIENT
Start: 2024-03-05 | End: 2024-05-29 | Stop reason: SDUPTHER

## 2024-03-05 RX ORDER — DULOXETIN HYDROCHLORIDE 20 MG/1
CAPSULE, DELAYED RELEASE ORAL
Qty: 90 CAPSULE | Refills: 0 | Status: SHIPPED | OUTPATIENT
Start: 2024-03-05 | End: 2024-06-03 | Stop reason: SDUPTHER

## 2024-03-11 ENCOUNTER — LAB VISIT (OUTPATIENT)
Dept: LAB | Facility: HOSPITAL | Age: 49
End: 2024-03-11
Attending: INTERNAL MEDICINE
Payer: COMMERCIAL

## 2024-03-11 DIAGNOSIS — Z00.00 PREVENTATIVE HEALTH CARE: ICD-10-CM

## 2024-03-11 LAB
ALBUMIN SERPL BCP-MCNC: 3.9 G/DL (ref 3.5–5.2)
ALP SERPL-CCNC: 44 U/L (ref 55–135)
ALT SERPL W/O P-5'-P-CCNC: 44 U/L (ref 10–44)
ANION GAP SERPL CALC-SCNC: 9 MMOL/L (ref 8–16)
AST SERPL-CCNC: 26 U/L (ref 10–40)
BASOPHILS # BLD AUTO: 0.07 K/UL (ref 0–0.2)
BASOPHILS NFR BLD: 1 % (ref 0–1.9)
BILIRUB SERPL-MCNC: 0.4 MG/DL (ref 0.1–1)
BUN SERPL-MCNC: 18 MG/DL (ref 6–20)
CALCIUM SERPL-MCNC: 9.7 MG/DL (ref 8.7–10.5)
CHLORIDE SERPL-SCNC: 107 MMOL/L (ref 95–110)
CHOLEST SERPL-MCNC: 260 MG/DL (ref 120–199)
CHOLEST/HDLC SERPL: 6.2 {RATIO} (ref 2–5)
CO2 SERPL-SCNC: 25 MMOL/L (ref 23–29)
COMPLEXED PSA SERPL-MCNC: 0.37 NG/ML (ref 0–4)
CREAT SERPL-MCNC: 1.1 MG/DL (ref 0.5–1.4)
DIFFERENTIAL METHOD BLD: ABNORMAL
EOSINOPHIL # BLD AUTO: 0.2 K/UL (ref 0–0.5)
EOSINOPHIL NFR BLD: 2.2 % (ref 0–8)
ERYTHROCYTE [DISTWIDTH] IN BLOOD BY AUTOMATED COUNT: 13.8 % (ref 11.5–14.5)
EST. GFR  (NO RACE VARIABLE): >60 ML/MIN/1.73 M^2
ESTIMATED AVG GLUCOSE: 111 MG/DL (ref 68–131)
GLUCOSE SERPL-MCNC: 98 MG/DL (ref 70–110)
HBA1C MFR BLD: 5.5 % (ref 4–5.6)
HCT VFR BLD AUTO: 46.4 % (ref 40–54)
HDLC SERPL-MCNC: 42 MG/DL (ref 40–75)
HDLC SERPL: 16.2 % (ref 20–50)
HGB BLD-MCNC: 15.2 G/DL (ref 14–18)
IMM GRANULOCYTES # BLD AUTO: 0.05 K/UL (ref 0–0.04)
IMM GRANULOCYTES NFR BLD AUTO: 0.7 % (ref 0–0.5)
LDLC SERPL CALC-MCNC: 177.6 MG/DL (ref 63–159)
LYMPHOCYTES # BLD AUTO: 2.1 K/UL (ref 1–4.8)
LYMPHOCYTES NFR BLD: 28.8 % (ref 18–48)
MCH RBC QN AUTO: 29.3 PG (ref 27–31)
MCHC RBC AUTO-ENTMCNC: 32.8 G/DL (ref 32–36)
MCV RBC AUTO: 90 FL (ref 82–98)
MONOCYTES # BLD AUTO: 0.5 K/UL (ref 0.3–1)
MONOCYTES NFR BLD: 7 % (ref 4–15)
NEUTROPHILS # BLD AUTO: 4.3 K/UL (ref 1.8–7.7)
NEUTROPHILS NFR BLD: 60.3 % (ref 38–73)
NONHDLC SERPL-MCNC: 218 MG/DL
NRBC BLD-RTO: 0 /100 WBC
PLATELET # BLD AUTO: 301 K/UL (ref 150–450)
PMV BLD AUTO: 10.3 FL (ref 9.2–12.9)
POTASSIUM SERPL-SCNC: 5 MMOL/L (ref 3.5–5.1)
PROT SERPL-MCNC: 6.6 G/DL (ref 6–8.4)
RBC # BLD AUTO: 5.18 M/UL (ref 4.6–6.2)
SODIUM SERPL-SCNC: 141 MMOL/L (ref 136–145)
T4 FREE SERPL-MCNC: 0.87 NG/DL (ref 0.71–1.51)
TRIGL SERPL-MCNC: 202 MG/DL (ref 30–150)
TSH SERPL DL<=0.005 MIU/L-ACNC: 4.53 UIU/ML (ref 0.4–4)
WBC # BLD AUTO: 7.16 K/UL (ref 3.9–12.7)

## 2024-03-11 PROCEDURE — 84443 ASSAY THYROID STIM HORMONE: CPT | Performed by: INTERNAL MEDICINE

## 2024-03-11 PROCEDURE — 83036 HEMOGLOBIN GLYCOSYLATED A1C: CPT | Performed by: INTERNAL MEDICINE

## 2024-03-11 PROCEDURE — 84439 ASSAY OF FREE THYROXINE: CPT | Performed by: INTERNAL MEDICINE

## 2024-03-11 PROCEDURE — 84153 ASSAY OF PSA TOTAL: CPT | Performed by: INTERNAL MEDICINE

## 2024-03-11 PROCEDURE — 85025 COMPLETE CBC W/AUTO DIFF WBC: CPT | Performed by: INTERNAL MEDICINE

## 2024-03-11 PROCEDURE — 36415 COLL VENOUS BLD VENIPUNCTURE: CPT | Mod: PO | Performed by: INTERNAL MEDICINE

## 2024-03-11 PROCEDURE — 80053 COMPREHEN METABOLIC PANEL: CPT | Performed by: INTERNAL MEDICINE

## 2024-03-11 PROCEDURE — 80061 LIPID PANEL: CPT | Performed by: INTERNAL MEDICINE

## 2024-03-18 ENCOUNTER — OFFICE VISIT (OUTPATIENT)
Dept: INTERNAL MEDICINE | Facility: CLINIC | Age: 49
End: 2024-03-18
Payer: COMMERCIAL

## 2024-03-18 VITALS
DIASTOLIC BLOOD PRESSURE: 88 MMHG | OXYGEN SATURATION: 98 % | WEIGHT: 264.13 LBS | SYSTOLIC BLOOD PRESSURE: 128 MMHG | HEIGHT: 69 IN | BODY MASS INDEX: 39.12 KG/M2 | HEART RATE: 56 BPM

## 2024-03-18 DIAGNOSIS — E78.5 DYSLIPIDEMIA: ICD-10-CM

## 2024-03-18 DIAGNOSIS — G47.33 OSA (OBSTRUCTIVE SLEEP APNEA): ICD-10-CM

## 2024-03-18 DIAGNOSIS — E03.9 HYPOTHYROIDISM, UNSPECIFIED TYPE: ICD-10-CM

## 2024-03-18 DIAGNOSIS — I10 PRIMARY HYPERTENSION: ICD-10-CM

## 2024-03-18 DIAGNOSIS — Z00.00 PREVENTATIVE HEALTH CARE: Primary | ICD-10-CM

## 2024-03-18 DIAGNOSIS — F33.9 EPISODE OF RECURRENT MAJOR DEPRESSIVE DISORDER, UNSPECIFIED DEPRESSION EPISODE SEVERITY: ICD-10-CM

## 2024-03-18 DIAGNOSIS — E27.8 ADRENAL NODULE: ICD-10-CM

## 2024-03-18 DIAGNOSIS — E66.01 SEVERE OBESITY (BMI 35.0-39.9) WITH COMORBIDITY: ICD-10-CM

## 2024-03-18 PROCEDURE — 1160F RVW MEDS BY RX/DR IN RCRD: CPT | Mod: CPTII,S$GLB,, | Performed by: INTERNAL MEDICINE

## 2024-03-18 PROCEDURE — 3008F BODY MASS INDEX DOCD: CPT | Mod: CPTII,S$GLB,, | Performed by: INTERNAL MEDICINE

## 2024-03-18 PROCEDURE — 99396 PREV VISIT EST AGE 40-64: CPT | Mod: S$GLB,,, | Performed by: INTERNAL MEDICINE

## 2024-03-18 PROCEDURE — 1159F MED LIST DOCD IN RCRD: CPT | Mod: CPTII,S$GLB,, | Performed by: INTERNAL MEDICINE

## 2024-03-18 PROCEDURE — 3074F SYST BP LT 130 MM HG: CPT | Mod: CPTII,S$GLB,, | Performed by: INTERNAL MEDICINE

## 2024-03-18 PROCEDURE — 99999 PR PBB SHADOW E&M-EST. PATIENT-LVL V: CPT | Mod: PBBFAC,,, | Performed by: INTERNAL MEDICINE

## 2024-03-18 PROCEDURE — 3044F HG A1C LEVEL LT 7.0%: CPT | Mod: CPTII,S$GLB,, | Performed by: INTERNAL MEDICINE

## 2024-03-18 PROCEDURE — 3079F DIAST BP 80-89 MM HG: CPT | Mod: CPTII,S$GLB,, | Performed by: INTERNAL MEDICINE

## 2024-03-18 NOTE — PROGRESS NOTES
Patient ID: Jesse Hernández is a 49 y.o. male    Chief Complaint: Annual Exam    History of Present Illness  The patient is a 49-year-old male who is coming in today for his annual physical.    He feels tired all the time. His sleep is mixed. His CPAP tells him how many times he wakes up at night. He sometimes wakes up 4.5 times an hour and sometimes it is 0.6. He is not interested in getting it checked yet.    His lower back does not hurt other than he feels it coming across the right side of his buttock cheek and his whole leg will hurt. It seems to happen most times when he is laying down where his leg just hurts. There are times throughout the day sometimes. It did not bother him last night. He has started a new job. He had back surgery on his right L4-L5 in 2009. He does not feel any real pain in his back.    He went to get his thyroid medications, but some of them got wet. He does not think it was a whole bottle. This was a 90-day supply. . He does not need prescription refills. He is on fenofibrate.    He does not check his blood pressure at home. He has not seen any high numbers with it.    ROS: Otherwise Negative     Physical Exam  General: Well-appearing, well-nourished.  No distress  HEENT: conjunctivae are normal.  Pupils are equal and reative to light.  TM's are clear and intact bilaterally.  Hearing is grossly normal.  Nasopharynx is clear.  Oropharynx is clear.  Neck: Supple.  No thyroid megaly.  No bruits.  Lymph: No cervical or supraclavicular adenopathy.  Heart: Regular rate and rhythm, without murmur, rub or gallop.  Lungs: Clear to auscultation; respiratory effort normal.  Abdomen: Soft, nontender, nondistended.  Normoactive bowel sounds.  No hepatomegaly.  No masses.  Extremities: Good distal pulses.  No edema.  Psych: Oriented to time person place.  Judgment and insight seem unimpaired.  Mood and affect are appropriate.    Results  Laboratory Studies  Labs from 03/11/2024 were reviewed with the  patient.    Imaging  X-rays were reviewed with the patient.    Assessment & Plan  1. Sleep apnea.  He will continue to follow up with his sleep specialist.    2. Lower back pain.  His x-rays showed some arthritis and degeneration of his back and disks. He was recommended to do core and back strengthening exercises.    3. Hypothyroidism.  His thyroid hormone is normal. We will hold his thyroid medication. I will reassess his thyroid level at his next visit.    4. Dyslipidemia.  His cholesterol is 260 with an LDL of 177. He was recommended to watch his heavy creams, saturated fats, fried foods, fast foods, and heavy foods. At some point, for management of dyslipidemia, we may need to put him on a statin medication.    5. Hypertension.  Fairly well controlled. He will continue his medication. He will periodically check his blood pressure outside of the office.    6. Obstructive sleep apnea.  He will continue to follow up with his sleep specialist.    Follow-up  The patient will follow up in 1 year.      Follow up in about 6 months (around 9/18/2024) for HTN cmp lipids, Thyroid TSH.    Jesse was seen today for annual exam.    Diagnoses and all orders for this visit:    Preventative health care  Healthcare maintenance performed, reviewed, updated and counseled today.  KAILEE (obstructive sleep apnea)  -     Ambulatory referral/consult to Sleep Disorders; Future    Hypothyroidism, unspecified type  -     TSH; Standing  Controlled.  Continue current medical regimen.  Prescription refills addressed.  Followup advised. See after visit summary.  Will review TSH and free T4 at next visit to see if adjustment is required  Primary hypertension  Controlled.  Continue current medical regimen.  Prescription refills addressed.  Followup advised. See after visit summary.  Episode of recurrent major depressive disorder, unspecified depression episode severity  Followed and treated by Psychiatry  Dyslipidemia  -     Comprehensive Metabolic  Panel; Standing  -     Lipid Panel; Standing  Reviewed cholesterol results recommended low-fat low saturated fat diet may require pharmacologic intervention in the future with a statin medication  Severe obesity (BMI 35.0-39.9) with comorbidity  Continued efforts at healthy eating to promote weight loss  Adrenal nodule  Previous imaging showed stability and no further workup required       This note was generated with the assistance of ambient listening technology. Verbal consent was obtained by the patient and accompanying visitor(s) for the recording of patient appointment to facilitate this note. I attest to having reviewed and edited the generated note for accuracy, though some syntax or spelling errors may persist. Please contact the author of this note for any clarification.

## 2024-05-05 ENCOUNTER — PATIENT MESSAGE (OUTPATIENT)
Dept: INTERNAL MEDICINE | Facility: CLINIC | Age: 49
End: 2024-05-05
Payer: COMMERCIAL

## 2024-05-05 DIAGNOSIS — R68.82 LIBIDO, DECREASED: Primary | ICD-10-CM

## 2024-05-29 DIAGNOSIS — R10.13 EPIGASTRIC PAIN: ICD-10-CM

## 2024-05-29 DIAGNOSIS — K21.9 GASTROESOPHAGEAL REFLUX DISEASE, UNSPECIFIED WHETHER ESOPHAGITIS PRESENT: ICD-10-CM

## 2024-05-29 RX ORDER — PANTOPRAZOLE SODIUM 40 MG/1
40 TABLET, DELAYED RELEASE ORAL DAILY
Qty: 30 TABLET | Refills: 0 | Status: SHIPPED | OUTPATIENT
Start: 2024-05-29 | End: 2024-06-28

## 2024-06-03 ENCOUNTER — PATIENT MESSAGE (OUTPATIENT)
Dept: PSYCHIATRY | Facility: CLINIC | Age: 49
End: 2024-06-03
Payer: COMMERCIAL

## 2024-06-03 DIAGNOSIS — F41.1 GAD (GENERALIZED ANXIETY DISORDER): ICD-10-CM

## 2024-06-03 RX ORDER — DULOXETIN HYDROCHLORIDE 20 MG/1
CAPSULE, DELAYED RELEASE ORAL
Qty: 90 CAPSULE | Refills: 0 | Status: SHIPPED | OUTPATIENT
Start: 2024-06-03

## 2024-06-03 RX ORDER — DULOXETIN HYDROCHLORIDE 60 MG/1
60 CAPSULE, DELAYED RELEASE ORAL DAILY
Qty: 90 CAPSULE | Refills: 0 | Status: SHIPPED | OUTPATIENT
Start: 2024-06-03 | End: 2024-09-01

## 2024-06-03 RX ORDER — DULOXETIN HYDROCHLORIDE 60 MG/1
60 CAPSULE, DELAYED RELEASE ORAL DAILY
Qty: 90 CAPSULE | Refills: 0 | Status: SHIPPED | OUTPATIENT
Start: 2024-06-03 | End: 2024-06-03 | Stop reason: SDUPTHER

## 2024-06-03 RX ORDER — DULOXETIN HYDROCHLORIDE 60 MG/1
CAPSULE, DELAYED RELEASE ORAL
Qty: 90 CAPSULE | Refills: 0 | Status: SHIPPED | OUTPATIENT
Start: 2024-06-03

## 2024-06-05 DIAGNOSIS — F41.1 GAD (GENERALIZED ANXIETY DISORDER): ICD-10-CM

## 2024-06-05 RX ORDER — LAMOTRIGINE 150 MG/1
TABLET ORAL
Qty: 180 TABLET | Refills: 0 | Status: SHIPPED | OUTPATIENT
Start: 2024-06-05

## 2024-06-24 ENCOUNTER — OFFICE VISIT (OUTPATIENT)
Dept: PSYCHIATRY | Facility: CLINIC | Age: 49
End: 2024-06-24
Payer: COMMERCIAL

## 2024-06-24 DIAGNOSIS — F33.41 RECURRENT MAJOR DEPRESSIVE DISORDER, IN PARTIAL REMISSION: Primary | ICD-10-CM

## 2024-06-24 DIAGNOSIS — F41.1 GAD (GENERALIZED ANXIETY DISORDER): ICD-10-CM

## 2024-06-24 PROCEDURE — 3044F HG A1C LEVEL LT 7.0%: CPT | Mod: CPTII,95,, | Performed by: NURSE PRACTITIONER

## 2024-06-24 PROCEDURE — 99214 OFFICE O/P EST MOD 30 MIN: CPT | Mod: 95,,, | Performed by: NURSE PRACTITIONER

## 2024-06-24 RX ORDER — DULOXETIN HYDROCHLORIDE 20 MG/1
CAPSULE, DELAYED RELEASE ORAL
Qty: 90 CAPSULE | Refills: 1 | Status: SHIPPED | OUTPATIENT
Start: 2024-06-24

## 2024-06-24 RX ORDER — DULOXETIN HYDROCHLORIDE 20 MG/1
CAPSULE, DELAYED RELEASE ORAL
Qty: 90 CAPSULE | Refills: 1 | Status: SHIPPED | OUTPATIENT
Start: 2024-06-24 | End: 2024-06-24 | Stop reason: SDUPTHER

## 2024-06-24 RX ORDER — LAMOTRIGINE 150 MG/1
TABLET ORAL
Qty: 180 TABLET | Refills: 1 | Status: SHIPPED | OUTPATIENT
Start: 2024-06-24

## 2024-06-24 RX ORDER — DULOXETIN HYDROCHLORIDE 60 MG/1
60 CAPSULE, DELAYED RELEASE ORAL DAILY
Qty: 90 CAPSULE | Refills: 1 | Status: SHIPPED | OUTPATIENT
Start: 2024-06-24 | End: 2024-06-24

## 2024-06-24 RX ORDER — DULOXETIN HYDROCHLORIDE 60 MG/1
60 CAPSULE, DELAYED RELEASE ORAL DAILY
Qty: 90 CAPSULE | Refills: 0 | Status: SHIPPED | OUTPATIENT
Start: 2024-06-24 | End: 2024-09-22

## 2024-06-24 NOTE — PROGRESS NOTES
"Outpatient Psychiatry Follow-Up Visit (MD/NP)    6/24/2024   The patient location is: [place], [place]  The chief complaint leading to consultation is: Depression and anxiety    Visit type: audiovisual    Face to Face time with patient: 25 minutes  30 minutes of total time spent on the encounter, which includes face to face time and non-face to face time preparing to see the patient (eg, review of tests), Obtaining and/or reviewing separately obtained history, Documenting clinical information in the electronic or other health record, Independently interpreting results (not separately reported) and communicating results to the patient/family/caregiver, or Care coordination (not separately reported).         Each patient to whom he or she provides medical services by telemedicine is:  (1) informed of the relationship between the physician and patient and the respective role of any other health care provider with respect to management of the patient; and (2) notified that he or she may decline to receive medical services by telemedicine and may withdraw from such care at any time.    Notes:       Clinical Status of Patient:  Outpatient (Ambulatory)    Chief Complaint:  Jesse Hernández is a 49 y.o. male who presents today for follow-up of depression and anxiety.  Met with patient.      Interval History and Content of Current Session:  Interim Events/Subjective Report/Content of Current Session:   Patient arrived on time for his scheduled appointment. He stated his father has lung cancer and is getting treatment for it.   Patient stated his health is "fine", gained some weight, eating bigger portions, and wants to lose weight, referred to bariatric speciality, but can not afford it.  Stated his wife and his children are all doing well.  He stated he is working for a new company and is no longer working for himself.  He continues to worry about finances        He described mood as "okay but down at times" and his affect is " appropriate.    He reported managed anxiety, depression, and mood with his current medication regimen of duloxetine 80 mg po daily and Lamictal 300 mg po daily. He denied SI/HI/AH/VH and no delusion noted or reported.  He denied suicidal thoughts and no suicide plan or intent. Patient denied symptoms of debby or hypomania.     He reported occasional alcohol use and denied any type of illicit drug use or alcohol abuse. He reported overall okay sleep but it fluctuates at times due to worry and he still uses his CPAP. He reported medication compliance and denied any side or adverse effects to his current medication of Lamictal 300 mg daily and duloxetine 80 mg daily.  He reported memory problems and difficulty with words, encouraged following up with Neurology.      Psychiatric Review Of Systems - Is patient experiencing or having changes in:  sleep: okay. Uses his CPAP machine  appetite: no  weight: yes gained per patient report  energy/anergy: tired at times  interest/pleasure/anhedonia: no  somatic symptoms: no  anxiety/panic: yes but managed  guilty/hopelessness: no  concentration: no  S.I.B.s/risky behavior: no  Irritability:  No longer feels irritable  Racing thoughts: no  Impulsive behaviors: no  Paranoia:no  AVH:no,   Suicidal thoughts/plan/intent: no  SE: no   ETOH: no  Drugs: no    Psychotherapy:  Target symptoms: anxiety , mood/depression  Why chosen therapy is appropriate versus another modality: relevant to diagnosis, patient responds to this modality, evidence based practice  Outcome monitoring methods: self-report, observation  Therapeutic intervention type: insight oriented psychotherapy, supportive psychotherapy  Topics discussed/themes: building skills sets for symptom management, symptom recognition  The patient's response to the intervention is accepting, motivated. The patient's progress toward treatment goals is good.   Duration of intervention: 10 minutes.    Review of Systems   PSYCHIATRIC:  "Pertinant items are noted in the narrative.  CONSTITUTIONAL: No weight gain or loss.   MUSCULOSKELETAL: No pain or stiffness of the joints.  NEUROLOGIC: No weakness, sensory changes, seizures, confusion, memory loss, tremor or other abnormal movements.  ENDOCRINE: No polydipsia or polyuria.  INTEGUMENTARY: No rashes or lacerations.  EYES: No exophthalmos, jaundice or blindness.  ENT: No dizziness, tinnitus or hearing loss.  RESPIRATORY: No shortness of breath.  CARDIOVASCULAR: No tachycardia or chest pain.  GASTROINTESTINAL: No nausea, vomiting, pain, constipation or diarrhea.  GENITOURINARY: No frequency, dysuria or sexual dysfunction.  HEMATOLOGIC/LYMPHATIC: No excessive bleeding, prolonged or excessive bleeding after dental extraction/injury.  ALLERGIC/IMMUNOLOGIC: No allergic response to materials, foods or animals at this time.    Past Medical, Family and Social History: The patient's past medical, family and social history have been reviewed and updated as appropriate within the electronic medical record - see encounter notes.    Compliance: yes    Side effects: None    Risk Parameters:  Patient reports no suicidal ideation  Patient reports no homicidal ideation  Patient reports no self-injurious behavior  Patient reports no violent behavior    Exam (detailed: at least 9 elements; comprehensive: all 15 elements)   Constitutional  Vitals:  Most recent vital signs, dated greater than 90 days prior to this appointment, were reviewed.   There were no vitals filed for this visit.     General:  unremarkable, age appropriate     Musculoskeletal  Muscle Strength/Tone:  no dystonia, no tremor, no tic   Gait & Station:  non-ataxic     Psychiatric  Speech:  no latency; no press   Mood & Affect:  "Okay"  appropriate   Thought Process:  normal and logical   Associations:  intact   Thought Content:  normal, no suicidality, no homicidality, delusions, or paranoia   Insight:  intact   Judgement: behavior is adequate to " circumstances   Orientation:  grossly intact   Memory: intact for content of interview   Language: grossly intact, able to name, able to repeat   Attention Span & Concentration:  able to focus   Fund of Knowledge:  intact and appropriate to age and level of education     Assessment and Diagnosis   Status/Progress: Based on the examination today, the patient's problem(s) is/are improved.  New problems have not been presented today.   Co-morbidities, Diagnostic uncertainty and Lack of compliance are not complicating management of the primary condition.  There are no active rule-out diagnoses for this patient at this time.     General Impression:  Doing okay and stable but c/o increased anxiety and wants to increased duloxetine to 80 mg daily and will continue Lamictal 300 mg daily.         ICD-10-CM ICD-9-CM   1. Recurrent major depressive disorder, in partial remission  F33.41 296.35   2. ALISON (generalized anxiety disorder)  F41.1 300.02           Intervention/Counseling/Treatment Plan   Medication Management: Continue current medications. The risks and benefits of medication were discussed with the patient.  Labs, Diagnostic Studies: reviewed all recent labs are reviewed all recent vitals  Continue duloxetine 80 mg po daily for MDD and ALISON  Continue Lamictal 300 mg po daily (take 2 tabs of 150 mg in the morning) for mood stabilization. Warned against SJS and he agreed to continue taking.   -Discussed informed consent, diagnosis, risks and benefits of proposed treatment above vs alternative treatments vs no treatment, and potential side effects of these treatments. The patient expresses understanding of the above and displays the capacity to agree with this treatment given said understanding. Patient also agrees that, currently, the benefits outweigh the risks and would like to pursue treatment at this time. Answered all questions and discussed follow up. Encouraged patient to contact us with any questions or  concerns.   -Encouraged Patient to keep future appointments.  -Take medications as prescribed   -Patient to present to Emergency Department for thoughts to harm herself or others      Return to Clinic: 3 months or earlier as needed      TRAY Ruelas-BC

## 2024-07-14 DIAGNOSIS — E03.9 HYPOTHYROIDISM, UNSPECIFIED TYPE: ICD-10-CM

## 2024-07-14 RX ORDER — LEVOTHYROXINE SODIUM 75 UG/1
75 TABLET ORAL
Qty: 90 TABLET | Refills: 2 | Status: SHIPPED | OUTPATIENT
Start: 2024-07-14

## 2024-07-14 NOTE — TELEPHONE ENCOUNTER
No care due was identified.  [facility] Embedded Care Due Messages. Reference number: [id number].   7/14/2024 5:54:31 AM CDT

## 2024-07-14 NOTE — TELEPHONE ENCOUNTER
Refill Decision Note   Jesse Hernández  is requesting a refill authorization.  Brief Assessment and Rationale for Refill:  Approve     Medication Therapy Plan:       Medication Reconciliation Completed: No   Comments:     No Care Gaps recommended.     Note composed:10:41 AM 07/14/2024

## 2024-09-12 DIAGNOSIS — E78.1 HYPERTRIGLYCERIDEMIA: ICD-10-CM

## 2024-09-12 RX ORDER — FENOFIBRATE 160 MG/1
160 TABLET ORAL
Qty: 90 TABLET | Refills: 1 | Status: SHIPPED | OUTPATIENT
Start: 2024-09-12 | End: 2024-09-13 | Stop reason: SDUPTHER

## 2024-09-12 NOTE — TELEPHONE ENCOUNTER
No care due was identified.  [facility] Embedded Care Due Messages. Reference number: [id number].   9/12/2024 10:08:49 AM CDT

## 2024-09-13 DIAGNOSIS — E78.1 HYPERTRIGLYCERIDEMIA: ICD-10-CM

## 2024-09-13 RX ORDER — FENOFIBRATE 160 MG/1
160 TABLET ORAL DAILY
Qty: 90 TABLET | Refills: 1 | Status: SHIPPED | OUTPATIENT
Start: 2024-09-13

## 2024-09-13 NOTE — TELEPHONE ENCOUNTER
No care due was identified.  [facility] Embedded Care Due Messages. Reference number: [id number].   9/13/2024 1:30:30 AM CDT

## 2024-09-13 NOTE — TELEPHONE ENCOUNTER
Refill Decision Note   Jesse Betty  is requesting a refill authorization.  Brief Assessment and Rationale for Refill:  Approve     Medication Therapy Plan:         Comments:     Note composed:8:53 PM 09/12/2024

## 2024-09-18 ENCOUNTER — TELEPHONE (OUTPATIENT)
Dept: INTERNAL MEDICINE | Facility: CLINIC | Age: 49
End: 2024-09-18
Payer: COMMERCIAL

## 2024-10-31 ENCOUNTER — HOSPITAL ENCOUNTER (EMERGENCY)
Facility: HOSPITAL | Age: 49
Discharge: HOME | End: 2024-10-31
Attending: FAMILY MEDICINE
Payer: COMMERCIAL

## 2024-10-31 VITALS
WEIGHT: 253.5 LBS | TEMPERATURE: 98 F | BODY MASS INDEX: 37.55 KG/M2 | HEART RATE: 65 BPM | OXYGEN SATURATION: 95 % | HEIGHT: 69 IN | DIASTOLIC BLOOD PRESSURE: 81 MMHG | RESPIRATION RATE: 19 BRPM | SYSTOLIC BLOOD PRESSURE: 160 MMHG

## 2024-10-31 DIAGNOSIS — R10.2 PELVIC PAIN: ICD-10-CM

## 2024-10-31 DIAGNOSIS — R52 PAIN: ICD-10-CM

## 2024-10-31 DIAGNOSIS — N50.811 PAIN IN RIGHT TESTICLE: Primary | ICD-10-CM

## 2024-10-31 DIAGNOSIS — F41.9 SEVERE ANXIETY: ICD-10-CM

## 2024-10-31 LAB
ALBUMIN SERPL BCP-MCNC: 4.8 G/DL (ref 3.5–5.2)
ALP SERPL-CCNC: 50 U/L (ref 38–126)
ALT SERPL W/O P-5'-P-CCNC: 40 U/L (ref 10–44)
ANION GAP SERPL CALC-SCNC: 9 MMOL/L (ref 8–16)
AST SERPL-CCNC: 33 U/L (ref 15–46)
BASOPHILS # BLD AUTO: 0.07 K/UL (ref 0–0.2)
BASOPHILS NFR BLD: 0.8 % (ref 0–1.9)
BILIRUB SERPL-MCNC: 0.5 MG/DL (ref 0.1–1)
BILIRUB UR QL STRIP: NEGATIVE
CALCIUM SERPL-MCNC: 9.6 MG/DL (ref 8.7–10.5)
CHLORIDE SERPL-SCNC: 106 MMOL/L (ref 95–110)
CLARITY UR REFRACT.AUTO: CLEAR
CO2 SERPL-SCNC: 24 MMOL/L (ref 23–29)
COLOR UR AUTO: YELLOW
CREAT SERPL-MCNC: 0.98 MG/DL (ref 0.5–1.4)
CRP SERPL-MCNC: 0.19 MG/DL (ref 0–1)
DIFFERENTIAL METHOD BLD: ABNORMAL
EOSINOPHIL # BLD AUTO: 0.1 K/UL (ref 0–0.5)
EOSINOPHIL NFR BLD: 1.4 % (ref 0–8)
ERYTHROCYTE [DISTWIDTH] IN BLOOD BY AUTOMATED COUNT: 13.5 % (ref 11.5–14.5)
EST. GFR  (NO RACE VARIABLE): >60 ML/MIN/1.73 M^2
GLUCOSE SERPL-MCNC: 129 MG/DL (ref 70–110)
GLUCOSE UR QL STRIP: NEGATIVE
HCT VFR BLD AUTO: 49.3 % (ref 40–54)
HGB BLD-MCNC: 16.3 G/DL (ref 14–18)
HGB UR QL STRIP: NEGATIVE
IMM GRANULOCYTES # BLD AUTO: 0.06 K/UL (ref 0–0.04)
IMM GRANULOCYTES NFR BLD AUTO: 0.7 % (ref 0–0.5)
KETONES UR QL STRIP: NEGATIVE
LEUKOCYTE ESTERASE UR QL STRIP: NEGATIVE
LYMPHOCYTES # BLD AUTO: 2 K/UL (ref 1–4.8)
LYMPHOCYTES NFR BLD: 24.2 % (ref 18–48)
MCH RBC QN AUTO: 29.3 PG (ref 27–31)
MCHC RBC AUTO-ENTMCNC: 33.1 G/DL (ref 32–36)
MCV RBC AUTO: 89 FL (ref 82–98)
MONOCYTES # BLD AUTO: 0.4 K/UL (ref 0.3–1)
MONOCYTES NFR BLD: 5.1 % (ref 4–15)
NEUTROPHILS # BLD AUTO: 5.6 K/UL (ref 1.8–7.7)
NEUTROPHILS NFR BLD: 67.8 % (ref 38–73)
NITRITE UR QL STRIP: NEGATIVE
NRBC BLD-RTO: 0 /100 WBC
PH UR STRIP: 7 [PH] (ref 5–8)
PLATELET # BLD AUTO: 314 K/UL (ref 150–450)
PMV BLD AUTO: 10.2 FL (ref 9.2–12.9)
POTASSIUM SERPL-SCNC: 4.1 MMOL/L (ref 3.5–5.1)
PROT SERPL-MCNC: 7.8 G/DL (ref 6–8.4)
PROT UR QL STRIP: NEGATIVE
RBC # BLD AUTO: 5.57 M/UL (ref 4.6–6.2)
SODIUM SERPL-SCNC: 139 MMOL/L (ref 136–145)
SP GR UR STRIP: 1.02 (ref 1–1.03)
URN SPEC COLLECT METH UR: NORMAL
UROBILINOGEN UR STRIP-ACNC: 1 EU/DL
UUN UR-MCNC: 16 MG/DL (ref 2–20)
WBC # BLD AUTO: 8.28 K/UL (ref 3.9–12.7)

## 2024-10-31 PROCEDURE — 25500020 PHARM REV CODE 255: Mod: ER | Performed by: FAMILY MEDICINE

## 2024-10-31 PROCEDURE — 94761 N-INVAS EAR/PLS OXIMETRY MLT: CPT | Mod: ER

## 2024-10-31 PROCEDURE — 99900035 HC TECH TIME PER 15 MIN (STAT): Mod: ER

## 2024-10-31 PROCEDURE — 80053 COMPREHEN METABOLIC PANEL: CPT | Mod: ER | Performed by: FAMILY MEDICINE

## 2024-10-31 PROCEDURE — 63600175 PHARM REV CODE 636 W HCPCS: Mod: ER | Performed by: FAMILY MEDICINE

## 2024-10-31 PROCEDURE — 81003 URINALYSIS AUTO W/O SCOPE: CPT | Mod: ER | Performed by: FAMILY MEDICINE

## 2024-10-31 PROCEDURE — 99285 EMERGENCY DEPT VISIT HI MDM: CPT | Mod: 25,ER

## 2024-10-31 PROCEDURE — 96375 TX/PRO/DX INJ NEW DRUG ADDON: CPT | Mod: ER

## 2024-10-31 PROCEDURE — 86140 C-REACTIVE PROTEIN: CPT | Mod: ER | Performed by: FAMILY MEDICINE

## 2024-10-31 PROCEDURE — 96374 THER/PROPH/DIAG INJ IV PUSH: CPT | Mod: ER

## 2024-10-31 PROCEDURE — 25000003 PHARM REV CODE 250: Mod: ER | Performed by: FAMILY MEDICINE

## 2024-10-31 PROCEDURE — 85025 COMPLETE CBC W/AUTO DIFF WBC: CPT | Mod: ER | Performed by: FAMILY MEDICINE

## 2024-10-31 RX ORDER — KETAMINE HYDROCHLORIDE 100 MG/ML
100 INJECTION, SOLUTION INTRAMUSCULAR; INTRAVENOUS
Status: COMPLETED | OUTPATIENT
Start: 2024-10-31 | End: 2024-10-31

## 2024-10-31 RX ORDER — KETOROLAC TROMETHAMINE 30 MG/ML
15 INJECTION, SOLUTION INTRAMUSCULAR; INTRAVENOUS
Status: COMPLETED | OUTPATIENT
Start: 2024-10-31 | End: 2024-10-31

## 2024-10-31 RX ORDER — NAPROXEN 500 MG/1
500 TABLET ORAL 2 TIMES DAILY PRN
Qty: 20 TABLET | Refills: 0 | Status: SHIPPED | OUTPATIENT
Start: 2024-10-31

## 2024-10-31 RX ORDER — LORAZEPAM 2 MG/ML
2 INJECTION INTRAMUSCULAR
Status: COMPLETED | OUTPATIENT
Start: 2024-10-31 | End: 2024-10-31

## 2024-10-31 RX ORDER — KETAMINE HCL IN 0.9 % NACL 50 MG/5 ML
100 SYRINGE (ML) INTRAVENOUS
Status: DISCONTINUED | OUTPATIENT
Start: 2024-10-31 | End: 2024-10-31

## 2024-10-31 RX ADMIN — KETOROLAC TROMETHAMINE 15 MG: 30 INJECTION, SOLUTION INTRAMUSCULAR; INTRAVENOUS at 11:10

## 2024-10-31 RX ADMIN — IOHEXOL 100 ML: 350 INJECTION, SOLUTION INTRAVENOUS at 01:10

## 2024-10-31 RX ADMIN — KETAMINE HYDROCHLORIDE 100 MG: 100 INJECTION, SOLUTION, CONCENTRATE INTRAMUSCULAR; INTRAVENOUS at 12:10

## 2024-10-31 RX ADMIN — LORAZEPAM 2 MG: 2 INJECTION INTRAMUSCULAR; INTRAVENOUS at 11:10

## 2024-10-31 NOTE — ED NOTES
"Pt arouses easily, states "I am so glad this procedure is done. I was so nervous. Thank y'all for everything" AAOx3, resp even nonlabored. Pain 0/10  "

## 2024-10-31 NOTE — ED PROVIDER NOTES
"Encounter Date: 10/31/2024       History     Chief Complaint   Patient presents with    Testicle Pain     Testicle pain on and off for 3 years. This episode has been for a couple days. Pt also reports "sharp pain in the inside"     49-year-old male complains of chronic bilateral testicular pain for last 3 years.  Initially pain was intermittent but now more consistent for last 3-4 months.  Patient claims his pain is everyday and does not have a pattern which side.  Pain stays for couple of hours.  Sometimes he takes pain medication OTC.  Today he complains more pain in his right testicular area.  Pain sometimes radiates to his groin and to his back.  No swelling.  No dysuria.  No fever.  No hematuria.  Patient has not seen Urology.  some limitation with sexual activity (Slowing )but not limiting entire activity.    The history is provided by the patient.     Review of patient's allergies indicates:  No Known Allergies  Past Medical History:   Diagnosis Date    Hypertension     KAILEE (obstructive sleep apnea)      Past Surgical History:   Procedure Laterality Date    BACK SURGERY      COLONOSCOPY N/A 04/22/2022    Procedure: COLONOSCOPY miralax prep;  Surgeon: Lamonte Carcamo MD;  Location: [facility];  Service: Endoscopy;  Laterality: N/A;  Patient needs a rapid covid test.    ESOPHAGOGASTRODUODENOSCOPY N/A 5/30/2023    Procedure: EGD (ESOPHAGOGASTRODUODENOSCOPY);  Surgeon: Lamonte Carcamo MD;  Location: [facility];  Service: Endoscopy;  Laterality: N/A;    HERNIA REPAIR      PATENT DUCTUS ARTERIOUS LIGATION      infancy    ROBOT-ASSISTED LAPAROSCOPIC REPAIR OF INGUINAL HERNIA Left 06/28/2021    Procedure: ROBOTIC REPAIR, HERNIA, INGUINAL;  Surgeon: Patrick Rehman Jr., MD;  Location: [facility] OR;  Service: General;  Laterality: Left;  Left vs bilateral    SHOULDER SURGERY Left 2022    SPINE SURGERY      TONSILLECTOMY  When child    UMBILICAL HERNIA REPAIR N/A 11/25/2019    Procedure: REPAIR, HERNIA, UMBILICAL, AGE 3 " YEARS OR OLDER - open with mesh;  Surgeon: Patrick Rehman Jr., MD;  Location: [facility] OR;  Service: General;  Laterality: N/A;     Family History   Problem Relation Name Age of Onset    Cancer Mother      Dementia Mother      Hearing loss 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    Diabetes Paternal Grandmother Gissell caruso     COPD Maternal Aunt Jenni schilling     Drug abuse Brother Kuldeep Kenny         Took two meds that killed him    Early death Brother Kuldeep Kenny         Overdose     Social History     Tobacco Use    Smoking status: Former     Current packs/day: 0.00     Types: Cigarettes     Quit date: 12/1/2008     Years since quitting: 15.9    Smokeless tobacco: Former   Substance Use Topics    Alcohol use: Yes     Alcohol/week: 2.0 standard drinks of alcohol     Types: 1 Glasses of wine, 1 Cans of beer per week     Comment: holidays    Drug use: No     Review of Systems   Genitourinary:  Positive for testicular pain. Negative for decreased urine volume, difficulty urinating, dysuria, enuresis, flank pain, frequency, genital sores, hematuria, penile discharge, penile pain, penile swelling, scrotal swelling and urgency.   All other systems reviewed and are negative.      Physical Exam     Initial Vitals [10/31/24 1043]   BP Pulse Resp Temp SpO2   138/84 82 20 98.4 °F (36.9 °C) 98 %      MAP       --         Physical Exam    Nursing note and vitals reviewed.  Constitutional: Vital signs are normal. He appears well-developed and well-nourished. He is active. No distress.   HENT:   Head: Normocephalic.   Nose: Nose normal. Mouth/Throat: Oropharynx is clear and moist and mucous membranes are normal.   Eyes: Conjunctivae, EOM and lids are normal.   Neck: Neck supple.   Normal range of motion.  Cardiovascular:  Normal rate, regular rhythm, S1 normal, S2 normal and normal heart sounds.           Pulmonary/Chest: Breath sounds normal. No respiratory distress. He has no wheezes. He has no rhonchi. He has no rales.  He exhibits no tenderness.   Abdominal: Abdomen is soft. Bowel sounds are normal. He exhibits no distension. There is no abdominal tenderness. Hernia confirmed negative in the right inguinal area and confirmed negative in the left inguinal area.   Genitourinary:    Testes and penis normal.   Cremasteric reflex is present. Right testis shows no swelling. Left testis shows no swelling. Circumcised.    Genitourinary Comments: Examination is only by visualization.  As patient did not allow to touch or palpate.  Patient could do examination by himself which led him lift his left testicle with no pain.  Pressing to inguinal area with no pain.  Patient has tenderness to right testicle with any kind of palpation.    COMPLETE EXAMINATION DONE UNDER SEDATION.  NORMAL TEST IS LIE AND TEXTURE.  NO MASS.  NORMAL EPIDIDYMIS,     Musculoskeletal:         General: Normal range of motion.      Right upper arm: Normal.      Left upper arm: Normal.      Cervical back: Normal range of motion and neck supple.      Right lower leg: Normal.      Left lower leg: Normal.     Lymphadenopathy: No inguinal adenopathy noted on the right or left side.   Neurological: He is alert and oriented to person, place, and time. He has normal strength. GCS score is 15. GCS eye subscore is 4. GCS verbal subscore is 5. GCS motor subscore is 6.   Skin: Skin is warm. Capillary refill takes less than 2 seconds.   Psychiatric: He has a normal mood and affect. His speech is normal and behavior is normal. Thought content normal. Cognition and memory are normal.         ED Course   Procedural Sedation        Date/Time: 10/31/2024 12:00 PM    Performed by: Juventino Lazo MD  Authorized by: Juventino Lazo MD  Consent Done: Yes  Consent: Written consent obtained.  Risks and benefits: risks, benefits and alternatives were discussed  Consent given by: patient  Patient understanding: patient states understanding of the procedure being performed  Patient  "consent: the patient's understanding of the procedure matches consent given  Procedure consent: procedure consent matches procedure scheduled  Relevant documents: relevant documents present and verified  Test results: test results available and properly labeled  Site marked: the operative site was marked  Patient identity confirmed: name, verbally with patient, MRN and   Time out: Immediately prior to procedure a "time out" was called to verify the correct patient, procedure, equipment, support staff and site/side marked as required.  ASA Class: Class 2 - Mild Illness without functional impairment.  Mallampati Score: Class 2 - Visualization of the soft palate, fauces, and uvula.   NPO STATUS:  Date/Time of last solid: 10/31/2024 8:00 AM  Date/Time of last fluid: 10/31/2024 8:00 AM    Equipment: on cardiac monitor., on BP monitor., on CO2 monitor., suction available., airway equipment available., on supplemental oxygen. and reversal drugs available.     Sedation type: deep sedation    Analgesia: ketamine  Sedation start date/time: 10/31/2024 12:03 AM  Sedation end date/time: 10/31/2024 12:30 PM  Vitals: Vital signs were monitored during sedation.  Complications: No complications.   Patient/Family history of anesthesia or sedation complications: No      Labs Reviewed   CBC W/ AUTO DIFFERENTIAL - Abnormal       Result Value    WBC 8.28      RBC 5.57      Hemoglobin 16.3      Hematocrit 49.3      MCV 89      MCH 29.3      MCHC 33.1      RDW 13.5      Platelets 314      MPV 10.2      Immature Granulocytes 0.7 (*)     Gran # (ANC) 5.6      Immature Grans (Abs) 0.06 (*)     Lymph # 2.0      Mono # 0.4      Eos # 0.1      Baso # 0.07      nRBC 0      Gran % 67.8      Lymph % 24.2      Mono % 5.1      Eosinophil % 1.4      Basophil % 0.8      Differential Method Automated     COMPREHENSIVE METABOLIC PANEL - Abnormal    Sodium 139      Potassium 4.1      Chloride 106      CO2 24      Glucose 129 (*)     BUN 16      " Creatinine 0.98      Calcium 9.6      Total Protein 7.8      Albumin 4.8      Total Bilirubin 0.5      Alkaline Phosphatase 50      AST 33      ALT 40      Anion Gap 9      eGFR >60.0     URINALYSIS, REFLEX TO URINE CULTURE    Specimen UA Urine, Clean Catch      Color, UA Yellow      Appearance, UA Clear      pH, UA 7.0      Specific Gravity, UA 1.020      Protein, UA Negative      Glucose, UA Negative      Ketones, UA Negative      Bilirubin (UA) Negative      Occult Blood UA Negative      Nitrite, UA Negative      Urobilinogen, UA 1.0      Leukocytes, UA Negative      Narrative:     Preferred Collection Type->Urine, Clean Catch  Specimen Source->Urine   C-REACTIVE PROTEIN   C-REACTIVE PROTEIN    CRP 0.19            Imaging Results              CT Abdomen Pelvis With IV Contrast NO Oral Contrast (Final result)  Result time 10/31/24 13:31:55      Final result by Ronald Ponce MD (10/31/24 13:31:55)                   Impression:      1. No acute finding in the abdomen or pelvis.  2. Hepatic steatosis and other findings as above.      Electronically signed by: Ronald Ponce  Date:    10/31/2024  Time:    13:31               Narrative:    EXAMINATION:  CT ABDOMEN PELVIS WITH IV CONTRAST    CLINICAL HISTORY:  Abdominal pain, acute, nonlocalized;    COMPARISON:  July 25, 2019.    TECHNIQUE:  Axial CT images were obtained of the abdomen and pelvis following administration of 100 mL Omnipaque 350 intravenously.  Iterative reconstruction technique was used. The CT exam was performed using one or more of the following dose reduction techniques- Automated exposure control, adjustment of the mA and/or kV according to patient size, and/or use of iterative reconstructed technique.  IV contrast was administered.    FINDINGS:  Heart: Normal in size. No pericardial effusion.    Lung Bases: Well aerated, without consolidation or pleural fluid.    Liver: Hepatic steatosis.    Gallbladder: No calcified gallstones.    Bile Ducts:  No evidence of dilated ducts.    Pancreas: No mass or peripancreatic fat stranding.    Spleen: Unremarkable.    Adrenals: Slowly enlarging complex right adrenal myelolipoma, now 3.7 cm, previously 3.4 cm in 2019.  Left adrenal gland is unremarkable.    Kidneys/ Ureters: Unremarkable.    Bladder: No evidence of wall thickening.    Reproductive organs: Unremarkable.    GI Tract/Mesentery: No evidence of bowel obstruction or inflammation.  The appendix has a normal appearance.    Peritoneal Space: No ascites. No free air.    Retroperitoneum: No adenopathy.    Abdominal wall: Unremarkable.    Vasculature: No significant atherosclerosis or aneurysm.    Bones: Degenerative changes of the spine.  No acute or aggressive osseous finding                                       US Scrotum And Testicles (Final result)  Result time 10/31/24 12:47:27      Final result by Ronald Ponce MD (10/31/24 12:47:27)                   Impression:      No acute finding.  Small bilateral hydroceles.    Known, very small, fat containing right inguinal hernia.      Electronically signed by: Ronald Ponce  Date:    10/31/2024  Time:    12:47               Narrative:    EXAMINATION:  US SCROTUM AND TESTICLES    CLINICAL HISTORY:  Pain, unspecified    COMPARISON:  Left groin ultrasound June 21, 2021.    FINDINGS:  Real-time exam with grayscale, color, and spectral imaging of the scrotum was performed.    The right testicle measures 4.2 cm and the left measures 4.1 cm. Color-flow and arterial waveforms are present within both testicles. There are no findings of torsion. The testicles are homogeneous in echotexture without intratesticular mass.  No varicocele on either side.  Small hydroceles bilaterally.    Very small fat containing right inguinal hernia redemonstrated.                                       Medications   ketorolac injection 15 mg (15 mg Intravenous Given 10/31/24 1120)   LORazepam injection 2 mg (2 mg Intravenous Given 10/31/24  1142)   ketamine injection 100 mg (100 mg Intravenous Given 10/31/24 1202)   iohexoL (OMNIPAQUE 350) injection 100 mL (100 mLs Intravenous Given 10/31/24 1320)     Medical Decision Making  Differential diagnosis include not limited to hydrocele, spermatocele, spermatic cord referred pain, inguinal hernia, testicular mass, epididymitis,    Since patient has very high levels of anxiety will try Ativan initially for further examination.  Extremely anxious and did not let us touch- physical examination of genitalia.  Patient has been sedated with ketamine for examination and ultrasound.  Urinalysis and ultrasound.  Urinalysis no acute findings.  Ultrasound trace fluid- hydrocele.  Small right inguinal fat containing hernia.  Labs reviewed with normal white cell count and CRP.  CT of abdomen no acute findings except questionable right adrenal myelolipoma.  Patient pain has completely resolved after giving Toradol.  Patient tolerated sedation very well and reexamined with normal mental status.  Will discharge patient home on naproxen as needed and urology referral.  He is advised to follow up ED with any worsening or serious pain.      Amount and/or Complexity of Data Reviewed  Labs: ordered. Decision-making details documented in ED Course.  Radiology: ordered. Decision-making details documented in ED Course.    Risk  Prescription drug management.                                      Clinical Impression:  Final diagnoses:  [R52] Pain  [N50.811] Pain in right testicle (Primary)  [F41.9] Severe anxiety  [R10.2] Pelvic pain          ED Disposition Condition    Discharge Stable          ED Prescriptions       Medication Sig Dispense Start Date End Date Auth. Provider    naproxen (NAPROSYN) 500 MG tablet Take 1 tablet (500 mg total) by mouth 2 (two) times daily as needed (pain). 20 tablet 10/31/2024 -- Juventino Lazo MD          Follow-up Information       Follow up With Specialties Details Why Contact Elvia Wolfe  Roxanne REZA MD Internal Medicine Schedule an appointment as soon as possible for a visit in 1 week  [address] [facility]  [place] [id number]  [phone]               Juventino Lazo MD  10/31/24 7662

## 2024-12-10 ENCOUNTER — PATIENT MESSAGE (OUTPATIENT)
Dept: PSYCHIATRY | Facility: CLINIC | Age: 49
End: 2024-12-10
Payer: COMMERCIAL

## 2024-12-10 DIAGNOSIS — F41.1 GAD (GENERALIZED ANXIETY DISORDER): ICD-10-CM

## 2024-12-10 RX ORDER — LAMOTRIGINE 150 MG/1
TABLET ORAL
Qty: 180 TABLET | Refills: 0 | Status: SHIPPED | OUTPATIENT
Start: 2024-12-10

## 2024-12-10 RX ORDER — DULOXETIN HYDROCHLORIDE 60 MG/1
60 CAPSULE, DELAYED RELEASE ORAL DAILY
Qty: 30 CAPSULE | Refills: 2 | Status: SHIPPED | OUTPATIENT
Start: 2024-12-10 | End: 2025-01-09

## 2025-01-15 DIAGNOSIS — I10 HYPERTENSION, UNSPECIFIED TYPE: ICD-10-CM

## 2025-01-15 NOTE — TELEPHONE ENCOUNTER
Refill Routing Note   Medication(s) are not appropriate for processing by [facility] for the following reason(s):        ED/Hospital Visit since last OV with provider  Required vitals abnormal    [facility] action(s):  Defer               Appointments  past 12m or future 3m with PCP    Date Provider   Last Visit   3/18/2024 Roxanne Wolfe MD   Next Visit   Visit date not found Roxanne Wolfe MD   ED visits in past 90 days: 1        Note composed:10:05 AM 01/15/2025

## 2025-01-15 NOTE — TELEPHONE ENCOUNTER
Unable to retrieve patient chart and identify care due.  [facility] Embedded Care Due Messages. Reference number: [id number].   1/15/2025 8:10:29 AM CST

## 2025-01-17 ENCOUNTER — PATIENT MESSAGE (OUTPATIENT)
Dept: INTERNAL MEDICINE | Facility: CLINIC | Age: 50
End: 2025-01-17
Payer: COMMERCIAL

## 2025-01-17 RX ORDER — METOPROLOL SUCCINATE 100 MG/1
100 TABLET, EXTENDED RELEASE ORAL DAILY
Qty: 30 TABLET | Refills: 0 | Status: SHIPPED | OUTPATIENT
Start: 2025-01-17

## 2025-02-09 ENCOUNTER — PATIENT MESSAGE (OUTPATIENT)
Dept: INTERNAL MEDICINE | Facility: CLINIC | Age: 50
End: 2025-02-09
Payer: COMMERCIAL

## 2025-03-02 DIAGNOSIS — I10 HYPERTENSION, UNSPECIFIED TYPE: ICD-10-CM

## 2025-03-02 NOTE — TELEPHONE ENCOUNTER
Care Due:                  Date            Visit Type   Department     Provider  --------------------------------------------------------------------------------                                EP -                              PRIMARY      [facility] INTERNAL  Last Visit: 03-      CARE ([facility])   MEDICINE       Roxanne Wolfe                              EP -                              PRIMARY      [facility] INTERNAL  Next Visit: 03-      CARE ([facility])   MEDICINE       Roxanne Wolfe                                                            Last  Test          Frequency    Reason                     Performed    Due Date  --------------------------------------------------------------------------------    Lipid Panel.  12 months..  fenofibrate..............  03- 03-    TSH.........  12 months..  levothyroxine............  03- 03-    [facility] Embedded Care Due Messages. Reference number: [id number].   3/02/2025 2:59:35 PM CST

## 2025-03-03 RX ORDER — METOPROLOL SUCCINATE 100 MG/1
TABLET, EXTENDED RELEASE ORAL
Qty: 90 TABLET | Refills: 0 | Status: SHIPPED | OUTPATIENT
Start: 2025-03-03

## 2025-03-03 NOTE — TELEPHONE ENCOUNTER
Refill Routing Note   Medication(s) are not appropriate for processing by [facility] for the following reason(s):        ED/Hospital Visit since last OV with provider  Required vitals abnormal    [facility] action(s):  Defer     Requires labs : Yes      Medication Therapy Plan: Acute care/admission documentation reviewed. No change in therapy    Extended chart review required: Yes     Appointments  past 12m or future 3m with PCP    Date Provider   Last Visit   3/18/2024 Roxanne Wolfe MD   Next Visit   3/19/2025 Roxanne Wolfe MD   ED visits in past 90 days: 0        Note composed:11:32 AM 03/03/2025

## 2025-03-17 ENCOUNTER — LAB VISIT (OUTPATIENT)
Dept: LAB | Facility: HOSPITAL | Age: 50
End: 2025-03-17
Attending: INTERNAL MEDICINE
Payer: COMMERCIAL

## 2025-03-17 DIAGNOSIS — E78.5 DYSLIPIDEMIA: ICD-10-CM

## 2025-03-17 DIAGNOSIS — R68.82 LIBIDO, DECREASED: ICD-10-CM

## 2025-03-17 LAB
ALBUMIN SERPL BCP-MCNC: 4 G/DL (ref 3.5–5.2)
ALP SERPL-CCNC: 45 U/L (ref 40–150)
ALT SERPL W/O P-5'-P-CCNC: 33 U/L (ref 10–44)
ANION GAP SERPL CALC-SCNC: 9 MMOL/L (ref 8–16)
AST SERPL-CCNC: 25 U/L (ref 10–40)
BILIRUB SERPL-MCNC: 0.4 MG/DL (ref 0.1–1)
BUN SERPL-MCNC: 18 MG/DL (ref 6–20)
CALCIUM SERPL-MCNC: 9.4 MG/DL (ref 8.7–10.5)
CHLORIDE SERPL-SCNC: 107 MMOL/L (ref 95–110)
CHOLEST SERPL-MCNC: 221 MG/DL (ref 120–199)
CHOLEST/HDLC SERPL: 5.5 {RATIO} (ref 2–5)
CO2 SERPL-SCNC: 24 MMOL/L (ref 23–29)
CREAT SERPL-MCNC: 0.9 MG/DL (ref 0.5–1.4)
EST. GFR  (NO RACE VARIABLE): >60 ML/MIN/1.73 M^2
GLUCOSE SERPL-MCNC: 94 MG/DL (ref 70–110)
HDLC SERPL-MCNC: 40 MG/DL (ref 40–75)
HDLC SERPL: 18.1 % (ref 20–50)
LDLC SERPL CALC-MCNC: 145 MG/DL (ref 63–159)
NONHDLC SERPL-MCNC: 181 MG/DL
POTASSIUM SERPL-SCNC: 4.6 MMOL/L (ref 3.5–5.1)
PROT SERPL-MCNC: 6.9 G/DL (ref 6–8.4)
SODIUM SERPL-SCNC: 140 MMOL/L (ref 136–145)
TESTOST SERPL-MCNC: 339 NG/DL (ref 304–1227)
TRIGL SERPL-MCNC: 180 MG/DL (ref 30–150)

## 2025-03-17 PROCEDURE — 80061 LIPID PANEL: CPT | Performed by: INTERNAL MEDICINE

## 2025-03-17 PROCEDURE — 36415 COLL VENOUS BLD VENIPUNCTURE: CPT | Mod: PO | Performed by: INTERNAL MEDICINE

## 2025-03-17 PROCEDURE — 84403 ASSAY OF TOTAL TESTOSTERONE: CPT | Performed by: INTERNAL MEDICINE

## 2025-03-17 PROCEDURE — 80053 COMPREHEN METABOLIC PANEL: CPT | Performed by: INTERNAL MEDICINE

## 2025-03-18 ENCOUNTER — TELEPHONE (OUTPATIENT)
Dept: INTERNAL MEDICINE | Facility: CLINIC | Age: 50
End: 2025-03-18
Payer: COMMERCIAL

## 2025-03-18 ENCOUNTER — PATIENT MESSAGE (OUTPATIENT)
Dept: PSYCHIATRY | Facility: CLINIC | Age: 50
End: 2025-03-18
Payer: COMMERCIAL

## 2025-03-18 DIAGNOSIS — F41.1 GAD (GENERALIZED ANXIETY DISORDER): Primary | ICD-10-CM

## 2025-03-18 RX ORDER — DULOXETIN HYDROCHLORIDE 20 MG/1
CAPSULE, DELAYED RELEASE ORAL
Qty: 90 CAPSULE | Refills: 0 | Status: SHIPPED | OUTPATIENT
Start: 2025-03-18

## 2025-03-18 RX ORDER — DULOXETIN HYDROCHLORIDE 60 MG/1
CAPSULE, DELAYED RELEASE ORAL
Qty: 90 CAPSULE | Refills: 0 | Status: SHIPPED | OUTPATIENT
Start: 2025-03-18

## 2025-03-19 ENCOUNTER — OFFICE VISIT (OUTPATIENT)
Dept: INTERNAL MEDICINE | Facility: CLINIC | Age: 50
End: 2025-03-19
Payer: COMMERCIAL

## 2025-03-19 VITALS
DIASTOLIC BLOOD PRESSURE: 74 MMHG | WEIGHT: 263.88 LBS | OXYGEN SATURATION: 97 % | BODY MASS INDEX: 39.08 KG/M2 | HEIGHT: 69 IN | SYSTOLIC BLOOD PRESSURE: 128 MMHG | HEART RATE: 72 BPM

## 2025-03-19 DIAGNOSIS — G47.33 OSA (OBSTRUCTIVE SLEEP APNEA): ICD-10-CM

## 2025-03-19 DIAGNOSIS — E03.9 HYPOTHYROIDISM, UNSPECIFIED TYPE: ICD-10-CM

## 2025-03-19 DIAGNOSIS — M47.816 OSTEOARTHRITIS OF LUMBAR SPINE, UNSPECIFIED SPINAL OSTEOARTHRITIS COMPLICATION STATUS: ICD-10-CM

## 2025-03-19 DIAGNOSIS — Z00.00 PREVENTATIVE HEALTH CARE: Primary | ICD-10-CM

## 2025-03-19 DIAGNOSIS — F33.40 RECURRENT MAJOR DEPRESSIVE DISORDER, IN REMISSION: ICD-10-CM

## 2025-03-19 DIAGNOSIS — E78.5 DYSLIPIDEMIA: ICD-10-CM

## 2025-03-19 DIAGNOSIS — F41.1 GAD (GENERALIZED ANXIETY DISORDER): ICD-10-CM

## 2025-03-19 PROCEDURE — 99396 PREV VISIT EST AGE 40-64: CPT | Mod: S$GLB,,, | Performed by: INTERNAL MEDICINE

## 2025-03-19 PROCEDURE — 3074F SYST BP LT 130 MM HG: CPT | Mod: CPTII,S$GLB,, | Performed by: INTERNAL MEDICINE

## 2025-03-19 PROCEDURE — 99999 PR PBB SHADOW E&M-EST. PATIENT-LVL IV: CPT | Mod: PBBFAC,,, | Performed by: INTERNAL MEDICINE

## 2025-03-19 PROCEDURE — 1159F MED LIST DOCD IN RCRD: CPT | Mod: CPTII,S$GLB,, | Performed by: INTERNAL MEDICINE

## 2025-03-19 PROCEDURE — 1160F RVW MEDS BY RX/DR IN RCRD: CPT | Mod: CPTII,S$GLB,, | Performed by: INTERNAL MEDICINE

## 2025-03-19 PROCEDURE — 3008F BODY MASS INDEX DOCD: CPT | Mod: CPTII,S$GLB,, | Performed by: INTERNAL MEDICINE

## 2025-03-19 PROCEDURE — 3078F DIAST BP <80 MM HG: CPT | Mod: CPTII,S$GLB,, | Performed by: INTERNAL MEDICINE

## 2025-03-19 RX ORDER — HYDROCODONE BITARTRATE AND ACETAMINOPHEN 5; 325 MG/1; MG/1
1 TABLET ORAL
COMMUNITY

## 2025-03-19 RX ORDER — AMITRIPTYLINE HYDROCHLORIDE 25 MG/1
1 TABLET, FILM COATED ORAL NIGHTLY
COMMUNITY
Start: 2025-03-11 | End: 2025-03-19

## 2025-03-19 RX ORDER — CELECOXIB 200 MG/1
200 CAPSULE ORAL
COMMUNITY

## 2025-03-19 RX ORDER — DIAZEPAM 10 MG/1
10 TABLET ORAL DAILY PRN
COMMUNITY
Start: 2024-10-30 | End: 2025-03-19

## 2025-03-19 RX ORDER — GABAPENTIN 300 MG/1
300 CAPSULE ORAL
COMMUNITY

## 2025-03-19 RX ORDER — LATANOPROST OPHTHALMIC SOLUTION 0.005% 50 UG/ML
SOLUTION/ DROPS TOPICAL
COMMUNITY
Start: 2024-08-20

## 2025-03-20 NOTE — PROGRESS NOTES
Patient ID: Jesse Hernández is a 50 y.o. male    Chief Complaint: Annual Exam    History of Present Illness    CHIEF COMPLAINT:  Patient presents for an annual wellness visit and to discuss concerns about possible ADHD symptoms.    HPI:  Patient reports ongoing difficulties with organization and focus, impacting work performance. He struggles to maintain an organized workspace, with gradual accumulation of clutter. He has challenges focusing and organizing thoughts, noting occasional mental distractions while speaking. He also reports previous concerns about memory, including word-finding difficulties. These issues have been present throughout his life, though the exact duration is not specified.    Patient describes a recent episode of testicular pain, characterized as constant discomfort for about 2 weeks, with intermittent exacerbations. Due to discomfort with physical exam, he initially delayed seeking care but eventually went to the ER when the pain persisted. At the ER, he received medication to facilitate relaxation for the exam, and an ultrasound was performed, which did not reveal any abnormalities in his testicles. He was referred to a urologist for follow-up.    He has since identified the source of his pain as his lower back, with pain radiating to his toe and knee at times. He has been evaluated by a chiropractor for treatment, which he reports has been beneficial, though he still has occasional issues.    He was prescribed several medications for pain management, including gabapentin, amitriptyline, Celebrex, and tizanidine. He admits to inconsistent medication adherence. He initially tried gabapentin but discontinued due to concerns about drowsiness. He took amitriptyline once on a weekend but is hesitant to use it during the week due to concerns about its sedating effects impacting his ability to function.    He denies any current use of Vicodin.    MEDICATIONS:  Patient is on Cymbalta 60 mg and 20  mg. He is also on Celebrex, Tizanidine, and Gabapentin for chronic pain, with Tizanidine specifically used as a muscle relaxer and Gabapentin for nerve pain. Patient has 2 Vicodin pills left but is not currently taking them. He has discontinued regular use of Gabapentin, Tizanidine, and Celebrex.    MEDICAL HISTORY:  Patient has a history of obstructive sleep apnea and uses a CPAP machine. He also has lower back issues.    TEST RESULTS:  A chemistry panel conducted on March 17 showed normal results for kidney, liver, electrolytes, and sugar levels. The cholesterol panel from the same date revealed improved levels, with bad cholesterol at 145 (down from 170s) and triglycerides at 180 (down from 250). Patient's testosterone level was 339, which is on the lower side of the normal range. An ultrasound of the testicles was performed, and no abnormalities were found.    IMAGING:  An X-ray of the back was conducted, which showed issues related to the patient's back   pain.          ROS: Otherwise Negative     Physical Exam    General: Well-appearing. Well-nourished. No distress.  HEENT: Conjunctivae normal. Pupils are equal and reactive to light. TMs are clear and intact bilaterally. Hearing grossly normal. Nasopharynx clear. Oropharynx clear.  Neck: Supple. No thyromegaly. No bruits.  Lymph: No cervical adenopathy. No supraclavicular adenopathy.  Heart: Regular rate and rhythm. No murmur. No rub. No gallop.  Lungs: Clear to auscultation. Respiratory effort normal.  Abdomen: Soft. Nontender. Nondistended. Normoactive bowel sounds. No hepatomegaly. No masses.  Extremities: Good distal pulses. No edema.  Psych: Oriented to time person place. Judgment unimpaired. Insight unimpaired. Mood appropriate. Affect appropriate.  Vitals: Blood pressure: 128/74.          Assessment & Plan    OBSTRUCTIVE SLEEP APNEA:  - Confirmed the patient is using CPAP machine for treatment of obstructive sleep apnea.    HYPERLIPIDEMIA:  - Evaluated  the patient's cholesterol levels, noting significant improvement.  - Observed decrease in LDL cholesterol from 170s to 145, and triglycerides from 250 to 180.    LOW BACK PAIN AND RADICULOPATHY:  - Receiving chiropractic care for back pain management.  - Continued prescription of Celebrex as needed for anti-inflammatory effects.  - Continued prescription of Tizanidine as needed for muscle relaxation, cautioning about potential drowsiness.  - Noted the patient reports ongoing back pain affecting various parts of the body, including testicles, knee, and toes.  - Confirmed knee pain was associated with back issues.  - Acknowledged the patient has been seeing a chiropractor for treatment and reports feeling better.  - Recommend taking Celebrex, Tizanidine, and Gabapentin for chronic pain management.  - Provided information on gabapentin's mechanism of action for nerve pain and potential initial side effects.  -   TESTICULAR DYSFUNCTION:  - Noted patient reports severe testicular pain, equivalent to being kicked, which was constant for about 2 weeks.  - Reviewed ER ultrasound results, which found no issues with testicles.  - Referred the patient to a urologist for further evaluation.    ADHD COUNSELING:  - Discussed potential side effects of ADHD medications, including impacts on sleep.  - Explained importance of proper ADHD diagnosis before treatment with stimulants.  - Discussed potential side effects of ADHD medications, including impacts on blood pressure, and risk of headaches.  - Advised the patient to work with insurance to find covered psychologists for ADHD testing.  - Addressed the patient's concerns about potential ADHD symptoms and difficulties with organization and focus.  - Recommend seeking formal diagnosis through proper channels before considering treatment for ADHD.    FOLLOW-UP:  - Complete labs before next appointment.  - Follow up in 6 months.            Follow up in about 6 months (around 9/19/2025)  for HTN cmp lipids, Thyroid TSH.    Jesse was seen today for annual exam.    Diagnoses and all orders for this visit:    Preventative health care  -     PSA, Screening; Future  -     Comprehensive metabolic panel; Future  -     LIPID PANEL; Future  -     TSH; Future    KAILEE (obstructive sleep apnea)    Dyslipidemia  -     Comprehensive metabolic panel; Future  -     LIPID PANEL; Future    Hypothyroidism, unspecified type  -     Comprehensive metabolic panel; Future  -     TSH; Future    Recurrent major depressive disorder, in remission    ALISON (generalized anxiety disorder)    Osteoarthritis of lumbar spine, unspecified spinal osteoarthritis complication status         This note was generated with the assistance of ambient listening technology. Verbal consent was obtained by the patient and accompanying visitor(s) for the recording of patient appointment to facilitate this note. I attest to having reviewed and edited the generated note for accuracy, though some syntax or spelling errors may persist. Please contact the author of this note for any clarification.

## 2025-04-01 DIAGNOSIS — F41.1 GAD (GENERALIZED ANXIETY DISORDER): ICD-10-CM

## 2025-04-01 RX ORDER — LAMOTRIGINE 150 MG/1
300 TABLET ORAL
Qty: 180 TABLET | Refills: 0 | Status: SHIPPED | OUTPATIENT
Start: 2025-04-01

## 2025-04-10 DIAGNOSIS — E03.9 HYPOTHYROIDISM, UNSPECIFIED TYPE: ICD-10-CM

## 2025-04-10 NOTE — TELEPHONE ENCOUNTER
Refill Routing Note   Medication(s) are not appropriate for processing by [facility] for the following reason(s):        Required labs outdated    [facility] action(s):  Defer               Appointments  past 12m or future 3m with PCP    Date Provider   Last Visit   3/19/2025 Roxanne Wolfe MD   Next Visit   9/19/2025 Roxanne Wolfe MD   ED visits in past 90 days: 0        Note composed:8:17 AM 04/10/2025

## 2025-04-10 NOTE — TELEPHONE ENCOUNTER
No care due was identified.  Health [facility] Embedded Care Due Messages. Reference number: [id number].   4/10/2025 5:46:30 AM CDT

## 2025-04-11 RX ORDER — LEVOTHYROXINE SODIUM 75 UG/1
75 TABLET ORAL
Qty: 90 TABLET | Refills: 0 | Status: SHIPPED | OUTPATIENT
Start: 2025-04-11

## 2025-04-15 DIAGNOSIS — I10 HYPERTENSION, UNSPECIFIED TYPE: ICD-10-CM

## 2025-04-15 RX ORDER — METOPROLOL SUCCINATE 100 MG/1
TABLET, EXTENDED RELEASE ORAL
Qty: 90 TABLET | Refills: 3 | Status: SHIPPED | OUTPATIENT
Start: 2025-04-15

## 2025-04-15 NOTE — TELEPHONE ENCOUNTER
Refill Routing Note   Medication(s) are not appropriate for processing by [facility] for the following reason(s):        New or recently adjusted medication    [facility] action(s):  Defer        Medication Therapy Plan: INITIATED BY PROVIDER ON 01/17/2025      Appointments  past 12m or future 3m with PCP    Date Provider   Last Visit   3/19/2025 Roxanne Wolfe MD   Next Visit   9/19/2025 Roxanne Wolfe MD   ED visits in past 90 days: 0        Note composed:8:09 AM 04/15/2025

## 2025-04-15 NOTE — TELEPHONE ENCOUNTER
No care due was identified.  Health [facility] Embedded Care Due Messages. Reference number: [id number].   4/15/2025 5:48:03 AM CDT

## 2025-05-23 ENCOUNTER — HOSPITAL ENCOUNTER (OUTPATIENT)
Facility: HOSPITAL | Age: 50
Discharge: HOME | End: 2025-05-24
Attending: EMERGENCY MEDICINE
Payer: COMMERCIAL

## 2025-05-23 DIAGNOSIS — I21.4 NON-ST ELEVATION MYOCARDIAL INFARCTION (NSTEMI): Primary | ICD-10-CM

## 2025-05-23 DIAGNOSIS — R07.9 CHEST PAIN: ICD-10-CM

## 2025-05-23 DIAGNOSIS — R07.9 CHEST PAIN WITH HIGH RISK FOR CARDIAC ETIOLOGY: ICD-10-CM

## 2025-05-23 PROBLEM — E78.5 HYPERLIPIDEMIA: Status: ACTIVE | Noted: 2018-10-01

## 2025-05-23 PROBLEM — E66.9 OBESITY: Status: ACTIVE | Noted: 2025-05-23

## 2025-05-23 LAB
ABORH RETYPE: NORMAL
ABSOLUTE EOSINOPHIL (OHS): 0.07 K/UL
ABSOLUTE MONOCYTE (OHS): 0.5 K/UL (ref 0.3–1)
ABSOLUTE NEUTROPHIL COUNT (OHS): 6.48 K/UL (ref 1.8–7.7)
ALBUMIN SERPL BCP-MCNC: 4.4 G/DL (ref 3.5–5.2)
ALP SERPL-CCNC: 46 UNIT/L (ref 40–150)
ALT SERPL W/O P-5'-P-CCNC: 36 UNIT/L (ref 10–44)
ANION GAP (OHS): 9 MMOL/L (ref 8–16)
AORTIC SIZE INDEX (SOV): 1.6 CM/M2
AORTIC SIZE INDEX: 1.3 CM/M2
APICAL FOUR CHAMBER EJECTION FRACTION: 66 %
APICAL TWO CHAMBER EJECTION FRACTION: 69 %
APTT PPP: 24.6 SECONDS (ref 21–32)
ASCENDING AORTA: 3 CM
AST SERPL-CCNC: 25 UNIT/L (ref 11–45)
AV INDEX (PROSTH): 0.87
AV MEAN GRADIENT: 6 MMHG
AV PEAK GRADIENT: 9 MMHG
AV VALVE AREA BY VELOCITY RATIO: 3.5 CM²
AV VALVE AREA: 3.3 CM²
AV VELOCITY RATIO: 0.93
BASOPHILS # BLD AUTO: 0.05 K/UL
BASOPHILS NFR BLD AUTO: 0.6 %
BILIRUB SERPL-MCNC: 0.6 MG/DL (ref 0.1–1)
BNP SERPL-MCNC: <10 PG/ML (ref 0–99)
BSA FOR ECHO PROCEDURE: 2.35 M2
BUN SERPL-MCNC: 12 MG/DL (ref 6–20)
CALCIUM SERPL-MCNC: 9.6 MG/DL (ref 8.7–10.5)
CHLORIDE SERPL-SCNC: 105 MMOL/L (ref 95–110)
CHOLEST SERPL-MCNC: 249 MG/DL (ref 120–199)
CHOLEST/HDLC SERPL: 5.5 {RATIO} (ref 2–5)
CO2 SERPL-SCNC: 23 MMOL/L (ref 23–29)
CREAT SERPL-MCNC: 1 MG/DL (ref 0.5–1.4)
CV ECHO LV RWT: 0.42 CM
DOP CALC AO PEAK VEL: 1.5 M/S
DOP CALC AO VTI: 32.4 CM
DOP CALC LVOT AREA: 3.8 CM2
DOP CALC LVOT DIAMETER: 2.2 CM
DOP CALC LVOT PEAK VEL: 1.4 M/S
DOP CALC LVOT STROKE VOLUME: 107.5 CM3
DOP CALC MV VTI: 35.2 CM
DOP CALCLVOT PEAK VEL VTI: 28.3 CM
E WAVE DECELERATION TIME: 201 MSEC
E/A RATIO: 1.5
E/E' RATIO: 11 M/S
EAG (OHS): 126 MG/DL (ref 68–131)
ECHO LV POSTERIOR WALL: 1 CM (ref 0.6–1.1)
ERYTHROCYTE [DISTWIDTH] IN BLOOD BY AUTOMATED COUNT: 13.9 % (ref 11.5–14.5)
FRACTIONAL SHORTENING: 33.3 % (ref 28–44)
GFR SERPLBLD CREATININE-BSD FMLA CKD-EPI: >60 ML/MIN/1.73/M2
GLUCOSE SERPL-MCNC: 97 MG/DL (ref 70–110)
HBA1C MFR BLD: 6 % (ref 4–5.6)
HCT VFR BLD AUTO: 48.8 % (ref 40–54)
HDLC SERPL-MCNC: 45 MG/DL (ref 40–75)
HDLC SERPL: 18.1 % (ref 20–50)
HGB BLD-MCNC: 16.3 GM/DL (ref 14–18)
HR MV ECHO: 55 BPM
IMM GRANULOCYTES # BLD AUTO: 0.08 K/UL (ref 0–0.04)
IMM GRANULOCYTES NFR BLD AUTO: 0.9 % (ref 0–0.5)
INDIRECT COOMBS: NORMAL
INR PPP: 1 (ref 0.8–1.2)
INTERVENTRICULAR SEPTUM: 1.1 CM (ref 0.6–1.1)
IVC DIAMETER: 1.7 CM
LDLC SERPL CALC-MCNC: 164.6 MG/DL (ref 63–159)
LEFT ATRIUM AREA SYSTOLIC (APICAL 2 CHAMBER): 14.04 CM2
LEFT ATRIUM AREA SYSTOLIC (APICAL 4 CHAMBER): 13.25 CM2
LEFT ATRIUM VOLUME INDEX MOD: 14 ML/M2
LEFT ATRIUM VOLUME MOD: 31 ML
LEFT INTERNAL DIMENSION IN SYSTOLE: 3.2 CM (ref 2.1–4)
LEFT VENTRICLE DIASTOLIC VOLUME INDEX: 46.26 ML/M2
LEFT VENTRICLE DIASTOLIC VOLUME: 105 ML
LEFT VENTRICLE END DIASTOLIC VOLUME APICAL 2 CHAMBER: 102.29 ML
LEFT VENTRICLE END DIASTOLIC VOLUME APICAL 4 CHAMBER: 90.87 ML
LEFT VENTRICLE END SYSTOLIC VOLUME APICAL 2 CHAMBER: 30.42 ML
LEFT VENTRICLE END SYSTOLIC VOLUME APICAL 4 CHAMBER: 28.75 ML
LEFT VENTRICLE MASS INDEX: 80.1 G/M2
LEFT VENTRICLE SYSTOLIC VOLUME INDEX: 18.5 ML/M2
LEFT VENTRICLE SYSTOLIC VOLUME: 42 ML
LEFT VENTRICULAR INTERNAL DIMENSION IN DIASTOLE: 4.8 CM (ref 3.5–6)
LEFT VENTRICULAR MASS: 181.9 G
LV LATERAL E/E' RATIO: 10.7 M/S
LV SEPTAL E/E' RATIO: 12 M/S
LVED V (TEICH): 104.96 ML
LVES V (TEICH): 41.85 ML
LVOT MG: 4.33 MMHG
LVOT MV: 0.98 CM/S
LYMPHOCYTES # BLD AUTO: 1.88 K/UL (ref 1–4.8)
MCH RBC QN AUTO: 29.2 PG (ref 27–31)
MCHC RBC AUTO-ENTMCNC: 33.4 G/DL (ref 32–36)
MCV RBC AUTO: 88 FL (ref 82–98)
MV A" WAVE DURATION": 98.95 MSEC
MV MEAN GRADIENT: 2 MMHG
MV PEAK A VEL: 0.64 M/S
MV PEAK E VEL: 0.96 M/S
MV PEAK GRADIENT: 5 MMHG
MV STENOSIS PRESSURE HALF TIME: 58.37 MS
MV VALVE AREA BY CONTINUITY EQUATION: 3.05 CM2
MV VALVE AREA P 1/2 METHOD: 3.77 CM2
NONHDLC SERPL-MCNC: 204 MG/DL
NUCLEATED RBC (/100WBC) (OHS): 0 /100 WBC
OHS CV RV/LV RATIO: 0.69 CM
OHS LV EJECTION FRACTION SIMPSONS BIPLANE MOD: 68 %
PLATELET # BLD AUTO: 325 K/UL (ref 150–450)
PMV BLD AUTO: 9.8 FL (ref 9.2–12.9)
POCT GLUCOSE: 97 MG/DL (ref 70–110)
POTASSIUM SERPL-SCNC: 4.4 MMOL/L (ref 3.5–5.1)
PROT SERPL-MCNC: 7.8 GM/DL (ref 6–8.4)
PROTHROMBIN TIME: 11.6 SECONDS (ref 9–12.5)
PULM VEIN S/D RATIO: 0.98
PV MV: 0.87 M/S
PV PEAK D VEL: 0.47 M/S
PV PEAK GRADIENT: 5 MMHG
PV PEAK S VEL: 0.46 M/S
PV PEAK VELOCITY: 1.16 M/S
RA PRESSURE ESTIMATED: 3 MMHG
RA VOL SYS: 34.3 ML
RBC # BLD AUTO: 5.58 M/UL (ref 4.6–6.2)
RELATIVE EOSINOPHIL (OHS): 0.8 %
RELATIVE LYMPHOCYTE (OHS): 20.8 % (ref 18–48)
RELATIVE MONOCYTE (OHS): 5.5 % (ref 4–15)
RELATIVE NEUTROPHIL (OHS): 71.4 % (ref 38–73)
RH BLD: NORMAL
RIGHT ATRIAL AREA: 14.2 CM2
RIGHT ATRIUM END SYSTOLIC VOLUME APICAL 4 CHAMBER INDEX BSA: 14.34 ML/M2
RIGHT ATRIUM VOLUME AREA LENGTH APICAL 4 CHAMBER: 32.56 ML
RIGHT VENTRICLE DIASTOLIC BASEL DIMENSION: 3.3 CM
RV TISSUE DOPPLER FREE WALL SYSTOLIC VELOCITY 1 (APICAL 4 CHAMBER VIEW): 11.15 CM/S
SINUS: 3.53 CM
SODIUM SERPL-SCNC: 137 MMOL/L (ref 136–145)
SPECIMEN OUTDATE: NORMAL
STJ: 3.2 CM
TDI LATERAL: 0.09 M/S
TDI SEPTAL: 0.08 M/S
TDI: 0.09 M/S
TR MAX PG: 11 MMHG
TRICUSPID ANNULAR PLANE SYSTOLIC EXCURSION: 2.3 CM
TRIGL SERPL-MCNC: 197 MG/DL (ref 30–150)
TROPONIN I SERPL DL<=0.01 NG/ML-MCNC: 0.01 NG/ML
WBC # BLD AUTO: 9.06 K/UL (ref 3.9–12.7)
Z-SCORE OF LEFT VENTRICULAR DIMENSION IN END DIASTOLE: -5.64
Z-SCORE OF LEFT VENTRICULAR DIMENSION IN END SYSTOLE: -3.69

## 2025-05-23 PROCEDURE — 27201423 OPTIME MED/SURG SUP & DEVICES STERILE SUPPLY: Performed by: INTERNAL MEDICINE

## 2025-05-23 PROCEDURE — G0378 HOSPITAL OBSERVATION PER HR: HCPCS

## 2025-05-23 PROCEDURE — 83880 ASSAY OF NATRIURETIC PEPTIDE: CPT

## 2025-05-23 PROCEDURE — 80061 LIPID PANEL: CPT

## 2025-05-23 PROCEDURE — 25000003 PHARM REV CODE 250

## 2025-05-23 PROCEDURE — 93458 L HRT ARTERY/VENTRICLE ANGIO: CPT | Performed by: INTERNAL MEDICINE

## 2025-05-23 PROCEDURE — 84484 ASSAY OF TROPONIN QUANT: CPT

## 2025-05-23 PROCEDURE — 86850 RBC ANTIBODY SCREEN: CPT

## 2025-05-23 PROCEDURE — 93005 ELECTROCARDIOGRAM TRACING: CPT

## 2025-05-23 PROCEDURE — 82374 ASSAY BLOOD CARBON DIOXIDE: CPT

## 2025-05-23 PROCEDURE — 63600175 PHARM REV CODE 636 W HCPCS: Performed by: INTERNAL MEDICINE

## 2025-05-23 PROCEDURE — 93458 L HRT ARTERY/VENTRICLE ANGIO: CPT | Mod: 26,,, | Performed by: INTERNAL MEDICINE

## 2025-05-23 PROCEDURE — 84484 ASSAY OF TROPONIN QUANT: CPT | Mod: 91

## 2025-05-23 PROCEDURE — C1894 INTRO/SHEATH, NON-LASER: HCPCS | Performed by: INTERNAL MEDICINE

## 2025-05-23 PROCEDURE — 85025 COMPLETE CBC W/AUTO DIFF WBC: CPT

## 2025-05-23 PROCEDURE — 99153 MOD SED SAME PHYS/QHP EA: CPT | Performed by: INTERNAL MEDICINE

## 2025-05-23 PROCEDURE — C1769 GUIDE WIRE: HCPCS | Performed by: INTERNAL MEDICINE

## 2025-05-23 PROCEDURE — 99223 1ST HOSP IP/OBS HIGH 75: CPT | Mod: ,,, | Performed by: INTERNAL MEDICINE

## 2025-05-23 PROCEDURE — 25000242 PHARM REV CODE 250 ALT 637 W/ HCPCS: Performed by: NURSE PRACTITIONER

## 2025-05-23 PROCEDURE — 99152 MOD SED SAME PHYS/QHP 5/>YRS: CPT | Performed by: INTERNAL MEDICINE

## 2025-05-23 PROCEDURE — 99152 MOD SED SAME PHYS/QHP 5/>YRS: CPT | Mod: ,,, | Performed by: INTERNAL MEDICINE

## 2025-05-23 PROCEDURE — C1887 CATHETER, GUIDING: HCPCS | Performed by: INTERNAL MEDICINE

## 2025-05-23 PROCEDURE — 83036 HEMOGLOBIN GLYCOSYLATED A1C: CPT

## 2025-05-23 PROCEDURE — 99900035 HC TECH TIME PER 15 MIN (STAT)

## 2025-05-23 PROCEDURE — 85730 THROMBOPLASTIN TIME PARTIAL: CPT

## 2025-05-23 PROCEDURE — 25000003 PHARM REV CODE 250: Performed by: NURSE PRACTITIONER

## 2025-05-23 PROCEDURE — 25500020 PHARM REV CODE 255: Performed by: INTERNAL MEDICINE

## 2025-05-23 PROCEDURE — 93010 ELECTROCARDIOGRAM REPORT: CPT | Mod: ,,, | Performed by: INTERNAL MEDICINE

## 2025-05-23 PROCEDURE — 36415 COLL VENOUS BLD VENIPUNCTURE: CPT

## 2025-05-23 PROCEDURE — 85610 PROTHROMBIN TIME: CPT

## 2025-05-23 PROCEDURE — 94761 N-INVAS EAR/PLS OXIMETRY MLT: CPT

## 2025-05-23 PROCEDURE — 25000003 PHARM REV CODE 250: Performed by: INTERNAL MEDICINE

## 2025-05-23 RX ORDER — POLYETHYLENE GLYCOL 3350 17 G/17G
17 POWDER, FOR SOLUTION ORAL DAILY
Status: DISCONTINUED | OUTPATIENT
Start: 2025-05-23 | End: 2025-05-23 | Stop reason: SDUPTHER

## 2025-05-23 RX ORDER — FENOFIBRATE 160 MG/1
160 TABLET ORAL NIGHTLY
Status: DISCONTINUED | OUTPATIENT
Start: 2025-05-23 | End: 2025-05-24 | Stop reason: HOSPADM

## 2025-05-23 RX ORDER — HEPARIN SODIUM 1000 [USP'U]/ML
INJECTION, SOLUTION INTRAVENOUS; SUBCUTANEOUS
Status: DISCONTINUED | OUTPATIENT
Start: 2025-05-23 | End: 2025-05-23 | Stop reason: HOSPADM

## 2025-05-23 RX ORDER — LIDOCAINE HYDROCHLORIDE 10 MG/ML
INJECTION, SOLUTION INFILTRATION; PERINEURAL
Status: DISCONTINUED | OUTPATIENT
Start: 2025-05-23 | End: 2025-05-23 | Stop reason: HOSPADM

## 2025-05-23 RX ORDER — HEPARIN SODIUM 200 [USP'U]/100ML
INJECTION, SOLUTION INTRAVENOUS
Status: DISCONTINUED | OUTPATIENT
Start: 2025-05-23 | End: 2025-05-24 | Stop reason: HOSPADM

## 2025-05-23 RX ORDER — POLYETHYLENE GLYCOL 3350 17 G/17G
17 POWDER, FOR SOLUTION ORAL DAILY
Status: DISCONTINUED | OUTPATIENT
Start: 2025-05-23 | End: 2025-05-24 | Stop reason: HOSPADM

## 2025-05-23 RX ORDER — NITROGLYCERIN 0.4 MG/1
0.4 TABLET SUBLINGUAL
Status: DISPENSED | OUTPATIENT
Start: 2025-05-23 | End: 2025-05-23

## 2025-05-23 RX ORDER — CLOPIDOGREL BISULFATE 75 MG/1
300 TABLET ORAL ONCE
Status: COMPLETED | OUTPATIENT
Start: 2025-05-23 | End: 2025-05-23

## 2025-05-23 RX ORDER — ASPIRIN 325 MG
325 TABLET ORAL
Status: COMPLETED | OUTPATIENT
Start: 2025-05-23 | End: 2025-05-23

## 2025-05-23 RX ORDER — ALUMINUM HYDROXIDE, MAGNESIUM HYDROXIDE, AND SIMETHICONE 1200; 120; 1200 MG/30ML; MG/30ML; MG/30ML
30 SUSPENSION ORAL EVERY 6 HOURS PRN
Status: DISCONTINUED | OUTPATIENT
Start: 2025-05-23 | End: 2025-05-24 | Stop reason: HOSPADM

## 2025-05-23 RX ORDER — CARVEDILOL 6.25 MG/1
6.25 TABLET ORAL 2 TIMES DAILY
Status: DISCONTINUED | OUTPATIENT
Start: 2025-05-23 | End: 2025-05-24

## 2025-05-23 RX ORDER — HYDROCODONE BITARTRATE AND ACETAMINOPHEN 5; 325 MG/1; MG/1
1 TABLET ORAL EVERY 6 HOURS PRN
Refills: 0 | Status: DISCONTINUED | OUTPATIENT
Start: 2025-05-23 | End: 2025-05-24

## 2025-05-23 RX ORDER — TALC
6 POWDER (GRAM) TOPICAL NIGHTLY PRN
Status: DISCONTINUED | OUTPATIENT
Start: 2025-05-23 | End: 2025-05-24 | Stop reason: HOSPADM

## 2025-05-23 RX ORDER — ONDANSETRON HYDROCHLORIDE 2 MG/ML
4 INJECTION, SOLUTION INTRAVENOUS EVERY 12 HOURS PRN
Status: DISCONTINUED | OUTPATIENT
Start: 2025-05-23 | End: 2025-05-24 | Stop reason: HOSPADM

## 2025-05-23 RX ORDER — PANTOPRAZOLE SODIUM 40 MG/1
40 TABLET, DELAYED RELEASE ORAL NIGHTLY
Status: DISCONTINUED | OUTPATIENT
Start: 2025-05-23 | End: 2025-05-24 | Stop reason: HOSPADM

## 2025-05-23 RX ORDER — NITROGLYCERIN 0.4 MG/1
0.4 TABLET SUBLINGUAL EVERY 5 MIN PRN
Status: DISCONTINUED | OUTPATIENT
Start: 2025-05-23 | End: 2025-05-24 | Stop reason: HOSPADM

## 2025-05-23 RX ORDER — CARVEDILOL 3.12 MG/1
3.12 TABLET ORAL 2 TIMES DAILY
Status: DISCONTINUED | OUTPATIENT
Start: 2025-05-23 | End: 2025-05-23

## 2025-05-23 RX ORDER — IODIXANOL 320 MG/ML
INJECTION, SOLUTION INTRAVASCULAR
Status: DISCONTINUED | OUTPATIENT
Start: 2025-05-23 | End: 2025-05-23 | Stop reason: HOSPADM

## 2025-05-23 RX ORDER — ACETAMINOPHEN 325 MG/1
650 TABLET ORAL EVERY 8 HOURS PRN
Status: DISCONTINUED | OUTPATIENT
Start: 2025-05-23 | End: 2025-05-24 | Stop reason: HOSPADM

## 2025-05-23 RX ORDER — FENTANYL CITRATE 50 UG/ML
INJECTION, SOLUTION INTRAMUSCULAR; INTRAVENOUS
Status: DISCONTINUED | OUTPATIENT
Start: 2025-05-23 | End: 2025-05-23 | Stop reason: HOSPADM

## 2025-05-23 RX ORDER — MIDAZOLAM HYDROCHLORIDE 1 MG/ML
INJECTION INTRAMUSCULAR; INTRAVENOUS
Status: DISCONTINUED | OUTPATIENT
Start: 2025-05-23 | End: 2025-05-23 | Stop reason: HOSPADM

## 2025-05-23 RX ORDER — ACETAMINOPHEN 325 MG/1
650 TABLET ORAL EVERY 4 HOURS PRN
Status: DISCONTINUED | OUTPATIENT
Start: 2025-05-23 | End: 2025-05-24 | Stop reason: HOSPADM

## 2025-05-23 RX ORDER — NAPROXEN SODIUM 220 MG/1
81 TABLET, FILM COATED ORAL DAILY
Status: DISCONTINUED | OUTPATIENT
Start: 2025-05-24 | End: 2025-05-24 | Stop reason: HOSPADM

## 2025-05-23 RX ORDER — CLOPIDOGREL BISULFATE 75 MG/1
75 TABLET ORAL DAILY
Status: DISCONTINUED | OUTPATIENT
Start: 2025-05-24 | End: 2025-05-24 | Stop reason: HOSPADM

## 2025-05-23 RX ORDER — VERAPAMIL HYDROCHLORIDE 2.5 MG/ML
INJECTION INTRAVENOUS
Status: DISCONTINUED | OUTPATIENT
Start: 2025-05-23 | End: 2025-05-23 | Stop reason: HOSPADM

## 2025-05-23 RX ORDER — ATORVASTATIN CALCIUM 40 MG/1
80 TABLET, FILM COATED ORAL DAILY
Status: DISCONTINUED | OUTPATIENT
Start: 2025-05-23 | End: 2025-05-24 | Stop reason: HOSPADM

## 2025-05-23 RX ORDER — MORPHINE SULFATE 4 MG/ML
4 INJECTION, SOLUTION INTRAMUSCULAR; INTRAVENOUS EVERY 4 HOURS PRN
Refills: 0 | Status: DISCONTINUED | OUTPATIENT
Start: 2025-05-23 | End: 2025-05-24 | Stop reason: HOSPADM

## 2025-05-23 RX ADMIN — HUMAN ALBUMIN MICROSPHERES AND PERFLUTREN 0.11 MG: 10; .22 INJECTION, SOLUTION INTRAVENOUS at 03:05

## 2025-05-23 RX ADMIN — CLOPIDOGREL 300 MG: 75 TABLET ORAL at 01:05

## 2025-05-23 RX ADMIN — PANTOPRAZOLE SODIUM 40 MG: 40 TABLET, DELAYED RELEASE ORAL at 11:05

## 2025-05-23 RX ADMIN — Medication 6 MG: at 11:05

## 2025-05-23 RX ADMIN — ASPIRIN 325 MG ORAL TABLET 325 MG: 325 PILL ORAL at 01:05

## 2025-05-23 RX ADMIN — FENOFIBRATE 160 MG: 160 TABLET ORAL at 11:05

## 2025-05-23 RX ADMIN — ALUMINUM HYDROXIDE, MAGNESIUM HYDROXIDE, AND SIMETHICONE 30 ML: 200; 200; 20 SUSPENSION ORAL at 08:05

## 2025-05-23 RX ADMIN — HYDROCODONE BITARTRATE AND ACETAMINOPHEN 1 TABLET: 5; 325 TABLET ORAL at 08:05

## 2025-05-23 RX ADMIN — CARVEDILOL 6.25 MG: 6.25 TABLET, FILM COATED ORAL at 08:05

## 2025-05-23 NOTE — CONSULTS
Food & Nutrition  Education    Diet Education: Cardiac Education  Time Spent: RD remote  Learners: Patient       Nutrition Education provided with handouts:   + Clinical Reference attachments to d/c documents    Nutrition Related Social Determinants of Health: SDOH: Unable to assess at this time.     Comments:  Patient is on a low sodium diet.  Patient admitted with NSTEMI diagnosis. Patient post op for cardiac cath today. Labs reviewed.  NKFA.  LBM:KIM   I/O since admit +240mL.  Education handouts attached to discharge paperwork. RD to provide education at follow up visit and monitor po intake/tolerance.    Patient Active Problem List   Diagnosis    Fatty liver    Pulmonary nodule    Adrenal nodule    Episode of recurrent major depressive disorder    Depression    ALISON (generalized anxiety disorder)    Obesity, Class II, BMI 35-39.9    Obstructive sleep apnea on CPAP    Hyperlipidemia    Right shoulder pain    Hypertension    Reducible umbilical hernia    Left groin pain    Dyslipidemia    Lateral epicondylitis of both elbows    Traumatic incomplete tear of left rotator cuff    Hypothyroidism    Severe obesity (BMI 35.0-39.9) with comorbidity    Degenerative joint disease (DJD) of lumbar spine    Obesity    NSTEMI (non-ST elevated myocardial infarction)    Chest pain     Past Medical History:   Diagnosis Date    Hypertension     KAILEE (obstructive sleep apnea)      BMP  Lab Results   Component Value Date     05/23/2025    K 4.4 05/23/2025     05/23/2025    CO2 23 05/23/2025    BUN 12 05/23/2025    CREATININE 1.0 05/23/2025    CALCIUM 9.6 05/23/2025    ANIONGAP 9 05/23/2025    EGFRNORACEVR >60 05/23/2025     Lab Results   Component Value Date    HGBA1C 6.0 (H) 05/23/2025     Lab Results   Component Value Date    CALCIUM 9.6 05/23/2025     Glucose   Date Value Ref Range Status   05/23/2025 97 70 - 110 mg/dL Final   03/17/2025 94 70 - 110 mg/dL Final       Scheduled Meds:   [START ON 5/24/2025] aspirin  81 mg  Oral Daily    atorvastatin  80 mg Oral Daily    carvediloL  6.25 mg Oral BID    [START ON 5/24/2025] clopidogreL  75 mg Oral Daily    nitroGLYCERIN  0.4 mg Sublingual ED 1 Time    polyethylene glycol  17 g Oral Daily     Continuous Infusions:   heparin (porcine)    Continuous  mL/hr at 05/23/25 1350 1,500 Units/hr at 05/23/25 1350     PRN Meds:.  Current Facility-Administered Medications:     acetaminophen, 650 mg, Oral, Q8H PRN    acetaminophen, 650 mg, Oral, Q4H PRN    heparin (porcine), , , Continuous PRN    HYDROcodone-acetaminophen, 1 tablet, Oral, Q6H PRN    melatonin, 6 mg, Oral, Nightly PRN    morphine, 4 mg, Intravenous, Q4H PRN    nitroGLYCERIN, 0.4 mg, Sublingual, Q5 Min PRN    ondansetron, 4 mg, Intravenous, Q12H PRN       All questions and concerns answered. Dietitian's contact information provided.       Follow-Up: Yes     Please Re-consult as needed        Thanks  Lashon Tapia, MS, RDN, LDN

## 2025-05-23 NOTE — PLAN OF CARE
Problem: Wound  Goal: Optimal Coping  5/23/2025 1711 by Alana Oakley RN  Outcome: Met  5/23/2025 1658 by Alana Oakley RN  Outcome: Progressing  Goal: Optimal Functional Ability  5/23/2025 1711 by Alana Oakley RN  Outcome: Met  5/23/2025 1658 by Alana Oakley RN  Outcome: Progressing  Goal: Absence of Infection Signs and Symptoms  5/23/2025 1711 by Alana Oakley RN  Outcome: Met  5/23/2025 1658 by Alana Oakley RN  Outcome: Progressing  Goal: Improved Oral Intake  5/23/2025 1711 by Alana Oakley RN  Outcome: Met  5/23/2025 1658 by Alana Oakley RN  Outcome: Progressing  Goal: Optimal Pain Control and Function  5/23/2025 1711 by Alana Oakley RN  Outcome: Met  5/23/2025 1658 by Alana Oakley RN  Outcome: Progressing  Goal: Skin Health and Integrity  5/23/2025 1711 by Alana Oakley RN  Outcome: Met  5/23/2025 1658 by Alana Oakley RN  Outcome: Progressing  Goal: Optimal Wound Healing  5/23/2025 1711 by Alana Oakley RN  Outcome: Met  5/23/2025 1658 by Alana Oakley RN  Outcome: Progressing       4Eye skin assessment. No open skin assessed.

## 2025-05-23 NOTE — ASSESSMENT & PLAN NOTE
Patient's blood pressure range in the last 24 hours was: BP  Min: 150/82  Max: 181/99.The patient's inpatient anti-hypertensive regimen is listed below:  Current Antihypertensives  nitroGLYCERIN SL tablet 0.4 mg, ED 1 Time, Sublingual  nitroGLYCERIN SL tablet 0.4 mg, Every 5 min PRN, Sublingual  carvediloL tablet 3.125 mg, 2 times daily, Oral    Plan  - BP is uncontrolled, will adjust as follows:  P.r.n. medications added restarted home meds  - to be monitored

## 2025-05-23 NOTE — PLAN OF CARE
Patient transported to admit tele bed post cath recovery to  421 on tele monitor w/ nurse per [facility]. Patient AAO, VSS, no complaints. Right radial drsg cdi, no hematoma, no bleeding. Patient ambulated to bathroom then to bed. Bed low and locked. Call bell in reach. Chart to nurse w/ bedside hand off to Alana warren/ site check. Iv w/ drsg cdi rt arm. Patient only had underwear in recovery for belongings.

## 2025-05-23 NOTE — SUBJECTIVE & OBJECTIVE
Past Medical History:   Diagnosis Date    Hypertension     KAILEE (obstructive sleep apnea)        Past Surgical History:   Procedure Laterality Date    BACK SURGERY      COLONOSCOPY N/A 04/22/2022    Procedure: COLONOSCOPY miralax prep;  Surgeon: Lamonte Carcamo MD;  Location: [facility];  Service: Endoscopy;  Laterality: N/A;  Patient needs a rapid covid test.    ESOPHAGOGASTRODUODENOSCOPY N/A 5/30/2023    Procedure: EGD (ESOPHAGOGASTRODUODENOSCOPY);  Surgeon: Lamonte Carcamo MD;  Location: [facility];  Service: Endoscopy;  Laterality: N/A;    HERNIA REPAIR      PATENT DUCTUS ARTERIOUS LIGATION      infancy    ROBOT-ASSISTED LAPAROSCOPIC REPAIR OF INGUINAL HERNIA Left 06/28/2021    Procedure: ROBOTIC REPAIR, HERNIA, INGUINAL;  Surgeon: Patrick Rehman Jr., MD;  Location: [facility] OR;  Service: General;  Laterality: Left;  Left vs bilateral    SHOULDER SURGERY Left 2022    SPINE SURGERY      TONSILLECTOMY  When child    UMBILICAL HERNIA REPAIR N/A 11/25/2019    Procedure: REPAIR, HERNIA, UMBILICAL, AGE 5 YEARS OR OLDER - open with mesh;  Surgeon: Patrick Rehman Jr., MD;  Location: [facility] OR;  Service: General;  Laterality: N/A;       Review of patient's allergies indicates:  No Known Allergies    No current facility-administered medications on file prior to encounter.     Current Outpatient Medications on File Prior to Encounter   Medication Sig    celecoxib (CELEBREX) 200 MG capsule Take 200 mg by mouth as needed for Pain.    DULoxetine (CYMBALTA) 20 MG capsule TAKE ONE CAPSULE BY MOUTH DAILY AND 60 MG DAILY( TOTAL OF 80 MG DAILY) (Patient taking differently: Take 20 mg by mouth once daily. Take with 60 mg to equal a total of 80 mg daily.)    DULoxetine (CYMBALTA) 60 MG capsule TAKE ONE CAPSULE BY MOUTH DAILY AND 60 MG DAILY( TOTAL OF 80 MG DAILY) (Patient taking differently: Take 60 mg by mouth once daily. Take with 20 mg to equal a total of 80 mg daily.)    fenofibrate 160 MG Tab Take 1 tablet (160 mg total)  by mouth once daily.    HYDROcodone-acetaminophen (NORCO) 5-325 mg per tablet Take 1 tablet by mouth as needed for Pain.    lamoTRIgine (LAMICTAL) 150 MG Tab TAKE 2 TABLETS BY MOUTH DAILY (Patient taking differently: Take 300 mg by mouth once daily.)    latanoprost 0.005 % ophthalmic solution Place 1 drop into both eyes every evening.    levothyroxine (SYNTHROID) 75 MCG tablet TAKE 1 TABLET(75 MCG) BY MOUTH BEFORE BREAKFAST    metoprolol succinate (TOPROL-XL) 100 MG 24 hr tablet TAKE 1 TABLET(100 MG) BY MOUTH DAILY (Patient taking differently: Take 100 mg by mouth once daily.)    [DISCONTINUED] gabapentin (NEURONTIN) 300 MG capsule Take 300 mg by mouth.    [DISCONTINUED] MANDY BAGLEY, 0.005 % Dpet     [DISCONTINUED] loratadine (CLARITIN) 10 mg tablet Take 1 tablet (10 mg total) by mouth once daily.    [DISCONTINUED] naproxen (NAPROSYN) 500 MG tablet Take 1 tablet (500 mg total) by mouth 2 (two) times daily as needed (pain).    [DISCONTINUED] pantoprazole (PROTONIX) 40 MG tablet Take 1 tablet (40 mg total) by mouth once daily.    [DISCONTINUED] sucralfate (CARAFATE) 1 gram tablet TAKE 1 TABLET(1 GRAM) BY MOUTH THREE TIMES DAILY (Patient taking differently: Take 1 g by mouth.)     Family History       Problem Relation (Age of Onset)    COPD Maternal Aunt    Cancer Mother    Dementia Mother    Diabetes Paternal Grandmother    Drug abuse Brother    Early death Brother    Hearing loss Father          Tobacco Use    Smoking status: Former     Current packs/day: 0.00     Types: Cigarettes     Quit date: 2008     Years since quittin.4    Smokeless tobacco: Former   Substance and Sexual Activity    Alcohol use: Yes     Alcohol/week: 2.0 standard drinks of alcohol     Types: 1 Glasses of wine, 1 Cans of beer per week     Comment: holidays    Drug use: No    Sexual activity: Yes     Partners: Female     Birth control/protection: Partner-Vasectomy     Review of Systems   Constitutional:  Positive for activity change and  fatigue.   HENT: Negative.     Respiratory:  Positive for shortness of breath.    Cardiovascular:  Positive for chest pain and palpitations.   Gastrointestinal:  Positive for nausea.   Genitourinary: Negative.    Musculoskeletal:  Positive for myalgias.   Skin: Negative.    Neurological: Negative.    Hematological: Negative.    Psychiatric/Behavioral: Negative.       Objective:     Vital Signs (Most Recent):  Temp: 97.8 °F (36.6 °C) (05/23/25 0907)  Pulse: 65 (05/23/25 1317)  Resp: 19 (05/23/25 1317)  BP: (!) 150/82 (05/23/25 1317)  SpO2: 95 % (05/23/25 1317) Vital Signs (24h Range):  Temp:  [97.8 °F (36.6 °C)] 97.8 °F (36.6 °C)  Pulse:  [58-65] 65  Resp:  [15-20] 19  SpO2:  [95 %-98 %] 95 %  BP: (150-181)/(82-99) 150/82     Weight: 113.4 kg (250 lb)  Body mass index is 36.92 kg/m².     Physical Exam  Constitutional:       Appearance: He is obese.   HENT:      Head: Normocephalic and atraumatic.      Nose: Nose normal.      Mouth/Throat:      Mouth: Mucous membranes are moist.   Eyes:      Extraocular Movements: Extraocular movements intact.      Pupils: Pupils are equal, round, and reactive to light.   Cardiovascular:      Rate and Rhythm: Normal rate and regular rhythm.      Pulses: Normal pulses.      Heart sounds: Normal heart sounds.   Pulmonary:      Effort: Pulmonary effort is normal.      Breath sounds: Normal breath sounds.   Abdominal:      Palpations: Abdomen is soft.   Musculoskeletal:         General: Normal range of motion.      Right lower leg: No edema.      Left lower leg: No edema.   Skin:     General: Skin is warm.      Capillary Refill: Capillary refill takes less than 2 seconds.   Psychiatric:         Mood and Affect: Mood normal.              CRANIAL NERVES     CN III, IV, VI   Pupils are equal, round, and reactive to light.       Significant Labs: All pertinent labs within the past 24 hours have been reviewed.  Recent Lab Results         05/23/25  1248   05/23/25  1217   05/23/25  1212    05/23/25  0928        ABO and RH A POS             Albumin       4.4       ALP       46       ALT       36       Anion Gap       9       PTT   24.6  Comment: Refer to local heparin nomogram for intensity/dose specific therapeutic range.           AST       25       Baso #       0.05       Basophil %       0.6       BILIRUBIN TOTAL       0.6  Comment: For infants and newborns, interpretation of results should be based   on gestational age, weight and in agreement with clinical   observations.    Premature Infant recommended reference ranges:   0-24 hours:  <8.0 mg/dL   24-48 hours: <12.0 mg/dL   3-5 days:    <15.0 mg/dL   6-29 days:   <15.0 mg/dL       BNP       <10  Comment: Values of less than 100 pg/ml are consistent with non-CHF populations.        BUN       12       Calcium       9.6       Chloride       105       CO2       23       Creatinine       1.0       eGFR       >60  Comment: Estimated GFR calculated using the CKD-EPI creatinine (2021) equation.       Eos #       0.07       Eos %       0.8       Estimated Avg Glucose   126           Glucose       97       Gran # (ANC)       6.48       Group & Rh   A POS           Hematocrit       48.8       Hemoglobin       16.3       Hemoglobin A1C External   6.0  Comment: ADA Screening Guidelines:  5.7-6.4%  Consistent with prediabetes  >=6.5%  Consistent with diabetes    High levels of fetal hemoglobin interfere with the HbA1C  assay. Heterozygous hemoglobin variants (HbS, HgC, etc)do  not significantly interfere with this assay.   However, presence of multiple variants may affect accuracy.           Immature Grans (Abs)       0.08  Comment: Mild elevation in immature granulocytes is non specific and can be seen in a variety of conditions including stress response, acute inflammation, trauma and pregnancy. Correlation with other laboratory and clinical findings is essential.       Immature Granulocytes       0.9       INDIRECT REJI   NEG           INR    1.0  Comment: Coumadin Therapy:    2.0 - 3.0 for INR for all indicators except mechanical heart valves    and antiphospholipid syndromes which should use 2.5 - 3.5.           Lymph #       1.88       Lymph %       20.8       MCH       29.2       MCHC       33.4       MCV       88       Mono #       0.50       Mono %       5.5       MPV       9.8       Neut %       71.4       nRBC       0       Platelet Count       325       Potassium       4.4       PROTEIN TOTAL       7.8       PT   11.6           RBC       5.58       RDW       13.9       Sodium       137       Specimen Outdate   05/26/2025 23:59           Troponin I     0.009  Comment: The reference interval for Troponin I represents the 99th percentile cutoff for our facility and is consistent with 3rd generation assay performance.   0.009  Comment: The reference interval for Troponin I represents the 99th percentile cutoff for our facility and is consistent with 3rd generation assay performance.       WBC       9.06               Significant Imaging: I have reviewed all pertinent imaging results/findings within the past 24 hours.    Imaging Results              X-Ray Chest AP Portable (Final result)  Result time 05/23/25 10:52:40      Final result by Rhett Fuentes MD (05/23/25 10:52:40)                   Impression:      No acute cardiopulmonary abnormality.      Electronically signed by: Rhett Fuentes  Date:    05/23/2025  Time:    10:52               Narrative:    EXAMINATION:  XR CHEST AP PORTABLE    CLINICAL HISTORY:  Chest Pain;    TECHNIQUE:  Single frontal view of the chest was performed.    COMPARISON:  12/15/2021    FINDINGS:  Overlying monitoring leads.  Mild accentuation of the cardiac silhouette likely contributed by portable technique.  Decreased inspiratory effort without large confluent opacity or lobar consolidation.  No gross pneumothorax.  No acute osseous abnormality.

## 2025-05-23 NOTE — ED PROVIDER NOTES
Encounter Date: 5/23/2025       History     Chief Complaint   Patient presents with    Chest Pain     C/o intermittent, 6/10 pain to epigastrim radiating to L chest wall x2wk. +SOB +palpitation +nausea Unable to describe pain. No attempts to treat PTA. Pt found /99; endorses hx of HTN and took anti-Htn this morning.      Patient is a 50 y.o. male who presents to the ED for evaluation of intermittent substernal chest pain and SOB X 2 weeks. Patient has a PMH of hypertension, hypertriglyceridemia, obesity, sleep apnea.  Denies any known cardiac history.    States that the pain is localized to the center of his chest.  Describes it as a pressure.  States that the chest pain seems to occur at random in his not necessarily associated with exertion.  Endorses intermittent palpitations.  Also states that he has been having intermittent dyspnea on exertion for the past 2 weeks.  Endorses that he occasionally has bilateral lower extremity edema, but none currently.    Patient has taken no medication for symptom relief.  Patient's cardiac risk factors are dyslipidemia, family history of premature cardiovascular disease, hypertension, male gender, obesity (BMI >= 30 kg/m2), and sedentary lifestyle. Patient's risk factors for DVT/PE: none.     Denies fever, chills, cough, abdominal pain, nausea, vomiting, diarrhea, wheezing, stridor, left arm pain, jaw pain, palpitations, leg swelling, claudication, fatigue, near-syncope, orthopnea, paroxysmal nocturnal dyspnea, syncope, tachypnea, or any other complaints at this time.        The history is provided by the patient.     Review of patient's allergies indicates:  No Known Allergies  Past Medical History:   Diagnosis Date    Hypertension     KAILEE (obstructive sleep apnea)      Past Surgical History:   Procedure Laterality Date    BACK SURGERY      COLONOSCOPY N/A 04/22/2022    Procedure: COLONOSCOPY miralax prep;  Surgeon: Lamonte Carcamo MD;  Location: [facility];  Service:  Endoscopy;  Laterality: N/A;  Patient needs a rapid covid test.    ESOPHAGOGASTRODUODENOSCOPY N/A 5/30/2023    Procedure: EGD (ESOPHAGOGASTRODUODENOSCOPY);  Surgeon: Lamonte Carcamo MD;  Location: [facility] ENDO;  Service: Endoscopy;  Laterality: N/A;    HERNIA REPAIR      PATENT DUCTUS ARTERIOUS LIGATION      infancy    ROBOT-ASSISTED LAPAROSCOPIC REPAIR OF INGUINAL HERNIA Left 06/28/2021    Procedure: ROBOTIC REPAIR, HERNIA, INGUINAL;  Surgeon: Patrick Rehman Jr., MD;  Location: [facility] OR;  Service: General;  Laterality: Left;  Left vs bilateral    SHOULDER SURGERY Left 2022    SPINE SURGERY      TONSILLECTOMY  When child    UMBILICAL HERNIA REPAIR N/A 11/25/2019    Procedure: REPAIR, HERNIA, UMBILICAL, AGE 3 YEARS OR OLDER - open with mesh;  Surgeon: Patrick Rehman Jr., MD;  Location: [facility] OR;  Service: General;  Laterality: N/A;     Family History   Problem Relation Name Age of Onset    Cancer Mother      Dementia Mother      Hearing loss 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   Respiratory:  Positive for chest tightness and shortness of breath. Negative for cough, wheezing and stridor.    Cardiovascular:  Positive for chest pain and palpitations. Negative for leg swelling.   Gastrointestinal:  Negative for abdominal distention, abdominal pain, nausea and vomiting.   Genitourinary: Negative.        Physical Exam     Initial Vitals [05/23/25 0907]   BP Pulse Resp Temp SpO2   (!) 181/99 60 20 97.8 °F (36.6 °C) 98 %      MAP       --         Physical Exam    Constitutional: He appears well-developed and well-nourished. He is Obese .   HENT:   Head: Normocephalic and atraumatic.   Cardiovascular:  Normal rate,  regular rhythm and normal heart sounds.           Pulmonary/Chest: No respiratory distress. He has no wheezes. He has no rhonchi. He has no rales. He exhibits no tenderness.   Abdominal: He exhibits no distension. There is no abdominal tenderness. There is no rebound and no guarding.     Neurological: He is alert.         ED Course   Procedures  Labs Reviewed   CBC WITH DIFFERENTIAL - Abnormal       Result Value    WBC 9.06      RBC 5.58      HGB 16.3      HCT 48.8      MCV 88      MCH 29.2      MCHC 33.4      RDW 13.9      Platelet Count 325      MPV 9.8      Nucleated RBC 0      Neut % 71.4      Lymph % 20.8      Mono % 5.5      Eos % 0.8      Basophil % 0.6      Imm Grans % 0.9 (*)     Neut # 6.48      Lymph # 1.88      Mono # 0.50      Eos # 0.07      Baso # 0.05      Imm Grans # 0.08 (*)    HEMOGLOBIN A1C - Abnormal    Hemoglobin A1c 6.0 (*)     Estimated Average Glucose 126     COMPREHENSIVE METABOLIC PANEL - Normal    Sodium 137      Potassium 4.4      Chloride 105      CO2 23      Glucose 97      BUN 12      Creatinine 1.0      Calcium 9.6      Protein Total 7.8      Albumin 4.4      Bilirubin Total 0.6      ALP 46      AST 25      ALT 36      Anion Gap 9      eGFR >60     TROPONIN I - Normal    Troponin-I 0.009     B-TYPE NATRIURETIC PEPTIDE - Normal    BNP <10     CBC W/ AUTO DIFFERENTIAL    Narrative:     The following orders were created for panel order CBC auto differential.  Procedure                               Abnormality         Status                     ---------                               -----------         ------                     CBC with Differential[[id number]]       Abnormal            Final result                 Please view results for these tests on the individual orders.   EXTRA TUBES    Narrative:     The following orders were created for panel order EXTRA TUBES.  Procedure                               Abnormality         Status                     ---------                                -----------         ------                     Light Blue Top Hold[[id number]]                                                          Please view results for these tests on the individual orders.   LIGHT BLUE TOP HOLD   TROPONIN I   PROTIME-INR   APTT   LIPID PANEL   TROPONIN I   TROPONIN I   TYPE & SCREEN   ABORH RETYPE        ECG Results              EKG 12-lead (In process)        Collection Time Result Time QRS Duration OHS QTC Calculation    05/23/25 09:04:49 05/23/25 10:52:58 88 424                     In process by Interface, Lab In [facility] (05/23/25 10:53:07)                   Narrative:    Test Reason : R07.9,    Vent. Rate :  60 BPM     Atrial Rate :  60 BPM     P-R Int : 146 ms          QRS Dur :  88 ms      QT Int : 424 ms       P-R-T Axes :  69 -23  77 degrees    QTcB Int : 424 ms    Normal sinus rhythm  Normal ECG  When compared with ECG of 23-Jun-2021 08:47,  No significant change was found    Referred By: AAAREFERRAL SELF           Confirmed By:                                   Imaging Results              X-Ray Chest AP Portable (Final result)  Result time 05/23/25 10:52:40      Final result by Rhett Fuentes MD (05/23/25 10:52:40)                   Impression:      No acute cardiopulmonary abnormality.      Electronically signed by: Rhett Fuentes  Date:    05/23/2025  Time:    10:52               Narrative:    EXAMINATION:  XR CHEST AP PORTABLE    CLINICAL HISTORY:  Chest Pain;    TECHNIQUE:  Single frontal view of the chest was performed.    COMPARISON:  12/15/2021    FINDINGS:  Overlying monitoring leads.  Mild accentuation of the cardiac silhouette likely contributed by portable technique.  Decreased inspiratory effort without large confluent opacity or lobar consolidation.  No gross pneumothorax.  No acute osseous abnormality.                                       Medications   aspirin tablet 325 mg (has no administration in time range)   nitroGLYCERIN SL tablet 0.4 mg  (has no administration in time range)   aspirin chewable tablet 81 mg (has no administration in time range)   atorvastatin tablet 80 mg (80 mg Oral Not Given 5/23/25 1315)   nitroGLYCERIN SL tablet 0.4 mg (has no administration in time range)   clopidogreL tablet 75 mg (has no administration in time range)   carvediloL tablet 3.125 mg (has no administration in time range)   melatonin tablet 6 mg (has no administration in time range)   ondansetron injection 4 mg (has no administration in time range)   polyethylene glycol packet 17 g (17 g Oral Not Given 5/23/25 1315)   acetaminophen tablet 650 mg (has no administration in time range)   acetaminophen tablet 650 mg (has no administration in time range)   HYDROcodone-acetaminophen 5-325 mg per tablet 1 tablet (has no administration in time range)   morphine injection 4 mg (has no administration in time range)     Medical Decision Making  Patient is an afebrile 50 y.o. male who presents for evaluation of chest pain, shortness of breath, palpitations. A&Ox4. The patient remained comfortable and stable during their visit in the ED.     Differential diagnosis includes, but is not limited to: ACS, PE, CHF exacerbation, valvular heart disease, pericarditis, pericardial tamponade, HOCM, aortic dissection, aortic aneurysm, coronary aneurysm, costochondritis, pleurisy, mediastinitis, COVID, influenza, viral URI, pneumonia, bronchitis, COPD exacerbation, pneumothorax, GERD, esophageal spasm    After review of the patients physical exam, ED testing, and history/symptoms, the patient will be admitted. Details of ED course documented in ED workup.  Patient has heart score of 4.  Discussed with Hospital Medicine and Cardiology, patient will be admitted for further evaluation of chest pain. Dr. Adame will accept the admission .  Admit orders will be completed. The diagnosis, treatment and plan for admission were discussed with the patient and understanding was verbalized. All  questions or concerns have been addressed.     Amount and/or Complexity of Data Reviewed  Labs: ordered. Decision-making details documented in ED Course.  Radiology: ordered and independent interpretation performed. Decision-making details documented in ED Course.    Risk  OTC drugs.  Prescription drug management.      Additional MDM:   Heart Score:    History:          Moderately suspicious.  ECG:             Normal  Age:               45-65 years  Risk factors: >= 3 risk factors or history of atherosclerotic disease  Troponin:       Less than or equal to normal limit  Heart Score = 4                ED Course as of 25 1259   Fri May 23, 2025   0907 Independent EKG Interpretation:  Normal sinus rhythm at 60 bpm, nl axis, nl intervals, no hypertrophy, no ST-T changes.  [NP]   0947 WBC: 9.06  No leukocytosis [OB]   0947 Hemoglobin: 16.3 [OB]   0947 Hematocrit: 48.8  H&H stable [OB]   1012 Troponin I: 0.009  Initial troponin WNL [OB]   1024 BNP: <10  BNP WNL [OB]   1139 Heart score of 4.  Discussed with the patient, amenable to admission for chest pain rule out.  Discussed with [facility], who accepts admission. [OB]   1150 Discussed with cardiologist on-call (Dr. Stallings), requests that echo be ordered.  Cardiology will come to ED to assess patient and plan for stress test and/or cardiac catheterization possibly today. [OB]      ED Course User Index  [NP] Reji Navarro MD  [OB] Meagan Grady PA-C                           Clinical Impression:  Final diagnoses:  [R07.9] Chest pain with high risk for cardiac etiology          ED Disposition Condition    Observation                       [1]   Social History  Tobacco Use    Smoking status: Former     Current packs/day: 0.00     Types: Cigarettes     Quit date: 2008     Years since quittin.4    Smokeless tobacco: Former   Substance Use Topics    Alcohol use: Yes     Alcohol/week: 2.0 standard drinks of alcohol     Types: 1 Glasses of wine,  1 Cans of beer per week     Comment: holidays    Drug use: No        Meagan Grady PA-C  05/23/25 4747

## 2025-05-23 NOTE — H&P
Formerly [facility] Medicine  History & Physical    Patient Name: Jesse Hernández  MRN: [id number]  Patient Class: OP- Observation  Admission Date: 5/23/2025  Attending Physician: Kermit Adame MD   Primary Care Provider: Roxanne Wolfe MD         Patient information was obtained from patient and ER records.     Subjective:     Principal Problem:NSTEMI (non-ST elevated myocardial infarction)    Chief Complaint:   Chief Complaint   Patient presents with    Chest Pain     C/o intermittent, 6/10 pain to epigastrim radiating to L chest wall x2wk. +SOB +palpitation +nausea Unable to describe pain. No attempts to treat PTA. Pt found /99; endorses hx of HTN and took anti-Htn this morning.         HPI: 50 years old male with past medical history of essential hypertension, hyperlipidemia, obstructive sleep apnea on CPAP, and high BMI, presented to the ED for chest pain that is rated 6/10 start in the epigastric area radiating to the left chest wall, associated with nausea, shortness a breath and palpitation, BP on admission 181/99, OK 60, satting 98% on room air, CBC, CMP, BNP, troponins all within the normal, EKG was done did not show any ST changes.  Patient was admitted for further medical management.    Past Medical History:   Diagnosis Date    Hypertension     KAILEE (obstructive sleep apnea)        Past Surgical History:   Procedure Laterality Date    BACK SURGERY      COLONOSCOPY N/A 04/22/2022    Procedure: COLONOSCOPY miralax prep;  Surgeon: Lamonte Carcamo MD;  Location: [facility];  Service: Endoscopy;  Laterality: N/A;  Patient needs a rapid covid test.    ESOPHAGOGASTRODUODENOSCOPY N/A 5/30/2023    Procedure: EGD (ESOPHAGOGASTRODUODENOSCOPY);  Surgeon: Lamonte Carcamo MD;  Location: [facility];  Service: Endoscopy;  Laterality: N/A;    HERNIA REPAIR      PATENT DUCTUS ARTERIOUS LIGATION      infancy    ROBOT-ASSISTED LAPAROSCOPIC REPAIR OF INGUINAL HERNIA Left 06/28/2021    Procedure:  ROBOTIC REPAIR, HERNIA, INGUINAL;  Surgeon: Patrick Rehman Jr., MD;  Location: [facility] OR;  Service: General;  Laterality: Left;  Left vs bilateral    SHOULDER SURGERY Left 2022    SPINE SURGERY      TONSILLECTOMY  When child    UMBILICAL HERNIA REPAIR N/A 11/25/2019    Procedure: REPAIR, HERNIA, UMBILICAL, AGE 5 YEARS OR OLDER - open with mesh;  Surgeon: Patrick Rehman Jr., MD;  Location: [facility] OR;  Service: General;  Laterality: N/A;       Review of patient's allergies indicates:  No Known Allergies    No current facility-administered medications on file prior to encounter.     Current Outpatient Medications on File Prior to Encounter   Medication Sig    celecoxib (CELEBREX) 200 MG capsule Take 200 mg by mouth as needed for Pain.    DULoxetine (CYMBALTA) 20 MG capsule TAKE ONE CAPSULE BY MOUTH DAILY AND 60 MG DAILY( TOTAL OF 80 MG DAILY) (Patient taking differently: Take 20 mg by mouth once daily. Take with 60 mg to equal a total of 80 mg daily.)    DULoxetine (CYMBALTA) 60 MG capsule TAKE ONE CAPSULE BY MOUTH DAILY AND 60 MG DAILY( TOTAL OF 80 MG DAILY) (Patient taking differently: Take 60 mg by mouth once daily. Take with 20 mg to equal a total of 80 mg daily.)    fenofibrate 160 MG Tab Take 1 tablet (160 mg total) by mouth once daily.    HYDROcodone-acetaminophen (NORCO) 5-325 mg per tablet Take 1 tablet by mouth as needed for Pain.    lamoTRIgine (LAMICTAL) 150 MG Tab TAKE 2 TABLETS BY MOUTH DAILY (Patient taking differently: Take 300 mg by mouth once daily.)    latanoprost 0.005 % ophthalmic solution Place 1 drop into both eyes every evening.    levothyroxine (SYNTHROID) 75 MCG tablet TAKE 1 TABLET(75 MCG) BY MOUTH BEFORE BREAKFAST    metoprolol succinate (TOPROL-XL) 100 MG 24 hr tablet TAKE 1 TABLET(100 MG) BY MOUTH DAILY (Patient taking differently: Take 100 mg by mouth once daily.)    [DISCONTINUED] gabapentin (NEURONTIN) 300 MG capsule Take 300 mg by mouth.    [DISCONTINUED] MANDY BAGLEY, 0.005 %  Dpet     [DISCONTINUED] loratadine (CLARITIN) 10 mg tablet Take 1 tablet (10 mg total) by mouth once daily.    [DISCONTINUED] naproxen (NAPROSYN) 500 MG tablet Take 1 tablet (500 mg total) by mouth 2 (two) times daily as needed (pain).    [DISCONTINUED] pantoprazole (PROTONIX) 40 MG tablet Take 1 tablet (40 mg total) by mouth once daily.    [DISCONTINUED] sucralfate (CARAFATE) 1 gram tablet TAKE 1 TABLET(1 GRAM) BY MOUTH THREE TIMES DAILY (Patient taking differently: Take 1 g by mouth.)     Family History       Problem Relation (Age of Onset)    COPD Maternal Aunt    Cancer Mother    Dementia Mother    Diabetes Paternal Grandmother    Drug abuse Brother    Early death Brother    Hearing loss Father          Tobacco Use    Smoking status: Former     Current packs/day: 0.00     Types: Cigarettes     Quit date: 2008     Years since quittin.4    Smokeless tobacco: Former   Substance and Sexual Activity    Alcohol use: Yes     Alcohol/week: 2.0 standard drinks of alcohol     Types: 1 Glasses of wine, 1 Cans of beer per week     Comment: holidays    Drug use: No    Sexual activity: Yes     Partners: Female     Birth control/protection: Partner-Vasectomy     Review of Systems   Constitutional:  Positive for activity change and fatigue.   HENT: Negative.     Respiratory:  Positive for shortness of breath.    Cardiovascular:  Positive for chest pain and palpitations.   Gastrointestinal:  Positive for nausea.   Genitourinary: Negative.    Musculoskeletal:  Positive for myalgias.   Skin: Negative.    Neurological: Negative.    Hematological: Negative.    Psychiatric/Behavioral: Negative.       Objective:     Vital Signs (Most Recent):  Temp: 97.8 °F (36.6 °C) (25 0907)  Pulse: 65 (25 1317)  Resp: 19 (25 1317)  BP: (!) 150/82 (25 1317)  SpO2: 95 % (25 1317) Vital Signs (24h Range):  Temp:  [97.8 °F (36.6 °C)] 97.8 °F (36.6 °C)  Pulse:  [58-65] 65  Resp:  [15-20] 19  SpO2:  [95 %-98 %] 95  %  BP: (150-181)/(82-99) 150/82     Weight: 113.4 kg (250 lb)  Body mass index is 36.92 kg/m².     Physical Exam  Constitutional:       Appearance: He is obese.   HENT:      Head: Normocephalic and atraumatic.      Nose: Nose normal.      Mouth/Throat:      Mouth: Mucous membranes are moist.   Eyes:      Extraocular Movements: Extraocular movements intact.      Pupils: Pupils are equal, round, and reactive to light.   Cardiovascular:      Rate and Rhythm: Normal rate and regular rhythm.      Pulses: Normal pulses.      Heart sounds: Normal heart sounds.   Pulmonary:      Effort: Pulmonary effort is normal.      Breath sounds: Normal breath sounds.   Abdominal:      Palpations: Abdomen is soft.   Musculoskeletal:         General: Normal range of motion.      Right lower leg: No edema.      Left lower leg: No edema.   Skin:     General: Skin is warm.      Capillary Refill: Capillary refill takes less than 2 seconds.   Psychiatric:         Mood and Affect: Mood normal.              CRANIAL NERVES     CN III, IV, VI   Pupils are equal, round, and reactive to light.       Significant Labs: All pertinent labs within the past 24 hours have been reviewed.  Recent Lab Results         05/23/25  1248   05/23/25  1217   05/23/25  1212   05/23/25  0928        ABO and RH A POS             Albumin       4.4       ALP       46       ALT       36       Anion Gap       9       PTT   24.6  Comment: Refer to local heparin nomogram for intensity/dose specific therapeutic range.           AST       25       Baso #       0.05       Basophil %       0.6       BILIRUBIN TOTAL       0.6  Comment: For infants and newborns, interpretation of results should be based   on gestational age, weight and in agreement with clinical   observations.    Premature Infant recommended reference ranges:   0-24 hours:  <8.0 mg/dL   24-48 hours: <12.0 mg/dL   3-5 days:    <15.0 mg/dL   6-29 days:   <15.0 mg/dL       BNP       <10  Comment: Values of less than  100 pg/ml are consistent with non-CHF populations.        BUN       12       Calcium       9.6       Chloride       105       CO2       23       Creatinine       1.0       eGFR       >60  Comment: Estimated GFR calculated using the CKD-EPI creatinine (2021) equation.       Eos #       0.07       Eos %       0.8       Estimated Avg Glucose   126           Glucose       97       Gran # (ANC)       6.48       Group & Rh   A POS           Hematocrit       48.8       Hemoglobin       16.3       Hemoglobin A1C External   6.0  Comment: ADA Screening Guidelines:  5.7-6.4%  Consistent with prediabetes  >=6.5%  Consistent with diabetes    High levels of fetal hemoglobin interfere with the HbA1C  assay. Heterozygous hemoglobin variants (HbS, HgC, etc)do  not significantly interfere with this assay.   However, presence of multiple variants may affect accuracy.           Immature Grans (Abs)       0.08  Comment: Mild elevation in immature granulocytes is non specific and can be seen in a variety of conditions including stress response, acute inflammation, trauma and pregnancy. Correlation with other laboratory and clinical findings is essential.       Immature Granulocytes       0.9       INDIRECT REJI   NEG           INR   1.0  Comment: Coumadin Therapy:    2.0 - 3.0 for INR for all indicators except mechanical heart valves    and antiphospholipid syndromes which should use 2.5 - 3.5.           Lymph #       1.88       Lymph %       20.8       MCH       29.2       MCHC       33.4       MCV       88       Mono #       0.50       Mono %       5.5       MPV       9.8       Neut %       71.4       nRBC       0       Platelet Count       325       Potassium       4.4       PROTEIN TOTAL       7.8       PT   11.6           RBC       5.58       RDW       13.9       Sodium       137       Specimen Outdate   05/26/2025 23:59           Troponin I     0.009  Comment: The reference interval for Troponin I represents the 99th percentile  cutoff for our facility and is consistent with 3rd generation assay performance.   0.009  Comment: The reference interval for Troponin I represents the 99th percentile cutoff for our facility and is consistent with 3rd generation assay performance.       WBC       9.06               Significant Imaging: I have reviewed all pertinent imaging results/findings within the past 24 hours.    Imaging Results              X-Ray Chest AP Portable (Final result)  Result time 05/23/25 10:52:40      Final result by Rhett Fuentes MD (05/23/25 10:52:40)                   Impression:      No acute cardiopulmonary abnormality.      Electronically signed by: Rhett Fuentes  Date:    05/23/2025  Time:    10:52               Narrative:    EXAMINATION:  XR CHEST AP PORTABLE    CLINICAL HISTORY:  Chest Pain;    TECHNIQUE:  Single frontal view of the chest was performed.    COMPARISON:  12/15/2021    FINDINGS:  Overlying monitoring leads.  Mild accentuation of the cardiac silhouette likely contributed by portable technique.  Decreased inspiratory effort without large confluent opacity or lobar consolidation.  No gross pneumothorax.  No acute osseous abnormality.                                    Assessment/Plan:     Assessment & Plan  NSTEMI (non-ST elevated myocardial infarction)  No ST changes on EKG  Cardiology consulted  Pending echocardiogram  Patient will be going to cardiac cath  Received a loading dose of aspirin    Obstructive sleep apnea on CPAP  Chronic/stable  CPAP at bedtime    Hyperlipidemia  Chronic  Lipid panel ordered  On high-dose statin    Hypertension  Patient's blood pressure range in the last 24 hours was: BP  Min: 150/82  Max: 181/99.The patient's inpatient anti-hypertensive regimen is listed below:  Current Antihypertensives  nitroGLYCERIN SL tablet 0.4 mg, ED 1 Time, Sublingual  nitroGLYCERIN SL tablet 0.4 mg, Every 5 min PRN, Sublingual  carvediloL tablet 3.125 mg, 2 times daily, Oral    Plan  - BP is  uncontrolled, will adjust as follows:  P.r.n. medications added restarted home meds  - to be monitored  Obesity  Body mass index is 36.92 kg/m². Morbid obesity complicates all aspects of disease management from diagnostic modalities to treatment. Weight loss encouraged and health benefits explained to patient.       Chest pain      VTE Risk Mitigation (From admission, onward)           Ordered     IP VTE HIGH RISK PATIENT  Once         05/23/25 1206     Place sequential compression device  Until discontinued         05/23/25 1206                       On 05/23/2025, patient should be placed in hospital observation services under my care.             Kermit Kenney MD  Department of Hospital Medicine  [facility] - Emergency Dept

## 2025-05-23 NOTE — PLAN OF CARE
Remaining air removed from right radial vasc band. Gauze and tegaderm dressing applied c.d.i. No drainage or shadowing noted. No bleeding or hematoma noted around site. +2 noel radial pulses palpated. Skin normal in color, warm to touch, < 3 sec cap refill. Pt tolerated well.  Will continue to monitor pt.

## 2025-05-23 NOTE — ED NOTES
Pt resting, reports he still feels a sensation midsternal chest, endorses hx of acid reflux. Skin warm/dry. Denies wants/needs at this time.

## 2025-05-23 NOTE — ED NOTES
Pt presents to ED for midsternal CP x 2wks described as cramping that progressed to fluttering which caused SOB. 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 Hx of hypothyroidism and HTN which he is compliant with meds. Denies NV, abdominal pain.

## 2025-05-23 NOTE — PLAN OF CARE
Problem: Adult Inpatient Plan of Care  Goal: Plan of Care Review  Outcome: Progressing  Goal: Patient-Specific Goal (Individualized)  Outcome: Progressing  Goal: Absence of Hospital-Acquired Illness or Injury  Outcome: Progressing  Goal: Optimal Comfort and Wellbeing  Outcome: Progressing  Goal: Readiness for Transition of Care  Outcome: Progressing     Problem: Cardiac Catheterization (Diagnostic/Interventional)  Goal: Absence of Bleeding  Outcome: Progressing  Goal: Absence of Contrast-Induced Injury  Outcome: Progressing  Goal: Stable Heart Rate and Rhythm  Outcome: Progressing  Goal: Absence of Embolism Signs and Symptoms  Outcome: Progressing  Goal: Anesthesia/Sedation Recovery  Outcome: Progressing  Goal: Optimal Pain Control and Function  Outcome: Progressing  Goal: Absence of Vascular Access Complication  Outcome: Progressing       Patient arrived. Plan of care and expectation from staff discussed with patient. Cardiac monitor in place.

## 2025-05-23 NOTE — ASSESSMENT & PLAN NOTE
No ST changes on EKG  Cardiology consulted  Pending echocardiogram  Patient will be going to cardiac cath  Received a loading dose of aspirin

## 2025-05-23 NOTE — HPI
50 years old male with past medical history of essential hypertension, hyperlipidemia, obstructive sleep apnea on CPAP, and high BMI, presented to the ED for chest pain that is rated 6/10 start in the epigastric area radiating to the left chest wall, associated with nausea, shortness a breath and palpitation, BP on admission 181/99, AR 60, satting 98% on room air, CBC, CMP, BNP, troponins all within the normal, EKG was done did not show any ST changes.  Patient was admitted for further medical management.

## 2025-05-23 NOTE — BRIEF OP NOTE
[facility] - Cath Lab ([facility])  Surgery Department  Operative Note    SUMMARY   POST CATH NOTE  Procedure(s) (LRB):  Left heart cath (N/A)  ANGIOGRAM, CORONARY ARTERY (N/A)   Moderate conscious sedation was performed under my direct supervision by a sedation trained nurse and cardiorespiratory functions were monitored the entire procedure. See the medication administration log for medications administered, duration, route, and dosage.    Surgeons and Role:     * Aiden Stallings MD - Primary  Assisting Surgeon: None  Pre-Operative Diagnosis: Chest pain [R07.9]  Post-Operative Diagnosis: Post-Op Diagnosis Codes:     * Chest pain [R07.9]    Description of Pre-Procedure(s): Prior to cardiac catheterization, the procedure was discussed at length with the patient including a comprehensive list of risks, benefits and alternatives.  Patient was informed of all possible complications and verbally acknowledged that they understood and the patient decided to proceed with cardiac catheterization.  Expressed written consent was confirmed in chart prior to beginning the procedure.  Myself along with the scrub tech and the nurse entered the room.  A formal timeout was performed in which patient was identified by name, date of birth, medical record number and procedure being performed; all agreed.  The patient was prepped and draped in the usual sterile fashion. Please see attached procedure log for specifics regarding approach/equipment.    Finding(s):   Access: R radial  Coronary Anatomy:  Left Main Coronary Artery:   -No evidence of obstruction, YARIEL III Flow  Left Anterior Descending Artery: Large Caliber Vessel, YARIEL III Flow   -Proximal:30% stenosis   -Mid: 30% stenosis   -Distal: normal   -D1: normal   -D2: normal  Left Circumflex Coronary Artery: Large Caliber Vessel, YARIEL III Flow   -Proximal: normal   -Distal: normal    -OM1: normal   -OM2: normal   -LPL1: normal  Right Coronary Artery: Large Caliber Vessel, YARIEL III  Flow   -Proximal: normal   -Mid: normal   -Distal: normal   -PDA: normal   -RPL: normal  Ramus Coronary Artery: Large Caliber Vessel, YARIEL III Flow   -  Dominance: LEFT    LVEDP: 20 mmHg    EF 55-60% on ventriculogram     Intervention:   none  Please see attached procedure log for specifics regarding the approach/equipment  Complications: None, EBL minimal    Assessment:   No obstructive CAD.     Plan:  Bed rest 2 hours  No IVF given elevated LVEDP  Risk factor reduction  Would continue HI statin   BP control     Aiden Stallings M.D.  Interventional Cardiology   [facility]

## 2025-05-23 NOTE — PLAN OF CARE
Hand off telephone report called to nurse Warner for tele admit bed 421 post cath recovery and reports she did get report from ER.

## 2025-05-23 NOTE — ASSESSMENT & PLAN NOTE
Body mass index is 36.92 kg/m². Morbid obesity complicates all aspects of disease management from diagnostic modalities to treatment. Weight loss encouraged and health benefits explained to patient.

## 2025-05-23 NOTE — CONSULTS
[facility] Note     HPI/Interval:       Jesse Hernández is a pleasant 50 y.o. male with problems noted below in A/P   Presents with 2 weeks of on and off intermittent substernal chest pressure occurring seemingly random not associated with any exertion or any other symptoms however does have some dyspnea when he does get the pain..  Does have some intermittent palpitations.  No radiation of the pain to his jaw back/arm.  Has multiple risk factors per below.  He has been compliant with his medications.      History obtained by patient interview and supplemented by nursing documentation and chart review.   Objective   PMHx:  has a past medical history of Hypertension and KAILEE (obstructive sleep apnea).   SurgHx:  has a past surgical history that includes Back surgery; Spine surgery; Umbilical hernia repair (N/A, 11/25/2019); Hernia repair; Patent ductus arterious ligation; Robot-assisted laparoscopic repair of inguinal hernia (Left, 06/28/2021); Colonoscopy (N/A, 04/22/2022); Tonsillectomy (When child); Shoulder surgery (Left, 2022); and Esophagogastroduodenoscopy (N/A, 5/30/2023).   FamHx: family history includes COPD in his maternal aunt; Cancer in his mother; Dementia in his mother; Diabetes in his paternal grandmother; Drug abuse in his brother; Early death in his brother; Hearing loss in his father.   SocialHx:  reports that he quit smoking about 16 years ago. His smoking use included cigarettes. He has quit using smokeless tobacco. He reports current alcohol use of about 2.0 standard drinks of alcohol per week. He reports that he does not use drugs.  Home Meds:  Current Outpatient Medications   Medication Instructions    celecoxib (CELEBREX) 200 mg, As needed (PRN)    DULoxetine (CYMBALTA) 20 MG capsule TAKE ONE CAPSULE BY MOUTH DAILY AND 60 MG DAILY( TOTAL OF 80 MG DAILY)    DULoxetine (CYMBALTA) 60 MG capsule TAKE ONE CAPSULE BY MOUTH DAILY AND 60 MG DAILY( TOTAL OF 80 MG DAILY)    fenofibrate 160  "mg, Oral, Daily    HYDROcodone-acetaminophen (NORCO) 5-325 mg per tablet 1 tablet, As needed (PRN)    lamoTRIgine (LAMICTAL) 300 mg, Oral    latanoprost 0.005 % ophthalmic solution 1 drop, Nightly    levothyroxine (SYNTHROID) 75 mcg, Oral, Before breakfast    metoprolol succinate (TOPROL-XL) 100 MG 24 hr tablet TAKE 1 TABLET(100 MG) BY MOUTH DAILY     Review of Systems: Comprehensive ROS was performed and is negative unless otherwise noted in HPI.     Objective:   Last 24 Hour Vital Signs:  BP  Min: 150/82  Max: 181/99  Temp  Av.8 °F (36.6 °C)  Min: 97.8 °F (36.6 °C)  Max: 97.8 °F (36.6 °C)  Pulse  Av.2  Min: 58  Max: 65  Resp  Av  Min: 15  Max: 20  SpO2  Av.2 %  Min: 95 %  Max: 98 %  Height  Av' 9" (175.3 cm)  Min: 5' 9" (175.3 cm)  Max: 5' 9" (175.3 cm)  Weight  Av.4 kg (250 lb)  Min: 113.4 kg (250 lb)  Max: 113.4 kg (250 lb)  No intake/output data recorded.    Physical Examination:  Constitutional: NAD, Atraumatic   HEENT: MMM  Cardiovascular: RRR, no murmurs noted, no chest tenderness to palpation, 2+ radial pulses b/l  Pulmonary: normal respiratory effort, CTAB, no crackles, wheezes  Abdominal: S/NT/ND  Musculoskeletal: No lower extremity edema noted  Neurological: Alert & oriented to self, location, time and situation. No gross neurological deficits  Skin: W/D/I  Psych: Appropriate affect, normal mood    Laboratory:  Personally reviewed    Other Results:  TTE:  No results found for this or any previous visit.    Stress Testing:   No results found for this or any previous visit.     Coronary Angiogram:  No results found for this or any previous visit.      Time spent on this visit included personally:  -Reviewing Medical records & lab results  -Independently reviewing and interpreting (if not documented by myself) EKGs, echocardiograms, catherizations   -Obtaining a history, performing a relevant exam, counseling/educating the patient/family  -Documenting clinical information in " the EMR including ordering of tests  -Care coordination and communicating with other health care providers         Assessment/Plan:     Active Hospital Problems    Diagnosis    *NSTEMI (non-ST elevated myocardial infarction)    Obesity    Chest pain    Hypertension    Hyperlipidemia    Obstructive sleep apnea on CPAP       Jesse Hernández is a 50 y.o. male with HTN, HLD, pre-DM, obesity, KAILEE (on CPAP), family history of CAD presenting with typical sounding chest pain.  VSS.  ECG WNL.  Troponins initially negative.      -TTE   -loaded with aspirin and 300 mg of Plavix   -plan for left heart catheterization in setting of concerning symptoms  -continue high-intensity statin for now as well as ASA 81 mg daily  -further recs to follow up post angiogram    Thank you for the opportunity to care for this patient. Please don't hesitate to reach out with any questions/concerns,  Speech recognition software used for parts of this note. Please excuse grammar, out of context phrases, and/or syntax issues.

## 2025-05-23 NOTE — PHARMACY MED REC
"    [facility]           Pharmacy  Admission Medication History     The home medication history was taken by Keisha Ma.      Medication history obtained from Medications listed below were obtained from: Patient/family.    Based on information gathered for medication list, you may go to "Admission" then "Reconcile Home Medications" tabs to review and/or act upon those items.     The home medication list has been updated by the Pharmacy department.   Please read ALL comments highlighted in yellow.   Please address this information as you see fit.    Feel free to contact us if you have any questions or require assistance.    The medications listed below were removed from the home medication list.  Please reorder if appropriate:    Patient reports NOT TAKING the following medication(s):  Gabapentin 300 mg cap  Naproxen 500 mg tab  Carafate 1 gram tab    No current facility-administered medications on file prior to encounter.     Current Outpatient Medications on File Prior to Encounter   Medication Sig Dispense Refill    celecoxib (CELEBREX) 200 MG capsule Take 200 mg by mouth as needed for Pain.      DULoxetine (CYMBALTA) 20 MG capsule TAKE ONE CAPSULE BY MOUTH DAILY AND 60 MG DAILY( TOTAL OF 80 MG DAILY) (Patient taking differently: Take 20 mg by mouth once daily. Take with 60 mg to equal a total of 80 mg daily.) 90 capsule 0    DULoxetine (CYMBALTA) 60 MG capsule TAKE ONE CAPSULE BY MOUTH DAILY AND 60 MG DAILY( TOTAL OF 80 MG DAILY) (Patient taking differently: Take 60 mg by mouth once daily. Take with 20 mg to equal a total of 80 mg daily.) 90 capsule 0    fenofibrate 160 MG Tab Take 1 tablet (160 mg total) by mouth once daily. 90 tablet 1    HYDROcodone-acetaminophen (NORCO) 5-325 mg per tablet Take 1 tablet by mouth as needed for Pain.      lamoTRIgine (LAMICTAL) 150 MG Tab TAKE 2 TABLETS BY MOUTH DAILY (Patient taking differently: Take 300 mg by mouth once daily.) 180 tablet 0    " latanoprost 0.005 % ophthalmic solution Place 1 drop into both eyes every evening.      levothyroxine (SYNTHROID) 75 MCG tablet TAKE 1 TABLET(75 MCG) BY MOUTH BEFORE BREAKFAST 90 tablet 0    metoprolol succinate (TOPROL-XL) 100 MG 24 hr tablet TAKE 1 TABLET(100 MG) BY MOUTH DAILY (Patient taking differently: Take 100 mg by mouth once daily.) 90 tablet 3       Please address this information as you see fit.  Feel free to contact us if you have any questions or require assistance.    Keisha Ma  [phone]              .

## 2025-05-23 NOTE — PLAN OF CARE
Patient to cath recovery per stretcher w/ nurse Tamir and bedside report received; Patient in no distress and VSS when connected to monitors--- SR on monitor. Right radial access site w/ vasc band w/ 12 ml air in band, good sensation, brisk cap refill, fingers wm/pk, palpable pulse, no hematoma, no bleeding. IV access rt arm maintained and drsg cdi. Fall risk discussed and band on patient along w/ ID band. Head elevated and pillow in use. Post procedure and home care teaching reviewed w/ pt and verbalizes understanding. Will continue to monitor.

## 2025-05-24 VITALS
HEIGHT: 69 IN | OXYGEN SATURATION: 98 % | DIASTOLIC BLOOD PRESSURE: 93 MMHG | TEMPERATURE: 98 F | SYSTOLIC BLOOD PRESSURE: 150 MMHG | RESPIRATION RATE: 18 BRPM | BODY MASS INDEX: 37.45 KG/M2 | HEART RATE: 69 BPM | WEIGHT: 252.88 LBS

## 2025-05-24 PROBLEM — I50.32 CHRONIC DIASTOLIC HEART FAILURE: Status: ACTIVE | Noted: 2025-05-24

## 2025-05-24 PROBLEM — R94.30 ELEVATED LEFT VENTRICULAR END-DIASTOLIC PRESSURE (LVEDP): Chronic | Status: ACTIVE | Noted: 2025-05-24

## 2025-05-24 LAB — POCT GLUCOSE: 99 MG/DL (ref 70–110)

## 2025-05-24 PROCEDURE — 93005 ELECTROCARDIOGRAM TRACING: CPT

## 2025-05-24 PROCEDURE — 99900035 HC TECH TIME PER 15 MIN (STAT)

## 2025-05-24 PROCEDURE — 93010 ELECTROCARDIOGRAM REPORT: CPT | Mod: ,,, | Performed by: INTERNAL MEDICINE

## 2025-05-24 PROCEDURE — 25000003 PHARM REV CODE 250

## 2025-05-24 PROCEDURE — G0378 HOSPITAL OBSERVATION PER HR: HCPCS

## 2025-05-24 PROCEDURE — 94761 N-INVAS EAR/PLS OXIMETRY MLT: CPT

## 2025-05-24 RX ORDER — LISINOPRIL 10 MG/1
10 TABLET ORAL DAILY
Qty: 90 TABLET | Refills: 3 | Status: SHIPPED | OUTPATIENT
Start: 2025-05-24 | End: 2025-05-27 | Stop reason: SDUPTHER

## 2025-05-24 RX ORDER — NAPROXEN SODIUM 220 MG/1
81 TABLET, FILM COATED ORAL DAILY
Qty: 360 TABLET | Refills: 0 | Status: SHIPPED | OUTPATIENT
Start: 2025-05-24 | End: 2026-05-24

## 2025-05-24 RX ORDER — LEVOTHYROXINE SODIUM 75 UG/1
75 TABLET ORAL
Status: DISCONTINUED | OUTPATIENT
Start: 2025-05-24 | End: 2025-05-24 | Stop reason: HOSPADM

## 2025-05-24 RX ORDER — LAMOTRIGINE 100 MG/1
300 TABLET ORAL DAILY
Status: DISCONTINUED | OUTPATIENT
Start: 2025-05-24 | End: 2025-05-24 | Stop reason: HOSPADM

## 2025-05-24 RX ORDER — METOPROLOL SUCCINATE 50 MG/1
100 TABLET, EXTENDED RELEASE ORAL DAILY
Status: DISCONTINUED | OUTPATIENT
Start: 2025-05-24 | End: 2025-05-24 | Stop reason: HOSPADM

## 2025-05-24 RX ORDER — DULOXETIN HYDROCHLORIDE 20 MG/1
20 CAPSULE, DELAYED RELEASE ORAL DAILY
Status: DISCONTINUED | OUTPATIENT
Start: 2025-05-24 | End: 2025-05-24 | Stop reason: HOSPADM

## 2025-05-24 RX ORDER — NITROGLYCERIN 0.4 MG/1
0.4 TABLET SUBLINGUAL EVERY 5 MIN PRN
Qty: 3 TABLET | Refills: 0 | Status: SHIPPED | OUTPATIENT
Start: 2025-05-24 | End: 2026-05-24

## 2025-05-24 RX ORDER — ATORVASTATIN CALCIUM 80 MG/1
80 TABLET, FILM COATED ORAL DAILY
Qty: 90 TABLET | Refills: 3 | Status: SHIPPED | OUTPATIENT
Start: 2025-05-24 | End: 2026-05-24

## 2025-05-24 RX ORDER — LISINOPRIL 5 MG/1
10 TABLET ORAL DAILY
Status: DISCONTINUED | OUTPATIENT
Start: 2025-05-24 | End: 2025-05-24 | Stop reason: HOSPADM

## 2025-05-24 RX ADMIN — ATORVASTATIN CALCIUM 80 MG: 40 TABLET, FILM COATED ORAL at 09:05

## 2025-05-24 RX ADMIN — DULOXETINE HYDROCHLORIDE 20 MG: 20 CAPSULE, DELAYED RELEASE ORAL at 09:05

## 2025-05-24 RX ADMIN — CLOPIDOGREL 75 MG: 75 TABLET ORAL at 09:05

## 2025-05-24 RX ADMIN — METOPROLOL SUCCINATE 100 MG: 50 TABLET, EXTENDED RELEASE ORAL at 09:05

## 2025-05-24 RX ADMIN — LEVOTHYROXINE SODIUM 75 MCG: 75 TABLET ORAL at 09:05

## 2025-05-24 RX ADMIN — LISINOPRIL 10 MG: 5 TABLET ORAL at 09:05

## 2025-05-24 RX ADMIN — LAMOTRIGINE 300 MG: 100 TABLET ORAL at 09:05

## 2025-05-24 RX ADMIN — ASPIRIN 81 MG CHEWABLE TABLET 81 MG: 81 TABLET CHEWABLE at 09:05

## 2025-05-24 NOTE — HOSPITAL COURSE
Patient was admitted for shortness of breath and chest pain had angiogram on 05/23 showed on obstructive CAD with elevated LVEDP, patient was started on high-intensity statin and BP management tolerated procedure well stayed overnight to control blood pressure patient was discharged advised to follow up with PCP within 3-5 days of discharge and with Cardiology with a 1-2 days of discharge

## 2025-05-24 NOTE — PLAN OF CARE
VIRTUAL NURSE: Pt arrived to unit. Permission received to turn camera to view patient. VIP model explained; Patient informed this VN will be working with bedside nurse and the rest of the care team.      Admission questions completed.  Plan of care reviewed with patient.  Educated patient on VTE and fall risk. Safety precautions in place. Call light within reach, side rails up x2.     Patient instucted to ask staff for assistance. Patient verbalized complete understanding.  Will continue to be available and intervene as needed.    Labs, notes, orders, and careplan reviewed.     Problem: Adult Inpatient Plan of Care  Goal: Plan of Care Review  Outcome: Progressing  Goal: Patient-Specific Goal (Individualized)  Outcome: Progressing        05/23/25 1933   Patient Request   Patient Requested 3/10 medial chest pain. He wants to get up and walk around due to chronic back pain that shoots down his left leg, but he noticed that we have him as a Fall Risk. I told him that you would have to see him ambulate 1st & if you are comfortable with him walking around, then you will let him know. So he is waiting for you   Nurse Notification   Nurse Notfication Method Secure Chat   Nurse Notified Of Other;Patient Request  (Admission profile is completed)   Admission   Initial VN Admission Questions Complete   Communication Issues? None   Shift   Virtual Nurse - Rounding Complete   Pain Management Interventions pain management plan reviewed with patient/caregiver;pillow support provided   Virtual Nurse - Patient Verbalized Approval Of Camera Use;VN Rounding   Type of Frequent Check   Type Patient Rounds;Telemetry Monitoring   Safety/Activity   Patient Rounds bed in low position;placement of personal items at bedside;call light in patient/parent reach;clutter free environment maintained;visualized patient   Safety Promotion/Fall Prevention assistive device/personal item within reach;Fall Risk reviewed with patient/family;instructed to  call staff for mobility;medications reviewed;side rails raised x 2;nonskid shoes/socks when out of bed;patient expresses understanding of fall risk and prevention   Positioning   Body Position position changed independently;neutral head position;neutral body alignment   Head of Bed (HOB) Positioning HOB at 45 degrees   Pain/Comfort/Sleep   Preferred Pain Scale number (Numeric Rating Pain Scale)   Pain Body Location - Orientation medial   Pain Body Location chest   Pain Rating (0-10): Rest 3   Pain Rating (0-10): Activity 3   Frequency constant   Quality sharp;cramping   Sleep/Rest/Relaxation awake   Cardiac   Cardiac/Telemetry Monitor On Yes        05/23/25 6051   Admission Complete   Admission Complete by VN Complete

## 2025-05-24 NOTE — ASSESSMENT & PLAN NOTE
Patient's blood pressure range in the last 24 hours was: BP  Min: 111/68  Max: 150/93.The patient's inpatient anti-hypertensive regimen is listed below:  Current Antihypertensives  lisinopril (PRINIVIL,ZESTRIL) tablet, Daily, Oral  nitroGLYCERIN (NITROSTAT) SL tablet, Every 5 min PRN, Sublingual    Plan  - BP is uncontrolled, will adjust as follows:  P.r.n. medications added restarted home meds  - to be monitored

## 2025-05-24 NOTE — DISCHARGE SUMMARY
[facility] Medicine  Discharge Summary      Patient Name: Jesse Hernández  MRN: [id number]  LAMIN: [id number]  Patient Class: OP- Observation  Admission Date: 5/23/2025  Hospital Length of Stay: 0 days  Discharge Date and Time: 05/24/2025 3:46 PM  Attending Physician: No att. providers found   Discharging Provider: Kermit Kenney MD  Primary Care Provider: Roxanne Wolfe MD    Primary Care Team: Networked reference to record PCT     HPI:   50 years old male with past medical history of essential hypertension, hyperlipidemia, obstructive sleep apnea on CPAP, and high BMI, presented to the ED for chest pain that is rated 6/10 start in the epigastric area radiating to the left chest wall, associated with nausea, shortness a breath and palpitation, BP on admission 181/99, NM 60, satting 98% on room air, CBC, CMP, BNP, troponins all within the normal, EKG was done did not show any ST changes.  Patient was admitted for further medical management.    Procedure(s) (LRB):  Left heart cath (N/A)  ANGIOGRAM, CORONARY ARTERY (N/A)  Ventriculogram, Left      Hospital Course:   Patient was admitted for shortness of breath and chest pain had angiogram on 05/23 showed on obstructive CAD with elevated LVEDP, patient was started on high-intensity statin and BP management tolerated procedure well stayed overnight to control blood pressure patient was discharged advised to follow up with PCP within 3-5 days of discharge and with Cardiology with a 1-2 days of discharge     Goals of Care Treatment Preferences:  Code Status: Full Code      SDOH Screening:  The patient was screened for utility difficulties, food insecurity, transport difficulties, housing insecurity, and interpersonal safety and there were no concerns identified this admission.     Consults:   Consults (From admission, onward)          Status Ordering Provider     Inpatient consult to Registered Dietitian/Nutritionist  Once        Provider:  (Not yet  assigned)    Completed ALEX DASH     Inpatient consult to Social Work/Case Management  Once        Provider:  (Not yet assigned)    Completed ALEX DASH     Inpatient consult to Cardiology  Once        Provider:  Aiden Stallings MD    Completed LENIN FUENTES            Assessment & Plan  NSTEMI (non-ST elevated myocardial infarction)  No ST changes on EKG  Cardiology consulted  Pending echocardiogram  Patient will be going to cardiac cath  Received a loading dose of aspirin  -cath was done on 05/23 nonobstructive patient to continue on aspirin and high-intensity statin with PT management    Obstructive sleep apnea on CPAP  Chronic/stable  CPAP at bedtime    Hyperlipidemia  Chronic  Lipid panel ordered  On high-dose statin    Hypertension  Patient's blood pressure range in the last 24 hours was: BP  Min: 111/68  Max: 150/93.The patient's inpatient anti-hypertensive regimen is listed below:  Current Antihypertensives  lisinopril (PRINIVIL,ZESTRIL) tablet, Daily, Oral  nitroGLYCERIN (NITROSTAT) SL tablet, Every 5 min PRN, Sublingual    Plan  - BP is uncontrolled, will adjust as follows:  P.r.n. medications added restarted home meds  - to be monitored  Obesity  Body mass index is 37.34 kg/m². Morbid obesity complicates all aspects of disease management from diagnostic modalities to treatment. Weight loss encouraged and health benefits explained to patient.       Chest pain      Chronic diastolic heart failure      Elevated left ventricular end-diastolic pressure (LVEDP)  Detected by angiogram  BP management with high-intensity statin    Final Active Diagnoses:    Diagnosis Date Noted POA    PRINCIPAL PROBLEM:  NSTEMI (non-ST elevated myocardial infarction) [I21.4] 05/23/2025 Yes    Chronic diastolic heart failure [I50.32] 05/24/2025 Yes    Elevated left ventricular end-diastolic pressure (LVEDP) [R94.30] 05/24/2025 Yes     Chronic    Obesity [E66.9] 05/23/2025 Yes    Chest pain [R07.9] 05/23/2025 Yes     Hypertension [I10] 10/01/2018 Yes    Hyperlipidemia [E78.5] 10/01/2018 Yes    Obstructive sleep apnea on CPAP [G47.33]  Yes      Problems Resolved During this Admission:       Discharged Condition: fair    Disposition: Home or Self Care    Follow Up:   Follow-up Information       Aiden Contreras MD. Schedule an appointment as soon as possible for a visit.    Specialties: Interventional Cardiology, Cardiovascular Disease, Cardiology, Internal Medicine  Why: As needed, If symptoms worsen  Contact information:  [address]  [address]  [place] [address]  [phone]               Roxanne Wolfe MD. Schedule an appointment as soon as possible for a visit.    Specialty: Internal Medicine  Why: For Hospital Follow-up  Contact information:  [address]  [place] [address] [phone]                           Patient Instructions:      Ambulatory referral/consult to Cardiology   Standing Status: Future   Referral Priority: Routine Referral Type: Consultation   Referral Reason: Specialty Services Required   Referred to Provider: AIDEN CONTRERAS Requested Specialty: Cardiology   Number of Visits Requested: 1       Significant Diagnostic Studies: Labs: All labs within the past 24 hours have been reviewed    Pending Diagnostic Studies:       Procedure Component Value Units Date/Time    EKG 12-lead [[id number]]     Order Status: Sent Lab Status: No result     EXTRA TUBES [[id number]]     Order Status: Sent Lab Status: No result     Specimen: Blood, Venous     Narrative:      The following orders were created for panel order EXTRA TUBES.  Procedure                               Abnormality         Status                     ---------                               -----------         ------                     Light Blue Top Hold[[id number]]                                                          Please view results for these tests on the individual orders.    Light Blue Top Hold [[id number]]     Order  Status: Sent Lab Status: No result     Specimen: Blood, Venous            Medications:  Reconciled Home Medications:      Medication List        START taking these medications      aspirin 81 MG Chew  Take 1 tablet (81 mg total) by mouth once daily.     atorvastatin 80 MG tablet  Commonly known as: LIPITOR  Take 1 tablet (80 mg total) by mouth once daily.     lisinopriL 10 MG tablet  Take 1 tablet (10 mg total) by mouth once daily.     nitroGLYCERIN 0.4 MG SL tablet  Commonly known as: NITROSTAT  Place 1 tablet (0.4 mg total) under the tongue every 5 (five) minutes as needed for Chest pain (angina).            CHANGE how you take these medications      * DULoxetine 60 MG capsule  Commonly known as: CYMBALTA  TAKE ONE CAPSULE BY MOUTH DAILY AND 60 MG DAILY( TOTAL OF 80 MG DAILY)  What changed:   how much to take  how to take this  when to take this  additional instructions     * DULoxetine 20 MG capsule  Commonly known as: CYMBALTA  TAKE ONE CAPSULE BY MOUTH DAILY AND 60 MG DAILY( TOTAL OF 80 MG DAILY)  What changed:   how much to take  how to take this  when to take this  additional instructions     metoprolol succinate 100 MG 24 hr tablet  Commonly known as: TOPROL-XL  TAKE 1 TABLET(100 MG) BY MOUTH DAILY  What changed: See the new instructions.           * This list has 2 medication(s) that are the same as other medications prescribed for you. Read the directions carefully, and ask your doctor or other care provider to review them with you.                CONTINUE taking these medications      celecoxib 200 MG capsule  Commonly known as: CeleBREX  Take 200 mg by mouth as needed for Pain.     fenofibrate 160 MG Tab  Take 1 tablet (160 mg total) by mouth once daily.     HYDROcodone-acetaminophen 5-325 mg per tablet  Commonly known as: NORCO  Take 1 tablet by mouth as needed for Pain.     lamoTRIgine 150 MG Tab  Commonly known as: LAMICTAL  TAKE 2 TABLETS BY MOUTH DAILY     latanoprost 0.005 % ophthalmic  solution  Place 1 drop into both eyes every evening.     levothyroxine 75 MCG tablet  Commonly known as: SYNTHROID  TAKE 1 TABLET(75 MCG) BY MOUTH BEFORE BREAKFAST              Indwelling Lines/Drains at time of discharge:   Lines/Drains/Airways       None                   Time spent on the discharge of patient:  45 minutes         Kermit Kenney MD  Department of Hospital Medicine  [facility]

## 2025-05-24 NOTE — PLAN OF CARE
Plan for d/c home today.  Pt independent with all ADL's, will transport home with spouse Kesha at time of d/c.  Pt denies any CM needs at this time, cleared from CM standpoint.  Pt will need to contact PCP for f/u appt.       05/24/25 0943   Discharge Assessment   Assessment Type Discharge Planning Brief Assessment   Confirmed/corrected address, phone number and insurance Yes   Confirmed Demographics Correct on Facesheet   Source of Information patient   Communicated ALLEN with patient/caregiver Yes   People in Home spouse   Name(s) of People in Home   (wife Kesha)   Prior to hospitilization cognitive status: Alert/Oriented   Current cognitive status: Alert/Oriented   Equipment Currently Used at Home CPAP   Readmission within 30 days? No   Patient currently being followed by outpatient case management? No   Do you have any problems affording any of your prescribed medications? No   Are you on dialysis? No   Do you take coumadin? No   Discharge Plan A Home with family   DME Needed Upon Discharge  none   Discharge Plan discussed with: Patient   Transition of Care Barriers None         Future Appointments   Date Time Provider Department Center   9/15/2025  7:25 AM LAB, [facility]   9/19/2025  1:20 PM Roxanne Wolfe MD [facility]

## 2025-05-24 NOTE — ASSESSMENT & PLAN NOTE
No ST changes on EKG  Cardiology consulted  Pending echocardiogram  Patient will be going to cardiac cath  Received a loading dose of aspirin  -cath was done on 05/23 nonobstructive patient to continue on aspirin and high-intensity statin with PT management

## 2025-05-24 NOTE — ASSESSMENT & PLAN NOTE
Body mass index is 37.34 kg/m². Morbid obesity complicates all aspects of disease management from diagnostic modalities to treatment. Weight loss encouraged and health benefits explained to patient.

## 2025-05-24 NOTE — PLAN OF CARE
05/24/25 0948   Final Note   Assessment Type Final Discharge Note   Anticipated Discharge Disposition Home   What phone number can be called within the next 1-3 days to see how you are doing after discharge?   ([phone])

## 2025-05-24 NOTE — NURSING
Discharge orders noted. Additional clinical references attached.    Patient's discharge instructions given by bedside RN and reviewed via this VN with patient.    Education provided on new medication, diagnosis, and follow-up appointments.    All questions answered. Teach back method used. Patient verbalized understanding.    Transport to [facility] requested. Floor nurse notified.      05/24/25 1146   AVS Confirmation   Discharge instructions and AVS provided to and reviewed with patient and/or significant other. Yes

## 2025-05-26 LAB
OHS QRS DURATION: 88 MS
OHS QRS DURATION: 94 MS
OHS QTC CALCULATION: 419 MS
OHS QTC CALCULATION: 424 MS

## 2025-05-27 ENCOUNTER — OFFICE VISIT (OUTPATIENT)
Dept: CARDIOLOGY | Facility: CLINIC | Age: 50
End: 2025-05-27
Payer: COMMERCIAL

## 2025-05-27 VITALS
SYSTOLIC BLOOD PRESSURE: 136 MMHG | HEIGHT: 69 IN | BODY MASS INDEX: 37.42 KG/M2 | WEIGHT: 252.63 LBS | OXYGEN SATURATION: 96 % | HEART RATE: 72 BPM | DIASTOLIC BLOOD PRESSURE: 93 MMHG

## 2025-05-27 DIAGNOSIS — I10 PRIMARY HYPERTENSION: ICD-10-CM

## 2025-05-27 DIAGNOSIS — R07.9 CHEST PAIN, UNSPECIFIED TYPE: ICD-10-CM

## 2025-05-27 DIAGNOSIS — E78.5 HYPERLIPIDEMIA, UNSPECIFIED HYPERLIPIDEMIA TYPE: ICD-10-CM

## 2025-05-27 DIAGNOSIS — R94.30 ELEVATED LEFT VENTRICULAR END-DIASTOLIC PRESSURE (LVEDP): Chronic | ICD-10-CM

## 2025-05-27 DIAGNOSIS — I25.10 NONOBSTRUCTIVE ATHEROSCLEROSIS OF CORONARY ARTERY: ICD-10-CM

## 2025-05-27 DIAGNOSIS — E78.5 DYSLIPIDEMIA: ICD-10-CM

## 2025-05-27 DIAGNOSIS — I21.4 NON-ST ELEVATION MYOCARDIAL INFARCTION (NSTEMI): Primary | ICD-10-CM

## 2025-05-27 DIAGNOSIS — I50.32 CHRONIC DIASTOLIC HEART FAILURE: ICD-10-CM

## 2025-05-27 PROCEDURE — 3080F DIAST BP >= 90 MM HG: CPT | Mod: CPTII,S$GLB,, | Performed by: INTERNAL MEDICINE

## 2025-05-27 PROCEDURE — 1159F MED LIST DOCD IN RCRD: CPT | Mod: CPTII,S$GLB,, | Performed by: INTERNAL MEDICINE

## 2025-05-27 PROCEDURE — 3044F HG A1C LEVEL LT 7.0%: CPT | Mod: CPTII,S$GLB,, | Performed by: INTERNAL MEDICINE

## 2025-05-27 PROCEDURE — 4010F ACE/ARB THERAPY RXD/TAKEN: CPT | Mod: CPTII,S$GLB,, | Performed by: INTERNAL MEDICINE

## 2025-05-27 PROCEDURE — 99999 PR PBB SHADOW E&M-EST. PATIENT-LVL IV: CPT | Mod: PBBFAC,,, | Performed by: INTERNAL MEDICINE

## 2025-05-27 PROCEDURE — 3008F BODY MASS INDEX DOCD: CPT | Mod: CPTII,S$GLB,, | Performed by: INTERNAL MEDICINE

## 2025-05-27 PROCEDURE — 99214 OFFICE O/P EST MOD 30 MIN: CPT | Mod: S$GLB,,, | Performed by: INTERNAL MEDICINE

## 2025-05-27 PROCEDURE — 3075F SYST BP GE 130 - 139MM HG: CPT | Mod: CPTII,S$GLB,, | Performed by: INTERNAL MEDICINE

## 2025-05-27 RX ORDER — LISINOPRIL 40 MG/1
40 TABLET ORAL DAILY
Qty: 90 TABLET | Refills: 3 | Status: SHIPPED | OUTPATIENT
Start: 2025-05-27 | End: 2026-05-27

## 2025-05-27 NOTE — PROGRESS NOTES
"  Cardiology Clinic Visit    History of Present Illness/Subjective:       Jesse Hernández is a pleasant 50 y.o. male with medical issues noted below in assessment/plan    5/27/25    History of Present Illness    Mr. Hernández presents today for follow-up of recent hospital admission He reports progressive muscle cramps that started before his hospital admission. The cramps initially occurred primarily at night but are now increasing in frequency throughout the day. They appear random and not movement-related necessarily. He describes his legs feeling exhausted, "as if every day is leg day." The cramps have progressed to his upper body, with recent episodes occurring underneath his arm while showering. He notes difficulty in stretching to alleviate upper body cramps compared to leg cramps.. He uses CPAP for sleep apnea. He recently started Atorvastatin while inpatient.         History obtained by patient interview and supplemented by nursing documentation and chart review.   Objective   PMHx:  has a past medical history of Hypertension and KAILEE (obstructive sleep apnea).   SurgHx:  has a past surgical history that includes Back surgery; Spine surgery; Umbilical hernia repair (N/A, 11/25/2019); Hernia repair; Patent ductus arterious ligation; Robot-assisted laparoscopic repair of inguinal hernia (Left, 06/28/2021); Colonoscopy (N/A, 04/22/2022); Tonsillectomy (When child); Shoulder surgery (Left, 2022); Esophagogastroduodenoscopy (N/A, 5/30/2023); Left heart catheterization (N/A, 5/23/2025); Coronary angiography (N/A, 5/23/2025); and ventriculogram, left (5/23/2025).   FamHx: family history includes COPD in his maternal aunt; Cancer in his mother; Dementia in his mother; Diabetes in his paternal grandmother; Drug abuse in his brother; Early death in his brother; Hearing loss in his father.   SocialHx:  reports that he quit smoking about 16 years ago. His smoking use included cigarettes. He has quit using smokeless " "tobacco. He reports current alcohol use of about 2.0 standard drinks of alcohol per week. He reports that he does not use drugs.  Home Meds:  Current Outpatient Medications   Medication Instructions    aspirin 81 mg, Oral, Daily    atorvastatin (LIPITOR) 80 mg, Oral, Daily    celecoxib (CELEBREX) 200 mg, As needed (PRN)    DULoxetine (CYMBALTA) 20 MG capsule TAKE ONE CAPSULE BY MOUTH DAILY AND 60 MG DAILY( TOTAL OF 80 MG DAILY)    DULoxetine (CYMBALTA) 60 MG capsule TAKE ONE CAPSULE BY MOUTH DAILY AND 60 MG DAILY( TOTAL OF 80 MG DAILY)    fenofibrate 160 mg, Oral, Daily    HYDROcodone-acetaminophen (NORCO) 5-325 mg per tablet 1 tablet, As needed (PRN)    lamoTRIgine (LAMICTAL) 300 mg, Oral    latanoprost 0.005 % ophthalmic solution 1 drop, Nightly    levothyroxine (SYNTHROID) 75 mcg, Oral, Before breakfast    lisinopriL (PRINIVIL,ZESTRIL) 40 mg, Oral, Daily    metoprolol succinate (TOPROL-XL) 100 MG 24 hr tablet TAKE 1 TABLET(100 MG) BY MOUTH DAILY    nitroGLYCERIN (NITROSTAT) 0.4 mg, Sublingual, Every 5 min PRN     Objective/Exam:   BP (!) 136/93 (BP Location: Right arm)   Pulse 72   Ht 5' 9" (1.753 m)   Wt 114.6 kg (252 lb 10.4 oz)   SpO2 96%   BMI 37.31 kg/m²    Wt Readings from Last 4 Encounters:   05/27/25 114.6 kg (252 lb 10.4 oz)   05/23/25 114.7 kg (252 lb 13.9 oz)   03/19/25 119.7 kg (263 lb 14.3 oz)   10/31/24 115 kg (253 lb 8.5 oz)      Physical Exam    General: No acute distress. Well-developed. Well-nourished.  Eyes: EOMI. Sclerae anicteric.  HENT: Normocephalic. Atraumatic. Nares patent. Moist oral mucosa.  Ears: Bilateral TMs clear. Bilateral EACs clear.  Cardiovascular: Regular rate. Regular rhythm. No murmurs. No rubs. No gallops. Normal S1, S2.  Respiratory: Normal respiratory effort. Clear to auscultation bilaterally. No rales. No rhonchi. No wheezing.  Abdomen: Soft. Non-tender. Non-distended. Normoactive bowel sounds.  Musculoskeletal: No  obvious deformity.  Extremities: No lower extremity " "edema.  Neurological: Alert & oriented x3. No slurred speech. Normal gait.  Psychiatric: Normal mood. Normal affect. Good insight. Good judgment.  Skin: Warm. Dry. No rash.       Labs/Imaging/Procedures   Personally reviewed  Lab Results   Component Value Date     05/23/2025     03/17/2025    K 4.4 05/23/2025    K 4.6 03/17/2025     05/23/2025     03/17/2025    CO2 23 05/23/2025    CO2 24 03/17/2025    BUN 12 05/23/2025    CREATININE 1.0 05/23/2025    ANIONGAP 9 05/23/2025     Lab Results   Component Value Date    HGBA1C 6.0 (H) 05/23/2025    HGBA1C 5.5 03/11/2024     Lab Results   Component Value Date    BNP <10 05/23/2025      Lab Results   Component Value Date    WBC 9.06 05/23/2025    HGB 16.3 05/23/2025    HGB 16.3 10/31/2024    HCT 48.8 05/23/2025    HCT 49.3 10/31/2024     05/23/2025     10/31/2024    GRAN 5.6 10/31/2024    GRAN 67.8 10/31/2024     Lab Results   Component Value Date    CHOL 249 (H) 05/23/2025    CHOL 221 (H) 03/17/2025    HDL 45 05/23/2025    LDLCALC 164.6 (H) 05/23/2025    LDLCALC 145.0 03/17/2025    LDLCALC 177.6 (H) 03/11/2024    LDLCALC 151.2 09/07/2023    TRIG 197 (H) 05/23/2025    TRIG 180 (H) 03/17/2025     Lab Results   Component Value Date    TSH 4.534 (H) 03/11/2024     No results found for: "APOLIPOPROTE"  No results found for: "LIPOA"       TTE:  Results for orders placed during the hospital encounter of 05/23/25    Echo    Interpretation Summary    Left Ventricle: The left ventricle is normal in size. Mildly increased wall thickness. There is normal systolic function. Quantitated ejection fraction is 68%. Grade I diastolic dysfunction.    Right Ventricle: The right ventricle is normal in size measuring 3.3 cm. Systolic function is normal.    Normal valvular structure and function.    Stress Testing:   No results found for this or any previous visit.     Coronary Angiogram:  Results for orders placed during the hospital encounter of " 05/23/25    Cardiac catheterization    Conclusion  Images from the original result were not included.  Finding(s):  Access: R radial  Coronary Anatomy:  Left Main Coronary Artery:  -No evidence of obstruction, YARIEL III Flow  Left Anterior Descending Artery: Large Caliber Vessel, YARIEL III Flow  -Proximal: 30% stenosis  -Mid: 40% stenosis  -Distal: normal  -D1: normal  -D2: normal  Left Circumflex Coronary Artery: Large Caliber Vessel, YARIEL III Flow  -Proximal: normal  -Distal: normal  -OM1: normal  -OM2: normal  -OM3: normal  -LPDA: normal  Right Coronary Artery: Large Caliber Vessel, YARIEL III Flow  -Proximal: normal  -Mid: normal  -Distal: normal  Dominance: LEFT  LVEDP: 20 mmHg    EF 55-60% on ventriculogram    Assessment:  Non-obstructive CAD      Aiden Stallings M.D.  Interventional Cardiology  [facility]      Personal: works in [employer]     Time spent on this visit included personally:  -Reviewing Medical records & lab results  -Independently reviewing and interpreting (if not documented by myself) EKGs, echocardiograms, catherizations   -Obtaining a history, performing a relevant exam, counseling/educating the patient/family  -Documenting clinical information in the EMR including ordering of tests  -Care coordination and communicating with other health care providers      This note was generated with the assistance of ambient listening technology. Verbal consent was obtained by the patient and accompanying visitor(s) for the recording of patient appointment to facilitate this note. I attest to having reviewed and edited the generated note for accuracy, though some syntax or spelling errors may persist. Please contact the author of this note for any clarification.         Problem List:     1. Non-ST elevation myocardial infarction (NSTEMI)    2. Primary hypertension    3. Hyperlipidemia, unspecified hyperlipidemia type    4. Dyslipidemia    5. Elevated left ventricular end-diastolic pressure (LVEDP)     6. Chronic diastolic heart failure    7. Chest pain, unspecified type    8. Nonobstructive atherosclerosis of coronary artery      Assessment/Plan:   Jesse Hernández is a 50 y.o. male with HTN, HLD, pre-DM, obesity, KAILEE (on CPAP), family history of CAD presenting recent admission for UA s/p LHC w/ nonobs dz. TTE EF 68%, nlm PAP.  No further significant chest pains noted.    - Order Ankle-Brachial Index (MCKENZIE) for atypical claudication symptoms  - Start OTC magnesium supplement to see if leg cramps improve  - Continue current medications including aspirin and Metoprolol, may stop aspirin next visit.  Metoprolol may also be able to be discontinued in the future as currently no cardiac indications (other BP meds can be optimized)  - if stretching/magnesium/conservative therapies do not work, we will trial stopping atorvastatin for 2-4 weeks  -BP not at goal--increase Lisinopril 40mg daily  - Follow up with Dr. Wolfe to discuss newer weight loss medications  -continue remainder of Current Medications[1]    Thank you for the opportunity to care for this patient. Please don't hesitate to reach out with any questions/concerns.   Speech recognition software used for parts of this note. Please excuse grammar, out of context phrases, and/or syntax issues.        Aiden Stallings MD  Interventional Cardiology  [facility]             [1]   Current Outpatient Medications:     aspirin 81 MG Chew, Take 1 tablet (81 mg total) by mouth once daily., Disp: 360 tablet, Rfl: 0    atorvastatin (LIPITOR) 80 MG tablet, Take 1 tablet (80 mg total) by mouth once daily., Disp: 90 tablet, Rfl: 3    celecoxib (CELEBREX) 200 MG capsule, Take 200 mg by mouth as needed for Pain., Disp: , Rfl:     DULoxetine (CYMBALTA) 20 MG capsule, TAKE ONE CAPSULE BY MOUTH DAILY AND 60 MG DAILY( TOTAL OF 80 MG DAILY) (Patient taking differently: Take 20 mg by mouth once daily. Take with 60 mg to equal a total of 80 mg daily.), Disp: 90 capsule, Rfl: 0     DULoxetine (CYMBALTA) 60 MG capsule, TAKE ONE CAPSULE BY MOUTH DAILY AND 60 MG DAILY( TOTAL OF 80 MG DAILY) (Patient taking differently: Take 60 mg by mouth once daily. Take with 20 mg to equal a total of 80 mg daily.), Disp: 90 capsule, Rfl: 0    fenofibrate 160 MG Tab, Take 1 tablet (160 mg total) by mouth once daily., Disp: 90 tablet, Rfl: 1    HYDROcodone-acetaminophen (NORCO) 5-325 mg per tablet, Take 1 tablet by mouth as needed for Pain., Disp: , Rfl:     lamoTRIgine (LAMICTAL) 150 MG Tab, TAKE 2 TABLETS BY MOUTH DAILY (Patient taking differently: Take 300 mg by mouth once daily.), Disp: 180 tablet, Rfl: 0    latanoprost 0.005 % ophthalmic solution, Place 1 drop into both eyes every evening., Disp: , Rfl:     levothyroxine (SYNTHROID) 75 MCG tablet, TAKE 1 TABLET(75 MCG) BY MOUTH BEFORE BREAKFAST, Disp: 90 tablet, Rfl: 0    metoprolol succinate (TOPROL-XL) 100 MG 24 hr tablet, TAKE 1 TABLET(100 MG) BY MOUTH DAILY (Patient taking differently: Take 100 mg by mouth once daily.), Disp: 90 tablet, Rfl: 3    nitroGLYCERIN (NITROSTAT) 0.4 MG SL tablet, Place 1 tablet (0.4 mg total) under the tongue every 5 (five) minutes as needed for Chest pain (angina)., Disp: 3 tablet, Rfl: 0    lisinopriL (PRINIVIL,ZESTRIL) 40 MG tablet, Take 1 tablet (40 mg total) by mouth once daily., Disp: 90 tablet, Rfl: 3

## 2025-05-29 ENCOUNTER — HOSPITAL ENCOUNTER (EMERGENCY)
Facility: HOSPITAL | Age: 50
Discharge: HOME | End: 2025-05-29
Attending: EMERGENCY MEDICINE
Payer: COMMERCIAL

## 2025-05-29 VITALS
DIASTOLIC BLOOD PRESSURE: 83 MMHG | SYSTOLIC BLOOD PRESSURE: 138 MMHG | TEMPERATURE: 98 F | HEART RATE: 62 BPM | RESPIRATION RATE: 20 BRPM | WEIGHT: 252 LBS | OXYGEN SATURATION: 97 % | HEIGHT: 69 IN | BODY MASS INDEX: 37.33 KG/M2

## 2025-05-29 DIAGNOSIS — R07.9 CHEST PAIN: ICD-10-CM

## 2025-05-29 DIAGNOSIS — R07.9 CHEST PAIN, UNSPECIFIED TYPE: Primary | ICD-10-CM

## 2025-05-29 LAB
ABSOLUTE EOSINOPHIL (OHS): 0.09 K/UL
ABSOLUTE MONOCYTE (OHS): 0.68 K/UL (ref 0.3–1)
ABSOLUTE NEUTROPHIL COUNT (OHS): 7.04 K/UL (ref 1.8–7.7)
ALBUMIN SERPL BCP-MCNC: 4.6 G/DL (ref 3.5–5.2)
ALP SERPL-CCNC: 50 UNIT/L (ref 40–150)
ALT SERPL W/O P-5'-P-CCNC: 32 UNIT/L (ref 10–44)
ANION GAP (OHS): 11 MMOL/L (ref 8–16)
AST SERPL-CCNC: 25 UNIT/L (ref 11–45)
BASOPHILS # BLD AUTO: 0.07 K/UL
BASOPHILS NFR BLD AUTO: 0.7 %
BILIRUB SERPL-MCNC: 0.5 MG/DL (ref 0.1–1)
BUN SERPL-MCNC: 19 MG/DL (ref 6–20)
CALCIUM SERPL-MCNC: 9.7 MG/DL (ref 8.7–10.5)
CHLORIDE SERPL-SCNC: 104 MMOL/L (ref 95–110)
CO2 SERPL-SCNC: 17 MMOL/L (ref 23–29)
CREAT SERPL-MCNC: 0.9 MG/DL (ref 0.5–1.4)
ERYTHROCYTE [DISTWIDTH] IN BLOOD BY AUTOMATED COUNT: 13.3 % (ref 11.5–14.5)
GFR SERPLBLD CREATININE-BSD FMLA CKD-EPI: >60 ML/MIN/1.73/M2
GLUCOSE SERPL-MCNC: 120 MG/DL (ref 70–110)
HCT VFR BLD AUTO: 47.3 % (ref 40–54)
HGB BLD-MCNC: 16.2 GM/DL (ref 14–18)
IMM GRANULOCYTES # BLD AUTO: 0.09 K/UL (ref 0–0.04)
IMM GRANULOCYTES NFR BLD AUTO: 0.9 % (ref 0–0.5)
LYMPHOCYTES # BLD AUTO: 2.15 K/UL (ref 1–4.8)
MAGNESIUM SERPL-MCNC: 2.3 MG/DL (ref 1.6–2.6)
MCH RBC QN AUTO: 29.5 PG (ref 27–31)
MCHC RBC AUTO-ENTMCNC: 34.2 G/DL (ref 32–36)
MCV RBC AUTO: 86 FL (ref 82–98)
NUCLEATED RBC (/100WBC) (OHS): 0 /100 WBC
PLATELET # BLD AUTO: 331 K/UL (ref 150–450)
PMV BLD AUTO: 10 FL (ref 9.2–12.9)
POTASSIUM SERPL-SCNC: 4.4 MMOL/L (ref 3.5–5.1)
PROT SERPL-MCNC: 8 GM/DL (ref 6–8.4)
RBC # BLD AUTO: 5.5 M/UL (ref 4.6–6.2)
RELATIVE EOSINOPHIL (OHS): 0.9 %
RELATIVE LYMPHOCYTE (OHS): 21.2 % (ref 18–48)
RELATIVE MONOCYTE (OHS): 6.7 % (ref 4–15)
RELATIVE NEUTROPHIL (OHS): 69.6 % (ref 38–73)
SODIUM SERPL-SCNC: 132 MMOL/L (ref 136–145)
TROPONIN I SERPL DL<=0.01 NG/ML-MCNC: 0.01 NG/ML
TROPONIN I SERPL DL<=0.01 NG/ML-MCNC: 0.02 NG/ML
WBC # BLD AUTO: 10.12 K/UL (ref 3.9–12.7)

## 2025-05-29 PROCEDURE — 25000003 PHARM REV CODE 250: Performed by: EMERGENCY MEDICINE

## 2025-05-29 PROCEDURE — 85025 COMPLETE CBC W/AUTO DIFF WBC: CPT | Performed by: EMERGENCY MEDICINE

## 2025-05-29 PROCEDURE — 82374 ASSAY BLOOD CARBON DIOXIDE: CPT | Performed by: EMERGENCY MEDICINE

## 2025-05-29 PROCEDURE — 84484 ASSAY OF TROPONIN QUANT: CPT | Performed by: EMERGENCY MEDICINE

## 2025-05-29 PROCEDURE — 99285 EMERGENCY DEPT VISIT HI MDM: CPT | Mod: 25

## 2025-05-29 PROCEDURE — 93010 ELECTROCARDIOGRAM REPORT: CPT | Mod: ,,, | Performed by: STUDENT IN AN ORGANIZED HEALTH CARE EDUCATION/TRAINING PROGRAM

## 2025-05-29 PROCEDURE — 93005 ELECTROCARDIOGRAM TRACING: CPT

## 2025-05-29 PROCEDURE — 83735 ASSAY OF MAGNESIUM: CPT | Performed by: EMERGENCY MEDICINE

## 2025-05-29 RX ORDER — PANTOPRAZOLE SODIUM 20 MG/1
20 TABLET, DELAYED RELEASE ORAL DAILY
Qty: 30 TABLET | Refills: 0 | Status: SHIPPED | OUTPATIENT
Start: 2025-05-29 | End: 2026-05-29

## 2025-05-29 RX ORDER — ALUMINUM HYDROXIDE, MAGNESIUM HYDROXIDE, AND SIMETHICONE 1200; 120; 1200 MG/30ML; MG/30ML; MG/30ML
30 SUSPENSION ORAL ONCE
Status: COMPLETED | OUTPATIENT
Start: 2025-05-29 | End: 2025-05-29

## 2025-05-29 RX ORDER — LIDOCAINE HYDROCHLORIDE 20 MG/ML
15 SOLUTION OROPHARYNGEAL ONCE
Status: COMPLETED | OUTPATIENT
Start: 2025-05-29 | End: 2025-05-29

## 2025-05-29 RX ADMIN — ALUMINUM HYDROXIDE, MAGNESIUM HYDROXIDE, AND DIMETHICONE 30 ML: 200; 20; 200 SUSPENSION ORAL at 10:05

## 2025-05-29 RX ADMIN — LIDOCAINE HYDROCHLORIDE 15 ML: 20 SOLUTION ORAL at 10:05

## 2025-05-29 NOTE — ED PROVIDER NOTES
Encounter Date: 5/29/2025       History     Chief Complaint   Patient presents with    Chest Pain     Substernal chest pain that started this morning ~0730. Recent admission for NSTEMI. Took 4 NTG with minimal relief. Endorses nausea.      50-year-old male with a history of hypertension presents with chest pain shortness of breath that started about an hour ago.  Patient says it feels similar to when he was admitted a few weeks ago.  Patient said that at that time he had an angiogram done.  He followed up with his cardiologist and was told that if he gets these symptoms again, he should return to the hospital.  Patient says what really made him want to come in today was because he had a fluttering which really took his breath away.  Patient took 4 nitroglycerin which did not seem to have much impact on his pain.  He also took his morning aspirin.       Review of patient's allergies indicates:  No Known Allergies  Past Medical History:   Diagnosis Date    Hypertension     KAILEE (obstructive sleep apnea)      Past Surgical History:   Procedure Laterality Date    BACK SURGERY      COLONOSCOPY N/A 04/22/2022    Procedure: COLONOSCOPY miralax prep;  Surgeon: Lamonte Carcamo MD;  Location: [facility] ENDO;  Service: Endoscopy;  Laterality: N/A;  Patient needs a rapid covid test.    CORONARY ANGIOGRAPHY N/A 5/23/2025    Procedure: ANGIOGRAM, CORONARY ARTERY;  Surgeon: Aiden Stallings MD;  Location: [facility] CATH LAB/EP;  Service: Cardiology;  Laterality: N/A;    ESOPHAGOGASTRODUODENOSCOPY N/A 5/30/2023    Procedure: EGD (ESOPHAGOGASTRODUODENOSCOPY);  Surgeon: Lamonte Carcamo MD;  Location: [facility] ENDO;  Service: Endoscopy;  Laterality: N/A;    HERNIA REPAIR      LEFT HEART CATHETERIZATION N/A 5/23/2025    Procedure: Left heart cath;  Surgeon: Aiden Stallings MD;  Location: [facility] CATH LAB/EP;  Service: Cardiology;  Laterality: N/A;    PATENT DUCTUS ARTERIOUS LIGATION      infancy    ROBOT-ASSISTED LAPAROSCOPIC REPAIR OF INGUINAL  HERNIA Left 06/28/2021    Procedure: ROBOTIC REPAIR, HERNIA, INGUINAL;  Surgeon: Patrick Rehman Jr., MD;  Location: [facility] OR;  Service: General;  Laterality: Left;  Left vs bilateral    SHOULDER SURGERY Left 2022    SPINE SURGERY      TONSILLECTOMY  When child    UMBILICAL HERNIA REPAIR N/A 11/25/2019    Procedure: REPAIR, HERNIA, UMBILICAL, AGE 5 YEARS OR OLDER - open with mesh;  Surgeon: Patrick Rehman Jr., MD;  Location: [facility] OR;  Service: General;  Laterality: N/A;    VENTRICULOGRAM, LEFT  5/23/2025    Procedure: Ventriculogram, Left;  Surgeon: Aiden Stallings MD;  Location: [facility] CATH LAB/EP;  Service: Cardiology;;     Family History   Problem Relation Name Age of Onset    Cancer Mother      Dementia Mother      Hearing loss 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   Eyes:  Negative for pain.   Respiratory:  Positive for shortness of breath.    Cardiovascular:  Positive for chest pain.   Gastrointestinal:  Negative for abdominal pain, nausea and vomiting.   Genitourinary:  Negative for frequency.   Musculoskeletal:  Negative for arthralgias and neck pain.   Neurological:  Negative for headaches.   Psychiatric/Behavioral:  Negative for confusion.        Physical Exam     Initial Vitals [05/29/25 0841]   BP Pulse Resp Temp SpO2   130/80 (!) 59 18 98 °F (36.7 °C) 98 %      MAP       --         Physical Exam    Nursing note and vitals reviewed.  HENT:   Head: Atraumatic.   Eyes: Conjunctivae and EOM are normal.   Neck:   Normal range of motion.  Cardiovascular:      Exam reveals no gallop and no friction rub.       No murmur heard.  Pulmonary/Chest: Breath sounds normal. No respiratory distress. He has no  wheezes. He has no rales.   Abdominal: Abdomen is soft. Bowel sounds are normal. He exhibits no distension. There is no abdominal tenderness.   Musculoskeletal:         General: No edema. Normal range of motion.      Cervical back: Normal range of motion.     Neurological: He is alert and oriented to person, place, and time.   Skin: Skin is warm and dry.   Psychiatric: He has a normal mood and affect.         ED Course   Procedures  Labs Reviewed   COMPREHENSIVE METABOLIC PANEL - Abnormal       Result Value    Sodium 132 (*)     Potassium 4.4      Chloride 104      CO2 17 (*)     Glucose 120 (*)     BUN 19      Creatinine 0.9      Calcium 9.7      Protein Total 8.0      Albumin 4.6      Bilirubin Total 0.5      ALP 50      AST 25      ALT 32      Anion Gap 11      eGFR >60     CBC WITH DIFFERENTIAL - Abnormal    WBC 10.12      RBC 5.50      HGB 16.2      HCT 47.3      MCV 86      MCH 29.5      MCHC 34.2      RDW 13.3      Platelet Count 331      MPV 10.0      Nucleated RBC 0      Neut % 69.6      Lymph % 21.2      Mono % 6.7      Eos % 0.9      Basophil % 0.7      Imm Grans % 0.9 (*)     Neut # 7.04      Lymph # 2.15      Mono # 0.68      Eos # 0.09      Baso # 0.07      Imm Grans # 0.09 (*)    TROPONIN I - Normal    Troponin-I 0.016     MAGNESIUM - Normal    Magnesium  2.3     TROPONIN I - Normal    Troponin-I 0.015     CBC W/ AUTO DIFFERENTIAL    Narrative:     The following orders were created for panel order CBC Auto Differential.  Procedure                               Abnormality         Status                     ---------                               -----------         ------                     CBC with Differential[[id number]]       Abnormal            Final result                 Please view results for these tests on the individual orders.     EKG Readings: (Independently Interpreted)   Initial Reading: No STEMI. Rhythm: Normal Sinus Rhythm. Heart Rate: 64. Ectopy: No Ectopy. Conduction: Normal.      ECG Results              EKG 12-lead (In process)        Collection Time Result Time QRS Duration [facility] QTC Calculation    05/29/25 08:40:16 05/29/25 08:50:17 92 431                     In process by Interface, Lab In [facility] (05/29/25 08:50:24)                   Narrative:    Test Reason : R07.9,    Vent. Rate :  64 BPM     Atrial Rate :  64 BPM     P-R Int : 146 ms          QRS Dur :  92 ms      QT Int : 418 ms       P-R-T Axes :  68 -31  71 degrees    QTcB Int : 431 ms    Normal sinus rhythm  Left axis deviation  Abnormal ECG  When compared with ECG of 24-May-2025 06:07,  No significant change was found    Referred By:            Confirmed By:                                   Imaging Results              X-Ray Chest AP Portable (Final result)  Result time 05/29/25 09:49:26      Final result by Tesfaye Guidry MD (05/29/25 09:49:26)                   Impression:      No convincing evidence of acute cardiopulmonary disease.      Electronically signed by: Tesfaye Guidry  Date:    05/29/2025  Time:    09:49               Narrative:    EXAMINATION:  XR CHEST AP PORTABLE    CLINICAL HISTORY:  chest pain;    TECHNIQUE:  Single frontal view of the chest was performed.    COMPARISON:  Chest radiograph performed 05/23/2025, 09:40 hours.    FINDINGS:  Monitoring leads over the chest.  Grossly unchanged cardiac contours.  Lungs grossly clear.    No definite pneumothorax or large volume pleural effusion.    No acute findings in the visualized abdomen.  Osseous and soft tissue structures appear without definite acute change.                                       Medications   aluminum-magnesium hydroxide-simethicone 200-200-20 mg/5 mL suspension 30 mL (30 mLs Oral Given 5/29/25 1048)     And   LIDOcaine viscous HCl 2% oral solution 15 mL (15 mLs Oral Given 5/29/25 1048)     Medical Decision Making  50-year-old male with history of hypertension presenting with recurrent chest pain shortness of breath.  Recently had  angiogram which showed nonobstructive CAD.    Amount and/or Complexity of Data Reviewed  Labs: ordered. Decision-making details documented in ED Course.  Radiology: ordered.    Risk  OTC drugs.  Prescription drug management.               ED Course as of 25 1355   Thu May 29, 2025   0911 CBC Auto Differential(!) [SN]   0941 Magnesium : 2.3 [SN]   0941 Troponin I: 0.016 [SN]   0941 Comprehensive Metabolic Panel(!) [SN]   1354 Repeat troponin within normal limits.  With recent angiogram showing nonobstructive CAD, low suspicion for acute life-threatening illness.  I think patient can be discharged home.  Doubt PE.  Doubt dissection.  Maybe GI related symptoms. [SN]      ED Course User Index  [SN] Ga Pratt MD                           Clinical Impression:  Final diagnoses:  [R07.9] Chest pain  [R07.9] Chest pain, unspecified type (Primary)          ED Disposition Condition    Discharge Stable          ED Prescriptions       Medication Sig Dispense Start Date End Date Auth. Provider    pantoprazole (PROTONIX) 20 MG tablet Take 1 tablet (20 mg total) by mouth once daily. 30 tablet 2025 Ga Pratt MD          Follow-up Information       Follow up With Specialties Details Why Contact Info    Roxanne Wolfe MD Internal Medicine Schedule an appointment as soon as possible for a visit in 1 week   [facility] [id number]  [phone]                     [1]   Social History  Tobacco Use    Smoking status: Former     Current packs/day: 0.00     Types: Cigarettes     Quit date: 2008     Years since quittin.5    Smokeless tobacco: Former   Substance Use Topics    Alcohol use: Yes     Alcohol/week: 2.0 standard drinks of alcohol     Types: 1 Glasses of wine, 1 Cans of beer per week     Comment: holidays    Drug use: No        Ga Pratt MD  25 [id number]

## 2025-05-29 NOTE — ED NOTES
"Onset of mid sternal chest pressure at approx. 730am this morning with minimal activity. Recent hospitalization for NSTEMI. Denies radiation. Denies significant sob but had feeling of "not being able to catch my breath". Reports dizziness after taking Ntg. Sl x4 that resolved and mild diaphoresis. No n/v. He currently rates discomfort 5/10. Discussed current test results and plan of care with patient. Also discussed notify staff immediately if symptoms worsen.   "

## 2025-05-29 NOTE — ED NOTES
Pt. Reports belching after gi cocktail and has felt better since. Pt. Has f/u testing with Cardiology in a.m.. stable, without distress.

## 2025-05-30 ENCOUNTER — HOSPITAL ENCOUNTER (OUTPATIENT)
Dept: CARDIOLOGY | Facility: HOSPITAL | Age: 50
Discharge: HOME | End: 2025-05-30
Attending: INTERNAL MEDICINE
Payer: COMMERCIAL

## 2025-05-30 VITALS — HEIGHT: 69 IN | WEIGHT: 252 LBS | BODY MASS INDEX: 37.33 KG/M2

## 2025-05-30 DIAGNOSIS — I21.4 NON-ST ELEVATION MYOCARDIAL INFARCTION (NSTEMI): ICD-10-CM

## 2025-05-30 PROCEDURE — 93924 LWR XTR VASC STDY BILAT: CPT

## 2025-05-30 PROCEDURE — 93924 LWR XTR VASC STDY BILAT: CPT | Mod: 26,,, | Performed by: INTERNAL MEDICINE

## 2025-05-31 LAB
ABI POST MINUTES1: 2 MIN
ABI POST MINUTES2: 3 MIN
IMMEDIATE ARM BP: 161 MMHG
IMMEDIATE LEFT ABI: 1.22
IMMEDIATE LEFT TIBIAL: 197 MMHG
IMMEDIATE RIGHT ABI: 1.23
IMMEDIATE RIGHT TIBIAL: 198 MMHG
LEFT ABI: 1.36
LEFT ARM BP: 136 MMHG
LEFT DORSALIS PEDIS: 209 MMHG
LEFT POSTERIOR TIBIAL: 183 MMHG
POST1 ARM BP: 154 MMHG
POST1 LEFT ABI: 1.18
POST1 LEFT TIBIAL: 181 MMHG
POST1 RIGHT ABI: 1.32
POST1 RIGHT TIBIAL: 203 MMHG
POST2 ARM BP: 148 MMHG
POST2 LEFT ABI: 1.2
POST2 LEFT TIBIAL: 178 MMHG
POST2 RIGHT ABI: 1.33
POST2 RIGHT TIBIAL: 197 MMHG
RIGHT ABI: 1.25
RIGHT ARM BP: 154 MMHG
RIGHT DORSALIS PEDIS: 192 MMHG
RIGHT POSTERIOR TIBIAL: 178 MMHG
TREADMILL GRADE: 10 %
TREADMILL SPEED: 2 MPH
TREADMILL TIME: 5 MIN

## 2025-06-01 ENCOUNTER — RESULTS FOLLOW-UP (OUTPATIENT)
Dept: CARDIOLOGY | Facility: CLINIC | Age: 50
End: 2025-06-01

## 2025-06-02 LAB
OHS QRS DURATION: 92 MS
OHS QTC CALCULATION: 431 MS

## 2025-06-06 ENCOUNTER — PATIENT MESSAGE (OUTPATIENT)
Dept: ADMINISTRATIVE | Facility: HOSPITAL | Age: 50
End: 2025-06-06
Payer: COMMERCIAL

## 2025-06-21 DIAGNOSIS — F41.1 GAD (GENERALIZED ANXIETY DISORDER): ICD-10-CM

## 2025-06-21 RX ORDER — DULOXETIN HYDROCHLORIDE 60 MG/1
60 CAPSULE, DELAYED RELEASE ORAL DAILY
Qty: 30 CAPSULE | Refills: 1 | Status: SHIPPED | OUTPATIENT
Start: 2025-06-21 | End: 2025-07-21

## 2025-06-30 ENCOUNTER — OFFICE VISIT (OUTPATIENT)
Dept: FAMILY MEDICINE | Facility: CLINIC | Age: 50
End: 2025-06-30
Payer: COMMERCIAL

## 2025-06-30 VITALS
OXYGEN SATURATION: 98 % | WEIGHT: 252.19 LBS | HEART RATE: 66 BPM | BODY MASS INDEX: 37.24 KG/M2 | SYSTOLIC BLOOD PRESSURE: 114 MMHG | DIASTOLIC BLOOD PRESSURE: 78 MMHG

## 2025-06-30 DIAGNOSIS — E78.5 HYPERLIPIDEMIA, UNSPECIFIED HYPERLIPIDEMIA TYPE: Primary | ICD-10-CM

## 2025-06-30 DIAGNOSIS — G47.33 OBSTRUCTIVE SLEEP APNEA ON CPAP: ICD-10-CM

## 2025-06-30 DIAGNOSIS — E03.9 HYPOTHYROIDISM, UNSPECIFIED TYPE: ICD-10-CM

## 2025-06-30 DIAGNOSIS — I21.4 NON-STEMI (NON-ST ELEVATED MYOCARDIAL INFARCTION): ICD-10-CM

## 2025-06-30 DIAGNOSIS — I25.10 NONOBSTRUCTIVE ATHEROSCLEROSIS OF CORONARY ARTERY: ICD-10-CM

## 2025-06-30 DIAGNOSIS — E11.9 NEW ONSET TYPE 2 DIABETES MELLITUS: ICD-10-CM

## 2025-06-30 DIAGNOSIS — I50.32 CHRONIC DIASTOLIC HEART FAILURE: ICD-10-CM

## 2025-06-30 DIAGNOSIS — I10 PRIMARY HYPERTENSION: ICD-10-CM

## 2025-06-30 PROBLEM — K42.9 REDUCIBLE UMBILICAL HERNIA: Status: RESOLVED | Noted: 2019-11-25 | Resolved: 2025-06-30

## 2025-06-30 PROBLEM — E66.9 OBESITY: Status: RESOLVED | Noted: 2025-05-23 | Resolved: 2025-06-30

## 2025-06-30 PROBLEM — R10.32 LEFT GROIN PAIN: Status: RESOLVED | Noted: 2021-06-28 | Resolved: 2025-06-30

## 2025-06-30 PROBLEM — E66.812 OBESITY, CLASS II, BMI 35-39.9: Status: RESOLVED | Noted: 2017-08-09 | Resolved: 2025-06-30

## 2025-06-30 PROCEDURE — 99215 OFFICE O/P EST HI 40 MIN: CPT | Mod: S$GLB,,, | Performed by: INTERNAL MEDICINE

## 2025-06-30 PROCEDURE — 1160F RVW MEDS BY RX/DR IN RCRD: CPT | Mod: CPTII,S$GLB,, | Performed by: INTERNAL MEDICINE

## 2025-06-30 PROCEDURE — 1159F MED LIST DOCD IN RCRD: CPT | Mod: CPTII,S$GLB,, | Performed by: INTERNAL MEDICINE

## 2025-06-30 PROCEDURE — 3044F HG A1C LEVEL LT 7.0%: CPT | Mod: CPTII,S$GLB,, | Performed by: INTERNAL MEDICINE

## 2025-06-30 PROCEDURE — 3078F DIAST BP <80 MM HG: CPT | Mod: CPTII,S$GLB,, | Performed by: INTERNAL MEDICINE

## 2025-06-30 PROCEDURE — 4010F ACE/ARB THERAPY RXD/TAKEN: CPT | Mod: CPTII,S$GLB,, | Performed by: INTERNAL MEDICINE

## 2025-06-30 PROCEDURE — 3008F BODY MASS INDEX DOCD: CPT | Mod: CPTII,S$GLB,, | Performed by: INTERNAL MEDICINE

## 2025-06-30 PROCEDURE — 3074F SYST BP LT 130 MM HG: CPT | Mod: CPTII,S$GLB,, | Performed by: INTERNAL MEDICINE

## 2025-06-30 PROCEDURE — 99999 PR PBB SHADOW E&M-EST. PATIENT-LVL IV: CPT | Mod: PBBFAC,,, | Performed by: INTERNAL MEDICINE

## 2025-06-30 RX ORDER — TIRZEPATIDE 2.5 MG/.5ML
2.5 INJECTION, SOLUTION SUBCUTANEOUS
Qty: 2 ML | Refills: 0 | Status: SHIPPED | OUTPATIENT
Start: 2025-06-30

## 2025-07-01 ENCOUNTER — PATIENT MESSAGE (OUTPATIENT)
Dept: FAMILY MEDICINE | Facility: CLINIC | Age: 50
End: 2025-07-01
Payer: COMMERCIAL

## 2025-07-02 ENCOUNTER — PATIENT MESSAGE (OUTPATIENT)
Dept: FAMILY MEDICINE | Facility: CLINIC | Age: 50
End: 2025-07-02

## 2025-07-02 PROBLEM — E11.9 NEW ONSET TYPE 2 DIABETES MELLITUS: Status: ACTIVE | Noted: 2025-07-02

## 2025-07-02 NOTE — PROGRESS NOTES
Patient ID: Jesse Hernández is a 50 y.o. male    Chief Complaint: Hypertension    History of Present Illness    CHIEF COMPLAINT:  Patient presents for follow-up after an emergency department visit for hypertension and a non-STEMI (non-ST-elevation myocardial infarction).    HPI:  Patient was admitted to the emergency department on May 23rd with concerns about high blood pressure. An angiogram revealed non-obstructive heart disease, and he was diagnosed with a non-STEMI. He was prescribed high-dose cholesterol medicine and blood pressure medication, and instructed to follow up with his doctor.    On May 29th, he returned to the hospital due to chest pain concerns, as advised by the emergency department staff. His EKG and cardiac enzymes were normal, and he was discharged.    He reports taking all prescribed medications as directed, including Lisinopril, baby aspirin, high-dose Lipitor (atorvastatin), and metoprolol. He deviates from his diet once a week on Wednesdays at lunch, consuming pork chops at a specific restaurant, but otherwise attempts to maintain a low-fat diet.    In response to his recent health scare, he has initiated an evening walking routine with his partner, Kay, although inclement weather has been interfering with their schedule.    He is evaluated by Dr. Cisneros for pain management, particularly for back and shoulder issues. He receives injections for these problems and has been prescribed pain medication (hydrocodone) and Celebrex in the past, though he reports infrequent use of the hydrocodone.    MEDICATIONS:  Patient is on Lisinopril 40 mg daily for hypertension, which was recently increased by his cardiologist. He is also taking daily baby aspirin and Atorvastatin (Lipitor) 80 mg for high cholesterol. His medication regimen includes Metoprolol 100 mg daily and Levothyroxine for thyroid management. For pain management, he has been prescribed 15 tablets of Hydrocodone to be taken as needed,  along with Celebrex.    MEDICAL HISTORY:  Patient has a history of hypertension, non-obstructive coronary artery disease, and non-ST-elevation myocardial infarction (NSTEMI). He also has obstructive sleep apnea. His diabetes is indicated by a Hemoglobin A1C of 6%, and he has hyperlipidemia with an LDL of 164.    SURGICAL HISTORY:  Patient underwent an angiogram on May 23rd for evaluation of coronary artery disease. He also had hernia surgery for a suspected umbilical hernia, though the date of this procedure is not specified.    TEST RESULTS:  On May 23rd, the patient's Hemoglobin A1C was 6%, indicating an average blood sugar of 126. A cholesterol panel conducted on the same day revealed an LDL (bad cholesterol) level of 164. Patient underwent an EKG on May 29th, which showed normal results. Cardiac enzyme tests performed on the same day were also normal.    IMAGING:  An angiogram conducted on May 23rd revealed non-obstructive heart disease.          ROS: Otherwise Negative     Physical Exam    General: Well-appearing. Well-nourished. No distress.  HEENT: Conjunctivae normal. Pupils are equal and reactive to light. TMs are clear and intact bilaterally. Hearing grossly normal. Nasopharynx clear. Oropharynx clear.  Neck: Supple. No thyromegaly. No bruits.  Lymph: No cervical adenopathy. No supraclavicular adenopathy.  Heart: Regular rate and rhythm. No murmur. No rub. No gallop.  Lungs: Clear to auscultation. Respiratory effort normal.  Abdomen: Soft. Nontender. Nondistended. Normoactive bowel sounds. No hepatomegaly. No masses.  Extremities: Good distal pulses. No edema.  Psych: Oriented to time person place. Judgment unimpaired. Insight unimpaired. Mood appropriate. Affect appropriate.  Vitals: Blood pressure: 114/78. Weight: 200 lbs.          Assessment & Plan    NON-ST ELEVATION MYOCARDIAL INFARCTION (NSTEMI):  - Evaluated patient's non-STEMI condition, noting EKG did not change but there was concern.  - Patient  to continue follow-up with cardiologist, Dr. Abraham, who was involved during the hospital stay.    ESSENTIAL HYPERTENSION:  - Blood pressure today is optimal at 114/78, though it was elevated during the emergency department visit.  - Continue Lisinopril 40 mg daily for management.  - Patient instructed to perform daily BP checks in the morning and evening and to know current BP for next visit.    ATHEROSCLEROTIC HEART DISEASE:  - Evaluated and confirmed non-obstructive coronary disease.  - Continue atorvastatin 80 mg, Lisinopril, and metoprolol for management.    OBSTRUCTIVE SLEEP APNEA:  - Initiated Mounjaro, a once-weekly injectable GLP-1 agonist, which offers multifaceted benefits for sleep apnea, diabetes, and obesity management.    TYPE 2 DIABETES MELLITUS:  - Hemoglobin A1C is well-controlled at 6% (average glucose 126).  - Started Mounjaro 2.5 mg weekly injection for comprehensive management of metabolic issues.  - Plan to increase to 5 mg weekly after 1 month pending e-visit confirmation.  - Scheduled 1-month e-visit for dose adjustment and ordered diabetic and thyroid labs to be completed before 4-month follow-up.    HYPERLIPIDEMIA:  - LDL cholesterol elevated at 164.  - Continue high-dose atorvastatin 80 mg for management.    BACK PAIN:  - Patient experiences upper back pain, initially thought to be related to umbilical hernia.  - Continue management with injections administered by Dr. Cisneros.    SHOULDER PAIN:  - Patient has right shoulder dysfunction with pain upon use.  - Continue management with injections administered by Dr. Cisneros.    LONG TERM USE OF ASPIRIN:  - Continue baby aspirin as part of medication regimen.    GENERAL HEALTH MANAGEMENT AND FOLLOW-UP:  - Discussed that Mounjaro's full benefits may not be immediate.  - Emphasized importance of lifestyle modifications including healthier food choices (less sugar and starches, more healthy proteins, fruits, and vegetables) and  maintaining/increasing current walking routine.  - Patient advised on proper weight measurement technique and to know current weight for next visit.  - Follow up in 4 months for in-person or virtual visit to assess weight loss progress and review lab results.            Follow up in about 4 months (around 10/30/2025) for diabetes cmp hba1c lipids, virtual.    Jesse was seen today for hypertension.    Diagnoses and all orders for this visit:    Hyperlipidemia, unspecified hyperlipidemia type    Nonobstructive atherosclerosis of coronary artery  -     MYC E-VISIT    New onset type 2 diabetes mellitus  -     tirzepatide (MOUNJARO) 2.5 mg/0.5 mL PnIj; Inject 2.5 mg into the skin every 7 days.  -     MYC E-VISIT  -     Comprehensive Metabolic Panel; Standing  -     Hemoglobin A1C; Standing  -     Lipid Panel; Standing    Obstructive sleep apnea on CPAP  -     tirzepatide (MOUNJARO) 2.5 mg/0.5 mL PnIj; Inject 2.5 mg into the skin every 7 days.    Non-STEMI (non-ST elevated myocardial infarction)  Continue medical management and follow-up with Cardiology  Primary hypertension  Controlled.  Continue current medical regimen.  Prescription refills addressed.  Followup advised. See after visit summary.  Hypothyroidism, unspecified type  -     TSH; Standing    Chronic diastolic heart failure  Stable also following up with Cardiology     I spent a total of 40 minutes on the day of the visit.This includes face to face time and non-face to face time preparing to see the patient (eg, review of tests), obtaining and/or reviewing separately obtained history, documenting clinical information in the electronic or other health record, independently interpreting results and communicating results to the patient/family/caregiver, or care coordinator.     This note was generated with the assistance of ambient listening technology. Verbal consent was obtained by the patient and accompanying visitor(s) for the recording of patient appointment  to facilitate this note. I attest to having reviewed and edited the generated note for accuracy, though some syntax or spelling errors may persist. Please contact the author of this note for any clarification.

## 2025-07-14 ENCOUNTER — TELEPHONE (OUTPATIENT)
Dept: FAMILY MEDICINE | Facility: CLINIC | Age: 50
End: 2025-07-14
Payer: COMMERCIAL

## 2025-07-14 DIAGNOSIS — G47.33 OBSTRUCTIVE SLEEP APNEA ON CPAP: Primary | ICD-10-CM

## 2025-07-14 RX ORDER — TIRZEPATIDE 2.5 MG/.5ML
2.5 INJECTION, SOLUTION SUBCUTANEOUS
Qty: 2 ML | Refills: 0 | Status: SHIPPED | OUTPATIENT
Start: 2025-07-14

## 2025-07-20 DIAGNOSIS — F41.1 GAD (GENERALIZED ANXIETY DISORDER): ICD-10-CM

## 2025-07-20 RX ORDER — LAMOTRIGINE 150 MG/1
300 TABLET ORAL
Qty: 180 TABLET | Refills: 0 | Status: SHIPPED | OUTPATIENT
Start: 2025-07-20 | End: 2025-07-24 | Stop reason: SDUPTHER

## 2025-07-22 ENCOUNTER — PATIENT MESSAGE (OUTPATIENT)
Dept: PSYCHIATRY | Facility: CLINIC | Age: 50
End: 2025-07-22
Payer: COMMERCIAL

## 2025-07-24 ENCOUNTER — OFFICE VISIT (OUTPATIENT)
Dept: PSYCHIATRY | Facility: CLINIC | Age: 50
End: 2025-07-24
Payer: COMMERCIAL

## 2025-07-24 DIAGNOSIS — F33.42 RECURRENT MAJOR DEPRESSIVE DISORDER, IN FULL REMISSION: Primary | ICD-10-CM

## 2025-07-24 DIAGNOSIS — F41.1 GAD (GENERALIZED ANXIETY DISORDER): ICD-10-CM

## 2025-07-24 PROCEDURE — G2211 COMPLEX E/M VISIT ADD ON: HCPCS | Mod: 95,,, | Performed by: NURSE PRACTITIONER

## 2025-07-24 PROCEDURE — 4010F ACE/ARB THERAPY RXD/TAKEN: CPT | Mod: CPTII,95,, | Performed by: NURSE PRACTITIONER

## 2025-07-24 PROCEDURE — 98006 SYNCH AUDIO-VIDEO EST MOD 30: CPT | Mod: 95,,, | Performed by: NURSE PRACTITIONER

## 2025-07-24 PROCEDURE — 3044F HG A1C LEVEL LT 7.0%: CPT | Mod: CPTII,95,, | Performed by: NURSE PRACTITIONER

## 2025-07-24 RX ORDER — DULOXETIN HYDROCHLORIDE 60 MG/1
CAPSULE, DELAYED RELEASE ORAL
Qty: 90 CAPSULE | Refills: 1 | Status: SHIPPED | OUTPATIENT
Start: 2025-07-24

## 2025-07-24 RX ORDER — DULOXETIN HYDROCHLORIDE 20 MG/1
CAPSULE, DELAYED RELEASE ORAL
Qty: 90 CAPSULE | Refills: 1 | Status: SHIPPED | OUTPATIENT
Start: 2025-07-24

## 2025-07-24 RX ORDER — LAMOTRIGINE 150 MG/1
150 TABLET ORAL 2 TIMES DAILY
Qty: 180 TABLET | Refills: 1 | Status: SHIPPED | OUTPATIENT
Start: 2025-07-24 | End: 2025-10-22

## 2025-07-24 NOTE — PROGRESS NOTES
"Outpatient Psychiatry Follow-Up Visit (MD/NP)    7/24/2025   The patient location is: [place], [place]  The chief complaint leading to consultation is: Depression and anxiety    Visit type: audiovisual    Face to Face time with patient: 14 minutes  20 minutes of total time spent on the encounter, which includes face to face time and non-face to face time preparing to see the patient (eg, review of tests), Obtaining and/or reviewing separately obtained history, Documenting clinical information in the electronic or other health record, Independently interpreting results (not separately reported) and communicating results to the patient/family/caregiver, or Care coordination (not separately reported).         Each patient to whom he or she provides medical services by telemedicine is:  (1) informed of the relationship between the physician and patient and the respective role of any other health care provider with respect to management of the patient; and (2) notified that he or she may decline to receive medical services by telemedicine and may withdraw from such care at any time.    Notes:       Clinical Status of Patient:  Outpatient (Ambulatory)    Chief Complaint:  Jesse Hernández is a 50 y.o. male who presents today for follow-up of depression and anxiety.  Met with patient.      Interval History and Content of Current Session:  Interim Events/Subjective Report/Content of Current Session:   Patient arrived on time for his scheduled appointment. He stated he has been doing well since his last visit. Patient stated his mood has been "calm" and his affect was appropriate.     He stated he feels healthy and sees a chiropractor for back pain as needed.    He reported eating less and lost 20 lbs since his last visit and feeling well.    Stated his wife and his children are all doing well.  He stated he is working for the same company.     He reported managed anxiety, depression, and mood with his current medication regimen of " duloxetine 80 mg po daily and Lamictal 300 mg po daily. He denied SI/HI/AH/VH and no delusion noted or reported.  He denied suicidal thoughts and no suicide plan or intent. No suicide attempts.     Patient denied symptoms of debby or hypomania.     He reported occasional alcohol use and denied any type of illicit drug use or alcohol abuse. He reported good sleep and he still uses his CPAP. He reported medication compliance and denied any side or adverse effects to his current medication of Lamictal 300 mg daily and duloxetine 80 mg daily.      Psychiatric Review Of Systems - Is patient experiencing or having changes in:  sleep: good Uses his CPAP machine  appetite: no  weight: yes lost 20 lbs per patient report  energy/anergy: tired at times  interest/pleasure/anhedonia: no  somatic symptoms: no  anxiety/panic: yes but managed  guilty/hopelessness: no  concentration: no  S.I.B.s/risky behavior: no  Irritability:  No irritability  Racing thoughts: no  Impulsive behaviors: no  Paranoia:no  AVH:no,   Suicidal thoughts/plan/intent: no  SE: no   ETOH: no  Drugs: no    Psychotherapy:  Target symptoms: anxiety , mood/depression  Why chosen therapy is appropriate versus another modality: relevant to diagnosis, patient responds to this modality, evidence based practice  Outcome monitoring methods: self-report, observation  Therapeutic intervention type: insight oriented psychotherapy, supportive psychotherapy  Topics discussed/themes: building skills sets for symptom management, symptom recognition  The patient's response to the intervention is accepting, motivated. The patient's progress toward treatment goals is good.   Duration of intervention: 10 minutes.    Review of Systems   PSYCHIATRIC: Pertinant items are noted in the narrative.  CONSTITUTIONAL: No weight gain or loss.   MUSCULOSKELETAL: No pain or stiffness of the joints.  NEUROLOGIC: No weakness, sensory changes, seizures, confusion, memory loss, tremor or other  "abnormal movements.  ENDOCRINE: No polydipsia or polyuria.  INTEGUMENTARY: No rashes or lacerations.  EYES: No exophthalmos, jaundice or blindness.  ENT: No dizziness, tinnitus or hearing loss.  RESPIRATORY: No shortness of breath.  CARDIOVASCULAR: No tachycardia or chest pain.  GASTROINTESTINAL: No nausea, vomiting, pain, constipation or diarrhea.  GENITOURINARY: No frequency, dysuria or sexual dysfunction.  HEMATOLOGIC/LYMPHATIC: No excessive bleeding, prolonged or excessive bleeding after dental extraction/injury.  ALLERGIC/IMMUNOLOGIC: No allergic response to materials, foods or animals at this time.    Past Medical, Family and Social History: The patient's past medical, family and social history have been reviewed and updated as appropriate within the electronic medical record - see encounter notes.    Compliance: yes    Side effects: None    Risk Parameters:  Patient reports no suicidal ideation  Patient reports no homicidal ideation  Patient reports no self-injurious behavior  Patient reports no violent behavior    Exam (detailed: at least 9 elements; comprehensive: all 15 elements)   Constitutional  Vitals:  Most recent vital signs, dated greater than 90 days prior to this appointment, were reviewed.   There were no vitals filed for this visit.     General:  unremarkable, age appropriate     Musculoskeletal  Muscle Strength/Tone:  no dystonia, no tremor, no tic   Gait & Station:  non-ataxic     Psychiatric  Speech:  no latency; no press   Mood & Affect:  "calm"  appropriate   Thought Process:  normal and logical   Associations:  intact   Thought Content:  normal, no suicidality, no homicidality, delusions, or paranoia   Insight:  intact   Judgement: behavior is adequate to circumstances   Orientation:  grossly intact   Memory: intact for content of interview   Language: grossly intact, able to name, able to repeat   Attention Span & Concentration:  able to focus   Fund of Knowledge:  intact and appropriate " to age and level of education     Assessment and Diagnosis   Status/Progress: Based on the examination today, the patient's problem(s) is/are improved.  New problems have not been presented today.   Co-morbidities, Diagnostic uncertainty and Lack of compliance are not complicating management of the primary condition.  There are no active rule-out diagnoses for this patient at this time.     General Impression:  Doing okay and stable but c/o increased anxiety and wants to increased duloxetine to 80 mg daily and will continue Lamictal 300 mg daily. 7/24/2025. Patient is stable and doing well. Patient requested keeping the same dosages of his current medications, Lamictal 300 mg daily and duloxetine 80 mg p.o. daily due to their effectiveness in managing his symptoms.  He reported feeling optimistic and hopeful toward his future and denied any thoughts of harming himself or others.  He reported tolerating his medications well without any side or adverse effects          ICD-10-CM ICD-9-CM   1. Recurrent major depressive disorder, in full remission  F33.42 296.36   2. ALISON (generalized anxiety disorder)  F41.1 300.02             Intervention/Counseling/Treatment Plan   Medication Management: Continue current medications. The risks and benefits of medication were discussed with the patient.  Labs, Diagnostic Studies: reviewed all recent labs are reviewedall recent vitals  Continue duloxetine 80 mg po daily for MDD and ALISON  Continue Lamictal 300 mg po daily (take 2 tabs of 150 mg in the morning) for mood stabilization. Warned against SJS and he agreed to continue taking. Patient is aware of most common and rare side effects including the possibility of developing a deadly and disfiguring rash from Taz Migue syndrome, which is a rare adverse effect, for taking Lamictal.  Patient believes the benefits of taking Lamictal outweigh the risks.  -Discussed informed consent, diagnosis, risks and benefits of proposed treatment  above vs alternative treatments vs no treatment, and potential side effects of these treatments. The patient expresses understanding of the above and displays the capacity to agree with this treatment given said understanding. Patient also agrees that, currently, the benefits outweigh the risks and would like to pursue treatment at this time. Answered all questions and discussed follow up. Encouraged patient to contact us with any questions or concerns.   -Encouraged Patient to keep future appointments.  -Take medications as prescribed   -Patient to present to Emergency Department for thoughts to harm herself or others      Return to Clinic: 3 months or earlier as needed      TRAY Ruelas-BC           no

## (undated) DEVICE — PAD DEFIB CADENCE ADULT R2

## (undated) DEVICE — PACK BASIC

## (undated) DEVICE — SUT MCRYL PLUS 4-0 PS2 27IN

## (undated) DEVICE — COVER PROBE US 5.5X58L NON LTX

## (undated) DEVICE — SUT VICRYL 3-0 27 SH

## (undated) DEVICE — GUIDE LAUNCHER 6FR JL 3.5

## (undated) DEVICE — GLOVE SURGICAL LATEX SZ 7

## (undated) DEVICE — SEE MEDLINE ITEM 157116

## (undated) DEVICE — CATH ANG PIGTAIL 4FR INFINITY

## (undated) DEVICE — MANIFOLD 4 PORT

## (undated) DEVICE — KIT LEFT HEART MANIFOLD CUSTOM

## (undated) DEVICE — SUT MONOCRYL 4-0 PS-2

## (undated) DEVICE — SPIKE SHORT LG BORE 1-WAY 2IN

## (undated) DEVICE — SEE MEDLINE ITEM 156955

## (undated) DEVICE — COVER TIP CURVED SCISSORS XI

## (undated) DEVICE — DRESSING TRANS 4X4 3/4

## (undated) DEVICE — KIT GLIDESHEATH SLEND 6FR 10CM

## (undated) DEVICE — DRESSING AQUACEL SACRAL 9 X 9

## (undated) DEVICE — ADHESIVE DERMABOND ADVANCED

## (undated) DEVICE — SEAL UNIVERSAL 5MM-8MM XI

## (undated) DEVICE — SUT V-LOC 90 UD GS22 2-0 9IN

## (undated) DEVICE — PADS RADI PERIPHERAL SHIELD

## (undated) DEVICE — VALVE GUARDIAN II HEMOSTASIS

## (undated) DEVICE — SEE MEDLINE ITEM 152622

## (undated) DEVICE — DRAPE ANGIO BRACH 38X44IN

## (undated) DEVICE — COVER OVERHEAD SURG LT BLUE

## (undated) DEVICE — ELECTRODE REM PLYHSV RETURN 9

## (undated) DEVICE — SEE MEDLINE ITEM 157148

## (undated) DEVICE — TRAY FOLEY 16FR INFECTION CONT

## (undated) DEVICE — KIT WING PAD POSITIONING

## (undated) DEVICE — NDL INSUF ULTRA VERESS 120MM

## (undated) DEVICE — GAUZE SPONGE 4X4 12PLY

## (undated) DEVICE — SEE MEDLINE ITEM 156952

## (undated) DEVICE — VISIPAQUE 320 200ML +PK

## (undated) DEVICE — SEE MEDLINE ITEM 157117

## (undated) DEVICE — HEMOSTAT VASC BAND LONG 27CM

## (undated) DEVICE — GUIDEWIRE EMERALD .035IN 260CM

## (undated) DEVICE — TUBING HPCIL ROT M/F ADPT 48IN

## (undated) DEVICE — CATH IMPULSE 5F 100CM FR4

## (undated) DEVICE — OBTURATOR BLADELESS 8MM XI CLR

## (undated) DEVICE — NDL HYPO REG 25G X 1 1/2

## (undated) DEVICE — Device

## (undated) DEVICE — GLOVE 7.5 PROTEXIS PI BLUE

## (undated) DEVICE — DRAPE ARM DAVINCI XI

## (undated) DEVICE — SUT ETHIBOND 0 CR/CT-2 8-18

## (undated) DEVICE — APPLICATOR CHLORAPREP ORN 26ML

## (undated) DEVICE — CATH OPTITORQUE TIGER 5F 100CM